# Patient Record
Sex: FEMALE | Race: WHITE | NOT HISPANIC OR LATINO | Employment: OTHER | URBAN - NONMETROPOLITAN AREA
[De-identification: names, ages, dates, MRNs, and addresses within clinical notes are randomized per-mention and may not be internally consistent; named-entity substitution may affect disease eponyms.]

---

## 2017-03-13 ENCOUNTER — AMBULATORY - GICH (OUTPATIENT)
Dept: PEDIATRICS | Facility: OTHER | Age: 82
End: 2017-03-13

## 2017-03-17 ENCOUNTER — OFFICE VISIT - GICH (OUTPATIENT)
Dept: PEDIATRICS | Facility: OTHER | Age: 82
End: 2017-03-17

## 2017-03-17 ENCOUNTER — AMBULATORY - GICH (OUTPATIENT)
Dept: RADIOLOGY | Facility: OTHER | Age: 82
End: 2017-03-17

## 2017-03-17 ENCOUNTER — HISTORY (OUTPATIENT)
Dept: PEDIATRICS | Facility: OTHER | Age: 82
End: 2017-03-17

## 2017-03-17 DIAGNOSIS — M99.83 OTHER BIOMECHANICAL LESIONS OF LUMBAR REGION (CODE): ICD-10-CM

## 2017-03-17 DIAGNOSIS — M12.88 OTHER SPECIFIC ARTHROPATHIES, NOT ELSEWHERE CLASSIFIED, OTHER SPECIFIED SITE: ICD-10-CM

## 2017-03-17 DIAGNOSIS — M05.79 RHEUMATOID ARTHRITIS OF MULTIPLE SITES WITHOUT ORGAN OR SYSTEM INVOLVEMENT WITH POSITIVE RHEUMATOID FACTOR (H): ICD-10-CM

## 2017-03-17 DIAGNOSIS — R05.9 COUGH: ICD-10-CM

## 2017-03-17 DIAGNOSIS — M48.061 SPINAL STENOSIS OF LUMBAR REGION: ICD-10-CM

## 2017-03-17 DIAGNOSIS — M53.9 DORSOPATHY: ICD-10-CM

## 2017-03-26 LAB
Lab: NEGATIVE

## 2017-03-29 ENCOUNTER — HOSPITAL ENCOUNTER (OUTPATIENT)
Dept: RADIOLOGY | Facility: OTHER | Age: 82
End: 2017-03-29
Attending: INTERNAL MEDICINE

## 2017-03-29 DIAGNOSIS — M12.88 OTHER SPECIFIC ARTHROPATHIES, NOT ELSEWHERE CLASSIFIED, OTHER SPECIFIED SITE: ICD-10-CM

## 2017-03-29 DIAGNOSIS — M99.83 OTHER BIOMECHANICAL LESIONS OF LUMBAR REGION (CODE): ICD-10-CM

## 2017-03-29 DIAGNOSIS — M53.9 DORSOPATHY: ICD-10-CM

## 2017-03-29 DIAGNOSIS — M05.79 RHEUMATOID ARTHRITIS OF MULTIPLE SITES WITHOUT ORGAN OR SYSTEM INVOLVEMENT WITH POSITIVE RHEUMATOID FACTOR (H): ICD-10-CM

## 2017-03-29 DIAGNOSIS — M48.061 SPINAL STENOSIS OF LUMBAR REGION: ICD-10-CM

## 2017-04-21 ENCOUNTER — AMBULATORY - GICH (OUTPATIENT)
Dept: PEDIATRICS | Facility: OTHER | Age: 82
End: 2017-04-21

## 2017-05-05 ENCOUNTER — COMMUNICATION - GICH (OUTPATIENT)
Dept: PEDIATRICS | Facility: OTHER | Age: 82
End: 2017-05-05

## 2017-05-05 DIAGNOSIS — R73.03 PREDIABETES: ICD-10-CM

## 2017-05-05 DIAGNOSIS — M06.9 RHEUMATOID ARTHRITIS (H): ICD-10-CM

## 2017-05-05 DIAGNOSIS — R73.01 IMPAIRED FASTING GLUCOSE: ICD-10-CM

## 2017-05-11 ENCOUNTER — HISTORY (OUTPATIENT)
Dept: PEDIATRICS | Facility: OTHER | Age: 82
End: 2017-05-11

## 2017-05-11 ENCOUNTER — AMBULATORY - GICH (OUTPATIENT)
Dept: LAB | Facility: OTHER | Age: 82
End: 2017-05-11

## 2017-05-11 ENCOUNTER — OFFICE VISIT - GICH (OUTPATIENT)
Dept: PEDIATRICS | Facility: OTHER | Age: 82
End: 2017-05-11

## 2017-05-11 DIAGNOSIS — M05.79 RHEUMATOID ARTHRITIS OF MULTIPLE SITES WITHOUT ORGAN OR SYSTEM INVOLVEMENT WITH POSITIVE RHEUMATOID FACTOR (H): ICD-10-CM

## 2017-05-11 DIAGNOSIS — M06.9 RHEUMATOID ARTHRITIS (H): ICD-10-CM

## 2017-05-11 DIAGNOSIS — M48.061 SPINAL STENOSIS OF LUMBAR REGION: ICD-10-CM

## 2017-05-11 DIAGNOSIS — M16.0 PRIMARY OSTEOARTHRITIS OF BOTH HIPS: ICD-10-CM

## 2017-05-11 DIAGNOSIS — E83.52 HYPERCALCEMIA: ICD-10-CM

## 2017-05-11 DIAGNOSIS — R73.01 IMPAIRED FASTING GLUCOSE: ICD-10-CM

## 2017-05-11 LAB
A/G RATIO - HISTORICAL: 1.8 (ref 1–2)
ABSOLUTE BASOPHILS - HISTORICAL: 0 THOU/CU MM
ABSOLUTE EOSINOPHILS - HISTORICAL: 0.1 THOU/CU MM
ABSOLUTE LYMPHOCYTES - HISTORICAL: 1.2 THOU/CU MM (ref 0.9–2.9)
ABSOLUTE MONOCYTES - HISTORICAL: 0.5 THOU/CU MM
ABSOLUTE NEUTROPHILS - HISTORICAL: 3.7 THOU/CU MM (ref 1.7–7)
ALBUMIN SERPL-MCNC: 4.9 G/DL (ref 3.5–5.7)
ALP SERPL-CCNC: 50 IU/L (ref 34–104)
ALT (SGPT) - HISTORICAL: 24 IU/L (ref 7–52)
ANION GAP - HISTORICAL: 11 (ref 5–18)
AST SERPL-CCNC: 21 IU/L (ref 13–39)
BASOPHILS # BLD AUTO: 0.6 %
BILIRUB SERPL-MCNC: 0.5 MG/DL (ref 0.3–1)
BUN SERPL-MCNC: 20 MG/DL (ref 7–25)
BUN/CREAT RATIO - HISTORICAL: 24
CALCIUM SERPL-MCNC: 11 MG/DL (ref 8.6–10.3)
CHLORIDE SERPLBLD-SCNC: 103 MMOL/L (ref 98–107)
CO2 SERPL-SCNC: 23 MMOL/L (ref 21–31)
CREAT SERPL-MCNC: 0.85 MG/DL (ref 0.7–1.3)
EOSINOPHIL NFR BLD AUTO: 1.9 %
ERYTHROCYTE [DISTWIDTH] IN BLOOD BY AUTOMATED COUNT: 14.3 % (ref 11.5–15.5)
ESTIMATED AVERAGE GLUCOSE: 103 MG/DL
GFR IF NOT AFRICAN AMERICAN - HISTORICAL: >60 ML/MIN/1.73M2
GLOBULIN - HISTORICAL: 2.8 G/DL (ref 2–3.7)
GLUCOSE SERPL-MCNC: 107 MG/DL (ref 70–105)
HCT VFR BLD AUTO: 38.3 % (ref 33–51)
HEMOGLOBIN A1C MONITORING (POCT) - HISTORICAL: 5.2 % (ref 4–6.2)
HEMOGLOBIN: 12.4 G/DL (ref 12–16)
LYMPHOCYTES NFR BLD AUTO: 22 % (ref 20–44)
MCH RBC QN AUTO: 31.9 PG (ref 26–34)
MCHC RBC AUTO-ENTMCNC: 32.3 G/DL (ref 32–36)
MCV RBC AUTO: 99 FL (ref 80–100)
MONOCYTES NFR BLD AUTO: 9.6 %
NEUTROPHILS NFR BLD AUTO: 66 % (ref 42–72)
PARATHYROID HORMONE INTACT: 81.3 PG/ML (ref 12–88)
PLATELET # BLD AUTO: 324 THOU/CU MM (ref 140–440)
PMV BLD: 5.7 FL (ref 6.5–11)
POTASSIUM SERPL-SCNC: 4 MMOL/L (ref 3.5–5.1)
PROT SERPL-MCNC: 7.7 G/DL (ref 6.4–8.9)
RED BLOOD COUNT - HISTORICAL: 3.88 MIL/CU MM (ref 4–5.2)
SODIUM SERPL-SCNC: 137 MMOL/L (ref 133–143)
VITAMIN D TOTAL - HISTORICAL: 36.4 NG/ML
WHITE BLOOD COUNT - HISTORICAL: 5.6 THOU/CU MM (ref 4.5–11)

## 2017-05-11 ASSESSMENT — ANXIETY QUESTIONNAIRES
2. NOT BEING ABLE TO STOP OR CONTROL WORRYING: NOT AT ALL
GAD7 TOTAL SCORE: 0
4. TROUBLE RELAXING: NOT AT ALL
7. FEELING AFRAID AS IF SOMETHING AWFUL MIGHT HAPPEN: NOT AT ALL
6. BECOMING EASILY ANNOYED OR IRRITABLE: NOT AT ALL
5. BEING SO RESTLESS THAT IT IS HARD TO SIT STILL: NOT AT ALL
3. WORRYING TOO MUCH ABOUT DIFFERENT THINGS: NOT AT ALL
1. FEELING NERVOUS, ANXIOUS, OR ON EDGE: NOT AT ALL

## 2017-05-16 LAB — PTH-RELATED PEPTIDE - HISTORICAL: <0.2 PMOL/L

## 2017-05-22 ENCOUNTER — HOSPITAL ENCOUNTER (OUTPATIENT)
Dept: RADIOLOGY | Facility: OTHER | Age: 82
End: 2017-05-22
Attending: INTERNAL MEDICINE

## 2017-05-22 DIAGNOSIS — M16.0 PRIMARY OSTEOARTHRITIS OF BOTH HIPS: ICD-10-CM

## 2017-06-07 ENCOUNTER — AMBULATORY - GICH (OUTPATIENT)
Dept: RADIOLOGY | Facility: OTHER | Age: 82
End: 2017-06-07

## 2017-06-07 DIAGNOSIS — M16.0 PRIMARY OSTEOARTHRITIS OF BOTH HIPS: ICD-10-CM

## 2017-06-27 ENCOUNTER — HOSPITAL ENCOUNTER (OUTPATIENT)
Dept: RADIOLOGY | Facility: OTHER | Age: 82
End: 2017-06-27
Attending: INTERNAL MEDICINE

## 2017-06-27 DIAGNOSIS — M16.0 PRIMARY OSTEOARTHRITIS OF BOTH HIPS: ICD-10-CM

## 2017-07-13 ENCOUNTER — AMBULATORY - GICH (OUTPATIENT)
Dept: SCHEDULING | Facility: OTHER | Age: 82
End: 2017-07-13

## 2017-07-17 ENCOUNTER — AMBULATORY - GICH (OUTPATIENT)
Dept: PEDIATRICS | Facility: OTHER | Age: 82
End: 2017-07-17

## 2017-08-08 ENCOUNTER — COMMUNICATION - GICH (OUTPATIENT)
Dept: PEDIATRICS | Facility: OTHER | Age: 82
End: 2017-08-08

## 2017-08-08 DIAGNOSIS — M06.9 RHEUMATOID ARTHRITIS (H): ICD-10-CM

## 2017-08-11 ENCOUNTER — HISTORY (OUTPATIENT)
Dept: PEDIATRICS | Facility: OTHER | Age: 82
End: 2017-08-11

## 2017-08-11 ENCOUNTER — OFFICE VISIT - GICH (OUTPATIENT)
Dept: PEDIATRICS | Facility: OTHER | Age: 82
End: 2017-08-11

## 2017-08-11 DIAGNOSIS — M05.79 RHEUMATOID ARTHRITIS OF MULTIPLE SITES WITHOUT ORGAN OR SYSTEM INVOLVEMENT WITH POSITIVE RHEUMATOID FACTOR (H): ICD-10-CM

## 2017-08-11 LAB
ABSOLUTE BASOPHILS - HISTORICAL: 0 THOU/CU MM
ABSOLUTE EOSINOPHILS - HISTORICAL: 0.2 THOU/CU MM
ABSOLUTE IMMATURE GRANULOCYTES(METAS,MYELOS,PROS) - HISTORICAL: 0 THOU/CU MM
ABSOLUTE LYMPHOCYTES - HISTORICAL: 1 THOU/CU MM (ref 0.9–2.9)
ABSOLUTE MONOCYTES - HISTORICAL: 0.4 THOU/CU MM
ABSOLUTE NEUTROPHILS - HISTORICAL: 4 THOU/CU MM (ref 1.7–7)
BASOPHILS # BLD AUTO: 0.4 %
C-REACTIVE PROTEIN - HISTORICAL: <1 MG/DL
EOSINOPHIL NFR BLD AUTO: 2.7 %
ERYTHROCYTE [DISTWIDTH] IN BLOOD BY AUTOMATED COUNT: 15.4 % (ref 11.5–15.5)
ERYTHROCYTE [SEDIMENTATION RATE] IN BLOOD: 16 MM/HR
HCT VFR BLD AUTO: 32.7 % (ref 33–51)
HEMOGLOBIN: 10.6 G/DL (ref 12–16)
IMMATURE GRANULOCYTES(METAS,MYELOS,PROS) - HISTORICAL: 0.5 %
LYMPHOCYTES NFR BLD AUTO: 17.7 % (ref 20–44)
MCH RBC QN AUTO: 29.5 PG (ref 26–34)
MCHC RBC AUTO-ENTMCNC: 32.4 G/DL (ref 32–36)
MCV RBC AUTO: 91 FL (ref 80–100)
MONOCYTES NFR BLD AUTO: 7.5 %
NEUTROPHILS NFR BLD AUTO: 71.2 % (ref 42–72)
PLATELET # BLD AUTO: 255 THOU/CU MM (ref 140–440)
PMV BLD: 9 FL (ref 6.5–11)
RED BLOOD COUNT - HISTORICAL: 3.59 MIL/CU MM (ref 4–5.2)
WHITE BLOOD COUNT - HISTORICAL: 5.6 THOU/CU MM (ref 4.5–11)

## 2017-08-11 ASSESSMENT — ANXIETY QUESTIONNAIRES
7. FEELING AFRAID AS IF SOMETHING AWFUL MIGHT HAPPEN: NOT AT ALL
5. BEING SO RESTLESS THAT IT IS HARD TO SIT STILL: NOT AT ALL
3. WORRYING TOO MUCH ABOUT DIFFERENT THINGS: NOT AT ALL
GAD7 TOTAL SCORE: 0
2. NOT BEING ABLE TO STOP OR CONTROL WORRYING: NOT AT ALL
6. BECOMING EASILY ANNOYED OR IRRITABLE: NOT AT ALL
1. FEELING NERVOUS, ANXIOUS, OR ON EDGE: NOT AT ALL
4. TROUBLE RELAXING: NOT AT ALL

## 2017-08-24 ENCOUNTER — OFFICE VISIT (OUTPATIENT)
Dept: AUDIOLOGY | Facility: OTHER | Age: 82
End: 2017-08-24
Attending: AUDIOLOGIST
Payer: MEDICARE

## 2017-08-24 DIAGNOSIS — H90.3 SENSORINEURAL HEARING LOSS, BILATERAL: Primary | ICD-10-CM

## 2017-08-24 PROCEDURE — 92556 SPEECH AUDIOMETRY COMPLETE: CPT | Performed by: AUDIOLOGIST

## 2017-08-24 PROCEDURE — 92552 PURE TONE AUDIOMETRY AIR: CPT | Performed by: AUDIOLOGIST

## 2017-08-24 NOTE — MR AVS SNAPSHOT
After Visit Summary   8/24/2017    Brenda Barker    MRN: [id number]           Patient Information     Date Of Birth          1935        Visit Information        Provider Department      8/24/2017 11:15 AM Fernanda Luna AuD [facility]        Today's Diagnoses     Sensorineural hearing loss, bilateral    -  1       Follow-ups after your visit        Your next 10 appointments already scheduled     Aug 24, 2017 11:15 AM CDT   (Arrive by 11:00 AM)   Hearing Evaluation with Cristela Erickson   [facility] [place] ([facility] [place] )    [address]  [place] [address]   [phone]           Please see your medical professional for ear cleaning prior to this appointment if you believe wax buildup may be an issue. All patients are required to have a physician's order stating the medical reason for the hearing test. Your doctor can send an electronic order, use their own form or we have provided a form (called Physician's Order for Audiology Services). It states that there is a medical reason for your exam. Without an order you may need to be rescheduled until the order can be obtained.              Who to contact     If you have questions or need follow up information about today's clinic visit or your schedule please contact [facility] directly at [phone].  Normal or non-critical lab and imaging results will be communicated to you by MyChart, letter or phone within 4 business days after the clinic has received the results. If you do not hear from us within 7 days, please contact the clinic through MyChart or phone. If you have a critical or abnormal lab result, we will notify you by phone as soon as possible.  Submit refill requests through [employer] or call your pharmacy and they will forward the refill request to us. Please allow 3 business days for your refill to be completed.          Additional Information About Your Visit         [employer] Information     [employer] gives you secure access to your electronic health record. If you see a primary care provider, you can also send messages to your care team and make appointments. If you have questions, please call your primary care clinic.  If you do not have a primary care provider, please call [phone] and they will assist you.        Care EveryWhere ID     This is your Care EveryWhere ID. This could be used by other organizations to access your [place] medical records  [id number]         Blood Pressure from Last 3 Encounters:   06/28/13 145/79   05/30/13 112/70   05/03/07 116/60    Weight from Last 3 Encounters:   05/30/13 181 lb (82.1 kg)   05/03/07 197 lb (89.4 kg)   04/16/07 197 lb (89.4 kg)              We Performed the Following     AUDIOGRAM/TYMPANOGRAM - INTERFACE        Primary Care Provider Office Phone # Fax #    Rich Keo Dukes [phone]       [facility] [address] [facility] [address]        Equal Access to Services     [facility]: Hadii aad ku hadasho Soomaali, waaxda luqadaha, qaybta kaalmada adeegyada, waxay paigein haymaddy garcia . So [facility] [phone].    ATENCIÓN: Si habla español, tiene a beltran disposición servicios gratuitos de asistencia lingüística. Llame al [phone].    We comply with applicable federal civil rights laws and [place] laws. We do not discriminate on the basis of race, color, national origin, age, disability sex, sexual orientation or gender identity.            Thank you!     Thank you for choosing [facility]  for your care. Our goal is always to provide you with excellent care. Hearing back from our patients is one way we can continue to improve our services. Please take a few minutes to complete the written survey that you may receive in the mail after your visit with us. Thank you!             Your Updated Medication List - Protect others around you: Learn how to safely  use, store and throw away your medicines at www.disposemymeds.org.          This list is accurate as of: 8/24/17 11:08 AM.  Always use your most recent med list.                   Brand Name Dispense Instructions for use Diagnosis    aspirin 81 MG tablet      1 TABLET DAILY        CALCIUM + D PO      1 TABLET DAILY        CENTRUM SILVER per tablet      1 TABLET DAILY        fish oil-omega-3 fatty acids 1000 MG capsule     OTC    2 CAPSULES DAILY  (? DOSE)    Other and unspecified hyperlipidemia       folic acid 1 MG tablet    FOLVITE     4 mg TABS DAILY    Rheumatoid arthritis(714.0), Obesity, unspecified, Other abnormal glucose       hydrochlorothiazide 12.5 MG capsule    MICROZIDE     Take 12.5 mg by mouth daily.        HYDROcodone-acetaminophen 5-500 MG per tablet    VICODIN     Take 1-2 tablets by mouth every 6 hours as needed.        LIQUID TEARS 1.4 % ophthalmic solution   Generic drug:  polyvinyl alcohol      1 drop as needed.        methotrexate 25 MG/ML injection      None Entered        nitroFURantoin macrocrystal 100 MG ED starter pack    MACRODANTIN     Take 1 capsule by mouth daily.        priLOSEC 20 MG CR capsule   Generic drug:  omeprazole     3 MONTHS    ONE DAILY  as needed    Rheumatoid arthritis(714.0), Obesity, unspecified, Other abnormal glucose       * TOPROL XL 50 MG 24 hr tablet   Generic drug:  metoprolol     30    ONE TABLET DAILY    Essential hypertension, benign       * metoprolol 50 MG 24 hr tablet    TOPROL-XL    30    1 TABLET DAILY    Essential hypertension, benign       ZOCOR 20 MG tablet   Generic drug:  simvastatin      Take  by mouth At Bedtime.        ZOVIRAX 400 MG tablet   Generic drug:  acyclovir      Take 400 mg by mouth daily.        * Notice:  This list has 2 medication(s) that are the same as other medications prescribed for you. Read the directions carefully, and ask your doctor or other care provider to review them with you.

## 2017-08-24 NOTE — PROGRESS NOTES
Audiology Evaluation Completed. Please refer SCANNED AUDIOGRAM and/or TYMPANOGRAM for BACKGROUND, RESULTS, RECOMMENDATIONS.         Fernanda MANN, [facility]  Audiologist #[id number]

## 2017-09-06 ENCOUNTER — COMMUNICATION - GICH (OUTPATIENT)
Dept: PEDIATRICS | Facility: OTHER | Age: 82
End: 2017-09-06

## 2017-09-06 DIAGNOSIS — K21.9 GASTRO-ESOPHAGEAL REFLUX DISEASE WITHOUT ESOPHAGITIS: ICD-10-CM

## 2017-09-10 ENCOUNTER — AMBULATORY - GICH (OUTPATIENT)
Dept: SCHEDULING | Facility: OTHER | Age: 82
End: 2017-09-10

## 2017-09-11 ENCOUNTER — AMBULATORY - GICH (OUTPATIENT)
Dept: PEDIATRICS | Facility: OTHER | Age: 82
End: 2017-09-11

## 2017-09-11 DIAGNOSIS — M54.9 DORSALGIA: ICD-10-CM

## 2017-09-11 DIAGNOSIS — R26.81 UNSTEADINESS ON FEET: ICD-10-CM

## 2017-10-05 ENCOUNTER — AMBULATORY - GICH (OUTPATIENT)
Dept: PEDIATRICS | Facility: OTHER | Age: 82
End: 2017-10-05

## 2017-10-18 ENCOUNTER — AMBULATORY - GICH (OUTPATIENT)
Dept: SCHEDULING | Facility: OTHER | Age: 82
End: 2017-10-18

## 2017-10-20 ENCOUNTER — OFFICE VISIT (OUTPATIENT)
Dept: FAMILY MEDICINE | Facility: CLINIC | Age: 82
End: 2017-10-20
Payer: COMMERCIAL

## 2017-10-20 VITALS
DIASTOLIC BLOOD PRESSURE: 60 MMHG | TEMPERATURE: 97.9 F | HEIGHT: 63 IN | OXYGEN SATURATION: 99 % | BODY MASS INDEX: 30.83 KG/M2 | SYSTOLIC BLOOD PRESSURE: 118 MMHG | HEART RATE: 78 BPM | WEIGHT: 174 LBS

## 2017-10-20 DIAGNOSIS — M05.79 RHEUMATOID ARTHRITIS INVOLVING MULTIPLE SITES WITH POSITIVE RHEUMATOID FACTOR (H): Primary | ICD-10-CM

## 2017-10-20 DIAGNOSIS — D33.3 BENIGN NEOPLASM OF CRANIAL NERVE (H): ICD-10-CM

## 2017-10-20 DIAGNOSIS — Z79.899 HIGH RISK MEDICATION USE: ICD-10-CM

## 2017-10-20 DIAGNOSIS — Z91.81 AT RISK FOR FALLING: ICD-10-CM

## 2017-10-20 DIAGNOSIS — E66.811 CLASS 1 OBESITY WITHOUT SERIOUS COMORBIDITY IN ADULT, UNSPECIFIED BMI, UNSPECIFIED OBESITY TYPE: ICD-10-CM

## 2017-10-20 PROCEDURE — 99202 OFFICE O/P NEW SF 15 MIN: CPT | Performed by: FAMILY MEDICINE

## 2017-10-20 RX ORDER — MELOXICAM 7.5 MG/1
7.5 TABLET ORAL
COMMUNITY
Start: 2016-12-15 | End: 2018-03-13

## 2017-10-20 RX ORDER — CHOLECALCIFEROL (VITAMIN D3) 25 MCG
2000 CAPSULE ORAL DAILY
COMMUNITY
End: 2022-05-24

## 2017-10-20 ASSESSMENT — PAIN SCALES - GENERAL: PAINLEVEL: SEVERE PAIN (6)

## 2017-10-20 NOTE — PROGRESS NOTES
SUBJECTIVE:   Brenda Barker is a 82 year old female who presents to clinic today for the following health issues:    New Patient/Transfer of Care - Transfer of Care from [facility] in [place]      Rheumatoid arthritis and history of left acoustic neuroma removed in 1998. Left total knee replacement in 2008. Moved down to [facility] [place]. Living in [facility] independent living. Medications, immunizations and labs all up to date. Appointment with new rheumatologist in 1 month. He will be doing labs.       Problem list and histories reviewed & adjusted, as indicated.  Additional history: as documented    Patient Active Problem List   Diagnosis     Benign neoplasm of cranial nerve (H)     Hemorrhoids     Aortic valve disorder     Obesity     Cervicalgia     Rheumatoid arthritis (H)     SNHL (sensorineural hearing loss)     Status post total left knee replacement     Past Surgical History:   Procedure Laterality Date     C DEXA, BONE DENSITY, AXIAL SKEL  7/03 7/03 L1-4 1.7, FemNkL-0.8,R-1.2, FArm Px 0.2,Dist-0.3     C REMOVAL OF OVARY(S)  1980 2nd ovary removed - endometriosis     C TOTAL ABDOM HYSTERECTOMY  1972    uterus, ovary removed - fibroid     HC COLONOSCOPY THRU STOMA W BIOPSY/CAUTERY TUMOR/POLYP/LESION  1993    adenomatous polyp     HC COLONOSCOPY THRU STOMA, DIAGNOSTIC  1998, 10/03    normal, '03 rec repeat in 5 yrs due to +hx polyp and + fam hx     HC FLEX SIGMOIDOSCOPY W/WO KARYNA SPEC BY BRUSH/WASH  12/2001     HC REMOVAL OF TONSILS,<11 Y/O  1942     SURGICAL HISTORY OF -   1989    basal cell Ca scalp excised     SURGICAL HISTORY OF -   1998    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       polyvinyl alcohol (LIQUID TEARS) 1.4 % ophthalmic solution 1 drop as needed.       hydrochlorothiazide (MICROZIDE) 12.5 MG capsule Take 12.5 mg by mouth daily.       simvastatin (ZOCOR) 20 MG tablet Take  by mouth At Bedtime.       METOPROLOL SUCCINATE# 50 MG OR TB24 1 TABLET DAILY 30 6     FOLIC ACID 1 MG OR TABS 4 mg TABS DAILY       PRILOSEC 20 MG OR CPDR ONE DAILY  as needed 3 MONTHS 1 YEAR     METHOTREXATE SODIUM 25 MG/ML IJ SOLN None Entered       CENTRUM SILVER OR TABS 1 TABLET DAILY       ASPIRIN 81 MG OR TABS 1 TABLET DAILY       TOPROL XL# 50 MG OR TB24 ONE TABLET DAILY 30 1 YEAR     FISH OIL 1000 MG OR CAPS 2 CAPSULES DAILY  (? DOSE) OTC --     acyclovir (ZOVIRAX) 400 MG tablet Take 400 mg by mouth daily.       Allergies   Allergen Reactions     Ciprofloxacin      nausea     Contrast Dye      Hives,anaphylaxix     Sulfasalazine      Other reaction(s): Rash     No lab results found.   BP Readings from Last 3 Encounters:   10/20/17 118/60   06/28/13 145/79   05/30/13 112/70    Wt Readings from Last 3 Encounters:   10/20/17 174 lb (78.9 kg)   05/30/13 181 lb (82.1 kg)   05/03/07 197 lb (89.4 kg)                  Labs reviewed in EPIC          Reviewed and updated as needed this visit by clinical staffTobacco  Allergies  Meds  Med Hx  Surg Hx  Fam Hx  Soc Hx      Reviewed and updated as needed this visit by Provider         ROS:  Constitutional, HEENT, cardiovascular, pulmonary, gi and gu systems are negative, except as otherwise noted.      OBJECTIVE:   /60 (BP Location: Right arm, Patient Position: Chair, Cuff Size: Adult Large)  Pulse 78  Temp 97.9  F (36.6  C)  "(Oral)  Ht 5' 2.58\" (1.59 m)  Wt 174 lb (78.9 kg)  SpO2 99%  Breastfeeding? No  BMI 31.24 kg/m2  Body mass index is 31.24 kg/(m^2).  GENERAL: elderly, alert, well nourished, well hydrated, no distress  HENT: ear canals- normal; TMs- normal; Nose- normal; Mouth- no ulcers, no lesions, missing dentition  NECK: no tenderness, no adenopathy, no asymmetry, no masses, no stiffness; thyroid- normal to palpation  RESP: lungs clear to auscultation - no rales, no rhonchi, no wheezes  CV: regular rates and rhythm, normal S1 S2, no S3 or S4 and no murmur, no click or rub, normal pulses  ABDOMEN: soft, no tenderness, no  hepatosplenomegaly, no masses, normal bowel sounds  MS: extremities- no gross deformities noted, no edema  SKIN: no suspicious lesions, no rashes, age related skin changes with seborrheic keratosis and no actinic keratosis.    NEURO: strength and tone- decreased, sensory exam- grossly normal, reflexes- symmetric  BACK: no CVA tenderness, no paralumbar tenderness  MENTAL STATUS EXAM:  Appearance/Behavior: no apparent distress, neatly groomed, dressed appropriately for weather, appears stated age and is frail-appearing  Speech: normal  Mood/Affect: normal affect  Insight: Good     Diagnostic Test Results:  none     ASSESSMENT/PLAN:         Tobacco Cessation:   reports that she has never smoked. She has never used smokeless tobacco.      BMI:   Estimated body mass index is 31.24 kg/(m^2) as calculated from the following:    Height as of this encounter: 5' 2.58\" (1.59 m).    Weight as of this encounter: 174 lb (78.9 kg).   Weight management plan: Discussed healthy diet and exercise guidelines and patient will follow up in 3 months in clinic to re-evaluate.            ICD-10-CM    1. Rheumatoid arthritis involving multiple sites with positive rheumatoid factor (H) M05.79 Stable on methotrexate and monitored by rheumatologist   2. At risk for falling Z91.81 Stable and uses walker with wheels   3. 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 Class 1 obesity without serious comorbidity in adult, unspecified BMI, unspecified obesity type E66.9 Weight loss counseling done        CONSULTATION/REFERRAL to rheumatology as scheduled.   FUTURE LABS:       - Schedule fasting labs in 3 months  FUTURE APPOINTMENTS:       - Follow-up visit in 3 months or sooner if any questions or concerns.   Work on weight loss  Regular exercise  See Patient Instructions    Liudmila Bowie MD  [facility]  \

## 2017-10-20 NOTE — MR AVS SNAPSHOT
After Visit Summary   10/20/2017    Brenda Barker    MRN: [id number]           Patient Information     Date Of Birth          1935        Visit Information        Provider Department      10/20/2017 1:20 PM Liudmila Bowie MD [facility]        Today's Diagnoses     Rheumatoid arthritis involving multiple sites with positive rheumatoid factor (H)    -  1    At risk for falling        Benign neoplasm of cranial nerve (H)          Care Instructions    How to contact your care team: [phone] Pharmacy [phone]   MD EFRAIN STEWART PA-C CHRIS JONES, PA-C NAM HO, MD JONATHAN BATES, MD ARVIN VOCAL, MD    Clinic hours M-Th 7am-7pm Fri 7am-5pm.   Urgent care M-F 11am-9pm  Sat/Sun 9am-5pm.   Pharmacy   Mon-Th:  8:00am-8pm   Fri:  8:00am-6:00pm  Sat/Sun  8:00am-5:00 pm               Follow-ups after your visit        Follow-up notes from your care team     Return in about 3 months (around 1/20/2018) for Physical Exam, Lab Work, medication follow up.      Who to contact     If you have questions or need follow up information about today's clinic visit or your schedule please contact [facility] directly at [phone].  Normal or non-critical lab and imaging results will be communicated to you by MyChart, letter or phone within 4 business days after the clinic has received the results. If you do not hear from us within 7 days, please contact the clinic through [employer] or phone. If you have a critical or abnormal lab result, we will notify you by phone as soon as possible.  Submit refill requests through [employer] or call your pharmacy and they will forward the refill request to us. Please allow 3 business days for your refill to be completed.          Additional Information About Your Visit        [employer] Information     [employer] gives you secure access to your electronic health record. If you see a primary care provider, you can also  "send messages to your care team and make appointments. If you have questions, please call your primary care clinic.  If you do not have a primary care provider, please call [phone] and they will assist you.        Care EveryWhere ID     This is your Care EveryWhere ID. This could be used by other organizations to access your [place] medical records  [id number]        Your Vitals Were     Pulse Temperature Height Pulse Oximetry Breastfeeding? BMI (Body Mass Index)    78 97.9  F (36.6  C) (Oral) 5' 2.58\" (1.59 m) 99% No 31.24 kg/m2       Blood Pressure from Last 3 Encounters:   10/20/17 118/60   06/28/13 145/79   05/30/13 112/70    Weight from Last 3 Encounters:   10/20/17 174 lb (78.9 kg)   05/30/13 181 lb (82.1 kg)   05/03/07 197 lb (89.4 kg)              Today, you had the following     No orders found for display       Primary Care Provider Office Phone # Fax #    Liudmila Sindy Bowie [phone]       [address]  [facility] [address]        Equal Access to Services     [facility] : Hadii aad ku hadasho Soomaali, waaxda luqadaha, qaybta kaalmada adeegyada, waxay lesia haymulugetan ramsey lee la'mulugetan ah. So [facility] [phone].    ATENCIÓN: Si habla español, tiene a beltran disposición servicios gratuitos de asistencia lingüística. [facility] [phone].    We comply with applicable federal civil rights laws and [place] laws. We do not discriminate on the basis of race, color, national origin, age, disability, sex, sexual orientation, or gender identity.            Thank you!     Thank you for choosing [facility]  for your care. Our goal is always to provide you with excellent care. Hearing back from our patients is one way we can continue to improve our services. Please take a few minutes to complete the written survey that you may receive in the mail after your visit with us. Thank you!             Your Updated Medication List - Protect others around you: Learn how to " safely use, store and throw away your medicines at www.disposemymeds.org.          This list is accurate as of: 10/20/17  2:12 PM.  Always use your most recent med list.                   Brand Name Dispense Instructions for use Diagnosis    aspirin 81 MG tablet      1 TABLET DAILY        CENTRUM SILVER per tablet      1 TABLET DAILY        fish oil-omega-3 fatty acids 1000 MG capsule     OTC    2 CAPSULES DAILY  (? DOSE)    Other and unspecified hyperlipidemia       folic acid 1 MG tablet    FOLVITE     4 mg TABS DAILY    Rheumatoid arthritis(714.0), Obesity, unspecified, Other abnormal glucose       hydrochlorothiazide 12.5 MG capsule    MICROZIDE     Take 12.5 mg by mouth daily.        LIQUID TEARS 1.4 % ophthalmic solution   Generic drug:  polyvinyl alcohol      1 drop as needed.        meloxicam 7.5 MG tablet    MOBIC     Take 7.5 mg by mouth        methotrexate 25 MG/ML injection      None Entered        nitroFURantoin macrocrystal 100 MG ED starter pack    MACRODANTIN     Take 1 capsule by mouth daily.        priLOSEC 20 MG CR capsule   Generic drug:  omeprazole     3 MONTHS    ONE DAILY  as needed    Rheumatoid arthritis(714.0), Obesity, unspecified, Other abnormal glucose       * TOPROL XL 50 MG 24 hr tablet   Generic drug:  metoprolol     30    ONE TABLET DAILY    Essential hypertension, benign       * metoprolol 50 MG 24 hr tablet    TOPROL-XL    30    1 TABLET DAILY    Essential hypertension, benign       Vitamin D-3 1000 UNITS Caps      Take by mouth daily        ZOCOR 20 MG tablet   Generic drug:  simvastatin      Take  by mouth At Bedtime.        ZOVIRAX 400 MG tablet   Generic drug:  acyclovir      Take 400 mg by mouth daily.        * Notice:  This list has 2 medication(s) that are the same as other medications prescribed for you. Read the directions carefully, and ask your doctor or other care provider to review them with you.

## 2017-10-20 NOTE — NURSING NOTE
"Chief Complaint   Patient presents with     Establish Care     Transfer from [facility] in [place]       Initial /60 (BP Location: Right arm, Patient Position: Chair, Cuff Size: Adult Large)  Pulse 78  Temp 97.9  F (36.6  C) (Oral)  Ht 5' 2.58\" (1.59 m)  Wt 174 lb (78.9 kg)  SpO2 99%  Breastfeeding? No  BMI 31.24 kg/m2 Estimated body mass index is 31.24 kg/(m^2) as calculated from the following:    Height as of this encounter: 5' 2.58\" (1.59 m).    Weight as of this encounter: 174 lb (78.9 kg).  Medication Reconciliation: complete     Maxwell Villareal CMA    "

## 2017-10-20 NOTE — PATIENT INSTRUCTIONS
How to contact your care team: [phone] Pharmacy [phone]   MD EFRAIN STEWART PA-C CHRIS JONES, PA-C NAM HO, MD JONATHAN BATES, MD ARVIN VOCAL, MD    Clinic hours M-Th 7am-7pm Fri 7am-5pm.   Urgent care M-F 11am-9pm  Sat/Sun 9am-5pm.   Pharmacy   Mon-Th:  8:00am-8pm   Fri:  8:00am-6:00pm  Sat/Sun  8:00am-5:00 pm

## 2017-10-23 ENCOUNTER — AMBULATORY - GICH (OUTPATIENT)
Dept: PEDIATRICS | Facility: OTHER | Age: 82
End: 2017-10-23

## 2017-10-23 DIAGNOSIS — Z87.440 PERSONAL HISTORY OF URINARY INFECTION: ICD-10-CM

## 2017-10-24 ENCOUNTER — OFFICE VISIT (OUTPATIENT)
Dept: FAMILY MEDICINE | Facility: CLINIC | Age: 82
End: 2017-10-24
Payer: COMMERCIAL

## 2017-10-24 VITALS
HEART RATE: 72 BPM | HEIGHT: 63 IN | WEIGHT: 173 LBS | BODY MASS INDEX: 30.65 KG/M2 | TEMPERATURE: 98.8 F | OXYGEN SATURATION: 95 % | SYSTOLIC BLOOD PRESSURE: 131 MMHG | DIASTOLIC BLOOD PRESSURE: 67 MMHG

## 2017-10-24 DIAGNOSIS — J06.9 URI WITH COUGH AND CONGESTION: Primary | ICD-10-CM

## 2017-10-24 PROCEDURE — 99213 OFFICE O/P EST LOW 20 MIN: CPT | Performed by: FAMILY MEDICINE

## 2017-10-24 RX ORDER — GUAIFENESIN 200 MG/1
400 TABLET ORAL EVERY 4 HOURS PRN
Qty: 60 TABLET | Refills: 1 | Status: SHIPPED | OUTPATIENT
Start: 2017-10-24 | End: 2017-12-19

## 2017-10-24 ASSESSMENT — PAIN SCALES - GENERAL: PAINLEVEL: NO PAIN (1)

## 2017-10-24 NOTE — NURSING NOTE
"Chief Complaint   Patient presents with     Sinus Problem       Initial /67 (BP Location: Right arm, Patient Position: Chair, Cuff Size: Adult Large)  Pulse 72  Temp 98.8  F (37.1  C) (Oral)  Ht 5' 2.58\" (1.59 m)  Wt 173 lb (78.5 kg)  SpO2 95%  Breastfeeding? No  BMI 31.06 kg/m2 Estimated body mass index is 31.06 kg/(m^2) as calculated from the following:    Height as of this encounter: 5' 2.58\" (1.59 m).    Weight as of this encounter: 173 lb (78.5 kg).  Medication Reconciliation: complete     Maxwell Villareal CMA    "

## 2017-10-24 NOTE — PROGRESS NOTES
SUBJECTIVE:   Brenda Barker is a 82 year old female who presents to clinic today for the following health issues:    Acute Illness   Acute illness concerns: Sinus infection  Onset: 10/22/17 Lucien night    Fever: no     Chills/Sweats: YES- shills    Headache (location?): no     Sinus Pressure:YES    Conjunctivitis:  no    Ear Pain: no    Rhinorrhea: YES- slight backdrainage    Congestion: no     Sore Throat: YES     Cough: YES-productive of yellow sputum    Wheeze: no     Decreased Appetite: YES    Nausea: no     Vomiting: no     Diarrhea:  no     Dysuria/Freq.: no     Fatigue/Achiness: YES    Sick/Strep Exposure: YES-  with a cough     Therapies Tried and outcome: Zyrtec daily without relief  Developed allergies in [place] this last year. Had to have steroid shots. Negative antibody for Coccidioidosis.             Problem list and histories reviewed & adjusted, as indicated.  Additional history: as documented    Patient Active Problem List   Diagnosis     Benign neoplasm of cranial nerve (H)     Hemorrhoids     Aortic valve disorder     Cervicalgia     Rheumatoid arthritis (H)     SNHL (sensorineural hearing loss)     Status post total left knee replacement     Obesity     High risk medication use     Past Surgical History:   Procedure Laterality Date     C DEXA, BONE DENSITY, AXIAL SKEL  7/03 7/03 L1-4 1.7, FemNkL-0.8,R-1.2, FArm Px 0.2,Dist-0.3     C REMOVAL OF OVARY(S)  1980 2nd ovary removed - endometriosis     C TOTAL ABDOM HYSTERECTOMY  1972    uterus, ovary removed - fibroid     HC COLONOSCOPY THRU STOMA W BIOPSY/CAUTERY TUMOR/POLYP/LESION  1993    adenomatous polyp     HC COLONOSCOPY THRU STOMA, DIAGNOSTIC  1998, 10/03    normal, '03 rec repeat in 5 yrs due to +hx polyp and + fam hx     HC FLEX SIGMOIDOSCOPY W/WO KARYNA SPEC BY BRUSH/WASH  12/2001     HC REMOVAL OF TONSILS,<11 Y/O  1942     SURGICAL HISTORY OF -   1989    basal cell Ca scalp excised     SURGICAL HISTORY OF -   1998    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       Social History   Substance Use Topics     Smoking status: Never Smoker     Smokeless tobacco: Never Used     Alcohol use Yes      Comment: occasionally     Family History   Problem Relation Age of Onset     GASTROINTESTINAL DISEASE Father      bleeding ulcer     CANCER Mother      thyroid cancer         Current Outpatient Prescriptions   Medication Sig Dispense Refill     guaiFENesin (ORGANIDIN) 200 MG TABS tablet Take 2 tablets (400 mg) by mouth every 4 hours as needed for cough 60 tablet 1     meloxicam (MOBIC) 7.5 MG tablet Take 7.5 mg by mouth       Cholecalciferol (VITAMIN D-3) 1000 UNITS CAPS Take by mouth daily       nitrofurantoin 100 MG Take 1 capsule by mouth daily.       polyvinyl alcohol (LIQUID TEARS) 1.4 % ophthalmic solution 1 drop as needed.       acyclovir (ZOVIRAX) 400 MG tablet Take 400 mg by mouth daily.       hydrochlorothiazide (MICROZIDE) 12.5 MG capsule Take 12.5 mg by mouth daily.       simvastatin (ZOCOR) 20 MG tablet Take  by mouth At Bedtime.       METOPROLOL SUCCINATE# 50 MG OR TB24 1 TABLET DAILY 30 6     FOLIC ACID 1 MG OR TABS 4 mg TABS DAILY       PRILOSEC 20 MG OR CPDR ONE DAILY  as needed 3 MONTHS 1 YEAR     METHOTREXATE SODIUM 25 MG/ML IJ SOLN None Entered       CENTRUM SILVER OR TABS 1 TABLET DAILY       ASPIRIN 81 MG OR TABS 1 TABLET DAILY       FISH OIL 1000 MG OR CAPS 2 CAPSULES DAILY  (? DOSE) OTC --     Allergies   Allergen Reactions     Ciprofloxacin      nausea     Contrast Dye      Hives,anaphylaxix     Sulfasalazine      Other reaction(s): Rash     No lab results found.   BP Readings from Last 3 Encounters:   10/24/17 131/67   10/20/17 118/60   06/28/13 145/79    Wt Readings from Last 3 Encounters:   10/24/17 173 lb (78.5 kg)   10/20/17 174 lb (78.9 kg)   05/30/13 181 lb (82.1 kg)                  Labs reviewed in EPIC          Reviewed and updated as needed this visit by clinical staffTobacco  Allergies  Meds      "  Reviewed and updated as needed this visit by Provider         ROS:  Constitutional, HEENT, cardiovascular, pulmonary, gi and gu systems are negative, except as otherwise noted.      OBJECTIVE:   /67 (BP Location: Right arm, Patient Position: Chair, Cuff Size: Adult Large)  Pulse 72  Temp 98.8  F (37.1  C) (Oral)  Ht 5' 2.58\" (1.59 m)  Wt 173 lb (78.5 kg)  SpO2 95%  Breastfeeding? No  BMI 31.06 kg/m2  Body mass index is 31.06 kg/(m^2).  GENERAL: acutely ill, alert, well nourished, well hydrated, no distress, with cough  HENT: ear canals- normal; TMs- normal; Nose- rhinorrhea ; Mouth- no ulcers, no lesions, throat is erythematous without exudate. Sinuses without tenderness to percussion.   NECK: no tenderness, negative anterior cervical adenopathy, no asymmetry, no masses, no stiffness; thyroid- normal to palpation  RESP: lungs clear to auscultation - no rales, no rhonchi, some expiratory wheezes  CV: regular rates and rhythm, normal S1 S2, no S3 or S4 and no murmur, no click or rub -  ABDOMEN: soft, no tenderness, no  hepatosplenomegaly, no masses, normal bowel sounds  MS: extremities- no gross deformities noted, no edema  SKIN: no suspicious lesions, no rashes  PSYCH: Alert and oriented times 3; affect- normal         ASSESSMENT/PLAN:         Tobacco Cessation:   reports that she has never smoked. She has never used smokeless tobacco.      BMI:   Estimated body mass index is 31.06 kg/(m^2) as calculated from the following:    Height as of this encounter: 5' 2.58\" (1.59 m).    Weight as of this encounter: 173 lb (78.5 kg).   Weight management plan: Discussed healthy diet and exercise guidelines and patient will follow up in 3 months in clinic to re-evaluate.            ICD-10-CM    1. URI with cough and congestion J06.9 guaiFENesin (ORGANIDIN) 200 MG TABS tablet every 4 hours as needed. Symptomatic treatment with rest, fluids, tylenol and OTC cold medications as needed. Call if symptoms worse or do not " improve for further evaluation and treatment. Discussed no antibiotics unless symptoms persistent or signs of bacterial infection.        FUTURE APPOINTMENTS:       - Follow-up for annual visit or as needed  See Patient Instructions    Liudmila Bowie MD  [facility]

## 2017-10-24 NOTE — MR AVS SNAPSHOT
After Visit Summary   10/24/2017    Brenda Barker    MRN: [id number]           Patient Information     Date Of Birth          1935        Visit Information        Provider Department      10/24/2017 11:20 AM Liudmila Bowie MD [facility]        Today's Diagnoses     URI with cough and congestion    -  1      Care Instructions    At [facility], we strive to deliver an exceptional experience to you, every time we see you.  If you receive a survey in the mail, please send us back your thoughts. We really do value your feedback.    Based on your medical history, these are the current health maintenance/preventive care services that you are due for (some may have been done at this visit.)  Health Maintenance Due   Topic Date Due     DEPRESSION ACTION PLAN Q1 YR  08/28/1955     PHQ-9 Q6 MONTHS  08/28/1953     ADVANCE DIRECTIVE PLANNING Q5 YRS  08/28/1992         Suggested websites for health information:  Www.Bikmo : Up to date and easily searchable information on multiple topics.  Www.medlineplus.gov : medication info, interactive tutorials, watch real surgeries online  Www.familydoctor.org : good info from the Academy of Family Physicians  Www.cdc.gov : public health info, travel advisories, epidemics (H1N1)  Www.aap.org : children's health info, normal development, vaccinations  Www.health[place] : [place] dept of health, public health issues in [place], N1N1    Your care team:                            Family Medicine Internal Medicine   MD Jose Alejandro Floyd MD Shantel Branch-Fleming, MD Katya Georgiev PA-C Nam Ho, MD Pediatrics   VINITA Gutiérrez, MD Kika Olivia CNP, MD Deborah Mielke, MD Kim Thein, APRHIREN CNP      Clinic hours: Monday - Thursday 7 am-7 pm; Fridays 7 am-5 pm.   Urgent care: Monday - Friday 11 am-9 pm; Saturday and Sunday 9 am-5 pm.  Pharmacy :  Monday -Thursday 8 am-8 pm; Friday 8 am-6 pm; Saturday and Sunday 9 am-5 pm.     Clinic: [phone]   Pharmacy: [phone]    Viral Upper Respiratory Illness (Adult)  You have a viral upper respiratory illness (URI), which is another term for the common cold. This illness is contagious during the first few days. It is spread through the air by coughing and sneezing. It may also be spread by direct contact (touching the sick person and then touching your own eyes, nose, or mouth). Frequent handwashing will decrease risk of spread. Most viral illnesses go away within 7 to 10 days with rest and simple home remedies. Sometimes the illness may last for several weeks. Antibiotics will not kill a virus, and they are generally not prescribed for this condition.    Home care    If symptoms are severe, rest at home for the first 2 to 3 days. When you resume activity, don't let yourself get too tired.    Avoid being exposed to cigarette smoke (yours or others ).    You may use acetaminophen or ibuprofen to control pain and fever, unless another medicine was prescribed. (Note: If you have chronic liver or kidney disease, have ever had a stomach ulcer or gastrointestinal bleeding, or are taking blood-thinning medicines, talk with your healthcare provider before using these medicines.) Aspirin should never be given to anyone under 18 years of age who is ill with a viral infection or fever. It may cause severe liver or brain damage.    Your appetite may be poor, so a light diet is fine. Avoid dehydration by drinking 6 to 8 glasses of fluids per day (water, soft drinks, juices, tea, or soup). Extra fluids will help loosen secretions in the nose and lungs.    Over-the-counter cold medicines will not shorten the length of time you re sick, but they may be helpful for the following symptoms: cough, sore throat, and nasal and sinus congestion. (Note: Do not use decongestants if you have high blood pressure.)  Follow-up  care  Follow up with your healthcare provider, or as advised.  When to seek medical advice  Call your healthcare provider right away if any of these occur:    Cough with lots of colored sputum (mucus)    Severe headache; face, neck, or ear pain    Difficulty swallowing due to throat pain    Fever of 100.4 F (38 C)  Call 911, or get immediate medical care  Call emergency services right away if any of these occur:    Chest pain, shortness of breath, wheezing, or difficulty breathing    Coughing up blood    Inability to swallow due to throat pain  Date Last Reviewed: 9/13/2015 2000-2017 [employer]. [address] [place] [address]. All rights reserved. This information is not intended as a substitute for professional medical care. Always follow your healthcare professional's instructions.                Follow-ups after your visit        Who to contact     If you have questions or need follow up information about today's clinic visit or your schedule please contact [facility] directly at [phone].  Normal or non-critical lab and imaging results will be communicated to you by [email or web address], letter or phone within 4 business days after the clinic has received the results. If you do not hear from us within 7 days, please contact the clinic through [email or web address] or phone. If you have a critical or abnormal lab result, we will notify you by phone as soon as possible.  Submit refill requests through [employer] or call your pharmacy and they will forward the refill request to us. Please allow 3 business days for your refill to be completed.          Additional Information About Your Visit        [employer] Information     [employer] gives you secure access to your electronic health record. If you see a primary care provider, you can also send messages to your care team and make appointments. If you have questions, please call your primary care clinic.  If you do not have a primary care provider,  "please call [phone] and they will assist you.        Care EveryWhere ID     This is your Care EveryWhere ID. This could be used by other organizations to access your [facility] medical records  [id number]        Your Vitals Were     Pulse Temperature Height Pulse Oximetry Breastfeeding? BMI (Body Mass Index)    72 98.8  F (37.1  C) (Oral) 5' 2.58\" (1.59 m) 95% No 31.06 kg/m2       Blood Pressure from Last 3 Encounters:   10/24/17 131/67   10/20/17 118/60   06/28/13 145/79    Weight from Last 3 Encounters:   10/24/17 173 lb (78.5 kg)   10/20/17 174 lb (78.9 kg)   05/30/13 181 lb (82.1 kg)              Today, you had the following     No orders found for display         Today's Medication Changes          These changes are accurate as of: 10/24/17 12:01 PM.  If you have any questions, ask your nurse or doctor.               Start taking these medicines.        Dose/Directions    guaiFENesin 200 MG Tabs tablet   Commonly known as:  ORGANIDIN   Used for:  URI with cough and congestion   Started by:  Liudmila Bowie MD        Dose:  400 mg   Take 2 tablets (400 mg) by mouth every 4 hours as needed for cough   Quantity:  60 tablet   Refills:  1         These medicines have changed or have updated prescriptions.        Dose/Directions    metoprolol 50 MG 24 hr tablet   Commonly known as:  TOPROL-XL   This may have changed:  Another medication with the same name was removed. Continue taking this medication, and follow the directions you see here.   Used for:  Essential hypertension, benign   Changed by:  Lori Shay MD        1 TABLET DAILY   Quantity:  30   Refills:  6            Where to get your medicines      These medications were [place] [facility] [place] - [address]  [address], [facility] [address]     Phone:  [phone]     guaiFENesin 200 MG Tabs tablet                Primary Care Provider Office Phone # Fax #    Liudmila Bowie [phone] " [phone]       [address]  [place] [facility] [address]        Equal Access to Services     MALATHI CANNON : Hadii aad ku hadjohnathono Soyvonali, waaxda luqadaha, qaybta kaalmada adepenelopeda, liliana paigein hayaaargelia quileslavern lee laRicardomaddy del valle. So [facility] [phone].    ATENCIÓN: Si habla español, tiene a beltran disposición servicios gratuitos de asistencia lingüística. Llame al [phone].    We comply with applicable federal civil rights laws and [place] laws. We do not discriminate on the basis of race, color, national origin, age, disability, sex, sexual orientation, or gender identity.            Thank you!     Thank you for choosing [facility]  for your care. Our goal is always to provide you with excellent care. Hearing back from our patients is one way we can continue to improve our services. Please take a few minutes to complete the written survey that you may receive in the mail after your visit with us. Thank you!             Your Updated Medication List - Protect others around you: Learn how to safely use, store and throw away your medicines at www.disposemymeds.org.          This list is accurate as of: 10/24/17 12:01 PM.  Always use your most recent med list.                   Brand Name Dispense Instructions for use Diagnosis    aspirin 81 MG tablet      1 TABLET DAILY        CENTRUM SILVER per tablet      1 TABLET DAILY        fish oil-omega-3 fatty acids 1000 MG capsule     OTC    2 CAPSULES DAILY  (? DOSE)    Other and unspecified hyperlipidemia       folic acid 1 MG tablet    FOLVITE     4 mg TABS DAILY    Rheumatoid arthritis(714.0), Obesity, unspecified, Other abnormal glucose       guaiFENesin 200 MG Tabs tablet    ORGANIDIN    60 tablet    Take 2 tablets (400 mg) by mouth every 4 hours as needed for cough    URI with cough and congestion       hydrochlorothiazide 12.5 MG capsule    MICROZIDE     Take 12.5 mg by mouth daily.        LIQUID TEARS 1.4 % ophthalmic solution   Generic drug:   polyvinyl alcohol      1 drop as needed.        meloxicam 7.5 MG tablet    MOBIC     Take 7.5 mg by mouth        methotrexate 25 MG/ML injection      None Entered        metoprolol 50 MG 24 hr tablet    TOPROL-XL    30    1 TABLET DAILY    Essential hypertension, benign       nitroFURantoin macrocrystal 100 MG ED starter pack    MACRODANTIN     Take 1 capsule by mouth daily.        priLOSEC 20 MG CR capsule   Generic drug:  omeprazole     3 MONTHS    ONE DAILY  as needed    Rheumatoid arthritis(714.0), Obesity, unspecified, Other abnormal glucose       Vitamin D-3 1000 UNITS Caps      Take by mouth daily        ZOCOR 20 MG tablet   Generic drug:  simvastatin      Take  by mouth At Bedtime.        ZOVIRAX 400 MG tablet   Generic drug:  acyclovir      Take 400 mg by mouth daily.

## 2017-10-24 NOTE — PATIENT INSTRUCTIONS
At [facility], we strive to deliver an exceptional experience to you, every time we see you.  If you receive a survey in the mail, please send us back your thoughts. We really do value your feedback.    Based on your medical history, these are the current health maintenance/preventive care services that you are due for (some may have been done at this visit.)  Health Maintenance Due   Topic Date Due     DEPRESSION ACTION PLAN Q1 YR  08/28/1953     PHQ-9 Q6 MONTHS  08[birth year]28/1953     ADVANCE DIRECTIVE PLANNING Q5 YRS  08/28/1992         Suggested websites for health information:  Www.[employer] : Up to date and easily searchable information on multiple topics.  Www.medlineplus.gov : medication info, interactive tutorials, watch real surgeries online  Www.familydoctor.org : good info from the Academy of Family Physicians  Www.cdc.gov : public health info, travel advisories, epidemics (H1N1)  Www.aap.org : children's health info, normal development, vaccinations  Www.health.[facility].[place].us : [place] dept of health, public health issues in [place], N1N1    Your care team:                            Family Medicine Internal Medicine   MD Jose Alejandro Floyd MD Shantel Branch-Fleming, MD Katya Georgiev PA-C Nam Ho, MD Pediatrics   VINITA Gutiérrez, MD Kika Olivia CNP, MD Deborah Mielke, MD Kim Thein, APRN [facility]      Clinic hours: Monday - Thursday 7 am-7 pm; Fridays 7 am-5 pm.   Urgent care: Monday - Friday 11 am-9 pm; Saturday and Sunday 9 am-5 pm.  Pharmacy : Monday -Thursday 8 am-8 pm; Friday 8 am-6 pm; Saturday and Sunday 9 am-5 pm.     Clinic: [phone]   Pharmacy: [phone]    Viral Upper Respiratory Illness (Adult)  You have a viral upper respiratory illness (URI), which is another term for the common cold. This illness is contagious during the first few days. It is spread through the air by coughing and sneezing.  It may also be spread by direct contact (touching the sick person and then touching your own eyes, nose, or mouth). Frequent handwashing will decrease risk of spread. Most viral illnesses go away within 7 to 10 days with rest and simple home remedies. Sometimes the illness may last for several weeks. Antibiotics will not kill a virus, and they are generally not prescribed for this condition.    Home care    If symptoms are severe, rest at home for the first 2 to 3 days. When you resume activity, don't let yourself get too tired.    Avoid being exposed to cigarette smoke (yours or others ).    You may use acetaminophen or ibuprofen to control pain and fever, unless another medicine was prescribed. (Note: If you have chronic liver or kidney disease, have ever had a stomach ulcer or gastrointestinal bleeding, or are taking blood-thinning medicines, talk with your healthcare provider before using these medicines.) Aspirin should never be given to anyone under 18 years of age who is ill with a viral infection or fever. It may cause severe liver or brain damage.    Your appetite may be poor, so a light diet is fine. Avoid dehydration by drinking 6 to 8 glasses of fluids per day (water, soft drinks, juices, tea, or soup). Extra fluids will help loosen secretions in the nose and lungs.    Over-the-counter cold medicines will not shorten the length of time you re sick, but they may be helpful for the following symptoms: cough, sore throat, and nasal and sinus congestion. (Note: Do not use decongestants if you have high blood pressure.)  Follow-up care  Follow up with your healthcare provider, or as advised.  When to seek medical advice  Call your healthcare provider right away if any of these occur:    Cough with lots of colored sputum (mucus)    Severe headache; face, neck, or ear pain    Difficulty swallowing due to throat pain    Fever of 100.4 F (38 C)  Call 911, or get immediate medical care  Call emergency services  right away if any of these occur:    Chest pain, shortness of breath, wheezing, or difficulty breathing    Coughing up blood    Inability to swallow due to throat pain  Date Last Reviewed: 9/13/2015 2000-2017 [employer]. [address] [place] [address], [place] [address]. All rights reserved. This information is not intended as a substitute for professional medical care. Always follow your healthcare professional's instructions.

## 2017-10-26 ENCOUNTER — TRANSFERRED RECORDS (OUTPATIENT)
Dept: HEALTH INFORMATION MANAGEMENT | Facility: CLINIC | Age: 82
End: 2017-10-26

## 2017-10-31 ENCOUNTER — OFFICE VISIT (OUTPATIENT)
Dept: FAMILY MEDICINE | Facility: CLINIC | Age: 82
End: 2017-10-31
Payer: COMMERCIAL

## 2017-10-31 VITALS
OXYGEN SATURATION: 99 % | TEMPERATURE: 97.1 F | HEART RATE: 70 BPM | HEIGHT: 63 IN | DIASTOLIC BLOOD PRESSURE: 69 MMHG | BODY MASS INDEX: 30.58 KG/M2 | WEIGHT: 172.6 LBS | SYSTOLIC BLOOD PRESSURE: 127 MMHG

## 2017-10-31 DIAGNOSIS — J20.9 ACUTE BRONCHITIS WITH SYMPTOMS > 10 DAYS: Primary | ICD-10-CM

## 2017-10-31 PROCEDURE — 99213 OFFICE O/P EST LOW 20 MIN: CPT | Performed by: FAMILY MEDICINE

## 2017-10-31 RX ORDER — AZITHROMYCIN 250 MG/1
TABLET, FILM COATED ORAL
Qty: 6 TABLET | Refills: 0 | Status: SHIPPED | OUTPATIENT
Start: 2017-10-31 | End: 2017-12-19

## 2017-10-31 ASSESSMENT — PAIN SCALES - GENERAL: PAINLEVEL: MODERATE PAIN (4)

## 2017-10-31 NOTE — NURSING NOTE
"Chief Complaint   Patient presents with     Cough       Initial /69 (BP Location: Left arm, Patient Position: Chair, Cuff Size: Adult Large)  Pulse 70  Temp 97.1  F (36.2  C) (Oral)  Ht 5' 2.58\" (1.59 m)  Wt 172 lb 9.6 oz (78.3 kg)  SpO2 99%  BMI 30.99 kg/m2 Estimated body mass index is 30.99 kg/(m^2) as calculated from the following:    Height as of this encounter: 5' 2.58\" (1.59 m).    Weight as of this encounter: 172 lb 9.6 oz (78.3 kg).  Medication Reconciliation: complete     Nayeli Sanchez MA      "

## 2017-10-31 NOTE — MR AVS SNAPSHOT
After Visit Summary   10/31/2017    Brenda Barker    MRN: [id number]           Patient Information     Date Of Birth          1935        Visit Information        Provider Department      10/31/2017 11:20 AM Liudmila Bowie MD [facility]        Today's Diagnoses     Acute bronchitis with symptoms > 10 days    -  1      Care Instructions    At [facility], we strive to deliver an exceptional experience to you, every time we see you.  If you receive a survey in the mail, please send us back your thoughts. We really do value your feedback.    Based on your medical history, these are the current health maintenance/preventive care services that you are due for (some may have been done at this visit.)  Health Maintenance Due   Topic Date Due     DEPRESSION ACTION PLAN Q1 YR  08/28/1955     PHQ-9 Q6 MONTHS  08/28/1955     ADVANCE DIRECTIVE PLANNING Q5 YRS  08/28/1992         Suggested websites for health information:  Www.[employer] : Up to date and easily searchable information on multiple topics.  Www.medlineplus.gov : medication info, interactive tutorials, watch real surgeries online  Www.familydoctor.org : good info from the Academy of Family Physicians  Www.cdc.gov : public health info, travel advisories, epidemics (H1N1)  Www.aap.org : children's health info, normal development, vaccinations  Www.health.[place] : [place] dept of health, public health issues in [place], N1N1    Your care team:                            Family Medicine Internal Medicine   MD Jose Alejandro Floyd MD Shantel Branch-Fleming, MD Katya Georgiev PA-C Nam Ho, MD Pediatrics   VINITA Gutiérrez, MD Kika Olivia CNP, MD Deborah Mielke, MD Kim Thein, ELIZABETH CNP      Clinic hours: Monday - Thursday 7 am-7 pm; Fridays 7 am-5 pm.   Urgent care: Monday - Friday 11 am-9 pm; Saturday and Sunday 9 am-5  "pm.  Pharmacy : Monday -Thursday 8 am-8 pm; Friday 8 am-6 pm; Saturday and Sunday 9 am-5 pm.     Clinic: [phone]   Pharmacy: [phone]            Follow-ups after your visit        Follow-up notes from your care team     Return if symptoms worsen or fail to improve.      Who to contact     If you have questions or need follow up information about today's clinic visit or your schedule please contact [facility] directly at [phone].  Normal or non-critical lab and imaging results will be communicated to you by [employer], letter or phone within 4 business days after the clinic has received the results. If you do not hear from us within 7 days, please contact the clinic through [employer] or phone. If you have a critical or abnormal lab result, we will notify you by phone as soon as possible.  Submit refill requests through [employer] or call your pharmacy and they will forward the refill request to us. Please allow 3 business days for your refill to be completed.          Additional Information About Your Visit        MyChart Information     [employer] gives you secure access to your electronic health record. If you see a primary care provider, you can also send messages to your care team and make appointments. If you have questions, please call your primary care clinic.  If you do not have a primary care provider, please call [phone] and they will assist you.        Care EveryWhere ID     This is your Care EveryWhere ID. This could be used by other organizations to access your [place] medical records  [id number]        Your Vitals Were     Pulse Temperature Height Pulse Oximetry BMI (Body Mass Index)       70 97.1  F (36.2  C) (Oral) 5' 2.58\" (1.59 m) 99% 30.99 kg/m2        Blood Pressure from Last 3 Encounters:   10/31/17 127/69   10/24/17 131/67   10/20/17 118/60    Weight from Last 3 Encounters:   10/31/17 172 lb 9.6 oz (78.3 kg)   10/24/17 173 lb (78.5 kg)   10/20/17 174 lb (78.9 " kg)              Today, you had the following     No orders found for display         Today's Medication Changes          These changes are accurate as of: 10/31/17 12:42 PM.  If you have any questions, ask your nurse or doctor.               Start taking these medicines.        Dose/Directions    azithromycin 250 MG tablet   Commonly known as:  ZITHROMAX   Used for:  Acute bronchitis with symptoms > 10 days   Started by:  Liudmila Bowie MD        Two tablets first day, then one tablet daily for four days.   Quantity:  6 tablet   Refills:  0            Where to get your medicines      These medications were sent to [facility] - [place], [place] - [address]  [address], [facility] [id number]     Phone:  [phone]     azithromycin 250 MG tablet                Primary Care Provider Office Phone # Fax #    Liudmila Bowie [phone]       [address]  [facility] [address]        Equal Access to Services     [facility]: Hadii aad ku hadasho Soomaali, waaxda luqadaha, qaybta kaalmada adeegyada, waxay idiin hayaan ramsey garcia . So [facility] [phone].    ATENCIÓN: Si habla español, tiene a beltran disposición servicios gratuitos de asistencia lingüística. Llame al [phone].    We comply with applicable federal civil rights laws and [place] laws. We do not discriminate on the basis of race, color, national origin, age, disability, sex, sexual orientation, or gender identity.            Thank you!     Thank you for choosing [facility]  for your care. Our goal is always to provide you with excellent care. Hearing back from our patients is one way we can continue to improve our services. Please take a few minutes to complete the written survey that you may receive in the mail after your visit with us. Thank you!             Your Updated Medication List - Protect others around you: Learn how to safely use, store and throw  away your medicines at www.disposemymeds.org.          This list is accurate as of: 10/31/17 12:42 PM.  Always use your most recent med list.                   Brand Name Dispense Instructions for use Diagnosis    aspirin 81 MG tablet      1 TABLET DAILY        azithromycin 250 MG tablet    ZITHROMAX    6 tablet    Two tablets first day, then one tablet daily for four days.    Acute bronchitis with symptoms > 10 days       CENTRUM SILVER per tablet      1 TABLET DAILY        fish oil-omega-3 fatty acids 1000 MG capsule     OTC    2 CAPSULES DAILY  (? DOSE)    Other and unspecified hyperlipidemia       folic acid 1 MG tablet    FOLVITE     4 mg TABS DAILY    Rheumatoid arthritis(714.0), Obesity, unspecified, Other abnormal glucose       guaiFENesin 200 MG Tabs tablet    ORGANIDIN    60 tablet    Take 2 tablets (400 mg) by mouth every 4 hours as needed for cough    URI with cough and congestion       hydrochlorothiazide 12.5 MG capsule    MICROZIDE     Take 12.5 mg by mouth daily.        LIQUID TEARS 1.4 % ophthalmic solution   Generic drug:  polyvinyl alcohol      1 drop as needed.        meloxicam 7.5 MG tablet    MOBIC     Take 7.5 mg by mouth        methotrexate 25 MG/ML injection      None Entered        metoprolol 50 MG 24 hr tablet    TOPROL-XL    30    1 TABLET DAILY    Essential hypertension, benign       nitroFURantoin macrocrystal 100 MG ED starter pack    MACRODANTIN     Take 1 capsule by mouth daily.        priLOSEC 20 MG CR capsule   Generic drug:  omeprazole     3 MONTHS    ONE DAILY  as needed    Rheumatoid arthritis(714.0), Obesity, unspecified, Other abnormal glucose       Vitamin D-3 1000 UNITS Caps      Take by mouth daily        ZOCOR 20 MG tablet   Generic drug:  simvastatin      Take  by mouth At Bedtime.        ZOVIRAX 400 MG tablet   Generic drug:  acyclovir      Take 400 mg by mouth daily.

## 2017-10-31 NOTE — PROGRESS NOTES
SUBJECTIVE:   Brenda Barker is a 82 year old female who presents to clinic today for the following health issues:      Acute Illness Follow up, worsen   Acute illness concerns: cough  Onset: 10/20    Fever: no    Chills/Sweats: yes, chills at night    Headache (location?): no    Sinus Pressure:no    Conjunctivitis:  no    Ear Pain: no    Rhinorrhea: no    Congestion: YES    Sore Throat: no     Cough: YES, difficulty coughing up phlegm, but when do so, color is yellow    Wheeze: YES- when laying down    Decreased Appetite: YES    Nausea: no    Vomiting: no    Diarrhea:  no    Dysuria/Freq.: no    Fatigue/Achiness: YES    Sick/Strep Exposure: YES     Therapies Tried and outcome: zyrtec, guaifenesin - no relief.    Getting worse and nights are worse. Coughing spasms        Problem list and histories reviewed & adjusted, as indicated.  Additional history: as documented    Patient Active Problem List   Diagnosis     Benign neoplasm of cranial nerve (H)     Hemorrhoids     Aortic valve disorder     Cervicalgia     Rheumatoid arthritis (H)     SNHL (sensorineural hearing loss)     Status post total left knee replacement     Obesity     High risk medication use     Past Surgical History:   Procedure Laterality Date     C DEXA, BONE DENSITY, AXIAL SKEL  7/03 7/03 L1-4 1.7, FemNkL-0.8,R-1.2, FArm Px 0.2,Dist-0.3     C REMOVAL OF OVARY(S)  1980 2nd ovary removed - endometriosis     C TOTAL ABDOM HYSTERECTOMY  1972    uterus, ovary removed - fibroid     HC COLONOSCOPY THRU STOMA W BIOPSY/CAUTERY TUMOR/POLYP/LESION  1993    adenomatous polyp     HC COLONOSCOPY THRU STOMA, DIAGNOSTIC  1998, 10/03    normal, '03 rec repeat in 5 yrs due to +hx polyp and + fam hx     HC FLEX SIGMOIDOSCOPY W/WO KARYNA SPEC BY BRUSH/WASH  12/2001     HC REMOVAL OF TONSILS,<11 Y/O  1942     SURGICAL HISTORY OF -   1989    basal cell Ca scalp excised     SURGICAL HISTORY OF -   1998    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 6 tablet 0     guaiFENesin (ORGANIDIN) 200 MG TABS tablet Take 2 tablets (400 mg) by mouth every 4 hours as needed for cough 60 tablet 1     meloxicam (MOBIC) 7.5 MG tablet Take 7.5 mg by mouth       Cholecalciferol (VITAMIN D-3) 1000 UNITS CAPS Take by mouth daily       nitrofurantoin 100 MG Take 1 capsule by mouth daily.       polyvinyl alcohol (LIQUID TEARS) 1.4 % ophthalmic solution 1 drop as needed.       acyclovir (ZOVIRAX) 400 MG tablet Take 400 mg by mouth daily.       hydrochlorothiazide (MICROZIDE) 12.5 MG capsule Take 12.5 mg by mouth daily.       simvastatin (ZOCOR) 20 MG tablet Take  by mouth At Bedtime.       METOPROLOL SUCCINATE# 50 MG OR TB24 1 TABLET DAILY 30 6     FOLIC ACID 1 MG OR TABS 4 mg TABS DAILY       PRILOSEC 20 MG OR CPDR ONE DAILY  as needed 3 MONTHS 1 YEAR     METHOTREXATE SODIUM 25 MG/ML IJ SOLN None Entered       CENTRUM SILVER OR TABS 1 TABLET DAILY       ASPIRIN 81 MG OR TABS 1 TABLET DAILY       FISH OIL 1000 MG OR CAPS 2 CAPSULES DAILY  (? DOSE) OTC --     Allergies   Allergen Reactions     Ciprofloxacin      nausea     Contrast Dye      Hives,anaphylaxix     Sulfasalazine      Other reaction(s): Rash     No lab results found.   BP Readings from Last 3 Encounters:   10/31/17 127/69   10/24/17 131/67   10/20/17 118/60    Wt Readings from Last 3 Encounters:   10/31/17 172 lb 9.6 oz (78.3 kg)   10/24/17 173 lb (78.5 kg)   10/20/17 174 lb (78.9 kg)                  Labs reviewed in EPIC          Reviewed and updated  "as needed this visit by clinical staffTobacco  Meds  Med Hx  Surg Hx  Fam Hx  Soc Hx      Reviewed and updated as needed this visit by Provider         ROS:  Constitutional, HEENT, cardiovascular, pulmonary, gi and gu systems are negative, except as otherwise noted.      OBJECTIVE:   /69 (BP Location: Left arm, Patient Position: Chair, Cuff Size: Adult Large)  Pulse 70  Temp 97.1  F (36.2  C) (Oral)  Ht 5' 2.58\" (1.59 m)  Wt 172 lb 9.6 oz (78.3 kg)  SpO2 99%  BMI 30.99 kg/m2  Body mass index is 30.99 kg/(m^2).  GENERAL: acutely ill, alert, well nourished, well hydrated, no distress, with cough  HENT: ear canals- normal; TMs- normal; Nose- rhinorrhea ; Mouth- no ulcers, no lesions, throat is erythematous without exudate. Sinuses without tenderness to percussion.   NECK: no tenderness, negative anterior cervical adenopathy, no asymmetry, no masses, no stiffness; thyroid- normal to palpation  RESP: lungs clear to auscultation - no rales, no rhonchi, some expiratory wheezes  CV: regular rates and rhythm, normal S1 S2, no S3 or S4 and no murmur, no click or rub -    MS: extremities- no gross deformities noted, no edema  SKIN: no suspicious lesions, no rashes  PSYCH: Alert and oriented times 3; affect- normal     Diagnostic Test Results:  none     ASSESSMENT/PLAN:             1. Acute bronchitis with symptoms > 10 days  Cough is getting worse with persistent symptoms at night especially. Will do an empiric course of antibiotics and follow up with chest xray if not improving. No signs or symptoms of pneumonia at this time.   - azithromycin (ZITHROMAX) 250 MG tablet; Two tablets first day, then one tablet daily for four days.  Dispense: 6 tablet; Refill: 0    Risks and benefits of medications discussed with patient. Patient instructed to call if symptoms become worse or do not improve. Call if side effects or allergic reactions. Return to clinic as instructed.     FUTURE APPOINTMENTS:       - as needed if " not improving and as previously recommended.   See Patient Instructions    Liudmila Bowie MD  [facility]

## 2017-10-31 NOTE — PATIENT INSTRUCTIONS
At [facility], we strive to deliver an exceptional experience to you, every time we see you.  If you receive a survey in the mail, please send us back your thoughts. We really do value your feedback.    Based on your medical history, these are the current health maintenance/preventive care services that you are due for (some may have been done at this visit.)  Health Maintenance Due   Topic Date Due     DEPRESSION ACTION PLAN Q1 YR  08/28/1953     PHQ-9 Q6 MONTHS  [birth year]/28/1953     ADVANCE DIRECTIVE PLANNING Q5 YRS  08/28/1992         Suggested websites for health information:  Www.[employer] : Up to date and easily searchable information on multiple topics.  Www.medlineplus.gov : medication info, interactive tutorials, watch real surgeries online  Www.familydoctor.org : good info from the Academy of Family Physicians  Www.cdc.gov : public health info, travel advisories, epidemics (H1N1)  Www.aap.org : children's health info, normal development, vaccinations  Www.health.[facility].[place].us : [place] dept of health, public health issues in [place], N1N1    Your care team:                            Family Medicine Internal Medicine   MD Jose Alejandro Floyd MD Shantel Branch-Fleming, MD Katya Georgiev PA-C Nam Ho, MD Pediatrics   VINITA Gutiérrez, MD Kika Olivia CNP, MD Deborah Mielke, MD Kim Thein, APRN [facility]      Clinic hours: Monday - Thursday 7 am-7 pm; Fridays 7 am-5 pm.   Urgent care: Monday - Friday 11 am-9 pm; Saturday and Sunday 9 am-5 pm.  Pharmacy : Monday -Thursday 8 am-8 pm; Friday 8 am-6 pm; Saturday and Sunday 9 am-5 pm.     Clinic: [phone]   Pharmacy: [phone]    Bronchitis, Antibiotic Treatment (Adult)    Bronchitis is an infection of the air passages (bronchial tubes) in your lungs. It often occurs when you have a cold. This illness is contagious during the first few days and is spread through the air  by coughing and sneezing, or by direct contact (touching the sick person and then touching your own eyes, nose, or mouth).  Symptoms of bronchitis include cough with mucus (phlegm) and low-grade fever. Bronchitis usually lasts 7 to 14 days. Mild cases can be treated with simple home remedies. More severe infection is treated with an antibiotic.  Home care  Follow these guidelines when caring for yourself at home:    If your symptoms are severe, rest at home for the first 2 to 3 days. When you go back to your usual activities, don't let yourself get too tired.    Do not smoke. Also avoid being exposed to secondhand smoke.    You may use over-the-counter medicines to control fever or pain, unless another medicine was prescribed. (Note: If you have chronic liver or kidney disease or have ever had a stomach ulcer or gastrointestinal bleeding, talk with your healthcare provider before using these medicines. Also talk to your provider if you are taking medicine to prevent blood clots.) Aspirin should never be given to anyone younger than 18 years of age who is ill with a viral infection or fever. It may cause severe liver or brain damage.    Your appetite may be poor, so a light diet is fine. Avoid dehydration by drinking 6 to 8 glasses of fluids per day (such as water, soft drinks, sports drinks, juices, tea, or soup). Extra fluids will help loosen secretions in the nose and lungs.    Over-the-counter cough, cold, and sore-throat medicines will not shorten the length of the illness, but they may be helpful to reduce symptoms. (Note: Do not use decongestants if you have high blood pressure.)    Finish all antibiotic medicine. Do this even if you are feeling better after only a few days.  Follow-up care  Follow up with your healthcare provider, or as advised. If you had an X-ray or ECG (electrocardiogram), a specialist will review it. You will be notified of any new findings that may affect your care.  Note: If you are age  65 or older, or if you have a chronic lung disease or condition that affects your immune system, or you smoke, talk to your healthcare provider about having pneumococcal vaccinations and a yearly influenza vaccination (flu shot).  When to seek medical advice  Call your healthcare provider right away if any of these occur:    Fever of 100.4 F (38 C) or higher    Coughing up increased amounts of colored sputum    Weakness, drowsiness, headache, facial pain, ear pain, or a stiff neck  Call 911, or get immediate medical care  Contact emergency services right away if any of these occur.    Coughing up blood    Worsening weakness, drowsiness, headache, or stiff neck    Trouble breathing, wheezing, or pain with breathing  Date Last Reviewed: 9/13/2015 2000-2017 [employer]. [address] [place] [address], [place] [address]. All rights reserved. This information is not intended as a substitute for professional medical care. Always follow your healthcare professional's instructions.

## 2017-12-09 ENCOUNTER — OFFICE VISIT (OUTPATIENT)
Dept: URGENT CARE | Facility: URGENT CARE | Age: 82
End: 2017-12-09
Payer: COMMERCIAL

## 2017-12-09 VITALS
SYSTOLIC BLOOD PRESSURE: 134 MMHG | BODY MASS INDEX: 30.52 KG/M2 | OXYGEN SATURATION: 98 % | DIASTOLIC BLOOD PRESSURE: 63 MMHG | TEMPERATURE: 98.1 F | WEIGHT: 170 LBS | HEART RATE: 88 BPM

## 2017-12-09 DIAGNOSIS — J20.9 ACUTE BRONCHITIS WITH COEXISTING CONDITION REQUIRING PROPHYLACTIC TREATMENT: Primary | ICD-10-CM

## 2017-12-09 PROCEDURE — 99214 OFFICE O/P EST MOD 30 MIN: CPT | Performed by: PHYSICIAN ASSISTANT

## 2017-12-09 RX ORDER — CEFUROXIME AXETIL 500 MG/1
500 TABLET ORAL 2 TIMES DAILY
Qty: 20 TABLET | Refills: 0 | Status: SHIPPED | OUTPATIENT
Start: 2017-12-09 | End: 2017-12-19

## 2017-12-09 NOTE — MR AVS SNAPSHOT
After Visit Summary   12/9/2017    Brenda Barker    MRN: [id number]           Patient Information     Date Of Birth          1935        Visit Information        Provider Department      12/9/2017 9:10 AM Lilo Henry PA-C [facility]        Today's Diagnoses     Acute bronchitis with coexisting condition requiring prophylactic treatment    -  1       Follow-ups after your visit        Who to contact     If you have questions or need follow up information about today's clinic visit or your schedule please contact [facility] directly at [phone].  Normal or non-critical lab and imaging results will be communicated to you by [employer], letter or phone within 4 business days after the clinic has received the results. If you do not hear from us within 7 days, please contact the clinic through [employer] or phone. If you have a critical or abnormal lab result, we will notify you by phone as soon as possible.  Submit refill requests through [facility] or call your pharmacy and they will forward the refill request to us. Please allow 3 business days for your refill to be completed.          Additional Information About Your Visit        MyChart Information     [facility] gives you secure access to your electronic health record. If you see a primary care provider, you can also send messages to your care team and make appointments. If you have questions, please call your primary care clinic.  If you do not have a primary care provider, please call [phone] and they will assist you.        Care EveryWhere ID     This is your Care EveryWhere ID. This could be used by other organizations to access your [place] medical records  [id number]        Your Vitals Were     Pulse Temperature Pulse Oximetry Breastfeeding? BMI (Body Mass Index)       88 98.1  F (36.7  C) (Oral) 98% No 30.52 kg/m2        Blood Pressure from Last 3 Encounters:   12/09/17 134/63   10/31/17  127/69   10/24/17 131/67    Weight from Last 3 Encounters:   12/09/17 170 lb (77.1 kg)   10/31/17 172 lb 9.6 oz (78.3 kg)   10/24/17 173 lb (78.5 kg)              Today, you had the following     No orders found for display         Today's Medication Changes          These changes are accurate as of: 12/9/17  9:55 AM.  If you have any questions, ask your nurse or doctor.               Start taking these medicines.        Dose/Directions    cefuroxime 500 MG tablet   Commonly known as:  CEFTIN   Used for:  Acute bronchitis with coexisting condition requiring prophylactic treatment   Started by:  Lilo Henry PA-C        Dose:  500 mg   Take 1 tablet (500 mg) by mouth 2 times daily   Quantity:  20 tablet   Refills:  0            Where to get your medicines      These medications were sent to [facility] [place] - [address]  [address], [facility] [address]     Phone:  [phone]     cefuroxime 500 MG tablet                Primary Care Provider Office Phone # Fax #    Liudmila Sindy Bowie [phone]       [address]  [facility] [address]        Equal Access to Services     MALATHI CANNON : Hadii simi ku hadasho Soomaali, waaxda luqadaha, qaybta kaalmada adeegyada, waxay idiin haymaddy del valle. So [facility] [phone].    ATENCIÓN: Si habla español, tiene a beltran disposición servicios gratuitos de asistencia lingüística. Llame al [phone].    We comply with applicable federal civil rights laws and [place] laws. We do not discriminate on the basis of race, color, national origin, age, disability, sex, sexual orientation, or gender identity.            Thank you!     Thank you for choosing [facility]  for your care. Our goal is always to provide you with excellent care. Hearing back from our patients is one way we can continue to improve our services. Please take a few minutes to complete the written survey that  you may receive in the mail after your visit with us. Thank you!             Your Updated Medication List - Protect others around you: Learn how to safely use, store and throw away your medicines at www.disposemymeds.org.          This list is accurate as of: 12/9/17  9:55 AM.  Always use your most recent med list.                   Brand Name Dispense Instructions for use Diagnosis    aspirin 81 MG tablet      1 TABLET DAILY        azithromycin 250 MG tablet    ZITHROMAX    6 tablet    Two tablets first day, then one tablet daily for four days.    Acute bronchitis with symptoms > 10 days       cefuroxime 500 MG tablet    CEFTIN    20 tablet    Take 1 tablet (500 mg) by mouth 2 times daily    Acute bronchitis with coexisting condition requiring prophylactic treatment       CENTRUM SILVER per tablet      1 TABLET DAILY        fish oil-omega-3 fatty acids 1000 MG capsule     OTC    2 CAPSULES DAILY  (? DOSE)    Other and unspecified hyperlipidemia       folic acid 1 MG tablet    FOLVITE     4 mg TABS DAILY    Rheumatoid arthritis(714.0), Obesity, unspecified, Other abnormal glucose       guaiFENesin 200 MG Tabs tablet    ORGANIDIN    60 tablet    Take 2 tablets (400 mg) by mouth every 4 hours as needed for cough    URI with cough and congestion       hydrochlorothiazide 12.5 MG capsule    MICROZIDE     Take 12.5 mg by mouth daily.        LIQUID TEARS 1.4 % ophthalmic solution   Generic drug:  polyvinyl alcohol      1 drop as needed.        meloxicam 7.5 MG tablet    MOBIC     Take 7.5 mg by mouth        methotrexate 25 MG/ML injection      None Entered        metoprolol 50 MG 24 hr tablet    TOPROL-XL    30    1 TABLET DAILY    Essential hypertension, benign       nitroFURantoin macrocrystal 100 MG ED starter pack    MACRODANTIN     Take 1 capsule by mouth daily.        priLOSEC 20 MG CR capsule   Generic drug:  omeprazole     3 MONTHS    ONE DAILY  as needed    Rheumatoid arthritis(714.0), Obesity, unspecified, Other  abnormal glucose       Vitamin D-3 1000 UNITS Caps      Take by mouth daily        ZOCOR 20 MG tablet   Generic drug:  simvastatin      Take  by mouth At Bedtime.        ZOVIRAX 400 MG tablet   Generic drug:  acyclovir      Take 400 mg by mouth daily.

## 2017-12-09 NOTE — NURSING NOTE
"Chief Complaint   Patient presents with     URI     cough 4x days       Initial /63 (BP Location: Left arm, Patient Position: Chair, Cuff Size: Adult Large)  Pulse 88  Temp 98.1  F (36.7  C) (Oral)  Wt 170 lb (77.1 kg)  SpO2 98%  Breastfeeding? No  BMI 30.52 kg/m2 Estimated body mass index is 30.52 kg/(m^2) as calculated from the following:    Height as of 10/31/17: 5' 2.58\" (1.59 m).    Weight as of this encounter: 170 lb (77.1 kg).  Medication Reconciliation: complete       Judi Kelsey MA      "

## 2017-12-09 NOTE — PROGRESS NOTES
SUBJECTIVE:   Brenda Barker is a 82 year old female who presents to clinic today for the following health issues:      RESPIRATORY SYMPTOMS      Duration: 4x days    Description  rhinorrhea, facial pain/pressure and cough    Severity: moderate    Accompanying signs and symptoms: coughing its really hurts, Diagnosed recently with Bronchitis.    History (predisposing factors):  none    Precipitating or alleviating factors: Coughing more will hurt the chest    Therapies tried and outcome:  rest and fluids Guaifenesin 200mg taken last night    Treated for bronchitis in October with z pack. Ill[place] and now coughing again. No CP/SOB. No leg swelling. No fever.      Allergies   Allergen Reactions     Ciprofloxacin      nausea     Contrast Dye      Hives,anaphylaxix     Sulfasalazine      Other reaction(s): Rash       Past Medical History:   Diagnosis Date     AORTIC VALVE SCLEROSIS 1/2002    ECHO AV sclerosis, mild TR, trace MR     ASYMPTOMATIC PVCS      Back pain      Basal cell carcinoma of scalp      BORDERLINE ELEVATED HBA1C- 6.1%  4/04 6/12/2005     CARPAL TUNNEL SYNDROME, R>L 8/2001     CERV. 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-6 BILAT UNCINATE SPURS 8/2001     DEPRESSION 2/13/2005     DJD (degenerative joint disease)      Endometriosis      Essential and other specified forms of tremor      HEMORRHOIDS      HERPES SIMPLEX  I AND II      HX ADENOMATOUS COLON POLYP 1993     HYPERLIPIDEMIA      HYPERTENSION 2/13/2005     L ACOUSTIC NEUROMA     post op 7 th nerve palsy and CSF leak     Neoplasm of uncertain behavior of skin      OBESITY -BMI 32      OSTEOPENIA 7/03    L1-4 1.7,FemNck L-0.8,R-1.2,F'arm dist-0.3     Pain in joint, pelvic region and thigh      Rheumatoid arthritis(714.0) 6/28/2004     Spinal stenosis, unspecified region other than cervical      Unspecified hearing loss      URIN TRACT INFECTION, RECURRENT          Current Outpatient Prescriptions on File Prior to Visit:  guaiFENesin  (ORGANIDIN) 200 MG TABS tablet Take 2 tablets (400 mg) by mouth every 4 hours as needed for cough   meloxicam (MOBIC) 7.5 MG tablet Take 7.5 mg by mouth   Cholecalciferol (VITAMIN D-3) 1000 UNITS CAPS Take by mouth daily   nitrofurantoin 100 MG Take 1 capsule by mouth daily.   polyvinyl alcohol (LIQUID TEARS) 1.4 % ophthalmic solution 1 drop as needed.   acyclovir (ZOVIRAX) 400 MG tablet Take 400 mg by mouth daily.   hydrochlorothiazide (MICROZIDE) 12.5 MG capsule Take 12.5 mg by mouth daily.   simvastatin (ZOCOR) 20 MG tablet Take  by mouth At Bedtime.   METOPROLOL SUCCINATE# 50 MG OR TB24 1 TABLET DAILY   FOLIC ACID 1 MG OR TABS 4 mg TABS DAILY   PRILOSEC 20 MG OR CPDR ONE DAILY  as needed   METHOTREXATE SODIUM 25 MG/ML IJ SOLN None Entered   CENTRUM SILVER OR TABS 1 TABLET DAILY   ASPIRIN 81 MG OR TABS 1 TABLET DAILY   FISH OIL 1000 MG OR CAPS 2 CAPSULES DAILY  (? DOSE)   azithromycin (ZITHROMAX) 250 MG tablet Two tablets first day, then one tablet daily for four days. (Patient not taking: Reported on 12/9/2017)     No current facility-administered medications on file prior to visit.     Social History   Substance Use Topics     Smoking status: Never Smoker     Smokeless tobacco: Never Used     Alcohol use Yes      Comment: occasionally       ROS:  CONSTITUTIONAL: Negative for fatigue or fever.  EYES: Negative for eye problems.  ENT: As above.  RESP: As above.  CV: Negative for chest pains.  GI: Negative for vomiting.  MUSCULOSKELETAL:  Negative for significant muscle or joint pains.  NEUROLOGIC: Negative for headaches.  SKIN: Negative for rash.    OBJECTIVE:  /63 (BP Location: Left arm, Patient Position: Chair, Cuff Size: Adult Large)  Pulse 88  Temp 98.1  F (36.7  C) (Oral)  Wt 170 lb (77.1 kg)  SpO2 98%  Breastfeeding? No  BMI 30.52 kg/m2  GENERAL APPEARANCE: Healthy, alert and no distress.  EYES:Conjunctiva/sclera clear.  EARS: No cerumen.   Ear canals w/o erythema.  TM's intact w/o erythema.     NOSE/MOUTH: Nose without ulcers, erythema or lesions.  SINUSES: No maxillary sinus tenderness.  THROAT: No erythema w/o tonsillar enlargement . No exudates.  NECK: Supple, nontender, no lymphadenopathy.  RESP: Lungs clear to auscultation - no rales, rhonchi or wheezes  CV: Regular rate and rhythm, normal S1 S2, no murmur noted.  NEURO: Awake, alert    SKIN: No rashes        ASSESSMENT:     ICD-10-CM    1. Acute bronchitis with coexisting condition requiring prophylactic treatment J20.9 cefuroxime (CEFTIN) 500 MG tablet           PLAN: Has Rheumatoid Arthritis so she is immunocompromised.  Lots of rest and fluids.  RTC if any worsening symptoms or if not improving.    Lilo Henry PA-C

## 2017-12-11 ENCOUNTER — COMMUNICATION - GICH (OUTPATIENT)
Dept: PEDIATRICS | Facility: OTHER | Age: 82
End: 2017-12-11

## 2017-12-11 ENCOUNTER — TELEPHONE (OUTPATIENT)
Dept: FAMILY MEDICINE | Facility: CLINIC | Age: 82
End: 2017-12-11

## 2017-12-11 DIAGNOSIS — K21.00 GASTROESOPHAGEAL REFLUX DISEASE WITH ESOPHAGITIS: Primary | ICD-10-CM

## 2017-12-11 DIAGNOSIS — K21.9 GASTRO-ESOPHAGEAL REFLUX DISEASE WITHOUT ESOPHAGITIS: ICD-10-CM

## 2017-12-11 NOTE — TELEPHONE ENCOUNTER
Patient asking for script for Omerpazole 20mg capsules.    Has now had filled here.  Patient asks it be filled by here PCP, Dr Bowie.

## 2017-12-12 NOTE — TELEPHONE ENCOUNTER
Patient contacted and informed. Requested physical appointment. Scheduled for next week.  Maxwell Villareal CMA

## 2017-12-19 ENCOUNTER — OFFICE VISIT (OUTPATIENT)
Dept: FAMILY MEDICINE | Facility: CLINIC | Age: 82
End: 2017-12-19
Payer: COMMERCIAL

## 2017-12-19 VITALS
WEIGHT: 167 LBS | HEIGHT: 62 IN | RESPIRATION RATE: 18 BRPM | HEART RATE: 80 BPM | BODY MASS INDEX: 30.73 KG/M2 | TEMPERATURE: 98.4 F | DIASTOLIC BLOOD PRESSURE: 60 MMHG | OXYGEN SATURATION: 98 % | SYSTOLIC BLOOD PRESSURE: 109 MMHG

## 2017-12-19 DIAGNOSIS — Z00.00 NORMAL PHYSICAL EXAM, ROUTINE: Primary | ICD-10-CM

## 2017-12-19 DIAGNOSIS — I35.9 AORTIC VALVE DISORDER: ICD-10-CM

## 2017-12-19 DIAGNOSIS — E83.52 HYPERCALCEMIA: ICD-10-CM

## 2017-12-19 DIAGNOSIS — I10 HYPERTENSION, GOAL BELOW 140/90: ICD-10-CM

## 2017-12-19 DIAGNOSIS — G25.2 RESTING TREMOR: ICD-10-CM

## 2017-12-19 DIAGNOSIS — E83.39 HYPOPHOSPHATEMIA: ICD-10-CM

## 2017-12-19 DIAGNOSIS — R00.0 RAPID HEART RATE: ICD-10-CM

## 2017-12-19 DIAGNOSIS — M05.79 RHEUMATOID ARTHRITIS INVOLVING MULTIPLE SITES WITH POSITIVE RHEUMATOID FACTOR (H): ICD-10-CM

## 2017-12-19 DIAGNOSIS — D33.3 BENIGN NEOPLASM OF CRANIAL NERVE (H): ICD-10-CM

## 2017-12-19 DIAGNOSIS — E78.5 HYPERLIPIDEMIA LDL GOAL <130: ICD-10-CM

## 2017-12-19 LAB
ALBUMIN SERPL-MCNC: 3.9 G/DL (ref 3.4–5)
ANION GAP SERPL CALCULATED.3IONS-SCNC: 12 MMOL/L (ref 3–14)
BUN SERPL-MCNC: 17 MG/DL (ref 7–30)
CALCIUM SERPL-MCNC: 10.6 MG/DL (ref 8.5–10.1)
CHLORIDE SERPL-SCNC: 106 MMOL/L (ref 94–109)
CHOLEST SERPL-MCNC: 167 MG/DL
CO2 SERPL-SCNC: 22 MMOL/L (ref 20–32)
CREAT SERPL-MCNC: 0.68 MG/DL (ref 0.52–1.04)
GFR SERPL CREATININE-BSD FRML MDRD: 83 ML/MIN/1.7M2
GLUCOSE SERPL-MCNC: 93 MG/DL (ref 70–99)
HDLC SERPL-MCNC: 64 MG/DL
LDLC SERPL CALC-MCNC: 77 MG/DL
NONHDLC SERPL-MCNC: 103 MG/DL
PHOSPHATE SERPL-MCNC: 2.3 MG/DL (ref 2.5–4.5)
POTASSIUM SERPL-SCNC: 4.4 MMOL/L (ref 3.4–5.3)
SODIUM SERPL-SCNC: 140 MMOL/L (ref 133–144)
TRIGL SERPL-MCNC: 131 MG/DL

## 2017-12-19 PROCEDURE — G0438 PPPS, INITIAL VISIT: HCPCS | Performed by: FAMILY MEDICINE

## 2017-12-19 PROCEDURE — 80069 RENAL FUNCTION PANEL: CPT | Performed by: FAMILY MEDICINE

## 2017-12-19 PROCEDURE — 36415 COLL VENOUS BLD VENIPUNCTURE: CPT | Performed by: FAMILY MEDICINE

## 2017-12-19 PROCEDURE — 80061 LIPID PANEL: CPT | Performed by: FAMILY MEDICINE

## 2017-12-19 RX ORDER — HYDROCHLOROTHIAZIDE 12.5 MG/1
12.5 CAPSULE ORAL DAILY
Qty: 90 CAPSULE | Refills: 3 | Status: ON HOLD | OUTPATIENT
Start: 2017-12-19 | End: 2018-07-01

## 2017-12-19 ASSESSMENT — PAIN SCALES - GENERAL: PAINLEVEL: NO PAIN (0)

## 2017-12-19 ASSESSMENT — ACTIVITIES OF DAILY LIVING (ADL): CURRENT_FUNCTION: NO ASSISTANCE NEEDED

## 2017-12-19 NOTE — PATIENT INSTRUCTIONS
At [facility], we strive to deliver an exceptional experience to you, every time we see you.  If you receive a survey in the mail, please send us back your thoughts. We really do value your feedback.    Based on your medical history, these are the current health maintenance/preventive care services that you are due for (some may have been done at this visit.)  Health Maintenance Due   Topic Date Due     DEPRESSION ACTION PLAN Q1 YR  08/28/1953     PHQ-9 Q6 MONTHS  08/28/1953     ADVANCE DIRECTIVE PLANNING Q5 YRS  08/28/1992         Suggested websites for health information:  Www.ShowKit.ShelfX : Up to date and easily searchable information on multiple topics.  Www.medlineplus.gov : medication info, interactive tutorials, watch real surgeries online  Www.familydoctor.org : good info from the Academy of Family Physicians  Www.cdc.gov : public health info, travel advisories, epidemics (H1N1)  Www.aap.org : children's health info, normal development, vaccinations  Www.health.[facility].[place].us : [place] dept of health, public health issues in [place], N1N1    Your care team:                            Family Medicine Internal Medicine   MD Jose Alejandro Floyd MD Shantel Branch-Fleming, MD Katya Georgiev PA-C Nam Ho, MD Pediatrics   VINITA Gutiérrez, MD Kika Olivia CNP, MD Deborah Mielke, MD Kim Thein, APRN CNP      Clinic hours: Monday - Thursday 7 am-7 pm; Fridays 7 am-5 pm.   Urgent care: Monday - Friday 11 am-9 pm; Saturday and Sunday 9 am-5 pm.  Pharmacy : Monday -Thursday 8 am-8 pm; Friday 8 am-6 pm; Saturday and Sunday 9 am-5 pm.     Clinic: [phone]   Pharmacy: [phone]

## 2017-12-19 NOTE — PROGRESS NOTES
SUBJECTIVE:   Brenda Barker is a 82 year old female who presents for Preventive Visit.  Are you in the first 12 months of your Medicare coverage?  No    Physical   Annual:     Getting at least 3 servings of Calcium per day::  NO (calcium high previous lab)    Bi-annual eye exam::  Yes    Dental care twice a year::  Yes    Diet::  Regular (no restrictions) (tries to eat healthy)    Frequency of exercise::  2-3 days/week (pool therapy twice per week)    Duration of exercise::  45-60 minutes    Taking medications regularly::  Yes    Medication side effects::  None    Additional concerns today::  No    Ability to successfully perform activities of daily living: no assistance needed  Home Safety:  No safety concerns identified  Hearing Impairment: no hearing concerns (with use of hearing aids)        Fall risk:  Fallen 2 or more times in the past year?: No  Any fall with injury in the past year?: No    COGNITIVE SCREEN  1) Repeat 3 items (Banana, Sunrise, Chair)    2) Clock draw: NORMAL  3) 3 item recall: Recalls 3 objects  Results: 3 items recalled: COGNITIVE IMPAIRMENT LESS LIKELY    Mini-CogTM Copyright S Carlos. Licensed by the author for use in [facility]; reprinted with permission (santosh@[facility]). All rights reserved.          Reviewed and updated as needed this visit by clinical staffTobacco  Allergies  Meds  Med Hx  Surg Hx  Fam Hx  Soc Hx        Reviewed and updated as needed this visit by Provider        Social History   Substance Use Topics     Smoking status: Never Smoker     Smokeless tobacco: Never Used     Alcohol use Yes      Comment: occasionally       No flowsheet data found.No flowsheet data found.            Hyperlipidemia Follow-Up      Rate your low fat/cholesterol diet?: good    Taking statin?  Yes, no muscle aches from statin    Other lipid medications/supplements?:  none    Hypertension Follow-up      Outpatient blood pressures are not being checked.    Low Salt Diet: no added  salt    Rheumatoid arthritis followed by rheumatologist. Stable.   Wrists were hurting and used her splints for a while and this is better now.  History of rapid heart rate controled with metoprolol.   History of back fusion with persistent hip and back pain.           Today's PHQ-2 Score: PHQ-2 ( 1999 Pfizer) 12/19/2017   Q1: Little interest or pleasure in doing things 0   Q2: Feeling down, depressed or hopeless 0   PHQ-2 Score 0       Do you feel safe in your environment - Yes    Do you have a Health Care Directive?: Yes: Patient states has Advance Directive and will bring in a copy to clinic.    Current providers sharing in care for this patient include:   Patient Care Team:  Liudmila Bowie MD as PCP - General (Family Practice)    The following health maintenance items are reviewed in Epic and correct as of today:  Health Maintenance   Topic Date Due     DEPRESSION ACTION PLAN Q1 YR  08/28/1953     PHQ-9 Q6 MONTHS  08/28/1953     ADVANCE DIRECTIVE PLANNING Q5 YRS  08/28/1992     TETANUS IMMUNIZATION (SYSTEM ASSIGNED)  03/13/2018     FALL RISK ASSESSMENT  10/20/2018     INFLUENZA VACCINE (SYSTEM ASSIGNED)  Completed     DEXA SCAN SCREENING (SYSTEM ASSIGNED)  Completed     PNEUMOCOCCAL  Completed     Labs reviewed in EPIC  BP Readings from Last 3 Encounters:   12/19/17 109/60   12/09/17 134/63   10/31/17 127/69    Wt Readings from Last 3 Encounters:   12/19/17 167 lb (75.8 kg)   12/09/17 170 lb (77.1 kg)   10/31/17 172 lb 9.6 oz (78.3 kg)                  Patient Active Problem List   Diagnosis     Benign neoplasm of cranial nerve (H)     Hemorrhoids     Aortic valve disorder     Cervicalgia     Rheumatoid arthritis (H)     SNHL (sensorineural hearing loss)     Status post total left knee replacement     Obesity     High risk medication use     Gastroesophageal reflux disease with esophagitis     Past Surgical History:   Procedure Laterality Date     C DEXA, BONE DENSITY, AXIAL SKEL  7/03 7/03 L1-4 1.7,  FemNkL-0.8,R-1.2, FArm Px 0.2,Dist-0.3     C REMOVAL OF OVARY(S)  1980    2nd ovary removed - endometriosis     C TOTAL ABDOM HYSTERECTOMY  1972    uterus, ovary removed - fibroid     HC COLONOSCOPY THRU STOMA W BIOPSY/CAUTERY TUMOR/POLYP/LESION  1993    adenomatous polyp     HC COLONOSCOPY THRU STOMA, DIAGNOSTIC  1998, 10/03    normal, '03 rec repeat in 5 yrs due to +hx polyp and + fam hx     HC FLEX SIGMOIDOSCOPY W/WO KARYNA SPEC BY BRUSH/WASH  12/2001     HC REMOVAL OF TONSILS,<13 Y/O  1942     SURGICAL HISTORY OF -   1989    basal cell Ca scalp excised     SURGICAL HISTORY OF -   1998    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       Social History   Substance Use Topics     Smoking status: Never Smoker     Smokeless tobacco: Never Used     Alcohol use Yes      Comment: occasionally     Family History   Problem Relation Age of Onset     GASTROINTESTINAL DISEASE Father      bleeding ulcer     CANCER Mother      thyroid cancer         Current Outpatient Prescriptions   Medication Sig Dispense Refill     omeprazole (PRILOSEC) 20 MG CR capsule Take 1 capsule (20 mg) by mouth daily 90 capsule 1     meloxicam (MOBIC) 7.5 MG tablet Take 7.5 mg by mouth       Cholecalciferol (VITAMIN D-3) 1000 UNITS CAPS Take by mouth daily       nitrofurantoin 100 MG Take 1 capsule by mouth daily.       polyvinyl alcohol (LIQUID TEARS) 1.4 % ophthalmic solution 1 drop as needed.       acyclovir (ZOVIRAX) 400 MG tablet Take 400 mg by mouth daily.       hydrochlorothiazide (MICROZIDE) 12.5 MG capsule Take 12.5 mg by mouth daily.       simvastatin (ZOCOR) 20 MG tablet Take  by mouth At Bedtime.       METOPROLOL SUCCINATE# 50 MG OR TB24 1 TABLET DAILY 30 6     FOLIC ACID 1 MG OR TABS 4 mg TABS DAILY       METHOTREXATE SODIUM 25 MG/ML IJ SOLN None Entered       CENTRUM SILVER OR TABS 1 TABLET DAILY       ASPIRIN 81 MG OR TABS 1 TABLET DAILY       FISH OIL 1000 MG OR CAPS 2 CAPSULES DAILY  (? DOSE) OTC --     Allergies  "  Allergen Reactions     Ciprofloxacin      nausea     Contrast Dye      Hives,anaphylaxix     Sulfasalazine      Other reaction(s): Rash     No lab results found.           Pneumonia Vaccine:Adults age 65+ who received Pneumovax (PPSV23) at 65 years or older: Should be given PCV13 > 1 year after their most recent PPSV23  Mammogram Screening: Patient over age 75, has elected to stop mammography screening.Review of Systems  Constitutional, HEENT, cardiovascular, pulmonary, gi and gu systems are negative, except as otherwise noted.      OBJECTIVE:   /60 (BP Location: Right arm, Patient Position: Chair, Cuff Size: Adult Large)  Pulse 80  Temp 98.4  F (36.9  C) (Oral)  Resp 18  Ht 5' 2.25\" (1.581 m)  Wt 167 lb (75.8 kg)  SpO2 98%  Breastfeeding? No  BMI 30.3 kg/m2 Estimated body mass index is 30.3 kg/(m^2) as calculated from the following:    Height as of this encounter: 5' 2.25\" (1.581 m).    Weight as of this encounter: 167 lb (75.8 kg).  Physical Exam  GENERAL: elderly, alert, well nourished, well hydrated, no distress  HENT: ear canals- normal; TMs- normal; Nose- normal; Mouth- no ulcers, no lesions, missing dentition  NECK: no tenderness, no adenopathy, no asymmetry, no masses, no stiffness; thyroid- normal to palpation  RESP: lungs clear to auscultation - no rales, no rhonchi, no wheezes  CV: regular rates and rhythm, normal S1 S2, no S3 or S4 and no murmur, no click or rub, normal pulses  ABDOMEN: soft, no tenderness, no  hepatosplenomegaly, no masses, normal bowel sounds  MS: extremities- no gross deformities noted, no edema  SKIN: no suspicious lesions, no rashes, age related skin changes with seborrheic keratosis and no actinic keratosis.    NEURO: strength and tone- normal, sensory exam- grossly normal, reflexes- symmetric, head tremor at rest  BACK: no CVA tenderness, no paralumbar tenderness  MENTAL STATUS EXAM:  Appearance/Behavior: no apparent distress, neatly groomed, dressed appropriately " "for weather, appears stated age and is not frail-appearing  Speech: normal  Mood/Affect: normal affect  Insight: Excellent     ASSESSMENT / PLAN:       ICD-10-CM    1. Normal physical exam, routine Z00.00 Chronic stable problems   2. Rheumatoid arthritis involving multiple sites with positive rheumatoid factor (H) M05.79 Established with new rheumatologist and is tapering a bit on her methotrexate.    3. 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 Aortic valve disorder I35.9 stable   5. Hypertension, goal below 140/90 I10 hydrochlorothiazide (MICROZIDE) 12.5 MG capsule     Renal panel   6. Hyperlipidemia LDL goal <130 E78.5 Lipid panel reflex to direct LDL Fasting   7. Rapid heart rate R00.0 Treated and stable for years on metoprolol. Refills as needed.    8. Resting tremor R25.9 Chronic head neck tremor. May benefit from botox injections.        End of Life Planning:  Patient currently has an advanced directive: Yes.  Practitioner is supportive of decision.    COUNSELING:  Reviewed preventive health counseling, as reflected in patient instructions       Regular exercise       Healthy diet/nutrition       Aspirin Prophylaxsis       Osteoporosis Prevention/Bone Health          Estimated body mass index is 30.3 kg/(m^2) as calculated from the following:    Height as of this encounter: 5' 2.25\" (1.581 m).    Weight as of this encounter: 167 lb (75.8 kg).  Weight management plan: Discussed healthy diet and exercise guidelines and patient will follow up in 6 months in clinic to re-evaluate.   reports that she has never smoked. She has never used smokeless tobacco.        Appropriate preventive services were discussed with this patient, including applicable screening as appropriate for cardiovascular disease, diabetes, osteopenia/osteoporosis, and glaucoma.  As appropriate for age/gender, discussed screening for colorectal cancer, prostate cancer, breast cancer, and " cervical cancer. Checklist reviewing preventive services available has been given to the patient.    Reviewed patients plan of care and provided an AVS. The Basic Care Plan (routine screening as documented in Health Maintenance) for Brenda meets the Care Plan requirement. This Care Plan has been established and reviewed with the Patient.    Counseling Resources:  ATP IV Guidelines  Pooled Cohorts Equation Calculator  Breast Cancer Risk Calculator  FRAX Risk Assessment  ICSI Preventive Guidelines  Dietary Guidelines for Americans, 2010  USDA's MyPlate  ASA Prophylaxis  Lung CA Screening    Liudmila Bowie MD  [facility]

## 2017-12-19 NOTE — LETTER
[address] [address]  [phone]        December 21, 2017    Regarding:  Brenda Barker  [address] [facility]    [facility] [address]              To Whom It May Concern;    Brenda Barker will need the following lab orders for follow up of   hypercalcemia E 83.52 and  hypophosphatemia E 83.39.    Calcium  Phosphorous  Parathyroid hormone  Vitamin D      Sincerely,        Liudmila Bowie MD

## 2017-12-19 NOTE — MR AVS SNAPSHOT
After Visit Summary   12/19/2017    Brenda Barker    MRN: [id number]           Patient Information     Date Of Birth          1935        Visit Information        Provider Department      12/19/2017 11:40 AM Liudmila Bowie MD [facility]        Today's Diagnoses     Normal physical exam, routine    -  1    Rheumatoid arthritis involving multiple sites with positive rheumatoid factor (H)        Benign neoplasm of cranial nerve (H)        Aortic valve disorder        Hypertension, goal below 140/90        Hyperlipidemia LDL goal <130        Rapid heart rate        Resting tremor          Care Instructions    At [facility], we strive to deliver an exceptional experience to you, every time we see you.  If you receive a survey in the mail, please send us back your thoughts. We really do value your feedback.    Based on your medical history, these are the current health maintenance/preventive care services that you are due for (some may have been done at this visit.)  Health Maintenance Due   Topic Date Due     DEPRESSION ACTION PLAN Q1 YR  08/28/1953     PHQ-9 Q6 MONTHS  [birth year]28/1953     ADVANCE DIRECTIVE PLANNING Q5 YRS  08/28/1992         Suggested websites for health information:  Www.Real [employer] Direct.artaculous : Up to date and easily searchable information on multiple topics.  Www.medlineplus.gov : medication info, interactive tutorials, watch real surgeries online  Www.familydoctor.org : good info from the Academy of Family Physicians  Www.cdc.gov : public health info, travel advisories, epidemics (H1N1)  Www.aap.org : children's health info, normal development, vaccinations  Www.health.[facility].[place].us : [place] dept of health, public health issues in [place], N1N1    Your care team:                            Family Medicine Internal Medicine   MD Jose Alejandro Floyd MD Shantel Branch-Fleming, MD Katya Georgiev PA-C Nam Ho, MD Pediatrics   Kel Pena,  ANNA Trent CNP, MD Bethany Templen, MD Deborah Mielke, MD Kim Thein, APRN CNP      Clinic hours: Monday - Thursday 7 am-7 pm; Fridays 7 am-5 pm.   Urgent care: Monday - Friday 11 am-9 pm; Saturday and Sunday 9 am-5 pm.  Pharmacy : Monday -Thursday 8 am-8 pm; Friday 8 am-6 pm; Saturday and Sunday 9 am-5 pm.     Clinic: [phone]   Pharmacy: [phone]            Follow-ups after your visit        Follow-up notes from your care team     Return in about 6 months (around 6/19/2018) for Lab Work, medication follow up, Weight check.      Who to contact     If you have questions or need follow up information about today's clinic visit or your schedule please contact [facility] directly at [phone].  Normal or non-critical lab and imaging results will be communicated to you by MyChart, letter or phone within 4 business days after the clinic has received the results. If you do not hear from us within 7 days, please contact the clinic through Emergent Onehart or phone. If you have a critical or abnormal lab result, we will notify you by phone as soon as possible.  Submit refill requests through Pre[employer]se or call your pharmacy and they will forward the refill request to us. Please allow 3 business days for your refill to be completed.          Additional Information About Your Visit        Emergent Onehart Information     Precyse gives you secure access to your electronic health record. If you see a primary care provider, you can also send messages to your care team and make appointments. If you have questions, please call your primary care clinic.  If you do not have a primary care provider, please call [phone] and they will assist you.        Care EveryWhere ID     This is your Care EveryWhere ID. This could be used by other organizations to access your [facility] medical records  [id number]        Your Vitals Were     Pulse Temperature Respirations  "Height Pulse Oximetry Breastfeeding?    80 98.4  F (36.9  C) (Oral) 18 5' 2.25\" (1.581 m) 98% No    BMI (Body Mass Index)                   30.3 kg/m2            Blood Pressure from Last 3 Encounters:   12/19/17 109/60   12/09/17 134/63   10/31/17 127/69    Weight from Last 3 Encounters:   12/19/17 167 lb (75.8 kg)   12/09/17 170 lb (77.1 kg)   10/31/17 172 lb 9.6 oz (78.3 kg)              We Performed the Following     Lipid panel reflex to direct LDL Fasting     Renal panel          Where to get your medicines      These medications were sent to [id number] [place] - [address] [place] AT [facility] [address]  [address] [place] [address]     Phone:  [phone]     hydrochlorothiazide 12.5 MG capsule          Primary Care Provider Office Phone # Fax #    Liudmila Sindy Bowie [phone]       [address]  [facility] [address]        Equal Access to Services     [facility] AH: Hadii aad ku hadasho Soomaali, waaxda luqadaha, qaybta kaalmada adeegyada, waxay paigein haymulugetan ramsey garcia . So [facility] [phone].    ATENCIÓN: Si habla español, tiene a beltran disposición servicios gratuitos de asistencia lingüística. Llame al [phone].    We comply with applicable federal civil rights laws and [place] laws. We do not discriminate on the basis of race, color, national origin, age, disability, sex, sexual orientation, or gender identity.            Thank you!     Thank you for choosing [facility]  for your care. Our goal is always to provide you with excellent care. Hearing back from our patients is one way we can continue to improve our services. Please take a few minutes to complete the written survey that you may receive in the mail after your visit with us. Thank you!             Your Updated Medication List - Protect others around you: Learn how to safely use, store and throw away your medicines at www.disposemymeds.org.    "       This list is accurate as of: 12/19/17 12:39 PM.  Always use your most recent med list.                   Brand Name Dispense Instructions for use Diagnosis    aspirin 81 MG tablet      1 TABLET DAILY        CENTRUM SILVER per tablet      1 TABLET DAILY        fish oil-omega-3 fatty acids 1000 MG capsule     OTC    2 CAPSULES DAILY  (? DOSE)    Other and unspecified hyperlipidemia       folic acid 1 MG tablet    FOLVITE     4 mg TABS DAILY    Rheumatoid arthritis(714.0), Obesity, unspecified, Other abnormal glucose       hydrochlorothiazide 12.5 MG capsule    MICROZIDE    90 capsule    Take 1 capsule (12.5 mg) by mouth daily    Hypertension, goal below 140/90       LIQUID TEARS 1.4 % ophthalmic solution   Generic drug:  polyvinyl alcohol      1 drop as needed.        meloxicam 7.5 MG tablet    MOBIC     Take 7.5 mg by mouth        methotrexate 25 MG/ML injection      None Entered        metoprolol 50 MG 24 hr tablet    TOPROL-XL    30    1 TABLET DAILY    Essential hypertension, benign       nitroFURantoin macrocrystal 100 MG ED starter pack    MACRODANTIN     Take 1 capsule by mouth daily.        omeprazole 20 MG CR capsule    priLOSEC    90 capsule    Take 1 capsule (20 mg) by mouth daily    Gastroesophageal reflux disease with esophagitis       Vitamin D-3 1000 UNITS Caps      Take by mouth daily        ZOCOR 20 MG tablet   Generic drug:  simvastatin      Take  by mouth At Bedtime.        ZOVIRAX 400 MG tablet   Generic drug:  acyclovir      Take 400 mg by mouth daily.

## 2017-12-20 ENCOUNTER — MYC MEDICAL ADVICE (OUTPATIENT)
Dept: FAMILY MEDICINE | Facility: CLINIC | Age: 82
End: 2017-12-20

## 2017-12-20 NOTE — PROGRESS NOTES
Dear Brenda    Your test results are attached.     Your calcium is still high but is better than last time when it was up to 11.0 in May. Your phosphorus is a little low. We sometimes see this when your parathyroid hormone is too high. I would like to recheck your calcium and phosphorous and check the parathyroid hormone level at the same time. Your previous doctor was recommending this also. I will put in a future lab order. Please schedule a lab appointment in the next 2-3 weeks at your convenience. You do not need to be fasting.    The blood sugar is normal and you do not have diabetes. The kidneys are healthy. The cholesterol looks great!    Please contact me by DrivenBIhart if you have any questions about your labs or management.    Liudmila Bowie MD

## 2017-12-27 NOTE — PROGRESS NOTES
Patient Information     Patient Name MRN Sex Brenda Mckinnon [id number] Female 1935      Progress Notes by Ayah Bess R.T. (ARRT) at 2017 12:06 PM     Author:  Ay[facility].T. [facility] Service:  (none) Author Type:  [facility] - Registered Radiologic Technologist     Filed:  2017 12:06 PM Date of Service:  2017 12:06 PM Status:  Signed     :  Ayah Bess R.T. ([facility]) ([facility] - Registered Radiologic Technologist)            [place] Protocol    A. Pre-procedure verification complete yes  1-relevant information / documentation available, reviewed and properly matched to the patient; 2-consent accurate and complete, 3-equipment and supplies available    B. Site marking complete Yes  Site marked if not in continuous attendance with patient    C. TIME OUT completed yes  Time Out was conducted just prior to starting procedure to verify the eight required elements: 1-patient identity, 2-consent accurate and complete, 3-position, 4-correct side/site marked (if applicable), 5-procedure, 6-relevant images / results properly labeled and displayed (if applicable), 7-antibiotics / irrigation fluids (if applicable), 8-safety precautions.

## 2017-12-27 NOTE — PROGRESS NOTES
Patient Information     Patient Name MRN Brenda Garcia [id number] Female 1935      Progress Notes by Rich Dukes MD at 2017  2:00 PM     Author:  Rich Dukes MD Service:  (none) Author Type:  Physician     Filed:  2017  2:28 PM Encounter Date:  2017 Status:  Signed     :  Rich Dukes MD (Physician)            Subjective  Brenda Barker is a 81 y.o. female who presents for follow-up visit from Dr. Bauer. She has a history of rheumatoid arthritis and continues on methotrexate and meloxicam. She was feeling no better or no worse at her last visit with Dr. Bauer before he left to move to the [facility]. She had blood work that revealed elevated CRP and sedimentation rate. She was prescribed a course of prednisone for 20 days due to the lab elevations. Her joints did feel better. She finished about a week ago. She starting to get a little more stiffness in her hands and hip area. Overall she feels unchanged. There is been no chest pains, cough or dyspnea. No diarrhea, abdominal pain. No vomiting. No sinus pressure or headache. No rash. No dysuria.    Problem List/PMH: reviewed in EMR, and made relevant updates today.  Medications: reviewed in EMR, and made relevant updates today.  Allergies: reviewed in EMR, and made relevant updates today.    Social Hx:  Social History       Substance Use Topics         Smoking status:   Never Smoker     Smokeless tobacco:   Never Used     Alcohol use   0.0 oz/week      0 Standard drinks or equivalent per week         Comment: 1 drink 3-4 times weekly      Social History Narrative    Nonsmoker. Alcohol-moderate.     . Retired. Three sons.      Her 3rd son committed suicide.         , will be going to [facility] to [facility] this winter.  Originally from [place] [facility].     Planning on moving back to the [place]      I reviewed social history and made relevant updates today.    Family Hx:   Family History      "  Problem   Relation Age of Onset     Cancer  Mother      Thyroid and larynx       Other  Father      bleeding ulcer       Diabetes  Maternal Grandmother      Diabetes mellitus.       Cancer-colon  Maternal Uncle      Colon CA , dx age 50       Psychiatric illness  Son      suicide       Cancer-breast  No Family History        Objective  Vitals: reviewed in EMR.  /60  Pulse 68  Ht 1.615 m (5' 3.6\")  Wt 80.7 kg (178 lb)  BMI 30.94 kg/m2    Gen: Pleasant female, NAD.  HEENT: MMM  Neck: Supple  Pulm: Breathing easily  Neuro: Grossly intact  Skin: No concerning lesions.  Psychiatric: Normal affect and insight. Does not appear anxious or depressed.    Laboratory data reviewed via care everywhere including ESR 53, CRP 8.7.      Assessment    ICD-10-CM    1. Rheumatoid arthritis involving multiple sites with positive rheumatoid factor (HC) M05.79 CBC WITH DIFFERENTIAL      SEDIMENTATION RATE      CRP, inflammatory      methotrexate (RHEUMATREX) 2.5 mg tablet      CBC WITH DIFFERENTIAL      SEDIMENTATION RATE      CRP, inflammatory      CBC WITH AUTO DIFFERENTIAL       It sounds as though her course of prednisone was for the elevated inflammatory markers without much of an underlying new symptom. If her inflammatory markers have normalized I'd say this is in response to the course of prednisone and likely the inflammation was secondary to her rheumatoid arthritis. However if inflammatory markers have remained elevated there is the possibility of an occult malignancy. In that circumstance close follow-up/establish care with new rheumatologist would be recommended first.    Plan   -- Expected clinical course discussed   -- Medications and their side effects discussed   -- Labs today   -- Follow-up in 3 months    Signed, Rich Dukes MD  Internal Medicine & Pediatrics            "

## 2017-12-28 NOTE — TELEPHONE ENCOUNTER
Patient Information     Patient Name MRN Brenda Garcia [id number] Female 1935      Telephone Encounter by Alie Lindsey RN at 2017 11:22 AM     Author:  Alie Lindsey RN Service:  (none) Author Type:  NURS- Registered Nurse     Filed:  2017 11:25 AM Encounter Date:  2017 Status:  Signed     :  Alie Lindsey RN (NURS- Registered Nurse)            Proton Pump Inhibitors    Office visit in the past 12 months or per provider note.    Last visit with GERARD ORANTES was on: 2017 in [facility]  Next visit with GERARD ORANTES is on: No future appointment listed with this provider  Next visit with Internal Med Pediatrics is on: No future appointment listed in this department    Max refill for 12 months from last office visit or per provider note.    Prescription refilled per RN Medication Refill Policy.................... ALIE LINDSEY RN ....................  2017   11:25 AM

## 2017-12-28 NOTE — TELEPHONE ENCOUNTER
Patient Information     Patient Name MRN Brenda Garcia [id number] Female 1935      Telephone Encounter by Sonia Rush RN at 2017  1:51 PM     Author:  Sonia Rush RN Service:  (none) Author Type:  NURS- Registered Nurse     Filed:  2017  1:54 PM Encounter Date:  2017 Status:  Signed     :  Sonia Rush RN (NURS- Registered Nurse)            Medication is not on refill protocol. Unable to complete prescription refill per RN Medication Refill Policy.................... SONIA RUSH RN ....................  2017   1:52 PM        This is a Refill request from: [facility]  Name of Medication: methotrexate 2.5mg  Quantity requested: 28  Last fill date: 17  Last visit with RICH DUKES was on: 2017 in [facility]  PCP:  Rich Dukes MD  Controlled Substance Agreement:  na   Diagnosis r/t this medication request: RA

## 2017-12-28 NOTE — PROGRESS NOTES
Patient Information     Patient Name MRN Brenda Garcia [id number] Female 1935      Progress Notes by Ayah Bess R.T. ([facility]) at 2017 12:05 PM     Author:  [facility]T. [facility] Service:  (none) Author Type:  [employer] - Registered Radiologic Technologist     Filed:  2017 12:06 PM Date of Service:  2017 12:05 PM Status:  Signed     :  Ayah Bess R.T. (ARRT) ([facility] - Registered Radiologic Technologist)            Falls Risk Criteria:    Age 65 and older or under age 4        Sensory deficits    Poor vision    Use of ambulatory aides    Impaired judgment    Unable to walk independently    Meets High Risk criteria for falls:  Yes             1.  Do you have dizziness or vertigo?    no                    2.  Do you need help standing or walking?   no                 3.  Have you fallen within the last 6 months?    no           4.  Has the patient been fasting?      no       If any risks are marked Yes, the following interventions are utilized:    Do not leave patient unattended     Assist patient in the dressing room and bathroom    Have ambulatory aides available throughout procedure    Involve patient s family if available

## 2017-12-28 NOTE — TELEPHONE ENCOUNTER
Patient Information     Patient Name MRN Brenda Garcia [id number] Female 1935      Telephone Encounter by Uri Hsu at 2017  2:54 PM     Author:  Uri Hsu Service:  (none) Author Type:  (none)     Filed:  2017  2:55 PM Encounter Date:  2017 Status:  Signed     :  Uri Hsu            After birth date was verified, spoke with Brenda and let her know that a written prescription for a walker with wheels will be available at the [address] check in window to . No further questions or concerns.    Uri Hsu ....................  2017   2:55 PM

## 2017-12-28 NOTE — TELEPHONE ENCOUNTER
Patient Information     Patient Name MRN Brenda Garcia [id number] Female 1935      Telephone Encounter by Rich Dukes MD at 2017  2:58 PM     Author:  Rich Dukes MD Service:  (none) Author Type:  Physician     Filed:  2017  2:59 PM Encounter Date:  2017 Status:  Signed     :  Rich Dukes MD (Physician)            Taking a course of prednisone with a taper for elevated ESR would be reasonable, particularly if there was an increase in arthritis pain. I agree with having a follow-up after the prescription has completed. Follow-up sooner if symptoms have worsened.    Signed, Rich Dukes MD  Internal Medicine & Pediatrics

## 2017-12-28 NOTE — PROGRESS NOTES
Patient Information     Patient Name MRN Brenda Garcia [id number] Female 1935      Progress Notes by Ayah Bess R.T. (ARRT) at 2017 12:05 PM     Author:  Ayah Bess R.T. (ARRT) Service:  (none) Author Type:  [employer] - Registered Radiologic Technologist     Filed:  2017 12:05 PM Date of Service:  2017 12:05 PM Status:  Signed     :  Ayah Bess R.T. (ARRT) ([facility] - Registered Radiologic Technologist)            RECOVERY TIME  You may experience numbness and/or relief of your pain for up to 4-6 hours after the injection.  Your usual symptoms may return the night of the procedure and may possible be more severe than usual a day or two following.  Please keep track of your pain over the next several days and report how long the relief lasts to the doctor who referred you for this procedure.    The beneficial effects of the steroids usually require 2 to 3 days to take effect, buy may take as long as 5 to 7 days.  If there is no change in the pain, then investigation can be focused on other possible sources of your pain.  In either case, the information is useful to the doctor who referred you for this procedure.    POSSIBLE SIDE EFFECTS  Facial flushing (redness), occasional low grade fevers of 99.5F or less, hiccups, insomnia, headaches, increased heart rate, abdominal cramping, and/or a bloating feeling are side effects of the steroid medications and will go away 3 to 4 days after the injection.    Diabetic Patients  The steroids you have received may significantly increase your blood sugar levels.  Monitor your blood sugar level closely (4-6 times per day) for a period of 4 days or until your blood sugar level normalizes.  If your blood sugar level elevates significantly or you experience confusion, dizziness, sweating, please notify our primary physician and make him/her aware that you have received steroids.

## 2017-12-30 NOTE — NURSING NOTE
Patient Information     Patient Name MRBrenda Arenas [id number] Female 1935      Nursing Note by Damari Talley at 2017  2:00 PM     Author:  Damari Talley Service:  (none) Author Type:  (none)     Filed:  2017  2:00 PM Encounter Date:  2017 Status:  Signed     :  Damari Talley            Patient presents to clinic for F/U on visit and lab work with Dr. Bauer.  Damari Talley LPN ....................  2017   1:56 PM

## 2018-01-03 NOTE — NURSING NOTE
Patient Information     Patient Name Brenda Headley [id number] Female 1935      Nursing Note by Damari Talley at 3/17/2017  2:00 PM     Author:  Damari Talley Service:  (none) Author Type:  (none)     Filed:  3/17/2017  2:14 PM Encounter Date:  3/17/2017 Status:  Signed     :  Damari Talley            Patient presents to clinic for cough since Feb that is getting better now that she is back from [place], allergies, and to discuss pain injection for back.  Damari Talley LPN ....................  3/17/2017   2:08 PM

## 2018-01-03 NOTE — PROGRESS NOTES
Patient Information     Patient Name MRN Brenda Garcia [id number] Female 1935      Progress Notes by Rich Dukes MD at 3/17/2017  2:00 PM     Author:  Rich Dukes MD Service:  (none) Author Type:  Physician     Filed:  3/17/2017  4:50 PM Encounter Date:  3/17/2017 Status:  Signed     :  Rich Dukes MD (Physician)            Subjective  Brenda Barker is a 81 y.o. female who presents for follow-up allergies. She never had allergies before. She went to [place] for the winter and thinks she was exposed to when the air and pollens. She felt like she had a marshmallow on the back of her throat with symptoms that moved to the left ear. She had a lot of coughing as well. She was seen and started on Nasacort and Zyrtec, as well as a course of prednisone. Her symptoms resolved with the exception of the cough which has persisted. She's had no fever. No wheezing. No heartburn. She's worried it may be valley fever. She has ongoing back pain which is chronic and unchanged in nature. She's tried multiple methodologies for her pain including physical therapy, water-based physical therapy, interventional radiology guided injections, physical medicine and rehabilitation consultation none of which have yielded any improvement in her back pain. She has a history of rheumatoid arthritis and continues on meloxicam and methotrexate under the guidance of Dr. Bauer in [place]. She recently saw him and had a good report.    Problem List/PMH: reviewed in EMR, and made relevant updates today.  Medications: reviewed in EMR, and made relevant updates today.  Allergies: reviewed in EMR, and made relevant updates today.    Social Hx:  Social History       Substance Use Topics         Smoking status:   Never Smoker     Smokeless tobacco:   Never Used     Alcohol use   0.0 oz/week      0 Standard drinks or equivalent per week         Comment: 1 drink 3-4 times weekly      Social History Narrative    Nonsmoker.  Alcohol-moderate.     . Retired. Three sons.      Her 3rd son committed suicide.         , will be going to [facility] to [facility] this winter.  Originally from [place].       I reviewed social history and made relevant updates today.    Family Hx:   Family History       Problem   Relation Age of Onset     Cancer  Mother      Thyroid and larynx       Other  Father      bleeding ulcer       Diabetes  Maternal Grandmother      Diabetes mellitus.       Cancer-colon  Maternal Uncle      Colon CA , dx age 50       Psychiatric illness  Son      suicide       Cancer-breast  No Family History        Objective  Vitals: reviewed in EMR.    Visit Vitals       /70     Pulse 64     Wt 81 kg (178 lb 9.6 oz)     Breastfeeding No     BMI 31.04 kg/m2       Gen: Pleasant female, NAD.  HEENT: MMM  Neck: Supple  Pulm: Breathing easily  Neuro: Grossly intact  Skin: No concerning lesions.  Psychiatric: Normal affect and insight. Does not appear anxious or depressed.    Recent laboratory data from care everywhere in [place] was reviewed, normal.      Assessment    ICD-10-CM    1. Cough R05 COCCIDIOIDES ANTIBODY      COCCIDIOIDES ANTIBODY   2. Rheumatoid arthritis involving multiple sites with positive rheumatoid factor (HC) M05.79 AMB CONSULT TO INTERVENTIONAL RADIOLOGY   3. Lumbar foraminal stenosis M99.83 AMB CONSULT TO INTERVENTIONAL RADIOLOGY   4. Lumbar facet arthropathy (HC) M12.88 AMB CONSULT TO INTERVENTIONAL RADIOLOGY       It is possible that her cough could be due to coccidiomycosis or valley fever so we will check the antibody today which I would expect to be positive at this point since her symptoms have been ongoing for 6 weeks. Most likely would be allergic rhinosinusitis with postnasal drainage. Differential diagnosis also includes gastroesophageal reflux disease, others. It's difficult for me to determine exactly what the next best methodologies for her back will be's given she's  tried some many options which have been ineffective. We discussed options and decided on an interventional radiology consult with Dr. Waters to see if he has any injections that he believes may be helpful that have not been performed yet.    Plan   -- Expected clinical course discussed   -- Medications and their side effects discussed  Patient Instructions    -- Continue Nasacort and Zyrtec   -- Consider PFTs if cough persists   -- Consult with Dr. Waters for possible injection    -- Consider return to PT after injection      Signed, Rich Dukes MD  Internal Medicine & Pediatrics

## 2018-01-03 NOTE — PATIENT INSTRUCTIONS
Patient Information     Patient Name MRN Sex Brenda Mckinnon [id number] Female 1935      Patient Instructions by Rich Dukes MD at 3/17/2017  2:00 PM     Author:  iRch Dukes MD Service:  (none) Author Type:  Physician     Filed:  3/17/2017  2:33 PM Encounter Date:  3/17/2017 Status:  Signed     :  Rich Dukes MD (Physician)             -- Continue Nasacort and Zyrtec   -- Consider PFTs if cough persists   -- Consult with Dr. Waters for possible injection    -- Consider return to PT after injection

## 2018-01-03 NOTE — TELEPHONE ENCOUNTER
Patient Information     Patient Name MRN Brenda Garcia [id number] Female 1935      Telephone Encounter by Rich Dukes MD at 3/13/2017  4:02 PM     Author:  Rich Dukes MD Service:  (none) Author Type:  Physician     Filed:  3/13/2017  4:02 PM Encounter Date:  3/13/2017 Status:  Signed     :  Rich Dukes MD (Physician)             -- Continue Zyrtec OTC   -- Schedule OV to discuss    SignedRich MD  Internal Medicine & Pediatrics

## 2018-01-04 NOTE — TELEPHONE ENCOUNTER
Patient Information     Patient Name MRN Brenda Garcia [id number] Female 1935      Telephone Encounter by Rich Dukes MD at 2017 11:42 AM     Author:  Rich Dukes MD Service:  (none) Author Type:  Physician     Filed:  2017 11:43 AM Encounter Date:  2017 Status:  Signed     :  Rich Dukes MD (Physician)            She just had an injection 3/29/17.  Recommend OV to discuss.    Signed, Rich Dukes MD  Internal Medicine & Pediatrics

## 2018-01-04 NOTE — PROGRESS NOTES
Patient Information     Patient Name MRN Sex Brenda Mckinnon [id number] Female 1935      Progress Notes by Ay[facility] Bess [facility]T. [facility]) at 3/29/2017  2:50 PM     Author:  Ayah Bess R[facility]T. [facility] Service:  (none) Author Type:  [facility] - Registered Radiologic Technologist     Filed:  3/29/2017  2:50 PM Date of Service:  3/29/2017  2:50 PM Status:  Signed     :  Ayah Bess R[facility]T. [facility]) ([facility] - Registered Radiologic Technologist)            [place] Protocol    A. Pre-procedure verification complete yes  1-relevant information / documentation available, reviewed and properly matched to the patient; 2-consent accurate and complete, 3-equipment and supplies available    B. Site marking complete Yes  Site marked if not in continuous attendance with patient    C. TIME OUT completed yes  Time Out was conducted just prior to starting procedure to verify the eight required elements: 1-patient identity, 2-consent accurate and complete, 3-position, 4-correct side/site marked (if applicable), 5-procedure, 6-relevant images / results properly labeled and displayed (if applicable), 7-antibiotics / irrigation fluids (if applicable), 8-safety precautions.

## 2018-01-04 NOTE — PROGRESS NOTES
Patient Information     Patient Name MRN Brenda Garcia [id number] Female 1935      Progress Notes by Ayah Bess R.T. ([facility]) at 3/29/2017  2:50 PM     Author:  Ayah Bess R.T. ([facility]) Service:  (none) Author Type:  [facility] - Registered Radiologic Technologist     Filed:  3/29/2017  2:50 PM Date of Service:  3/29/2017  2:50 PM Status:  Signed     :  Ayah Bess R.T. ([facility]) ([facility] - Registered Radiologic Technologist)            RECOVERY TIME  You may experience numbness and/or relief of your pain for up to 4-6 hours after the injection.  Your usual symptoms may return the night of the procedure and may possible be more severe than usual a day or two following.  Please keep track of your pain over the next several days and report how long the relief lasts to the doctor who referred you for this procedure.    The beneficial effects of the steroids usually require 2 to 3 days to take effect, buy may take as long as 5 to 7 days.  If there is no change in the pain, then investigation can be focused on other possible sources of your pain.  In either case, the information is useful to the doctor who referred you for this procedure.    POSSIBLE SIDE EFFECTS  Facial flushing (redness), occasional low grade fevers of 99.5F or less, hiccups, insomnia, headaches, increased heart rate, abdominal cramping, and/or a bloating feeling are side effects of the steroid medications and will go away 3 to 4 days after the injection.    Diabetic Patients  The steroids you have received may significantly increase your blood sugar levels.  Monitor your blood sugar level closely (4-6 times per day) for a period of 4 days or until your blood sugar level normalizes.  If your blood sugar level elevates significantly or you experience confusion, dizziness, sweating, please notify our primary physician and make him/her aware that you have received steroids.

## 2018-01-04 NOTE — PROGRESS NOTES
Patient Information     Patient Name MRN Brenda Garcia [id number] Female 1935      Progress Notes by Ayah Bess R.T. ([facility]) at 3/29/2017  2:50 PM     Author:  [facility] Service:  (none) Author Type:  [facility] - Registered Radiologic Technologist     Filed:  3/29/2017  2:50 PM Date of Service:  3/29/2017  2:50 PM Status:  Signed     :  Ayah Bess R.T. ([facility]) ([facility] - Registered Radiologic Technologist)            Falls Risk Criteria:    Age 65 and older or under age 4        Sensory deficits    Poor vision    Use of ambulatory aides    Impaired judgment    Unable to walk independently    Meets High Risk criteria for falls:  Yes             1.  Do you have dizziness or vertigo?    no                    2.  Do you need help standing or walking?   no                 3.  Have you fallen within the last 6 months?    no           4.  Has the patient been fasting?      no       If any risks are marked Yes, the following interventions are utilized:    Do not leave patient unattended     Assist patient in the dressing room and bathroom    Have ambulatory aides available throughout procedure    Involve patient s family if available

## 2018-01-04 NOTE — TELEPHONE ENCOUNTER
Patient Information     Patient Name MRBrenda Arenas [id number] Female 1935      Telephone Encounter by Lilia Mike at 2017 10:54 AM     Author:  Lilia Mike Service:  (none) Author Type:  (none)     Filed:  2017 10:59 AM Encounter Date:  2017 Status:  Signed     :  Lilia Mike            Patient notified  Lilia Mike LPN........................2017  10:59 AM

## 2018-01-04 NOTE — TELEPHONE ENCOUNTER
Patient Information     Patient Name MRN Brenda Garcia [id number] Female 1935      Telephone Encounter by Rich Dukes MD at 2017  7:56 AM     Author:  Rich Dukes MD Service:  (none) Author Type:  Physician     Filed:  2017  7:56 AM Encounter Date:  2017 Status:  Signed     :  Rich Dukes MD (Physician)             -- Non-fasting lab-only   -- MD visit for med management    Signed, Rich Dukes MD  Internal Medicine & Pediatrics

## 2018-01-04 NOTE — TELEPHONE ENCOUNTER
Patient Information     Patient Name MRN Brenda Garcia [id number] Female 1935      Telephone Encounter by Alie Florian RN at 2017  2:32 PM     Author:  Alie Florian RN Service:  (none) Author Type:  [facility]- Registered Nurse     Filed:  2017  2:33 PM Encounter Date:  2017 Status:  Signed     :  Alie Florian RN (NURS- Registered Nurse)            methotrexate (RHEUMATREX) 2.5 mg tablet  TAKE 17.5 MG(7 TABLETS) BY MOUTH ONCE WEEKLY       Disp: 28 tablet Refills: 0    Class: eRx Start: 2017    For: Rheumatoid arthritis, involving unspecified site, unspecified rheumatoid factor presence (HC)  Originally ordered: 1 year ago by Rich Dukes MD  Last refill:3/19/2017  [facility] [address] [place] [address]   [address] - [phone]: [phone]  Last visit with RICH DUKES was on: 2017 in [facility]  PCP:  Rich Dukes MD    Unable to complete prescription refill per RN Medication Refill Policy.................... ALIE FLORIAN RN ....................  2017   2:32 PM

## 2018-01-05 NOTE — PROGRESS NOTES
Patient Information     Patient Name MRBrenda Arenas [id number] Female 1935      Progress Notes by Gretchen Langford at 2017  1:17 PM     Author:  Gretchen Langford Service:  (none) Author Type:  (none)     Filed:  2017  1:17 PM Date of Service:  2017  1:17 PM Status:  Signed     :  Gretchen Langford            RECOVERY TIME  You may experience numbness and/or relief of your pain for up to 4-6 hours after the injection.  Your usual symptoms may return the night of the procedure and may possible be more severe than usual a day or two following.  Please keep track of your pain over the next several days and report how long the relief lasts to the doctor who referred you for this procedure.    The beneficial effects of the steroids usually require 2 to 3 days to take effect, buy may take as long as 5 to 7 days.  If there is no change in the pain, then investigation can be focused on other possible sources of your pain.  In either case, the information is useful to the doctor who referred you for this procedure.    POSSIBLE SIDE EFFECTS  Facial flushing (redness), occasional low grade fevers of 99.5F or less, hiccups, insomnia, headaches, increased heart rate, abdominal cramping, and/or a bloating feeling are side effects of the steroid medications and will go away 3 to 4 days after the injection.    Diabetic Patients  The steroids you have received may significantly increase your blood sugar levels.  Monitor your blood sugar level closely (4-6 times per day) for a period of 4 days or until your blood sugar level normalizes.  If your blood sugar level elevates significantly or you experience confusion, dizziness, sweating, please notify our primary physician and make him/her aware that you have received steroids.

## 2018-01-05 NOTE — NURSING NOTE
Patient Information     Patient Name MRN Brenda Garcia [id number] Female 1935      Nursing Note by Damari Talley at 2017  2:00 PM     Author:  Damari Talley Service:  (none) Author Type:  (none)     Filed:  2017  2:12 PM Encounter Date:  2017 Status:  Signed     :  Damari Talley            Patient presents to clinic for bilateral hip pain.  Would like a referral for hip injections.  Damari Talley LPN ....................  2017   2:02 PM

## 2018-01-05 NOTE — PROGRESS NOTES
Patient Information     Patient Name MRN Brenda Garcia [id number] Female 1935      Progress Notes by Rich Dukes MD at 2017  2:00 PM     Author:  Rich Dukes MD Service:  (none) Author Type:  Physician     Filed:  2017  2:53 PM Encounter Date:  2017 Status:  Signed     :  Rich Dukes MD (Physician)            Subjective  Brenda Barker is a 81 y.o. female who presents for evaluation of hip pain. She continues to navarrete pain in the buttocks and hip area as well as the low back. She had some recent low back injections which didn't help for very long. In the past Dr. Hsu gave her hip injections which were quite helpful. She describes the hip socket pain as related to the upper outer buttock radiating into the right leg. It's worse with walking or standing with cooking and better with keep moving. She has a history of rheumatoid arthritis which has been stable with methotrexate. She seen several specialists including Dr. Bauer of rheumatology and Dr. Kasper of physical medicine and rehabilitation. She's undergone extensive physical therapy which has had limited improvement. She is working on losing weight.    Problem List/PMH: reviewed in EMR, and made relevant updates today.  Medications: reviewed in EMR, and made relevant updates today.  Allergies: reviewed in EMR, and made relevant updates today.    Social Hx:  Social History       Substance Use Topics         Smoking status:   Never Smoker     Smokeless tobacco:   Never Used     Alcohol use   0.0 oz/week      0 Standard drinks or equivalent per week         Comment: 1 drink 3-4 times weekly      Social History Narrative    Nonsmoker. Alcohol-moderate.     . Retired. Three sons.      Her 3rd son committed suicide.         , will be going to [facility] to [facility] this winter.  Originally from [place].       I reviewed social history and made relevant updates today.    Family Hx:  "  Family History       Problem   Relation Age of Onset     Cancer  Mother      Thyroid and larynx       Other  Father      bleeding ulcer       Diabetes  Maternal Grandmother      Diabetes mellitus.       Cancer-colon  Maternal Uncle      Colon CA , dx age 50       Psychiatric illness  Son      suicide       Cancer-breast  No Family History        Objective  Vitals: reviewed in EMR.  /70  Pulse 62  Ht 1.615 m (5' 3.6\")  Wt 79.7 kg (175 lb 9.6 oz)  BMI 30.52 kg/m2    Gen: Pleasant female, NAD.  HEENT: MMM  Neck: Supple  Pulm: Breathing easily  Neuro: Grossly intact  Skin: No concerning lesions.  Psychiatric: Normal affect and insight. Does not appear anxious or depressed.  MSK: Negative log roll bilaterally. Mild pain is elicited with resisted internal and external rotation of the hips bilaterally. Equivocal straight leg raise bilaterally.    Results         RIGHT HIP, 2 VIEWS - 8/12/2013      HISTORY:  Lumbago.      FINDINGS:  Patient has moderate osteoarthritis of the hip with narrowing of the joint.  Some sclerosis and marginal spurring.  No fractures seen.      IMPRESSION:  Moderate osteoarthritis.          Donavan Hart M.D.  [facility].  [facility]  D:8/12/2013 / T:8/12/2013          Results         LEFT HIP 2 VIEWS, 8/12/2013     CLINICAL HISTORY:  Lumbago.     FINDINGS:  The patient has moderate to advanced osteoarthritis of the left hip with only a very small residual joint space superiorly narrowing diffusely. Sclerosis and marginal spurring identified.     IMPRESSION:  Moderate to advanced osteoarthritis of the left hip.     Donavan Hrat M.D.  [facility].  [facility]  D:8/12/2013/T:8/12/2013     Results for orders placed or performed in visit on 05/11/17      COMPLETE METABOLIC PANEL      Result  Value Ref Range    SODIUM 137 133 - 143 mmol/L    POTASSIUM 4.0 3.5 - 5.1 mmol/L    CHLORIDE 103 98 - 107 mmol/L    CO2,TOTAL 23 21 - 31 mmol/L "    ANION GAP 11 5 - 18                    GLUCOSE 107 (H) 70 - 105 mg/dL    CALCIUM 11.0 (H) 8.6 - 10.3 mg/dL    BUN 20 7 - 25 mg/dL    CREATININE 0.85 0.70 - 1.30 mg/dL    BUN/CREAT RATIO           24                    GFR if African American >60 >60 ml/min/1.73m2    GFR if not African American >60 >60 ml/min/1.73m2    ALBUMIN 4.9 3.5 - 5.7 g/dL    PROTEIN,TOTAL 7.7 6.4 - 8.9 g/dL    GLOBULIN                  2.8 2.0 - 3.7 g/dL    A/G RATIO 1.8 1.0 - 2.0                    BILIRUBIN,TOTAL 0.5 0.3 - 1.0 mg/dL    ALK PHOSPHATASE 50 34 - 104 IU/L    ALT (SGPT) 24 7 - 52 IU/L    AST (SGOT) 21 13 - 39 IU/L   Hgb A1c      Result  Value Ref Range    HEMOGLOBIN A1C MONITORING (POCT) 5.2 4.0 - 6.2 %    ESTIMATED AVERAGE GLUCOSE  103 mg/dL   CBC WITH AUTO DIFFERENTIAL      Result  Value Ref Range    WHITE BLOOD COUNT         5.6 4.5 - 11.0 thou/cu mm    RED BLOOD COUNT           3.88 (L) 4.00 - 5.20 mil/cu mm    HEMOGLOBIN                12.4 12.0 - 16.0 g/dL    HEMATOCRIT                38.3 33.0 - 51.0 %    MCV                       99 80 - 100 fL    MCH                       31.9 26.0 - 34.0 pg    MCHC                      32.3 32.0 - 36.0 g/dL    RDW                       14.3 11.5 - 15.5 %    PLATELET COUNT            324 140 - 440 thou/cu mm    MPV                       5.7 (L) 6.5 - 11.0 fL    NEUTROPHILS               66.0 42.0 - 72.0 %    LYMPHOCYTES               22.0 20.0 - 44.0 %    MONOCYTES                 9.6 <12.0 %    EOSINOPHILS               1.9 <8.0 %    BASOPHILS                 0.6 <3.0 %    ABSOLUTE NEUTROPHILS      3.7 1.7 - 7.0 thou/cu mm    ABSOLUTE LYMPHOCYTES      1.2 0.9 - 2.9 thou/cu mm    ABSOLUTE MONOCYTES        0.5 <0.9 thou/cu mm    ABSOLUTE EOSINOPHILS      0.1 <0.5 thou/cu mm    ABSOLUTE BASOPHILS        0.0 <0.3 thou/cu mm         Assessment    ICD-10-CM    1. Bilateral primary osteoarthritis of hip M16.0 XR INJ HIP JOINT LEFT      XR INJ HIP JOINT RIGHT   2. Spinal stenosis of lumbar  region M48.06    3. Rheumatoid arthritis involving multiple sites with positive rheumatoid factor (HC) M05.79    4. Hypercalcemia E83.52 PTH      VITAMIN D 25 (DEFICIENCY)      PTH RELATED PEPTIDE      PTH      VITAMIN D 25 (DEFICIENCY)      PTH RELATED PEPTIDE       Her clinical history sounds more consistent with a low back source with radiculopathy however differential diagnosis also includes osteoarthritis of the hip. Previous x-rays show moderate to severe osteoarthritis in the hips. Surveillance lab work was performed today for methotrexate therapy and overall looks reassuring including normal CBC, kidney function and liver function. However calcium has increased despite her reduced dose of oral calcium supplementation. Differential diagnosis includes hyperparathyroidism, parathyroid hormone related peptide production, vitamin D deficiency, others.    Plan   -- Expected clinical course discussed   -- Medications and their side effects discussed   -- another trial of corticosteroid hip injections   -- if ineffective consider return to physical therapy versus expert consultation: Physical medicine and rehabilitation versus orthopedic surgery   -- obtain additional labs looking at hypercalcemia as outlined above   -- additional testing may be required, based on these results   -- follow-up based on results.    Signed, Rich Dukes MD  Internal Medicine & Pediatrics

## 2018-01-05 NOTE — PROGRESS NOTES
Patient Information     Patient Name MRN Sex Brenda Mckinnon [id number] Female 1935      Progress Notes by Gretchen Langford at 2017  1:17 PM     Author:  Gretchen Langford Service:  (none) Author Type:  (none)     Filed:  2017  1:18 PM Date of Service:  2017  1:17 PM Status:  Signed     :  Gretchen Langford            [place] Protocol    A. Pre-procedure verification complete yes  1-relevant information / documentation available, reviewed and properly matched to the patient; 2-consent accurate and complete, 3-equipment and supplies available    B. Site marking complete Yes  Site marked if not in continuous attendance with patient    C. TIME OUT completed yes  Time Out was conducted just prior to starting procedure to verify the eight required elements: 1-patient identity, 2-consent accurate and complete, 3-position, 4-correct side/site marked (if applicable), 5-procedure, 6-relevant images / results properly labeled and displayed (if applicable), 7-antibiotics / irrigation fluids (if applicable), 8-safety precautions.

## 2018-01-05 NOTE — PROGRESS NOTES
Patient Information     Patient Name MRN Brenda Garcia [id number] Female 1935      Progress Notes by Gretchen Langford at 2017  1:18 PM     Author:  Gretchen Langford Service:  (none) Author Type:  (none)     Filed:  2017  1:18 PM Date of Service:  2017  1:18 PM Status:  Signed     :  Gretchen Langford            Falls Risk Criteria:    Age 65 and older or under age 4        Sensory deficits    Poor vision    Use of ambulatory aides    Impaired judgment    Unable to walk independently    Meets High Risk criteria for falls:  Yes             1.  Do you have dizziness or vertigo?    no                    2.  Do you need help standing or walking?   no                 3.  Have you fallen within the last 6 months?    no           4.  Has the patient been fasting?      no       If any risks are marked Yes, the following interventions are utilized:    Do not leave patient unattended     Assist patient in the dressing room and bathroom    Have ambulatory aides available throughout procedure    Involve patient s family if available

## 2018-01-12 ENCOUNTER — TELEPHONE (OUTPATIENT)
Dept: FAMILY MEDICINE | Facility: CLINIC | Age: 83
End: 2018-01-12

## 2018-01-12 NOTE — TELEPHONE ENCOUNTER
Reason for Call:  Other prescription    Detailed comments: Need clarification if it is vitamin D hydro or non-hydro    Phone Number Patient can be reached at: Other phone number:    [employer] [phone]         Best Time: any    Can we leave a detailed message on this number? YES    Call taken on 1/12/2018 at 1:33 PM by Dolores Louis

## 2018-01-12 NOTE — TELEPHONE ENCOUNTER
Commercial assay should measure total 25(OH)D. Which is the hydroxylated form.  Liudmila Bowie MD

## 2018-01-16 ENCOUNTER — TRANSFERRED RECORDS (OUTPATIENT)
Dept: HEALTH INFORMATION MANAGEMENT | Facility: CLINIC | Age: 83
End: 2018-01-16

## 2018-01-25 VITALS
DIASTOLIC BLOOD PRESSURE: 70 MMHG | HEIGHT: 64 IN | WEIGHT: 175.6 LBS | HEART RATE: 62 BPM | BODY MASS INDEX: 29.98 KG/M2 | SYSTOLIC BLOOD PRESSURE: 126 MMHG

## 2018-01-25 VITALS
SYSTOLIC BLOOD PRESSURE: 128 MMHG | DIASTOLIC BLOOD PRESSURE: 70 MMHG | WEIGHT: 178.6 LBS | BODY MASS INDEX: 31.14 KG/M2 | HEART RATE: 64 BPM

## 2018-01-25 VITALS
SYSTOLIC BLOOD PRESSURE: 134 MMHG | HEART RATE: 68 BPM | DIASTOLIC BLOOD PRESSURE: 60 MMHG | HEIGHT: 64 IN | WEIGHT: 178 LBS | BODY MASS INDEX: 30.39 KG/M2

## 2018-01-27 ASSESSMENT — ANXIETY QUESTIONNAIRES
GAD7 TOTAL SCORE: 0
GAD7 TOTAL SCORE: 0

## 2018-02-12 NOTE — TELEPHONE ENCOUNTER
Patient Information     Patient Name MRN Sex     Brenda Barker [id number] Female 1935      Telephone Encounter by Aditi Tilley RN at 2017 11:18 AM     Author:  Aditi Tilley RN Service:  (none) Author Type:  NURS- Registered Nurse     Filed:  2017 11:25 AM Encounter Date:  2017 Status:  Signed     :  Aditi Tilley RN (NURS- Registered Nurse)            Refill Request refused: Not appropriate.    Contacted Patient and verified last name and . Per medical message from Patient on 10/5/17, Patient had moved and was looking for a new provider. Patient states today that this medication was refilled by her new doctor and she thought April had cancelled the request. We can disregard this request at this time.    Unable to complete prescription refill per RN Medication Refill Policy.................... Aditi Tilley RN ....................  2017   11:25 AM

## 2018-03-07 DIAGNOSIS — I10 ESSENTIAL HYPERTENSION, BENIGN: ICD-10-CM

## 2018-03-08 RX ORDER — MELOXICAM 7.5 MG/1
TABLET ORAL
Qty: 90 TABLET | Refills: 0 | OUTPATIENT
Start: 2018-03-08

## 2018-03-08 RX ORDER — METOPROLOL SUCCINATE 50 MG/1
TABLET, EXTENDED RELEASE ORAL
Qty: 90 TABLET | Refills: 0 | OUTPATIENT
Start: 2018-03-08

## 2018-03-08 NOTE — TELEPHONE ENCOUNTER
This is a Refill request from: [facility]  Name of Medication and Dose:  Meloxicam 7.5 mg, Metoprolol 50 mg  Quantity requested:  90 days  Last fill date: N/A  Last Office Visit: 9/6/17  PCP:  Unclear on who PCP is.   Controlled Substance Agreement: N/A  Associated Diagnosis: HTN    SURAJ Knutson ....................  3/8/2018   11:36 AM

## 2018-03-13 DIAGNOSIS — B00.9 HERPES SIMPLEX VIRUS INFECTION: ICD-10-CM

## 2018-03-13 DIAGNOSIS — M05.79 RHEUMATOID ARTHRITIS INVOLVING MULTIPLE SITES WITH POSITIVE RHEUMATOID FACTOR (H): Primary | ICD-10-CM

## 2018-03-13 DIAGNOSIS — I10 HYPERTENSION, GOAL BELOW 140/90: ICD-10-CM

## 2018-03-13 DIAGNOSIS — R00.0 RAPID HEART RATE: ICD-10-CM

## 2018-03-13 NOTE — TELEPHONE ENCOUNTER
"Requested Prescriptions   Pending Prescriptions Disp Refills     meloxicam (MOBIC) 7.5 MG tablet [Pharmacy Med Name: MELOXICAM 7.5MG TABLETS]    Last Written Prescription Date:  12/15/16  Last Fill Quantity: 0,  # refills: 0   Last Office Visit with [facility] or  Health prescribing provider:  12/19/17   Future Office Visit:      90 tablet 0     Sig: TAKE 1 TABLET BY MOUTH ONCE DAILY    NSAID Medications Failed    3/13/2018  1:34 PM       Failed - Patient is age 6-64 years       Failed - Normal CBC on file in past 12 months    Recent Labs   Lab Test  08/11/17   1436   HGB  10.6*   HCT  32.7*   PLT  255            Passed - Blood pressure under 140/90 in past 12 months    BP Readings from Last 3 Encounters:   12/19/17 109/60   12/09/17 134/63   10/31/17 127/69                Passed - Normal ALT on file in past 12 months    No lab results found.         Passed - Normal AST on file in past 12 months    No lab results found.         Passed - Recent (12 mo) or future (30 days) visit within the authorizing provider's specialty    Patient had office visit in the last 12 months or has a visit in the next 30 days with authorizing provider or within the authorizing provider's specialty.  See \"Patient Info\" tab in inbasket, or \"Choose Columns\" in Meds & Orders section of the refill encounter.           Passed - No active pregnancy on record       Passed - Normal serum creatinine on file in past 12 months    Recent Labs   Lab Test  12/19/17   1223   CR  0.68            Passed - No positive pregnancy test in past 12 months        metoprolol succinate (TOPROL-XL) 50 MG 24 hr tablet [Pharmacy Med Name: METOPROLOL ER SUCCINATE 50MG TABS]    Last Written Prescription Date:  10/8/2007  Last Fill Quantity: 30,  # refills: 6   Last Office Visit with [facility] or [facility] prescribing provider:  12/19/17   Future Office Visit:      90 tablet 0     Sig: TAKE 1 TABLET BY MOUTH ONCE DAILY    Beta-Blockers Protocol Passed    3/13/2018  1:34 PM " "      Passed - Blood pressure under 140/90 in past 12 months    BP Readings from Last 3 Encounters:   12/19/17 109/60   12/09/17 134/63   10/31/17 127/69                Passed - Patient is age 6 or older       Passed - Recent (12 mo) or future (30 days) visit within the authorizing provider's specialty    Patient had office visit in the last 12 months or has a visit in the next 30 days with authorizing provider or within the authorizing provider's specialty.  See \"Patient Info\" tab in inbasket, or \"Choose Columns\" in Meds & Orders section of the refill encounter.            acyclovir (ZOVIRAX) 400 MG tablet [Pharmacy Med Name: ACYCLOVIR 400MG TABLETS]    Last Written Prescription Date:  n/a  Last Fill Quantity: 0,  # refills: 0   Last Office Visit with [facility], [facility] or [facility] prescribing provider:  12/19/17   Future Office Visit:      90 tablet 0     Sig: TAKE 1 TABLET BY MOUTH DAILY    Antivirals for Herpes Protocol Passed    3/13/2018  1:34 PM       Passed - Patient is age 12 or older       Passed - Recent (12 mo) or future (30 days) visit within the authorizing provider's specialty    Patient had office visit in the last 12 months or has a visit in the next 30 days with authorizing provider or within the authorizing provider's specialty.  See \"Patient Info\" tab in inbasket, or \"Choose Columns\" in Meds & Orders section of the refill encounter.           Passed - Normal serum creatinine on file in past 12 months    Recent Labs   Lab Test  12/19/17   1223   CR  0.68                   Moreno Faarax  [facility]  "

## 2018-03-14 ENCOUNTER — MYC MEDICAL ADVICE (OUTPATIENT)
Dept: FAMILY MEDICINE | Facility: CLINIC | Age: 83
End: 2018-03-14

## 2018-03-14 RX ORDER — MELOXICAM 7.5 MG/1
TABLET ORAL
Qty: 90 TABLET | Refills: 0 | Status: SHIPPED | OUTPATIENT
Start: 2018-03-14 | End: 2018-06-12

## 2018-03-14 RX ORDER — ACYCLOVIR 400 MG/1
TABLET ORAL
Qty: 90 TABLET | Refills: 0 | Status: SHIPPED | OUTPATIENT
Start: 2018-03-14 | End: 2018-06-04

## 2018-03-14 RX ORDER — METOPROLOL SUCCINATE 50 MG/1
TABLET, EXTENDED RELEASE ORAL
Qty: 90 TABLET | Refills: 0 | Status: SHIPPED | OUTPATIENT
Start: 2018-03-14 | End: 2018-06-12

## 2018-03-20 ENCOUNTER — OFFICE VISIT (OUTPATIENT)
Dept: FAMILY MEDICINE | Facility: CLINIC | Age: 83
End: 2018-03-20
Payer: COMMERCIAL

## 2018-03-20 VITALS
SYSTOLIC BLOOD PRESSURE: 118 MMHG | DIASTOLIC BLOOD PRESSURE: 59 MMHG | OXYGEN SATURATION: 98 % | BODY MASS INDEX: 30.73 KG/M2 | HEART RATE: 73 BPM | WEIGHT: 167 LBS | TEMPERATURE: 97.5 F | HEIGHT: 62 IN

## 2018-03-20 DIAGNOSIS — E78.5 HYPERLIPIDEMIA LDL GOAL <130: ICD-10-CM

## 2018-03-20 DIAGNOSIS — G89.29 CHRONIC PAIN OF LEFT KNEE: ICD-10-CM

## 2018-03-20 DIAGNOSIS — I10 HYPERTENSION, GOAL BELOW 140/90: ICD-10-CM

## 2018-03-20 DIAGNOSIS — M25.562 CHRONIC PAIN OF LEFT KNEE: ICD-10-CM

## 2018-03-20 DIAGNOSIS — Z96.652 STATUS POST TOTAL LEFT KNEE REPLACEMENT: ICD-10-CM

## 2018-03-20 DIAGNOSIS — J20.9 ACUTE BRONCHITIS WITH COEXISTING CONDITION REQUIRING PROPHYLACTIC TREATMENT: ICD-10-CM

## 2018-03-20 DIAGNOSIS — J06.9 VIRAL URI WITH COUGH: Primary | ICD-10-CM

## 2018-03-20 PROCEDURE — 99214 OFFICE O/P EST MOD 30 MIN: CPT | Performed by: FAMILY MEDICINE

## 2018-03-20 RX ORDER — CEFUROXIME AXETIL 500 MG/1
500 TABLET ORAL 2 TIMES DAILY
Qty: 20 TABLET | Refills: 0 | Status: SHIPPED | OUTPATIENT
Start: 2018-03-20 | End: 2018-05-17

## 2018-03-20 ASSESSMENT — PAIN SCALES - GENERAL: PAINLEVEL: MODERATE PAIN (5)

## 2018-03-20 NOTE — PATIENT INSTRUCTIONS
At [facility], we strive to deliver an exceptional experience to you, every time we see you.  If you receive a survey in the mail, please send us back your thoughts. We really do value your feedback.    Based on your medical history, these are the current health maintenance/preventive care services that you are due for (some may have been done at this visit.)  Health Maintenance Due   Topic Date Due     DEPRESSION ACTION PLAN Q1 YR  08/28/1953     ADVANCE DIRECTIVE PLANNING Q5 YRS  08/28/1992     PHQ-9 Q6 MONTHS  03/09/2017     TETANUS IMMUNIZATION (SYSTEM ASSIGNED)  03/13/2018         Suggested websites for health information:  Www.[facility].org : Up to date and easily searchable information on multiple topics.  Www.medlineplus.gov : medication info, interactive tutorials, watch real surgeries online  Www.familydoctor.org : good info from the Academy of Family Physicians  Www.cdc.gov : public health info, travel advisories, epidemics (H1N1)  Www.aap.org : children's health info, normal development, vaccinations  Www.health.[facility].[place].us : [place] dept of health, public health issues in [place], N1N1    Your care team:                            Family Medicine Internal Medicine   MD Jose Alejandro Floyd MD Shantel Branch-Fleming, MD Katya Georgiev PA-C Nam Ho, MD Pediatrics   VINITA Gutiérrez, MD Kika Olivia CNP, MD Deborah Mielke, MD Kim Thein, APRN [facility]      Clinic hours: Monday - Thursday 7 am-7 pm; Fridays 7 am-5 pm.   Urgent care: Monday - Friday 11 am-9 pm; Saturday and Sunday 9 am-5 pm.  Pharmacy : Monday -Thursday 8 am-8 pm; Friday 8 am-6 pm; Saturday and Sunday 9 am-5 pm.     Clinic: [phone]   Pharmacy: [phone]    Arthralgia    Arthralgia is the term for pain in or around the joint. It is a symptom, not a disease. This pain may involve one or more joints. In some cases, the pain moves from joint to  joint.  There are many causes for joint pain. These include:    Injury    Osteoarthritis (wearing out of the joint surface)    Gout (inflammation of the joint due to crystals in the joint fluid)    Infection inside the joint      Bursitis (inflammation of the fluid-filled sacs around the joint)    Autoimmune disorders such as rheumatoid arthritis or lupus    Tendonitis (inflammation of chords that attach muscle to bone)  Home care    Rest the involved joint(s) until your symptoms improve.     You may be prescribed pain medicine. If none is prescribed, you may use acetaminophen or ibuprofen to control pain and inflammation.  Follow-up care  Follow up with your healthcare provider or as advised.  When to seek medical advice  Contact your healthcare provider right away if any of the following occurs:    Pain, swelling, or redness of joint increases    Pain worsens or recurs after a period of improvement    Pain moves to other joints    You cannot bear weight on the affected joint     You cannot move the affected joint    Joint appears deformed    New rash appears    Fever of 100.4 F (38 C) or higher, or as directed by your healthcare provider  Date Last Reviewed: 3/1/2017    6382-2982 [employer]. [address] [place] [address]. All rights reserved. This information is not intended as a substitute for professional medical care. Always follow your healthcare professional's instructions.        Bronchitis, Antibiotic Treatment (Adult)    Bronchitis is an infection of the air passages (bronchial tubes) in your lungs. It often occurs when you have a cold. This illness is contagious during the first few days and is spread through the air by coughing and sneezing, or by direct contact (touching the sick person and then touching your own eyes, nose, or mouth).  Symptoms of bronchitis include cough with mucus (phlegm) and low-grade fever. Bronchitis usually lasts 7 to 14 days. Mild cases can be treated  with simple home remedies. More severe infection is treated with an antibiotic.  Home care  Follow these guidelines when caring for yourself at home:    If your symptoms are severe, rest at home for the first 2 to 3 days. When you go back to your usual activities, don't let yourself get too tired.    Do not smoke. Also avoid being exposed to secondhand smoke.    You may use over-the-counter medicines to control fever or pain, unless another medicine was prescribed. (Note: If you have chronic liver or kidney disease or have ever had a stomach ulcer or gastrointestinal bleeding, talk with your healthcare provider before using these medicines. Also talk to your provider if you are taking medicine to prevent blood clots.) Aspirin should never be given to anyone younger than 18 years of age who is ill with a viral infection or fever. It may cause severe liver or brain damage.    Your appetite may be poor, so a light diet is fine. Avoid dehydration by drinking 6 to 8 glasses of fluids per day (such as water, soft drinks, sports drinks, juices, tea, or soup). Extra fluids will help loosen secretions in the nose and lungs.    Over-the-counter cough, cold, and sore-throat medicines will not shorten the length of the illness, but they may be helpful to reduce symptoms. (Note: Do not use decongestants if you have high blood pressure.)    Finish all antibiotic medicine. Do this even if you are feeling better after only a few days.  Follow-up care  Follow up with your healthcare provider, or as advised. If you had an X-ray or ECG (electrocardiogram), a specialist will review it. You will be notified of any new findings that may affect your care.  Note: If you are age 65 or older, or if you have a chronic lung disease or condition that affects your immune system, or you smoke, talk to your healthcare provider about having pneumococcal vaccinations and a yearly influenza vaccination (flu shot).  When to seek medical advice  Call  your healthcare provider right away if any of these occur:    Fever of 100.4 F (38 C) or higher    Coughing up increased amounts of colored sputum    Weakness, drowsiness, headache, facial pain, ear pain, or a stiff neck  Call 911, or get immediate medical care  Contact emergency services right away if any of these occur.    Coughing up blood    Worsening weakness, drowsiness, headache, or stiff neck    Trouble breathing, wheezing, or pain with breathing  Date Last Reviewed: 9/13/2015 2000-2017 [employer]. [address] [place] [id number]. All rights reserved. This information is not intended as a substitute for professional medical care. Always follow your healthcare professional's instructions.

## 2018-03-20 NOTE — PROGRESS NOTES
SUBJECTIVE:   Brenda Barker is a 82 year old female who presents to clinic today for the following health issues:    Acute Illness   Acute illness concerns: Cough, sore throat. Recently came back from [place]. Unsure if related to allergies or other  Onset: 3/15/18, came back on 3-     Fever: no     Chills/Sweats: YES- chills    Headache (location?): no     Sinus Pressure:no    Conjunctivitis:  no    Ear Pain: no    Rhinorrhea: YES    Congestion: YES- into chest    Sore Throat: YES     Cough: YES    Wheeze: no     Decreased Appetite: YES    Nausea: no     Vomiting: no     Diarrhea:  no     Dysuria/Freq.: no     Fatigue/Achiness: YES    Sick/Strep Exposure: no      Therapies Tried and outcome: Zyrtec, Nasospray      Hyperlipidemia Follow-Up      Rate your low fat/cholesterol diet?: good    Taking statin?  Yes, no muscle aches from statin    Other lipid medications/supplements?:  none    Hypertension Follow-up      Outpatient blood pressures are not being checked.    Low Salt Diet: no added salt    Chronic Pain Follow-Up       Type / Location of Pain: left knee 10 years after knee replacement surgery  Analgesia/pain control:       Recent changes:  worse      Overall control: Tolerable with discomfort  Activity level/function:      Daily activities:  Able to do light housework, cooking    Work:  not applicable  Adverse effects:  No  Adherance    Taking medication as directed?  Yes    Participating in other treatments: yes  Risk Factors:    Sleep:  Fair    Mood/anxiety:  controlled    Recent family or social stressors:  none noted    Other aggravating factors: none  PHQ-9 SCORE 9/9/2016   Total Score 0     LORNE-7 SCORE 6/19/2015 5/11/2017 8/11/2017   Total Score 0 0 0     Encounter-Level CSA:     There are no encounter-level csa.          Problem list and histories reviewed & adjusted, as indicated.  Additional history: as documented    Patient Active Problem List   Diagnosis     Benign neoplasm of cranial nerve  (H)     Hemorrhoids     Aortic valve disorder     Cervicalgia     Rheumatoid arthritis (H)     SNHL (sensorineural hearing loss)     Status post total left knee replacement     Obesity     High risk medication use     Gastroesophageal reflux disease with esophagitis     Hypertension, goal below 140/90     Hyperlipidemia LDL goal <130     Rapid heart rate     Resting tremor     Past Surgical History:   Procedure Laterality Date     C DEXA, BONE DENSITY, AXIAL SKEL  7/03 7/03 L1-4 1.7, FemNkL-0.8,R-1.2, FArm Px 0.2,Dist-0.3     C REMOVAL OF OVARY(S)  1980 2nd ovary removed - endometriosis     C TOTAL ABDOM HYSTERECTOMY  1972    uterus, ovary removed - fibroid     HC COLONOSCOPY THRU STOMA W BIOPSY/CAUTERY TUMOR/POLYP/LESION  1993    adenomatous polyp     HC COLONOSCOPY THRU STOMA, DIAGNOSTIC  1998, 10/03    normal, '03 rec repeat in 5 yrs due to +hx polyp and + fam hx     HC FLEX SIGMOIDOSCOPY W/WO KARYNA SPEC BY BRUSH/WASH  12/2001     HC REMOVAL OF TONSILS,<11 Y/O  1942     SURGICAL HISTORY OF -   1989    basal cell Ca scalp excised     SURGICAL HISTORY OF -   1998    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       Social History   Substance Use Topics     Smoking status: Never Smoker     Smokeless tobacco: Never Used     Alcohol use Yes      Comment: occasionally     Family History   Problem Relation Age of Onset     GASTROINTESTINAL DISEASE Father      bleeding ulcer     CANCER Mother      thyroid cancer         Current Outpatient Prescriptions   Medication Sig Dispense Refill     cefuroxime (CEFTIN) 500 MG tablet Take 1 tablet (500 mg) by mouth 2 times daily 20 tablet 0     meloxicam (MOBIC) 7.5 MG tablet TAKE 1 TABLET BY MOUTH ONCE DAILY 90 tablet 0     metoprolol succinate (TOPROL-XL) 50 MG 24 hr tablet TAKE 1 TABLET BY MOUTH ONCE DAILY 90 tablet 0     acyclovir (ZOVIRAX) 400 MG tablet TAKE 1 TABLET BY MOUTH DAILY 90 tablet 0     simvastatin (ZOCOR) 20 MG tablet Take 1 tablet (20 mg) by  mouth At Bedtime 90 tablet 3     hydrochlorothiazide (MICROZIDE) 12.5 MG capsule Take 1 capsule (12.5 mg) by mouth daily 90 capsule 3     omeprazole (PRILOSEC) 20 MG CR capsule Take 1 capsule (20 mg) by mouth daily 90 capsule 1     Cholecalciferol (VITAMIN D-3) 1000 UNITS CAPS Take by mouth daily       nitrofurantoin 100 MG Take 1 capsule by mouth daily.       polyvinyl alcohol (LIQUID TEARS) 1.4 % ophthalmic solution 1 drop as needed.       simvastatin (ZOCOR) 20 MG tablet Take  by mouth At Bedtime.       FOLIC ACID 1 MG OR TABS 4 mg TABS DAILY       METHOTREXATE SODIUM 25 MG/ML IJ SOLN None Entered       CENTRUM SILVER OR TABS 1 TABLET DAILY       ASPIRIN 81 MG OR TABS 1 TABLET DAILY       FISH OIL 1000 MG OR CAPS 2 CAPSULES DAILY  (? DOSE) OTC --     Allergies   Allergen Reactions     Diatrizoate Hives and Shortness Of Breath     Ciprofloxacin      nausea     Contrast Dye      Hives,anaphylaxix     Sulfasalazine      Other reaction(s): Rash     Recent Labs   Lab Test  12/19/17   1223  05/11/17   1416  12/15/16   1129   12/07/15   1035   LDL  77   --   115*   --   100*   HDL  64   --   70   --   59   TRIG  131   --   119   --   150*   CR  0.68  0.85   --    < >  0.73   GFRESTIMATED  83   --    --    --    --    GFRESTBLACK  >90  >60   --    < >  >60   POTASSIUM  4.4  4.0   --    --   3.5    < > = values in this interval not displayed.      BP Readings from Last 3 Encounters:   03/20/18 118/59   12/19/17 109/60   12/09/17 134/63    Wt Readings from Last 3 Encounters:   03/20/18 167 lb (75.8 kg)   12/19/17 167 lb (75.8 kg)   12/09/17 170 lb (77.1 kg)                  Labs reviewed in EPIC    Reviewed and updated as needed this visit by clinical staff       Reviewed and updated as needed this visit by Provider         ROS:  Constitutional, HEENT, cardiovascular, pulmonary, gi and gu systems are negative, except as otherwise noted.    OBJECTIVE:     /59 (BP Location: Right arm, Patient Position: Chair, Cuff  "Size: Adult Large)  Pulse 73  Temp 97.5  F (36.4  C) (Oral)  Ht 5' 2.25\" (1.581 m)  Wt 167 lb (75.8 kg)  SpO2 98%  Breastfeeding? No  BMI 30.3 kg/m2  Body mass index is 30.3 kg/(m^2).  GENERAL: healthy, alert, well nourished, well hydrated, no distress, obese  HENT: ear canals- normal; TMs- normal; Nose- normal; Mouth- no ulcers, no lesions, throat is clear with no erythema or exudate.   NECK: no tenderness, no adenopathy, no asymmetry, no masses, no stiffness; thyroid- normal to palpation  RESP: lungs clear to auscultation - no rales, no rhonchi, no wheezes  CV: regular rates and rhythm, normal S1 S2, no S3 or S4 and no murmur, no click or rub -  ABDOMEN: soft, no tenderness, no  hepatosplenomegaly, no masses, normal bowel sounds  MS: extremities- no gross deformities noted, no edema, decreased ROM left knee with knee replacement.   SKIN: no suspicious lesions, no rashes  NEURO: strength and tone- normal, sensory exam- grossly normal, mentation- intact, speech- normal, reflexes- symmetric  BACK: no CVA tenderness, no paralumbar tenderness  PSYCH: Alert and oriented times 3; speech- coherent , normal rate and volume; able to articulate logical thoughts, able to abstract reason, no tangential thoughts, no hallucinations or delusions, affect- normal     Diagnostic Test Results:  Results for orders placed or performed in visit on 12/19/17   Renal panel   Result Value Ref Range    Sodium 140 133 - 144 mmol/L    Potassium 4.4 3.4 - 5.3 mmol/L    Chloride 106 94 - 109 mmol/L    Carbon Dioxide 22 20 - 32 mmol/L    Anion Gap 12 3 - 14 mmol/L    Glucose 93 70 - 99 mg/dL    Urea Nitrogen 17 7 - 30 mg/dL    Creatinine 0.68 0.52 - 1.04 mg/dL    GFR Estimate 83 >60 mL/min/1.7m2    GFR Estimate If Black >90 >60 mL/min/1.7m2    Calcium 10.6 (H) 8.5 - 10.1 mg/dL    Phosphorus 2.3 (L) 2.5 - 4.5 mg/dL    Albumin 3.9 3.4 - 5.0 g/dL   Lipid panel reflex to direct LDL Fasting   Result Value Ref Range    Cholesterol 167 <200 mg/dL " "   Triglycerides 131 <150 mg/dL    HDL Cholesterol 64 >49 mg/dL    LDL Cholesterol Calculated 77 <100 mg/dL    Non HDL Cholesterol 103 <130 mg/dL       ASSESSMENT/PLAN:         Tobacco Cessation:   reports that she has never smoked. She has never used smokeless tobacco.      BMI:   Estimated body mass index is 30.3 kg/(m^2) as calculated from the following:    Height as of this encounter: 5' 2.25\" (1.581 m).    Weight as of this encounter: 167 lb (75.8 kg).   Weight management plan: Discussed healthy diet and exercise guidelines and patient will follow up in 3 months in clinic to re-evaluate.        ICD-10-CM    1. Viral URI with cough J06.9 Had similar infection last year after coming back from [place]    B97.89    2. Acute bronchitis with coexisting condition requiring prophylactic treatment J20.9 cefuroxime (CEFTIN) 500 MG tablet twice a day for 10 days. Start if symptoms not improving with symptomatic treatment in 3-4 days.    3. Hypertension, goal below 140/90 I10 Well controlled on medications    4. Hyperlipidemia LDL goal <130 E78.5 stable   5. Status post total left knee replacement Z96.652 ORTHOPEDICS ADULT REFERRAL   6. Chronic pain of left knee M25.562 ORTHOPEDICS ADULT REFERRAL    G89.29        CONSULTATION/REFERRAL to orthopedics for follow up left knee replacement  FUTURE LABS:       - Schedule fasting labs in 3 months  FUTURE APPOINTMENTS:       - Follow-up visit in 3 months or sooner if any questions or concerns.   Work on weight loss  Regular exercise  See Patient Instructions    Liudmila Bowie MD  [facility]    "

## 2018-03-20 NOTE — MR AVS SNAPSHOT
After Visit Summary   3/20/2018    Brenda Barker    MRN: [id number]           Patient Information     Date Of Birth          1935        Visit Information        Provider Department      3/20/2018 6:20 PM Liudmila Bowie MD [facility]        Today's Diagnoses     Viral URI with cough    -  1    Acute bronchitis with coexisting condition requiring prophylactic treatment        Hypertension, goal below 140/90        Hyperlipidemia LDL goal <130        Status post total left knee replacement        Chronic pain of left knee          Care Instructions    At [facility], we strive to deliver an exceptional experience to you, every time we see you.  If you receive a survey in the mail, please send us back your thoughts. We really do value your feedback.    Based on your medical history, these are the current health maintenance/preventive care services that you are due for (some may have been done at this visit.)  Health Maintenance Due   Topic Date Due     DEPRESSION ACTION PLAN Q1 YR  08/28/1953     ADVANCE DIRECTIVE PLANNING Q5 YRS  08/28/1992     PHQ-9 Q6 MONTHS  03/09/2017     TETANUS IMMUNIZATION (SYSTEM ASSIGNED)  03/13/2018         Suggested websites for health information:  Www.Nativoo.Evocha : Up to date and easily searchable information on multiple topics.  Www.medlineplus.gov : medication info, interactive tutorials, watch real surgeries online  Www.familydoctor.org : good info from the Academy of Family Physicians  Www.cdc.gov : public health info, travel advisories, epidemics (H1N1)  Www.aap.org : children's health info, normal development, vaccinations  Www.health.[facility].[place].us : [place] dept of health, public health issues in [place], N1N1    Your care team:                            Family Medicine Internal Medicine   MD Jose Alejandro Floyd MD Shantel Branch-Fleming, MD Katya Georgiev PA-C Nam Ho, MD Pediatrics   Kel Pena,  VINITA Cook, MD Kika Olivia CNP, MD Deborah Mielke, MD Kim Thein, APRN CNP      Clinic hours: Monday - Thursday 7 am-7 pm; Fridays 7 am-5 pm.   Urgent care: Monday - Friday 11 am-9 pm; Saturday and Sunday 9 am-5 pm.  Pharmacy : Monday -Thursday 8 am-8 pm; Friday 8 am-6 pm; Saturday and Sunday 9 am-5 pm.     Clinic: [phone]   Pharmacy: [phone]    Arthralgia    Arthralgia is the term for pain in or around the joint. It is a symptom, not a disease. This pain may involve one or more joints. In some cases, the pain moves from joint to joint.  There are many causes for joint pain. These include:    Injury    Osteoarthritis (wearing out of the joint surface)    Gout (inflammation of the joint due to crystals in the joint fluid)    Infection inside the joint      Bursitis (inflammation of the fluid-filled sacs around the joint)    Autoimmune disorders such as rheumatoid arthritis or lupus    Tendonitis (inflammation of chords that attach muscle to bone)  Home care    Rest the involved joint(s) until your symptoms improve.     You may be prescribed pain medicine. If none is prescribed, you may use acetaminophen or ibuprofen to control pain and inflammation.  Follow-up care  Follow up with your healthcare provider or as advised.  When to seek medical advice  Contact your healthcare provider right away if any of the following occurs:    Pain, swelling, or redness of joint increases    Pain worsens or recurs after a period of improvement    Pain moves to other joints    You cannot bear weight on the affected joint     You cannot move the affected joint    Joint appears deformed    New rash appears    Fever of 100.4 F (38 C) or higher, or as directed by your healthcare provider  Date Last Reviewed: 3/1/2017    1453-4207 [employer]. [address] [place] [address], [place] [address]. All rights reserved. This information is not intended as a  substitute for professional medical care. Always follow your healthcare professional's instructions.        Bronchitis, Antibiotic Treatment (Adult)    Bronchitis is an infection of the air passages (bronchial tubes) in your lungs. It often occurs when you have a cold. This illness is contagious during the first few days and is spread through the air by coughing and sneezing, or by direct contact (touching the sick person and then touching your own eyes, nose, or mouth).  Symptoms of bronchitis include cough with mucus (phlegm) and low-grade fever. Bronchitis usually lasts 7 to 14 days. Mild cases can be treated with simple home remedies. More severe infection is treated with an antibiotic.  Home care  Follow these guidelines when caring for yourself at home:    If your symptoms are severe, rest at home for the first 2 to 3 days. When you go back to your usual activities, don't let yourself get too tired.    Do not smoke. Also avoid being exposed to secondhand smoke.    You may use over-the-counter medicines to control fever or pain, unless another medicine was prescribed. (Note: If you have chronic liver or kidney disease or have ever had a stomach ulcer or gastrointestinal bleeding, talk with your healthcare provider before using these medicines. Also talk to your provider if you are taking medicine to prevent blood clots.) Aspirin should never be given to anyone younger than 18 years of age who is ill with a viral infection or fever. It may cause severe liver or brain damage.    Your appetite may be poor, so a light diet is fine. Avoid dehydration by drinking 6 to 8 glasses of fluids per day (such as water, soft drinks, sports drinks, juices, tea, or soup). Extra fluids will help loosen secretions in the nose and lungs.    Over-the-counter cough, cold, and sore-throat medicines will not shorten the length of the illness, but they may be helpful to reduce symptoms. (Note: Do not use decongestants if you have high  blood pressure.)    Finish all antibiotic medicine. Do this even if you are feeling better after only a few days.  Follow-up care  Follow up with your healthcare provider, or as advised. If you had an X-ray or ECG (electrocardiogram), a specialist will review it. You will be notified of any new findings that may affect your care.  Note: If you are age 65 or older, or if you have a chronic lung disease or condition that affects your immune system, or you smoke, talk to your healthcare provider about having pneumococcal vaccinations and a yearly influenza vaccination (flu shot).  When to seek medical advice  Call your healthcare provider right away if any of these occur:    Fever of 100.4 F (38 C) or higher    Coughing up increased amounts of colored sputum    Weakness, drowsiness, headache, facial pain, ear pain, or a stiff neck  Call 911, or get immediate medical care  Contact emergency services right away if any of these occur.    Coughing up blood    Worsening weakness, drowsiness, headache, or stiff neck    Trouble breathing, wheezing, or pain with breathing  Date Last Reviewed: 9/13/2015 2000-2017 [employer]. [address] [place] [address]. All rights reserved. This information is not intended as a substitute for professional medical care. Always follow your healthcare professional's instructions.                Follow-ups after your visit        Additional Services     ORTHOPEDICS ADULT REFERRAL       Your provider has referred you to: [facility]: [facility] [phone]    [email or web address]    Please be aware that coverage of these services is subject to the terms and limitations of your health insurance plan.  Call member services at your health plan with any benefit or coverage questions.      Please bring the following to your appointment:    >>   Any x-rays, CTs or MRIs which have been performed.  Contact the facility  "where they were done to arrange for  prior to your scheduled appointment.    >>   List of current medications   >>   This referral request   >>   Any documents/labs given to you for this referral                  Follow-up notes from your care team     Return in about 6 months (around 9/20/2018) for Lab Work, Physical Exam, medication follow up.      Who to contact     If you have questions or need follow up information about today's clinic visit or your schedule please contact [facility] directly at [phone].  Normal or non-critical lab and imaging results will be communicated to you by Vovic[employer]art, letter or phone within 4 business days after the clinic has received the results. If you do not hear from us within 7 days, please contact the clinic through [employer] or phone. If you have a critical or abnormal lab result, we will notify you by phone as soon as possible.  Submit refill requests through [employer] or call your pharmacy and they will forward the refill request to us. Please allow 3 business days for your refill to be completed.          Additional Information About Your Visit        Vovicihart Information     [employer] gives you secure access to your electronic health record. If you see a primary care provider, you can also send messages to your care team and make appointments. If you have questions, please call your primary care clinic.  If you do not have a primary care provider, please call [phone] and they will assist you.        Care EveryWhere ID     This is your Care EveryWhere ID. This could be used by other organizations to access your [place] medical records  [id number]        Your Vitals Were     Pulse Temperature Height Pulse Oximetry Breastfeeding? BMI (Body Mass Index)    73 97.5  F (36.4  C) (Oral) 1.581 m (5' 2.25\") 98% No 30.3 kg/m2       Blood Pressure from Last 3 Encounters:   03/20/18 118/59   12/19/17 109/60   12/09/17 134/63    Weight from Last 3 " Encounters:   03/20/18 75.8 kg (167 lb)   12/19/17 75.8 kg (167 lb)   12/09/17 77.1 kg (170 lb)              We Performed the Following     ORTHOPEDICS ADULT REFERRAL          Today's Medication Changes          These changes are accurate as of 3/20/18  6:48 PM.  If you have any questions, ask your nurse or doctor.               Start taking these medicines.        Dose/Directions    cefuroxime 500 MG tablet   Commonly known as:  CEFTIN   Used for:  Acute bronchitis with coexisting condition requiring prophylactic treatment   Started by:  Liudmila Bowie MD        Dose:  500 mg   Take 1 tablet (500 mg) by mouth 2 times daily   Quantity:  20 tablet   Refills:  0            Where to get your medicines      [place] [facility] [place] [address]  [address], [facility] [id number]     Phone:  [phone]     cefuroxime 500 MG tablet                Primary Care Provider Office Phone # Fax #    Liudmila Bowie [phone]       [address]  [facility] [address]        Equal Access to Services     [facility]: Hadii aad ku hadasho Soomaali, waaxda luqadaha, qaybta kaalmada adeegyada, liliana graf haymaddy garcia . So [facility] [phone].    ATENCIÓN: Si habla español, tiene a beltran disposición servicios gratuitos de asistencia lingüística. Llame al [phone].    We comply with applicable federal civil rights laws and [place] laws. We do not discriminate on the basis of race, color, national origin, age, disability, sex, sexual orientation, or gender identity.            Thank you!     Thank you for choosing [facility]  for your care. Our goal is always to provide you with excellent care. Hearing back from our patients is one way we can continue to improve our services. Please take a few minutes to complete the written survey that you may receive in the mail after your visit with us.  Thank you!             Your Updated Medication List - Protect others around you: Learn how to safely use, store and throw away your medicines at www.disposemymeds.org.          This list is accurate as of 3/20/18  6:48 PM.  Always use your most recent med list.                   Brand Name Dispense Instructions for use Diagnosis    acyclovir 400 MG tablet    ZOVIRAX    90 tablet    TAKE 1 TABLET BY MOUTH DAILY    Herpes simplex virus infection       aspirin 81 MG tablet      1 TABLET DAILY        cefuroxime 500 MG tablet    CEFTIN    20 tablet    Take 1 tablet (500 mg) by mouth 2 times daily    Acute bronchitis with coexisting condition requiring prophylactic treatment       CENTRUM SILVER per tablet      1 TABLET DAILY        fish oil-omega-3 fatty acids 1000 MG capsule     OTC    2 CAPSULES DAILY  (? DOSE)    Other and unspecified hyperlipidemia       folic acid 1 MG tablet    FOLVITE     4 mg TABS DAILY    Rheumatoid arthritis(714.0), Obesity, unspecified, Other abnormal glucose       hydrochlorothiazide 12.5 MG capsule    MICROZIDE    90 capsule    Take 1 capsule (12.5 mg) by mouth daily    Hypertension, goal below 140/90       LIQUID TEARS 1.4 % ophthalmic solution   Generic drug:  polyvinyl alcohol      1 drop as needed.        meloxicam 7.5 MG tablet    MOBIC    90 tablet    TAKE 1 TABLET BY MOUTH ONCE DAILY    Rheumatoid arthritis involving multiple sites with positive rheumatoid factor (H)       methotrexate 25 MG/ML injection      None Entered        metoprolol succinate 50 MG 24 hr tablet    TOPROL-XL    90 tablet    TAKE 1 TABLET BY MOUTH ONCE DAILY    Rapid heart rate       nitroFURantoin macrocrystal 100 MG ED starter pack    MACRODANTIN     Take 1 capsule by mouth daily.        omeprazole 20 MG CR capsule    priLOSEC    90 capsule    Take 1 capsule (20 mg) by mouth daily    Gastroesophageal reflux disease with esophagitis       Vitamin D-3 1000 UNITS Caps      Take by mouth daily        * ZOCOR 20 MG  tablet   Generic drug:  simvastatin      Take  by mouth At Bedtime.        * simvastatin 20 MG tablet    ZOCOR    90 tablet    Take 1 tablet (20 mg) by mouth At Bedtime    Hyperlipidemia LDL goal <130       * Notice:  This list has 2 medication(s) that are the same as other medications prescribed for you. Read the directions carefully, and ask your doctor or other care provider to review them with you.

## 2018-03-23 ENCOUNTER — DOCUMENTATION ONLY (OUTPATIENT)
Dept: OTHER | Facility: CLINIC | Age: 83
End: 2018-03-23

## 2018-03-23 PROBLEM — Z71.89 ACP (ADVANCE CARE PLANNING): Chronic | Status: ACTIVE | Noted: 2018-03-23

## 2018-04-04 ENCOUNTER — RADIANT APPOINTMENT (OUTPATIENT)
Dept: GENERAL RADIOLOGY | Facility: CLINIC | Age: 83
End: 2018-04-04
Attending: ORTHOPAEDIC SURGERY
Payer: COMMERCIAL

## 2018-04-04 ENCOUNTER — OFFICE VISIT (OUTPATIENT)
Dept: ORTHOPEDICS | Facility: CLINIC | Age: 83
End: 2018-04-04
Payer: COMMERCIAL

## 2018-04-04 VITALS
DIASTOLIC BLOOD PRESSURE: 68 MMHG | WEIGHT: 169 LBS | BODY MASS INDEX: 31.1 KG/M2 | SYSTOLIC BLOOD PRESSURE: 146 MMHG | HEART RATE: 67 BPM | HEIGHT: 62 IN

## 2018-04-04 DIAGNOSIS — G89.29 CHRONIC PAIN OF LEFT KNEE: ICD-10-CM

## 2018-04-04 DIAGNOSIS — M25.562 CHRONIC PAIN OF LEFT KNEE: ICD-10-CM

## 2018-04-04 DIAGNOSIS — M25.562 CHRONIC PAIN OF LEFT KNEE: Primary | ICD-10-CM

## 2018-04-04 DIAGNOSIS — Z96.652 HX OF TOTAL KNEE REPLACEMENT, LEFT: ICD-10-CM

## 2018-04-04 DIAGNOSIS — G89.29 CHRONIC PAIN OF LEFT KNEE: Primary | ICD-10-CM

## 2018-04-04 PROCEDURE — 99203 OFFICE O/P NEW LOW 30 MIN: CPT | Performed by: ORTHOPAEDIC SURGERY

## 2018-04-04 PROCEDURE — 73562 X-RAY EXAM OF KNEE 3: CPT | Mod: LT

## 2018-04-04 ASSESSMENT — PAIN SCALES - GENERAL: PAINLEVEL: NO PAIN (1)

## 2018-04-04 NOTE — MR AVS SNAPSHOT
After Visit Summary   4/4/2018    Brenda Barker    MRN: [id number]           Patient Information     Date Of Birth          1935        Visit Information        Provider Department      4/4/2018 2:15 PM Davis Mena MD [facility]        Today's Diagnoses     Chronic pain of left knee    -  1    Hx of total knee replacement, left           Follow-ups after your visit        Additional Services     PHYSICAL THERAPY REFERRAL       *This order will print in the [facility] Central Scheduling Office*     Ana Lilia at [facility] provides Physical Therapy evaluation and treatment and many specialty services across the [facility] system.  If requesting a specialty program, please choose from the list below.    Call one number to schedule at any [facility] location   [phone].    Treatment: Evaluation & Treatment  Special Instructions/Modalities: S/P left total knee arthroplasty   Special Programs: None    Please be aware that coverage of these services is subject to the terms and limitations of your health insurance plan.  Call member services at your health plan with any benefit or coverage questions.      **Note to Provider** To refer patients to therapy outside of the location list, change the order class to External Referral in the order composer.                  Follow-up notes from your care team     Return if symptoms worsen or fail to improve.      Who to contact     If you have questions or need follow up information about today's clinic visit or your schedule please contact [facility] directly at [phone].  Normal or non-critical lab and imaging results will be communicated to you by MyChart, letter or phone within 4 business days after the clinic has received the results. If you do not hear from us within 7 days, please contact the clinic through [email or web address] or  "phone. If you have a critical or abnormal lab result, we will notify you by phone as soon as possible.  Submit refill requests through [employer] or call your pharmacy and they will forward the refill request to us. Please allow 3 business days for your refill to be completed.          Additional Information About Your Visit        MyChart Information     [employer] gives you secure access to your electronic health record. If you see a primary care provider, you can also send messages to your care team and make appointments. If you have questions, please call your primary care clinic.  If you do not have a primary care provider, please call [phone] and they will assist you.        Care EveryWhere ID     This is your Care EveryWhere ID. This could be used by other organizations to access your [place] medical records  [id number]        Your Vitals Were     Pulse Height BMI (Body Mass Index)             67 5' 2.25\" (1.581 m) 30.66 kg/m2          Blood Pressure from Last 3 Encounters:   04/04/18 146/68   03/20/18 118/59   12/19/17 109/60    Weight from Last 3 Encounters:   04/04/18 169 lb (76.7 kg)   03/20/18 167 lb (75.8 kg)   12/19/17 167 lb (75.8 kg)              We Performed the Following     PHYSICAL THERAPY REFERRAL        Primary Care Provider Office Phone # Fax #    Liudmila Sindy Bowie [phone]       [address]  [facility] [address]        Equal Access to Services     [facility]: Hadii aad ku hadasho Soyvonali, waaxda luqadaha, qaybta kaalmada adeegyada, liliana garcia . So [facility] [phone].    ATENCIÓN: Si habla español, tiene a beltran disposición servicios gratuitos de asistencia lingüística. Matt al [phone].    We comply with applicable federal civil rights laws and [place] laws. We do not discriminate on the basis of race, color, national origin, age, disability, sex, sexual orientation, or gender identity.            Thank you!     Thank you for " choosing [facility]  for your care. Our goal is always to provide you with excellent care. Hearing back from our patients is one way we can continue to improve our services. Please take a few minutes to complete the written survey that you may receive in the mail after your visit with us. Thank you!             Your Updated Medication List - Protect others around you: Learn how to safely use, store and throw away your medicines at www.disposemymeds.org.          This list is accurate as of 4/4/18  3:56 PM.  Always use your most recent med list.                   Brand Name Dispense Instructions for use Diagnosis    acyclovir 400 MG tablet    ZOVIRAX    90 tablet    TAKE 1 TABLET BY MOUTH DAILY    Herpes simplex virus infection       aspirin 81 MG tablet      1 TABLET DAILY        cefuroxime 500 MG tablet    CEFTIN    20 tablet    Take 1 tablet (500 mg) by mouth 2 times daily    Acute bronchitis with coexisting condition requiring prophylactic treatment       CENTRUM SILVER per tablet      1 TABLET DAILY        fish oil-omega-3 fatty acids 1000 MG capsule     OTC    2 CAPSULES DAILY  (? DOSE)    Other and unspecified hyperlipidemia       folic acid 1 MG tablet    FOLVITE     4 mg TABS DAILY    Rheumatoid arthritis(714.0), Obesity, unspecified, Other abnormal glucose       hydrochlorothiazide 12.5 MG capsule    MICROZIDE    90 capsule    Take 1 capsule (12.5 mg) by mouth daily    Hypertension, goal below 140/90       LIQUID TEARS 1.4 % ophthalmic solution   Generic drug:  polyvinyl alcohol      1 drop as needed.        meloxicam 7.5 MG tablet    MOBIC    90 tablet    TAKE 1 TABLET BY MOUTH ONCE DAILY    Rheumatoid arthritis involving multiple sites with positive rheumatoid factor (H)       methotrexate 25 MG/ML injection      None Entered        metoprolol succinate 50 MG 24 hr tablet    TOPROL-XL    90 tablet    TAKE 1 TABLET BY MOUTH ONCE DAILY    Rapid heart rate       nitroFURantoin  macrocrystal 100 MG ED starter pack    MACRODANTIN     Take 1 capsule by mouth daily.        omeprazole 20 MG CR capsule    priLOSEC    90 capsule    Take 1 capsule (20 mg) by mouth daily    Gastroesophageal reflux disease with esophagitis       Vitamin D-3 1000 UNITS Caps      Take by mouth daily        * ZOCOR 20 MG tablet   Generic drug:  simvastatin      Take  by mouth At Bedtime.        * simvastatin 20 MG tablet    ZOCOR    90 tablet    Take 1 tablet (20 mg) by mouth At Bedtime    Hyperlipidemia LDL goal <130       * Notice:  This list has 2 medication(s) that are the same as other medications prescribed for you. Read the directions carefully, and ask your doctor or other care provider to review them with you.

## 2018-04-04 NOTE — PROGRESS NOTES
CHIEF COMPLAINT:   Chief Complaint   Patient presents with     Knee Pain     Left knee pain s/p left TKA on 6/24/08 with Dr. Doroteo Cooper in [place]. Onset: 12/2017 - started making noise and began getting achy. NKI. The noises and achiness just came on expecially when bending over or picking up something. She has general achiness over the anterior knee. No treatments.      Brneda Barker is seen today in the [facility] for evaluation of left knee pain at the request of Dr. Liudmila Bowie.     HISTORY OF PRESENT ILLNESS    Brenda Barker is a 82 year old female seen for evaluation of ongoing left knee pain with no known injury. She is status post left total knee arthroplasty (cruciate retaining) on 6/24/2008 by Dr. Doroteo Cooper in [place] / [facility]. Pain has been present for about 4 months, since December 2017. She started to notice some clicking in the knee with some soreness. Today her pain is minimal, rated a 1/10. She has an occasional ache in the knee. pain is worsened with bending over to pick something. Denies any sharp pain. For treatment, she has not done much. She has tried going to use the warm water pool at [facility].      Of note: She has a walker for her hips.       Present symptoms: minimal pain.    Pain severity: 1/10  Frequency of symptoms: frequently  Exacerbating Factors: with bending the knees  Relieving Factors: rest  Night Pain: No  Pain while at rest: No   Numbness or tingling: Not in knee   Patient has tried:     NSAIDS: No     Physical Therapy: No      Activity modification: Yes      Bracing: No      Injections: No     Ice: No      Assistive device:  Yes, walker for her hips     Other: warm water pool    Orthopaedic PMH: history of left total knee arthroplasty in 2008    Other PMH:  has a past medical history of AORTIC VALVE SCLEROSIS (1/2002); ASYMPTOMATIC PVCS; Back pain; Basal cell carcinoma of scalp; BORDERLINE ELEVATED HBA1C- 6.1%  4/04  (6/12/2005); CARPAL TUNNEL SYNDROME, R>L (8/2001); CERV. 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-6 BILAT UNCINATE SPURS (8/2001); DEPRESSION (2/13/2005); DJD (degenerative joint disease); Endometriosis; Essential and other specified forms of tremor; HEMORRHOIDS; HERPES SIMPLEX  I AND II; HX ADENOMATOUS COLON POLYP (1993); HYPERLIPIDEMIA; HYPERTENSION (2/13/2005); L ACOUSTIC NEUROMA; Neoplasm of uncertain behavior of skin; OBESITY -BMI 32; OSTEOPENIA (7/03); Pain in joint, pelvic region and thigh; Rheumatoid arthritis(714.0) (6/28/2004); Spinal stenosis, unspecified region other than cervical; Unspecified hearing loss; and URIN TRACT INFECTION, RECURRENT.  Patient Active Problem List    Diagnosis Date Noted     ACP (advance care planning) 03/23/2018     Priority: Medium     Hypertension, goal below 140/90 12/19/2017     Priority: Medium     Hyperlipidemia LDL goal <130 12/19/2017     Priority: Medium     Rapid heart rate 12/19/2017     Priority: Medium     Resting tremor 12/19/2017     Priority: Medium     Gastroesophageal reflux disease with esophagitis 12/11/2017     Priority: Medium     Obesity 10/20/2017     Priority: Medium     High risk medication use 10/20/2017     Priority: Medium     SNHL (sensorineural hearing loss) 05/30/2013     Priority: Medium     Status post total left knee replacement 10/20/2008     Priority: Medium     Rheumatoid arthritis (H) 06/28/2004     Priority: Medium     Problem list name updated by automated process. 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 Provider to review       Aortic valve disorder 06/24/2003     Priority: Medium     Problem list name updated by automated process. Provider to review         Surgical Hx:  has a past surgical history that includes REMOVAL OF  TONSILS,<13 Y/O (1942); TOTAL ABDOM HYSTERECTOMY (1972); REMOVAL OF OVARY(S) (1980); surgical history of -  (1989); surgical history of -  (1998); COLONOSCOPY THRU STOMA W BIOPSY/CAUTERY TUMOR/POLYP/LESION (1993); COLONOSCOPY THRU STOMA, DIAGNOSTIC (1998, 10/03); FLEX SIGMOIDOSCOPY W/WO KARYNA SPEC BY BRUSH/WASH (12/2001); and DEXA, BONE DENSITY, AXIAL SKEL (7/03).    Medications:   Current Outpatient Prescriptions:      meloxicam (MOBIC) 7.5 MG tablet, TAKE 1 TABLET BY MOUTH ONCE DAILY, Disp: 90 tablet, Rfl: 0     metoprolol succinate (TOPROL-XL) 50 MG 24 hr tablet, TAKE 1 TABLET BY MOUTH ONCE DAILY, Disp: 90 tablet, Rfl: 0     acyclovir (ZOVIRAX) 400 MG tablet, TAKE 1 TABLET BY MOUTH DAILY, Disp: 90 tablet, Rfl: 0     simvastatin (ZOCOR) 20 MG tablet, Take 1 tablet (20 mg) by mouth At Bedtime, Disp: 90 tablet, Rfl: 3     hydrochlorothiazide (MICROZIDE) 12.5 MG capsule, Take 1 capsule (12.5 mg) by mouth daily, Disp: 90 capsule, Rfl: 3     omeprazole (PRILOSEC) 20 MG CR capsule, Take 1 capsule (20 mg) by mouth daily, Disp: 90 capsule, Rfl: 1     Cholecalciferol (VITAMIN D-3) 1000 UNITS CAPS, Take by mouth daily, Disp: , Rfl:      nitrofurantoin 100 MG, Take 1 capsule by mouth daily., Disp: , Rfl:      polyvinyl alcohol (LIQUID TEARS) 1.4 % ophthalmic solution, 1 drop as needed., Disp: , Rfl:      simvastatin (ZOCOR) 20 MG tablet, Take  by mouth At Bedtime., Disp: , Rfl:      FOLIC ACID 1 MG OR TABS, 4 mg TABS DAILY, Disp: , Rfl:      METHOTREXATE SODIUM 25 MG/ML IJ SOLN, None Entered, Disp: , Rfl:      CENTRUM SILVER OR TABS, 1 TABLET DAILY, Disp: , Rfl:      ASPIRIN 81 MG OR TABS, 1 TABLET DAILY, Disp: , Rfl:      FISH OIL 1000 MG OR CAPS, 2 CAPSULES DAILY  (? DOSE), Disp: OTC, Rfl: --     cefuroxime (CEFTIN) 500 MG tablet, Take 1 tablet (500 mg) by mouth 2 times daily (Patient not taking: Reported on 4/4/2018), Disp: 20 tablet, Rfl: 0    Allergies:   Allergies   Allergen Reactions     Diatrizoate Hives and Shortness  "Of Breath     Ciprofloxacin      nausea     Contrast Dye      Hives,anaphylaxix     Sulfasalazine      Other reaction(s): Rash       Social Hx: retired   reports that she has never smoked. She has never used smokeless tobacco. She reports that she drinks alcohol. She reports that she does not use illicit drugs.    Family Hx: family history includes CANCER in her mother; GASTROINTESTINAL DISEASE in her father.    REVIEW OF SYSTEMS: 10 point ROS neg other than the symptoms noted above in the HPI and PMH. Notables include  CONSTITUTIONAL:NEGATIVE for fever, chills, change in weight  INTEGUMENTARY/SKIN: NEGATIVE for worrisome rashes, moles or lesions  MUSCULOSKELETAL:See HPI above  NEURO: NEGATIVE for weakness, dizziness or paresthesias    This document serves as a record of the services and decisions personally performed and made by Davis Mena MD. It was created on his behalf by Rosaline Coburn, a trained medical scribe. The creation of this document is based the provider's statements to the medical scribe.    Scribe Rosaline Coburn 2:37 PM 4/4/2018    PHYSICAL EXAM:  /68  Pulse 67  Ht 1.581 m (5' 2.25\")  Wt 76.7 kg (169 lb)  BMI 30.66 kg/m2   GENERAL APPEARANCE: healthy, alert, no distress  SKIN: no suspicious lesions or rashes  NEURO: Normal strength and tone, mentation intact and speech normal, obvious hand and head tremors  PSYCH:  mentation appears normal and affect normal, not anxious  RESPIRATORY: No increased work of breathing.  HANDS: no clubbing, nail pitting. Positive tremor.    BILATERAL LOWER EXTREMITIES:  Gait: hunched, using wheeled walker (she states for her hips and low back)  No gross deformities or masses.  No Quad atrophy, strength weak bilateral.  Intact sensation deep peroneal nerve, superficial peroneal nerve, med/lat tibial nerve, sural nerve, saphenous nerve  Intact EHL, EDL, TA, FHL, GS, quadriceps hamstrings and hip flexors  Toes warm and well perfused, brisk capillary refill. Palpable 2+ " dp pulses.  Bilateral calf soft and nttp or squeeze.  Edema: 1+ pitting bilateral.    LEFT KNEE EXAM:    Skin: intact, no ecchymosis or erythema, longitudinal midline scar over anterior knee   ROM: 2 extension to 120 flexion  Tight hamstrings on straight leg raise.  Effusion: none  Tender: quadriceps tendon, anterior knee.  NTTP med/lat joint line, posterior knee    Some anterior/posterior laxity  Some varus/valgus laxity with endpoint within normal limits.  Patellofemoral joint:                Apprehension: negative              Crepitations: mild   Grind: positive      RIGHT KNEE EXAM:    Skin: intact, no ecchymosis or erythema, arthroscopy portal scars  ROM: 3 extension to 120 flexion  Tight hamstrings on straight leg raise.  Effusion: none  Tender: lateral joint line  NTTP med joint line, anterior or posterior knee  McMurrays: negative    MCL: stable, and non-painful at both 0 and 30 degrees knee flexion  Varus stress: stable, and non-painful at both 0 and 30 degrees knee flexion  Positive anterior/posterior laxity  Lachmans: neg, firm endpoint  Posterior Drawer stable  Patellofemoral joint:                Apprehension: negative              Crepitations: mild to moderate   Grind: mild positive.      X-RAY:  3 views left knee from 4/4/2018 were reviewed in clinic today. On my review, no obvious fractures or dislocations. Cemented total knee arthroplasty in place without evidence of failure or loosening. No effusion.      Impression:   82 year old female with left knee pain, 10 years status post left total knee arthroplasty, anterior knee pain.    Plan:   * reviewed imaging studies with patient  * possible ligament stretching / tearing, in particular posterior cruciate ligament in setting of history of rheumatoid arthritis.  * treatment is non-operative.  * referral placed to [facility].   * If patient would like a workup of carpal tunnel syndrome, schedule a new appointment.    * rest, ice,  elevate, nsaids, activity modification    * return to clinic as needed.          The information in this document, created by a scribe for me, accurately reflects the services I personally performed and the decisions made by me. I have reviewed and approved this document for accuracy.     Davis Mena M.D., M.S.  Dept. of Orthopaedic Surgery  [facility]

## 2018-04-04 NOTE — LETTER
4/4/2018         RE: Brenda Barker  [address]  [address]  [place] [address]        Dear Colleague,    Thank you for referring your patient, Brenda Barker, to the [facility]. Please see a copy of my visit note below.    CHIEF COMPLAINT:   Chief Complaint   Patient presents with     Knee Pain     Left knee pain s/p left TKA on 6/24/08 with Dr. Doroteo Cooper in [place]. Onset: 12/2017 - started making noise and began getting achy. NKI. The noises and achiness just came on expecially when bending over or picking up something. She has general achiness over the anterior knee. No treatments.      Brenda Barker is seen today in the [facility] for evaluation of left knee pain at the request of Dr. Liudmila Bowie.     HISTORY OF PRESENT ILLNESS    Brenda Barker is a 82 year old female seen for evaluation of ongoing left knee pain with no known injury. She is status post left total knee arthroplasty (cruciate retaining) on 6/24/2008 by Dr. Doroteo Cooper in [place] / [facility]. Pain has been present for about 4 months, since December 2017. She started to notice some clicking in the knee with some soreness. Today her pain is minimal, rated a 1/10. She has an occasional ache in the knee. pain is worsened with bending over to pick something. Denies any sharp pain. For treatment, she has not done much. She has tried going to use the warm water pool at [facility].      Of note: She has a walker for her hips.       Present symptoms: minimal pain.    Pain severity: 1/10  Frequency of symptoms: frequently  Exacerbating Factors: with bending the knees  Relieving Factors: rest  Night Pain: No  Pain while at rest: No   Numbness or tingling: Not in knee   Patient has tried:     NSAIDS: No     Physical Therapy: No      Activity modification: Yes      Bracing: No      Injections: No     Ice: No      Assistive device:  Yes, walker for her hips     Other: warm  water pool    Orthopaedic PMH: history of left total knee arthroplasty in 2008    Other PMH:  has a past medical history of AORTIC VALVE SCLEROSIS (1/2002); ASYMPTOMATIC PVCS; Back pain; Basal cell carcinoma of scalp; BORDERLINE ELEVATED HBA1C- 6.1%  4/04 (6/12/2005); CARPAL TUNNEL SYNDROME, R>L (8/2001); CERV. 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-6 BILAT UNCINATE SPURS (8/2001); DEPRESSION (2/13/2005); DJD (degenerative joint disease); Endometriosis; Essential and other specified forms of tremor; HEMORRHOIDS; HERPES SIMPLEX  I AND II; HX ADENOMATOUS COLON POLYP (1993); HYPERLIPIDEMIA; HYPERTENSION (2/13/2005); L ACOUSTIC NEUROMA; Neoplasm of uncertain behavior of skin; OBESITY -BMI 32; OSTEOPENIA (7/03); Pain in joint, pelvic region and thigh; Rheumatoid arthritis(714.0) (6/28/2004); Spinal stenosis, unspecified region other than cervical; Unspecified hearing loss; and URIN TRACT INFECTION, RECURRENT.  Patient Active Problem List    Diagnosis Date Noted     ACP (advance care planning) 03/23/2018     Priority: Medium     Hypertension, goal below 140/90 12/19/2017     Priority: Medium     Hyperlipidemia LDL goal <130 12/19/2017     Priority: Medium     Rapid heart rate 12/19/2017     Priority: Medium     Resting tremor 12/19/2017     Priority: Medium     Gastroesophageal reflux disease with esophagitis 12/11/2017     Priority: Medium     Obesity 10/20/2017     Priority: Medium     High risk medication use 10/20/2017     Priority: Medium     SNHL (sensorineural hearing loss) 05/30/2013     Priority: Medium     Status post total left knee replacement 10/20/2008     Priority: Medium     Rheumatoid arthritis (H) 06/28/2004     Priority: Medium     Problem list name updated by automated process. 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 Provider to review       Aortic valve disorder 06/24/2003     Priority: Medium     Problem list name updated by automated process. Provider to review         Surgical Hx:  has a past surgical history that includes REMOVAL OF TONSILS,<15 Y/O (1942); TOTAL ABDOM HYSTERECTOMY (1972); REMOVAL OF OVARY(S) (1980); surgical history of -  (1989); surgical history of -  (1998); COLONOSCOPY THRU STOMA W BIOPSY/CAUTERY TUMOR/POLYP/LESION (1993); COLONOSCOPY THRU STOMA, DIAGNOSTIC (1998, 10/03); FLEX SIGMOIDOSCOPY W/WO KARYNA SPEC BY BRUSH/WASH (12/2001); and DEXA, BONE DENSITY, AXIAL SKEL (7/03).    Medications:   Current Outpatient Prescriptions:      meloxicam (MOBIC) 7.5 MG tablet, TAKE 1 TABLET BY MOUTH ONCE DAILY, Disp: 90 tablet, Rfl: 0     metoprolol succinate (TOPROL-XL) 50 MG 24 hr tablet, TAKE 1 TABLET BY MOUTH ONCE DAILY, Disp: 90 tablet, Rfl: 0     acyclovir (ZOVIRAX) 400 MG tablet, TAKE 1 TABLET BY MOUTH DAILY, Disp: 90 tablet, Rfl: 0     simvastatin (ZOCOR) 20 MG tablet, Take 1 tablet (20 mg) by mouth At Bedtime, Disp: 90 tablet, Rfl: 3     hydrochlorothiazide (MICROZIDE) 12.5 MG capsule, Take 1 capsule (12.5 mg) by mouth daily, Disp: 90 capsule, Rfl: 3     omeprazole (PRILOSEC) 20 MG CR capsule, Take 1 capsule (20 mg) by mouth daily, Disp: 90 capsule, Rfl: 1     Cholecalciferol (VITAMIN D-3) 1000 UNITS CAPS, Take by mouth daily, Disp: , Rfl:      nitrofurantoin 100 MG, Take 1 capsule by mouth daily., Disp: , Rfl:      polyvinyl alcohol (LIQUID TEARS) 1.4 % ophthalmic solution, 1 drop as needed., Disp: , Rfl:      simvastatin (ZOCOR) 20 MG tablet, Take  by mouth At Bedtime., Disp: , Rfl:      FOLIC ACID 1 MG OR TABS, 4 mg TABS DAILY, Disp: , Rfl:      METHOTREXATE SODIUM 25 MG/ML IJ SOLN, None Entered, Disp: , Rfl:      CENTRUM SILVER OR TABS, 1 TABLET DAILY, Disp: , Rfl:      ASPIRIN 81 MG OR TABS, 1 TABLET DAILY, Disp: , Rfl:      FISH OIL 1000 MG OR CAPS, 2 CAPSULES DAILY  (? DOSE), Disp:  "OTC, Rfl: --     cefuroxime (CEFTIN) 500 MG tablet, Take 1 tablet (500 mg) by mouth 2 times daily (Patient not taking: Reported on 4/4/2018), Disp: 20 tablet, Rfl: 0    Allergies:   Allergies   Allergen Reactions     Diatrizoate Hives and Shortness Of Breath     Ciprofloxacin      nausea     Contrast Dye      Hives,anaphylaxix     Sulfasalazine      Other reaction(s): Rash       Social Hx: retired   reports that she has never smoked. She has never used smokeless tobacco. She reports that she drinks alcohol. She reports that she does not use illicit drugs.    Family Hx: family history includes CANCER in her mother; GASTROINTESTINAL DISEASE in her father.    REVIEW OF SYSTEMS: 10 point ROS neg other than the symptoms noted above in the HPI and PMH. Notables include  CONSTITUTIONAL:NEGATIVE for fever, chills, change in weight  INTEGUMENTARY/SKIN: NEGATIVE for worrisome rashes, moles or lesions  MUSCULOSKELETAL:See HPI above  NEURO: NEGATIVE for weakness, dizziness or paresthesias    This document serves as a record of the services and decisions personally performed and made by Davis Mena MD. It was created on his behalf by Rosaline Coburn, a trained medical scribe. The creation of this document is based the provider's statements to the medical scribe.    Scribe Rosaline Coburn 2:37 PM 4/4/2018    PHYSICAL EXAM:  /68  Pulse 67  Ht 1.581 m (5' 2.25\")  Wt 76.7 kg (169 lb)  BMI 30.66 kg/m2   GENERAL APPEARANCE: healthy, alert, no distress  SKIN: no suspicious lesions or rashes  NEURO: Normal strength and tone, mentation intact and speech normal, obvious hand and head tremors  PSYCH:  mentation appears normal and affect normal, not anxious  RESPIRATORY: No increased work of breathing.  HANDS: no clubbing, nail pitting. Positive tremor.    BILATERAL LOWER EXTREMITIES:  Gait: hunched, using wheeled walker (she states for her hips and low back)  No gross deformities or masses.  No Quad atrophy, strength weak " bilateral.  Intact sensation deep peroneal nerve, superficial peroneal nerve, med/lat tibial nerve, sural nerve, saphenous nerve  Intact EHL, EDL, TA, FHL, GS, quadriceps hamstrings and hip flexors  Toes warm and well perfused, brisk capillary refill. Palpable 2+ dp pulses.  Bilateral calf soft and nttp or squeeze.  Edema: 1+ pitting bilateral.    LEFT KNEE EXAM:    Skin: intact, no ecchymosis or erythema, longitudinal midline scar over anterior knee   ROM: 2 extension to 120 flexion  Tight hamstrings on straight leg raise.  Effusion: none  Tender: quadriceps tendon, anterior knee.  NTTP med/lat joint line, posterior knee    Some anterior/posterior laxity  Some varus/valgus laxity with endpoint within normal limits.  Patellofemoral joint:                Apprehension: negative              Crepitations: mild   Grind: positive      RIGHT KNEE EXAM:    Skin: intact, no ecchymosis or erythema, arthroscopy portal scars  ROM: 3 extension to 120 flexion  Tight hamstrings on straight leg raise.  Effusion: none  Tender: lateral joint line  NTTP med joint line, anterior or posterior knee  McMurrays: negative    MCL: stable, and non-painful at both 0 and 30 degrees knee flexion  Varus stress: stable, and non-painful at both 0 and 30 degrees knee flexion  Positive anterior/posterior laxity  Lachmans: neg, firm endpoint  Posterior Drawer stable  Patellofemoral joint:                Apprehension: negative              Crepitations: mild to moderate   Grind: mild positive.      X-RAY:  3 views left knee from 4/4/2018 were reviewed in clinic today. On my review, no obvious fractures or dislocations. Cemented total knee arthroplasty in place without evidence of failure or loosening. No effusion.      Impression:   82 year old female with left knee pain, 10 years status post left total knee arthroplasty, anterior knee pain.    Plan:   * reviewed imaging studies with patient  * possible ligament stretching / tearing, in particular  posterior cruciate ligament in setting of history of rheumatoid arthritis.  * treatment is non-operative.  * referral placed to physical therapy at [facility].   * If patient would like a workup of carpal tunnel syndrome, schedule a new appointment.    * rest, ice, elevate, nsaids, activity modification    * return to clinic as needed.          The information in this document, created by a scribe for me, accurately reflects the services I personally performed and the decisions made by me. I have reviewed and approved this document for accuracy.     Davis Mena M.D., M.S.  Dept. of Orthopaedic Surgery  [facility]        Again, thank you for allowing me to participate in the care of your patient.        Sincerely,        Davis Mena MD

## 2018-04-06 ENCOUNTER — MYC MEDICAL ADVICE (OUTPATIENT)
Dept: ORTHOPEDICS | Facility: CLINIC | Age: 83
End: 2018-04-06

## 2018-04-06 NOTE — TELEPHONE ENCOUNTER
Faxed physical therapy order to [facility] as requested to [phone]. Fax confirmed.  Miguelina Parrish Certified Medical Assistant

## 2018-04-25 ENCOUNTER — TELEPHONE (OUTPATIENT)
Dept: ORTHOPEDICS | Facility: CLINIC | Age: 83
End: 2018-04-25

## 2018-04-25 NOTE — TELEPHONE ENCOUNTER
Faxed back a signed plan/updated plan of progress to [facility] at [phone]. Fax confirmed and sent to abstracting.  Miguelina Parrish Clinical Medical Assistant

## 2018-05-07 ENCOUNTER — MYC MEDICAL ADVICE (OUTPATIENT)
Dept: FAMILY MEDICINE | Facility: CLINIC | Age: 83
End: 2018-05-07

## 2018-05-17 ENCOUNTER — OFFICE VISIT (OUTPATIENT)
Dept: FAMILY MEDICINE | Facility: CLINIC | Age: 83
End: 2018-05-17
Payer: COMMERCIAL

## 2018-05-17 ENCOUNTER — RADIANT APPOINTMENT (OUTPATIENT)
Dept: GENERAL RADIOLOGY | Facility: CLINIC | Age: 83
End: 2018-05-17
Attending: FAMILY MEDICINE
Payer: COMMERCIAL

## 2018-05-17 VITALS
RESPIRATION RATE: 18 BRPM | HEART RATE: 70 BPM | SYSTOLIC BLOOD PRESSURE: 142 MMHG | WEIGHT: 166 LBS | BODY MASS INDEX: 30.55 KG/M2 | TEMPERATURE: 97.5 F | HEIGHT: 62 IN | OXYGEN SATURATION: 99 % | DIASTOLIC BLOOD PRESSURE: 62 MMHG

## 2018-05-17 DIAGNOSIS — Z96.652 STATUS POST TOTAL LEFT KNEE REPLACEMENT: ICD-10-CM

## 2018-05-17 DIAGNOSIS — M25.552 HIP PAIN, LEFT: ICD-10-CM

## 2018-05-17 DIAGNOSIS — M16.0 PRIMARY OSTEOARTHRITIS OF BOTH HIPS: Primary | ICD-10-CM

## 2018-05-17 PROCEDURE — 73502 X-RAY EXAM HIP UNI 2-3 VIEWS: CPT | Mod: FY

## 2018-05-17 PROCEDURE — 99213 OFFICE O/P EST LOW 20 MIN: CPT | Performed by: FAMILY MEDICINE

## 2018-05-17 ASSESSMENT — PAIN SCALES - GENERAL: PAINLEVEL: MODERATE PAIN (5)

## 2018-05-17 NOTE — PATIENT INSTRUCTIONS
At [facility], we strive to deliver an exceptional experience to you, every time we see you.  If you receive a survey in the mail, please send us back your thoughts. We really do value your feedback.    Based on your medical history, these are the current health maintenance/preventive care services that you are due for (some may have been done at this visit.)  Health Maintenance Due   Topic Date Due     DEPRESSION ACTION PLAN Q1 YR  08/28/1953     PHQ-9 Q6 MONTHS  03/09/2017     TETANUS IMMUNIZATION (SYSTEM ASSIGNED)  03/13/2018         Suggested websites for health information:  Www.[employer] : Up to date and easily searchable information on multiple topics.  Www.medlineplus.gov : medication info, interactive tutorials, watch real surgeries online  Www.familydoctor.org : good info from the Academy of Family Physicians  Www.cdc.gov : public health info, travel advisories, epidemics (H1N1)  Www.aap.org : children's health info, normal development, vaccinations  Www.health.[facility].[place].us : [place] dept of health, public health issues in [place], N1N1    Your care team:                            Family Medicine Internal Medicine   MD Jose Alejandro Floyd MD Shantel Branch-Fleming, MD Katya Georgiev PA-C Nam Ho, MD Pediatrics   VINITA Gutiérrez, MD Kika Olivia CNP, MD Deborah Mielke, MD Kim Thein, APRN CNP      Clinic hours: Monday - Thursday 7 am-7 pm; Fridays 7 am-5 pm.   Urgent care: Monday - Friday 11 am-9 pm; Saturday and Sunday 9 am-5 pm.  Pharmacy : Monday -Thursday 8 am-8 pm; Friday 8 am-6 pm; Saturday and Sunday 9 am-5 pm.     Clinic: [phone]   Pharmacy: [phone]

## 2018-05-17 NOTE — PROGRESS NOTES
SUBJECTIVE:   Brenda Barker is a 82 year old female who presents to clinic today for the following health issues:    Chronic Pain Follow-Up       Type / Location of Pain: Left knee, radiation left hip Hx left total knee replacement 2008 and lower back pain worst left than right. Evaluated by orthopedics for his knee and now his hip is bothering.   Analgesia/pain control:       Recent changes:  Same. Left hip pain constant achy pain intensity varies, left knee pain intermittent with popping sounds with bending, Pain back pain constant ache      Overall control: Tolerable with discomfort  Activity level/function:      Daily activities:  Able to do light housework, cooking    Work:  not applicable  Adverse effects:  No  Adherance    Taking medication as directed?  Yes Meloxicam at bedtime and Tylenol 6 tablets during the day  Participating in other treatments: yes physical therapy on land did not work so switched to pool therapy x 1week and is better  Risk Factors:    Sleep:  Good    Mood/anxiety:  controlled    Recent family or social stressors:  none noted    Other aggravating factors: prolong walking  PHQ-9 SCORE 9/9/2016   Total Score 0     LORNE-7 SCORE 6/19/2015 5/11/2017 8/11/2017   Total Score 0 0 0     Encounter-Level CSA:     There are no encounter-level csa.                Problem list and histories reviewed & adjusted, as indicated.  Additional history: as documented    Patient Active Problem List   Diagnosis     Benign neoplasm of cranial nerve (H)     Hemorrhoids     Aortic valve disorder     Cervicalgia     Rheumatoid arthritis (H)     SNHL (sensorineural hearing loss)     Status post total left knee replacement     Obesity     High risk medication use     Gastroesophageal reflux disease with esophagitis     Hypertension, goal below 140/90     Hyperlipidemia LDL goal <130     Rapid heart rate     Resting tremor     ACP (advance care planning)     Past Surgical History:   Procedure Laterality Date      BACK SURGERY  2011    Fusion L3-4 & decompression; [facility]     BIOPSY  2013    Thyroid - normal result     C DEXA, BONE DENSITY, AXIAL SKEL  7/03 7/03 L1-4 1.7, FemNkL-0.8,R-1.2, FArm Px 0.2,Dist-0.3     C REMOVAL OF OVARY(S)  1980 2nd ovary removed - endometriosis     C TOTAL ABDOM HYSTERECTOMY  1972    uterus, ovary removed - fibroid     EYE SURGERY  2010 & 11    Cataract surgery     HC COLONOSCOPY THRU STOMA W BIOPSY/CAUTERY TUMOR/POLYP/LESION  1993    adenomatous polyp     HC COLONOSCOPY THRU STOMA, DIAGNOSTIC  1998, 10/03    normal, '03 rec repeat in 5 yrs due to +hx polyp and + fam hx     HC FLEX SIGMOIDOSCOPY W/WO KARYNA SPEC BY BRUSH/WASH  12/2001     HC REMOVAL OF TONSILS,<11 Y/O  1942     ORTHOPEDIC SURGERY  2008    Left knee replacement     SURGICAL HISTORY OF -   1989    basal cell Ca scalp excised     SURGICAL HISTORY OF -   1998    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       Social History   Substance Use Topics     Smoking status: Never Smoker     Smokeless tobacco: Never Used     Alcohol use Yes      Comment: occasionally     Family History   Problem Relation Age of Onset     GASTROINTESTINAL DISEASE Father      bleeding ulcer     CANCER Mother      thyroid cancer     Other Cancer Mother      thyroid & throat     OSTEOPOROSIS Mother      Thyroid Disease Mother      DIABETES Paternal Grandmother      CEREBROVASCULAR DISEASE Paternal Grandmother      Colon Cancer Maternal Half-Brother      OSTEOPOROSIS Maternal Grandmother          Current Outpatient Prescriptions   Medication Sig Dispense Refill     acyclovir (ZOVIRAX) 400 MG tablet TAKE 1 TABLET BY MOUTH DAILY 90 tablet 0     ASPIRIN 81 MG OR TABS 1 TABLET DAILY       CENTRUM SILVER OR TABS 1 TABLET DAILY       Cholecalciferol (VITAMIN D-3) 1000 UNITS CAPS Take by mouth daily       FISH OIL 1000 MG OR CAPS 2 CAPSULES DAILY  (? DOSE) OTC --     FOLIC ACID 1 MG OR TABS 4 mg TABS DAILY       hydrochlorothiazide (MICROZIDE)  12.5 MG capsule Take 1 capsule (12.5 mg) by mouth daily 90 capsule 3     meloxicam (MOBIC) 7.5 MG tablet TAKE 1 TABLET BY MOUTH ONCE DAILY 90 tablet 0     METHOTREXATE SODIUM 25 MG/ML IJ SOLN None Entered       metoprolol succinate (TOPROL-XL) 50 MG 24 hr tablet TAKE 1 TABLET BY MOUTH ONCE DAILY 90 tablet 0     nitrofurantoin 100 MG Take 1 capsule by mouth daily.       omeprazole (PRILOSEC) 20 MG CR capsule Take 1 capsule (20 mg) by mouth daily 90 capsule 1     polyvinyl alcohol (LIQUID TEARS) 1.4 % ophthalmic solution 1 drop as needed.       simvastatin (ZOCOR) 20 MG tablet Take 1 tablet (20 mg) by mouth At Bedtime 90 tablet 3     [DISCONTINUED] simvastatin (ZOCOR) 20 MG tablet Take  by mouth At Bedtime.       Allergies   Allergen Reactions     Diatrizoate Hives and Shortness Of Breath     Ciprofloxacin      nausea     Contrast Dye      Hives,anaphylaxix     Sulfasalazine      Other reaction(s): Rash     Recent Labs   Lab Test  12/19/17   1223  05/11/17   1416  12/15/16   1129   12/07/15   1035   LDL  77   --   115*   --   100*   HDL  64   --   70   --   59   TRIG  131   --   119   --   150*   CR  0.68  0.85   --    < >  0.73   GFRESTIMATED  83   --    --    --    --    GFRESTBLACK  >90  >60   --    < >  >60   POTASSIUM  4.4  4.0   --    --   3.5    < > = values in this interval not displayed.      BP Readings from Last 3 Encounters:   05/17/18 142/62   04/04/18 146/68   03/20/18 118/59    Wt Readings from Last 3 Encounters:   05/17/18 166 lb (75.3 kg)   04/04/18 169 lb (76.7 kg)   03/20/18 167 lb (75.8 kg)                  Labs reviewed in EPIC    Reviewed and updated as needed this visit by clinical staff  Tobacco  Allergies  Meds       Reviewed and updated as needed this visit by Provider         ROS:  Constitutional, HEENT, cardiovascular, pulmonary, gi and gu systems are negative, except as otherwise noted.    OBJECTIVE:     /62 (BP Location: Right arm, Patient Position: Chair, Cuff Size: Adult  "Regular)  Pulse 70  Temp 97.5  F (36.4  C) (Oral)  Resp 18  Ht 5' 2.25\" (1.581 m)  Wt 166 lb (75.3 kg)  LMP 05/17/1972  SpO2 99%  BMI 30.12 kg/m2  Body mass index is 30.12 kg/(m^2).  GENERAL: healthy, alert, well nourished, well hydrated, no distress, obese  HENT: ear canals- normal; TMs- normal; Nose- normal; Mouth- no ulcers, no lesions, throat is clear with no erythema or exudate.   NECK: no tenderness, no adenopathy, no asymmetry, no masses, no stiffness; thyroid- normal to palpation  RESP: lungs clear to auscultation - no rales, no rhonchi, no wheezes  CV: regular rates and rhythm, normal S1 S2, no S3 or S4 and no murmur, no click or rub -  ABDOMEN: soft, no tenderness, no  hepatosplenomegaly, no masses, normal bowel sounds  MS: extremities- no gross deformities noted, no edema, decreased ROM left hip with tenderness in groin area to palpation.   SKIN: no suspicious lesions, no rashes  NEURO: strength and tone- normal, sensory exam- grossly normal, mentation- intact, speech- normal, reflexes- symmetric  BACK: no CVA tenderness, no paralumbar tenderness  PSYCH: Alert and oriented times 3; speech- coherent , normal rate and volume; able to articulate logical thoughts, able to abstract reason, no tangential thoughts, no hallucinations or delusions, affect- normal     Diagnostic Test Results:  No results found for this or any previous visit (from the past 24 hour(s)).    ASSESSMENT/PLAN:         Tobacco Cessation:   reports that she has never smoked. She has never used smokeless tobacco.      BMI:   Estimated body mass index is 30.12 kg/(m^2) as calculated from the following:    Height as of this encounter: 5' 2.25\" (1.581 m).    Weight as of this encounter: 166 lb (75.3 kg).   Weight management plan: Discussed healthy diet and exercise guidelines and patient will follow up in 3 months in clinic to re-evaluate.        ICD-10-CM    1. Primary osteoarthritis of both hips M16.0 ORTHO  REFERRAL mostly " left hip increased pain and decreased ROM      PHYSICAL THERAPY REFERRAL   2. Status post total left knee replacement Z96.652 PHYSICAL THERAPY REFERRAL- doing better with pool therapy and needs referral renewal.    3. Hip pain, left M25.552 ORTHO  REFERRAL     XR Hip Left 2-3 Views- moderate to severe signs of osteoarthritis 5 years ago on x ray      PHYSICAL THERAPY REFERRAL       CONSULTATION/REFERRAL to orthopedics for evaluation of left hip for osteoarthritis.   FUTURE LABS:       - Schedule fasting labs in 6 months  FUTURE APPOINTMENTS:       - Follow-up visit in 6 months or sooner if any questions or concerns.   Work on weight loss  Regular exercise  See Patient Instructions    Liudmila Bowie MD  [facility]  [phone]

## 2018-05-17 NOTE — MR AVS SNAPSHOT
After Visit Summary   5/17/2018    Brenda Barker    MRN: [id number]           Patient Information     Date Of Birth          1935        Visit Information        Provider Department      5/17/2018 1:00 PM Liudmila Bowie MD [facility]        Today's Diagnoses     Primary osteoarthritis of both hips    -  1    Status post total left knee replacement        Hip pain, left          Care Instructions    At [facility], we strive to deliver an exceptional experience to you, every time we see you.  If you receive a survey in the mail, please send us back your thoughts. We really do value your feedback.    Based on your medical history, these are the current health maintenance/preventive care services that you are due for (some may have been done at this visit.)  Health Maintenance Due   Topic Date Due     DEPRESSION ACTION PLAN Q1 YR  08/28/1953     PHQ-9 Q6 MONTHS  03/09/2017     TETANUS IMMUNIZATION (SYSTEM ASSIGNED)  03/13/2018         Suggested websites for health information:  Www.[employer]limmer : Up to date and easily searchable information on multiple topics.  Www.medlineplus.gov : medication info, interactive tutorials, watch real surgeries online  Www.familydoctor.org : good info from the Academy of Family Physicians  Www.cdc.gov : public health info, travel advisories, epidemics (H1N1)  Www.aap.org : children's health info, normal development, vaccinations  Www.health.state.[place].us : [place] dept of health, public health issues in [place], N1N1    Your care team:                            Family Medicine Internal Medicine   MD Jose Alejandro Floyd MD Shantel Branch-Fleming, MD Katya Georgiev PA-C Nam Ho, MD Pediatrics   VINITA Gutiérrez, MD Kika Olivia CNP, MD Deborah Mielke, MD Kim Thein, APRN CNP      Clinic hours: Monday - Thursday 7 am-7 pm; Fridays 7 am-5 pm.   Urgent  "care: Monday - Friday 11 am-9 pm; Saturday and Sunday 9 am-5 pm.  Pharmacy : Monday -Thursday 8 am-8 pm; Friday 8 am-6 pm; Saturday and Sunday 9 am-5 pm.     Clinic: [phone]   Pharmacy: [phone]            Follow-ups after your visit        Additional Services     ORTHO  REFERRAL       [facility] is referring you to the Orthopedic  Services at [facility].       The  Representative will assist you in the coordination of your Orthopedic and Musculoskeletal Care as prescribed by your physician.    The  Representative will call you within 1 business day to help schedule your appointment, or you may contact the  Representative at:    All areas ~ [phone]     Type of Referral : Surgical / Specialist       Timeframe requested: Routine    Coverage of these services is subject to the terms and limitations of your health insurance plan.  Please call member services at your health plan with any benefit or coverage questions.      If X-rays, CT or MRI's have been performed, please contact the facility where they were done to arrange for , prior to your scheduled appointment.  Please bring this referral request to your appointment and present it to your specialist.            PHYSICAL THERAPY REFERRAL       Treatment: Evaluation & Treatment  Special Instructions/Modalities: Pool therapy and ultrasound for left hip and knee therapy    [facility] physical therapy [phone]    Please be aware that coverage of these services is subject to the terms and limitations of your health insurance plan.  Call member services at your health plan with any benefit or coverage questions.      **Note to Provider:  If you are referring outside of [facility] for the therapy appointment, please list the name of the location in the \"special instructions\" above, print the referral and give to the patient to schedule the appointment.         " "         Follow-up notes from your care team     Return in about 6 months (around 11/17/2018) for Physical Exam, Lab Work, medication follow up.      Who to contact     If you have questions or need follow up information about today's clinic visit or your schedule please contact [facility] directly at [phone].  Normal or non-critical lab and imaging results will be communicated to you by MyChart, letter or phone within 4 business days after the clinic has received the results. If you do not hear from us within 7 days, please contact the clinic through [employer] or phone. If you have a critical or abnormal lab result, we will notify you by phone as soon as possible.  Submit refill requests through [employer] or call your pharmacy and they will forward the refill request to us. Please allow 3 business days for your refill to be completed.          Additional Information About Your Visit        [employer] Information     [employer] gives you secure access to your electronic health record. If you see a primary care provider, you can also send messages to your care team and make appointments. If you have questions, please call your primary care clinic.  If you do not have a primary care provider, please call [phone] and they will assist you.        Care EveryWhere ID     This is your Care EveryWhere ID. This could be used by other organizations to access your [facility] medical records  [id number]        Your Vitals Were     Pulse Temperature Respirations Height Last Period Pulse Oximetry    70 97.5  F (36.4  C) (Oral) 18 5' 2.25\" (1.581 m) 05/17/1972 99%    BMI (Body Mass Index)                   30.12 kg/m2            Blood Pressure from Last 3 Encounters:   05/17/18 142/62   04/04/18 146/68   03/20/18 118/59    Weight from Last 3 Encounters:   05/17/18 166 lb (75.3 kg)   04/04/18 169 lb (76.7 kg)   03/20/18 167 lb (75.8 kg)              We Performed the Following     ORTHO  REFERRAL     " PHYSICAL THERAPY REFERRAL        Primary Care Provider Office Phone # Fax #    Liudmila Sindy Bowie [phone]       [address]  [facility] [address]        Equal Access to Services     MALATHI CANNON : Angel simi ash pageo Soyvonali, waaxda luqadaha, qaybta kaalmada adeegyada, waxariane idiin hayaan ramsey lee lacecile del valle. So [facility] [phone].    ATENCIÓN: Si habla español, tiene a beltran disposición servicios gratuitos de asistencia lingüística. Llame al [phone].    We comply with applicable federal civil rights laws and [place] laws. We do not discriminate on the basis of race, color, national origin, age, disability, sex, sexual orientation, or gender identity.            Thank you!     Thank you for choosing [facility]  for your care. Our goal is always to provide you with excellent care. Hearing back from our patients is one way we can continue to improve our services. Please take a few minutes to complete the written survey that you may receive in the mail after your visit with us. Thank you!             Your Updated Medication List - Protect others around you: Learn how to safely use, store and throw away your medicines at www.disposemymeds.org.          This list is accurate as of 5/17/18  4:39 PM.  Always use your most recent med list.                   Brand Name Dispense Instructions for use Diagnosis    acyclovir 400 MG tablet    ZOVIRAX    90 tablet    TAKE 1 TABLET BY MOUTH DAILY    Herpes simplex virus infection       aspirin 81 MG tablet      1 TABLET DAILY        CENTRUM SILVER per tablet      1 TABLET DAILY        fish oil-omega-3 fatty acids 1000 MG capsule     OTC    2 CAPSULES DAILY  (? DOSE)    Other and unspecified hyperlipidemia       folic acid 1 MG tablet    FOLVITE     4 mg TABS DAILY    Rheumatoid arthritis(714.0), Obesity, unspecified, Other abnormal glucose       hydrochlorothiazide 12.5 MG capsule    MICROZIDE    90 capsule    Take 1 capsule  (12.5 mg) by mouth daily    Hypertension, goal below 140/90       LIQUID TEARS 1.4 % ophthalmic solution   Generic drug:  polyvinyl alcohol      1 drop as needed.        meloxicam 7.5 MG tablet    MOBIC    90 tablet    TAKE 1 TABLET BY MOUTH ONCE DAILY    Rheumatoid arthritis involving multiple sites with positive rheumatoid factor (H)       methotrexate 25 MG/ML injection      None Entered        metoprolol succinate 50 MG 24 hr tablet    TOPROL-XL    90 tablet    TAKE 1 TABLET BY MOUTH ONCE DAILY    Rapid heart rate       nitroFURantoin macrocrystal 100 MG ED starter pack    MACRODANTIN     Take 1 capsule by mouth daily.        omeprazole 20 MG CR capsule    priLOSEC    90 capsule    Take 1 capsule (20 mg) by mouth daily    Gastroesophageal reflux disease with esophagitis       simvastatin 20 MG tablet    ZOCOR    90 tablet    Take 1 tablet (20 mg) by mouth At Bedtime    Hyperlipidemia LDL goal <130       Vitamin D-3 1000 units Caps      Take by mouth daily

## 2018-05-18 ENCOUNTER — MYC MEDICAL ADVICE (OUTPATIENT)
Dept: FAMILY MEDICINE | Facility: CLINIC | Age: 83
End: 2018-05-18

## 2018-05-20 ENCOUNTER — MYC MEDICAL ADVICE (OUTPATIENT)
Dept: FAMILY MEDICINE | Facility: CLINIC | Age: 83
End: 2018-05-20

## 2018-05-21 NOTE — PROGRESS NOTES
Dear Brenda    Your test results are attached.     The left hip has severe arthritis and the hip bone is deteriorating. You should see the orthopedic surgeon as soon as possible and do not continue to have physical therapy until you are evaluated and treated by the surgeon this week. I will forward these results to Dr Mena for your appointment on Wednesday     Please contact me by Verdezynet if you have any questions about your labs or management.    Liudmila Bowie MD

## 2018-05-24 ENCOUNTER — TELEPHONE (OUTPATIENT)
Dept: ORTHOPEDICS | Facility: CLINIC | Age: 83
End: 2018-05-24

## 2018-05-24 DIAGNOSIS — M25.552 HIP PAIN, LEFT: Primary | ICD-10-CM

## 2018-05-24 NOTE — TELEPHONE ENCOUNTER
Discussed with patient reason for visit Left hip pain  Patient prepared for appointment through the followin. Have you had any recent xrays in the last 6 months? Yes - Xray are in EPIC.    2. Have you had an MRI? No.     3. Have you had any surgery in past related to complaint?  No.  If yes, Patient advised that implant stickers are needed for any previous total joint surgery as well for appointment.     4. Are you being referred by another provider? Yes: Jamir  If yes-Records in Epic.    5. Have you received the intake form in mail?.No.   Pt reminded if intake has not been received before appointment to arrive an additional 20 minutes early to appointment.  Will mail    6. Is this work comp or MVA related? No.

## 2018-05-29 ENCOUNTER — RADIANT APPOINTMENT (OUTPATIENT)
Dept: GENERAL RADIOLOGY | Facility: CLINIC | Age: 83
End: 2018-05-29
Attending: ORTHOPAEDIC SURGERY
Payer: COMMERCIAL

## 2018-05-29 ENCOUNTER — OFFICE VISIT (OUTPATIENT)
Dept: ORTHOPEDICS | Facility: CLINIC | Age: 83
End: 2018-05-29
Payer: COMMERCIAL

## 2018-05-29 ENCOUNTER — TELEPHONE (OUTPATIENT)
Dept: ORTHOPEDICS | Facility: CLINIC | Age: 83
End: 2018-05-29

## 2018-05-29 VITALS
BODY MASS INDEX: 30.55 KG/M2 | HEART RATE: 71 BPM | SYSTOLIC BLOOD PRESSURE: 167 MMHG | OXYGEN SATURATION: 99 % | WEIGHT: 166.01 LBS | HEIGHT: 62 IN | DIASTOLIC BLOOD PRESSURE: 71 MMHG

## 2018-05-29 DIAGNOSIS — M25.552 HIP PAIN, LEFT: Primary | ICD-10-CM

## 2018-05-29 DIAGNOSIS — M25.552 HIP PAIN, LEFT: ICD-10-CM

## 2018-05-29 PROCEDURE — 73502 X-RAY EXAM HIP UNI 2-3 VIEWS: CPT | Performed by: RADIOLOGY

## 2018-05-29 PROCEDURE — 99204 OFFICE O/P NEW MOD 45 MIN: CPT | Performed by: ORTHOPAEDIC SURGERY

## 2018-05-29 NOTE — LETTER
5/29/2018         RE: Brenda Barker  [address]  [address]  [place] [address]        Dear Colleague,    Thank you for referring your patient, Brenda Barker, to the [facility]. Please see a copy of my visit note below.    Chief Complaint: Consult (Hip pain, left/primary osteoarthritis of both hips. Dr. Bowie)      Physician:  Liudmila Bowie    HPI: Brenda Barker is a 82 year old female who presents today for evaluation of    Symptom Profile  Location of symptoms:  Groin   Onset: insidoius  Duration of symptoms: multiple years   Quality of symptoms: aching and sharp/stabbing  Severity: severe  Alleviate:activity modification  Exacerbating: activities   Previous Treatments: Previous treatments include activity modification, oral pain medication,   MEDICAL HISTORY:   Past Medical History:   Diagnosis Date     AORTIC VALVE SCLEROSIS 1/2002    ECHO AV sclerosis, mild TR, trace MR     ASYMPTOMATIC PVCS      Back pain      Basal cell carcinoma of scalp      BORDERLINE ELEVATED HBA1C- 6.1%  4/04 6/12/2005     CARPAL TUNNEL SYNDROME, R>L 8/2001     CERV. 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 Discussed  risk factors.    Medications:     Current Outpatient Prescriptions:      acyclovir (ZOVIRAX) 400 MG tablet, TAKE 1 TABLET BY MOUTH DAILY, Disp: 90 tablet, Rfl: 0     ASPIRIN 81 MG OR TABS, 1 TABLET DAILY, Disp: , Rfl:      CENTRUM SILVER OR TABS, 1 TABLET DAILY, Disp: , Rfl:      Cholecalciferol (VITAMIN D-3) 1000 UNITS CAPS, Take by mouth daily, Disp: , Rfl:      FISH OIL 1000 MG OR CAPS, 2 CAPSULES DAILY  (? DOSE), Disp: OTC, Rfl: --     FOLIC ACID 1 MG OR TABS, 4 mg TABS DAILY, Disp: , Rfl:      hydrochlorothiazide (MICROZIDE) 12.5 MG capsule, Take 1 capsule (12.5 mg) by mouth daily, Disp: 90 capsule, Rfl: 3     meloxicam (MOBIC) 7.5 MG tablet, TAKE 1 TABLET BY MOUTH ONCE DAILY, Disp: 90 tablet, Rfl: 0     methotrexate 2.5 MG tablet CHEMO, TK  6 TS  PO  PER WEEK, Disp: , Rfl: 2     METHOTREXATE SODIUM 25 MG/ML IJ SOLN, None Entered, Disp: , Rfl:      metoprolol succinate (TOPROL-XL) 50 MG 24 hr tablet, TAKE 1 TABLET BY MOUTH ONCE DAILY, Disp: 90 tablet, Rfl: 0     nitrofurantoin 100 MG, Take 1 capsule by mouth daily., Disp: , Rfl:      omeprazole (PRILOSEC) 20 MG CR capsule, Take 1 capsule (20 mg) by mouth daily, Disp: 90 capsule, Rfl: 1     polyvinyl alcohol (LIQUID TEARS) 1.4 % ophthalmic solution, 1 drop as needed., Disp: , Rfl:      simvastatin (ZOCOR) 20 MG tablet, Take 1 tablet (20 mg) by mouth At Bedtime, Disp: 90 tablet, Rfl: 3    Allergies: Diatrizoate; Ciprofloxacin; Contrast dye; and Sulfasalazine    SURGICAL HISTORY:   Past Surgical History:   Procedure Laterality Date     BACK SURGERY  2011    Fusion L3-4 & decompression; [facility]     BIOPSY  2013    Thyroid - normal result     C DEXA, BONE DENSITY, AXIAL SKEL  7/03 7/03 L1-4 1.7, FemNkL-0.8,R-1.2, FArm Px 0.2,Dist-0.3     C REMOVAL OF OVARY(S)  1980    2nd ovary removed - endometriosis     C TOTAL ABDOM HYSTERECTOMY  1972    uterus, ovary removed - fibroid     EYE SURGERY  2010 & 11    Cataract surgery     HC COLONOSCOPY THRU STOMA W  "BIOPSY/CAUTERY TUMOR/POLYP/LESION  1993    adenomatous polyp     HC COLONOSCOPY THRU STOMA, DIAGNOSTIC  1998, 10/03    normal, '03 rec repeat in 5 yrs due to +hx polyp and + fam hx     HC FLEX SIGMOIDOSCOPY W/WO KARYNA SPEC BY BRUSH/WASH  12/2001     HC REMOVAL OF TONSILS,<13 Y/O  1942     ORTHOPEDIC SURGERY  2008    Left knee replacement     SURGICAL HISTORY OF -   1989    basal cell Ca scalp excised     SURGICAL HISTORY OF -   1998    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       FAMILY HISTORY:   Family History   Problem Relation Age of Onset     GASTROINTESTINAL DISEASE Father      bleeding ulcer     CANCER Mother      thyroid cancer     Other Cancer Mother      thyroid & throat     OSTEOPOROSIS Mother      Thyroid Disease Mother      DIABETES Paternal Grandmother      CEREBROVASCULAR DISEASE Paternal Grandmother      Colon Cancer Maternal Half-Brother      OSTEOPOROSIS Maternal Grandmother        SOCIAL HISTORY:   Social History   Substance Use Topics     Smoking status: Never Smoker     Smokeless tobacco: Never Used     Alcohol use Yes      Comment: occasionally       REVIEW OF SYSTEMS:  The comprehensive review of systems from the intake form was reviewed with the patient.  No fever, weight change or fatigue. No dry eyes. No oral ulcers, sore throat or voice change. No palpitations, syncope, angina or edema.  No chest pain, excessive sleepiness, shortness of breath or hemoptysis.   No abdominal pain, nausea, vomiting, diarrhea or heartburn.  No skin rash. No focal weakness or numbness. No bleeding or lymphadenopathy. No rhinitis or hives.     Exam:  On physical examination the patient appears the stated age, is in no acute distress, affectThe is appropriate, and breathing is non-labored.  Vitals are documented in the EMR and have been reviewed:    /71 (BP Location: Left arm)  Pulse 71  Ht 1.581 m (5' 2.24\")  Wt 75.3 kg (166 lb 0.1 oz)  LMP 05/17/1972  SpO2 99%  BMI 30.13 " "kg/m2  5' 2.244\"  Body mass index is 30.13 kg/(m^2).    Rises from chair: with effort   Gait: antalgic favoring the left   Gains the exam table: with effort     RIGHT hip subjective: a little irritated   Abd:  Add:  PFC:  Flexion: 09  IRF:0  ERF:25  Impingement test: + groin     LEFT hip subjective: painful ROM  Abd:  Add:  PFC:  Flexion: 80 with guarding   IRF: -10  ERF:25  Impingement test: ++ groin and this reproduces her symptoms    Distally, the circulatory, motor, and sensation exam is intact with 5/5 EHL, gastroc-soleus, and tibialis anterior.  Sensation to light touch is intact.  Dorsalis pedis and posterior tibialis pulses are palpable.  There are no sores on the feet, no bruising, and no lymphedema.    X-rays:   Final Report   Exam: Single frontal view of each hip and a lateral view of the left  hip dated 5/29/2018.    COMPARISON: 5/17/2018.    CLINICAL HISTORY: Left hip pain.    FINDINGS: Single frontal view of both hips and a lateral view of the  left hip was obtained. Osteoarthrosis in the bilateral hip joints,  severe in the left hip joint with complete joint space narrowing. Mild  collapse with subchondral lucency.    IMPRESSION: Osteoarthrosis in the bilateral hips, severe in the left  hip.    MEREDITH LOPEZ MD  Principal   Name: MEREDITH LOPEZ      Assessment: This is a 82 year old with advanced hip osteoarthritis. We discussed the treatment options [id number] living with it and total hip. We spent twenty minutes discussing total hip arthroplasty.  We discussed the implants, the procedure, the risks and benefits, and the post-operative course.  We discussed blood clots, blood clots to the lungs, injury to blood vessels and nerves, dislocation, infection, and leg length difference.  All the patients questions were answered to the best of my ability.    Plan:  Left total hip      Chief Complaint: Consult (Hip pain, left/primary osteoarthritis of both hips. Dr. Bowie)      Physician:  " Liudmila Bowie    HPI: Brenda Barker is a 82 year old female who presents today for evaluation of    Symptom Profile  Location of symptoms:    Onset:  Duration of symptoms:  Quality of symptoms:  Severity:  Alleviate:  Exacerbating:   Previous Treatments: Previous treatments include activity modification, oral pain medication,     Current Status:  Results of the patient s Hip Disability and Osteoarthritis Outcome Score (HOOS)  are as follows (0-100 scales with 100 being the theoretical best):  Pain:  Symptoms:  ADLs:  Sports/Recreation:  Quality of Life:  (http://koos.nu/)  [employer] Activity Score:    MEDICAL HISTORY:   Past Medical History:   Diagnosis Date     AORTIC VALVE SCLEROSIS 1/2002    ECHO AV sclerosis, mild TR, trace MR     ASYMPTOMATIC PVCS      Back pain      Basal cell carcinoma of scalp      BORDERLINE ELEVATED HBA1C- 6.1%  4/04 6/12/2005     CARPAL TUNNEL SYNDROME, R>L 8/2001     CERV. 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 Discussed risk factors.    Medications:     Current Outpatient Prescriptions:      acyclovir (ZOVIRAX) 400 MG tablet, TAKE 1 TABLET BY MOUTH DAILY, Disp: 90 tablet, Rfl: 0     ASPIRIN 81 MG OR TABS,  1 TABLET DAILY, Disp: , Rfl:      CENTRUM SILVER OR TABS, 1 TABLET DAILY, Disp: , Rfl:      Cholecalciferol (VITAMIN D-3) 1000 UNITS CAPS, Take by mouth daily, Disp: , Rfl:      FISH OIL 1000 MG OR CAPS, 2 CAPSULES DAILY  (? DOSE), Disp: OTC, Rfl: --     FOLIC ACID 1 MG OR TABS, 4 mg TABS DAILY, Disp: , Rfl:      hydrochlorothiazide (MICROZIDE) 12.5 MG capsule, Take 1 capsule (12.5 mg) by mouth daily, Disp: 90 capsule, Rfl: 3     meloxicam (MOBIC) 7.5 MG tablet, TAKE 1 TABLET BY MOUTH ONCE DAILY, Disp: 90 tablet, Rfl: 0     methotrexate 2.5 MG tablet CHEMO, TK  6 TS  PO  PER WEEK, Disp: , Rfl: 2     METHOTREXATE SODIUM 25 MG/ML IJ SOLN, None Entered, Disp: , Rfl:      metoprolol succinate (TOPROL-XL) 50 MG 24 hr tablet, TAKE 1 TABLET BY MOUTH ONCE DAILY, Disp: 90 tablet, Rfl: 0     nitrofurantoin 100 MG, Take 1 capsule by mouth daily., Disp: , Rfl:      omeprazole (PRILOSEC) 20 MG CR capsule, Take 1 capsule (20 mg) by mouth daily, Disp: 90 capsule, Rfl: 1     polyvinyl alcohol (LIQUID TEARS) 1.4 % ophthalmic solution, 1 drop as needed., Disp: , Rfl:      simvastatin (ZOCOR) 20 MG tablet, Take 1 tablet (20 mg) by mouth At Bedtime, Disp: 90 tablet, Rfl: 3    Allergies: Diatrizoate; Ciprofloxacin; Contrast dye; and Sulfasalazine    SURGICAL HISTORY:   Past Surgical History:   Procedure Laterality Date     BACK SURGERY  2011    Fusion L3-4 & decompression; [facility]     BIOPSY  2013    Thyroid - normal result     C DEXA, BONE DENSITY, AXIAL SKEL  7/03 7/03 L1-4 1.7, FemNkL-0.8,R-1.2, FArm Px 0.2,Dist-0.3     C REMOVAL OF OVARY(S)  1980    2nd ovary removed - endometriosis     C TOTAL ABDOM HYSTERECTOMY  1972    uterus, ovary removed - fibroid     EYE SURGERY  2010 & 11    Cataract surgery     HC COLONOSCOPY THRU STOMA W BIOPSY/CAUTERY TUMOR/POLYP/LESION  1993    adenomatous polyp     HC COLONOSCOPY THRU STOMA, DIAGNOSTIC  1998, 10/03    normal, '03 rec repeat in 5 yrs due to +hx polyp and + fam hx     HC FLEX  "SIGMOIDOSCOPY W/WO KARYNA SPEC BY BRUSH/WASH  12/2001     HC REMOVAL OF TONSILS,<11 Y/O  1942     ORTHOPEDIC SURGERY  2008    Left knee replacement     SURGICAL HISTORY OF -   1989    basal cell Ca scalp excised     SURGICAL HISTORY OF -   1998    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       FAMILY HISTORY:   Family History   Problem Relation Age of Onset     GASTROINTESTINAL DISEASE Father      bleeding ulcer     CANCER Mother      thyroid cancer     Other Cancer Mother      thyroid & throat     OSTEOPOROSIS Mother      Thyroid Disease Mother      DIABETES Paternal Grandmother      CEREBROVASCULAR DISEASE Paternal Grandmother      Colon Cancer Maternal Half-Brother      OSTEOPOROSIS Maternal Grandmother        SOCIAL HISTORY:   Social History   Substance Use Topics     Smoking status: Never Smoker     Smokeless tobacco: Never Used     Alcohol use Yes      Comment: occasionally       REVIEW OF SYSTEMS:  The comprehensive review of systems from the intake form was reviewed with the patient.  No fever, weight change or fatigue. No dry eyes. No oral ulcers, sore throat or voice change. No palpitations, syncope, angina or edema.  No chest pain, excessive sleepiness, shortness of breath or hemoptysis.   No abdominal pain, nausea, vomiting, diarrhea or heartburn.  No skin rash. No focal weakness or numbness. No bleeding or lymphadenopathy. No rhinitis or hives.     Exam:  On physical examination the patient appears the stated age, is in no acute distress, affectThe is appropriate, and breathing is non-labored.  Vitals are documented in the EMR and have been reviewed:    /71 (BP Location: Left arm)  Pulse 71  Ht 1.581 m (5' 2.24\")  Wt 75.3 kg (166 lb 0.1 oz)  LMP 05/17/1972  SpO2 99%  BMI 30.13 kg/m2  5' 2.244\"  Body mass index is 30.13 kg/(m^2).    Rises from chair:  Gait:  Trendelenburg test:  Gains the exam table:    RIGHT hip " subjective:  Abd:  Add:  PFC:  Flexion:  IRF:  ERF:  Impingement test:  Resisted straight leg raise:  DANIEL:    LEFT hip subjective:  Abd:  Add:  PFC:  Flexion:  IRF:  ERF:  Impingement test:  Resisted straight leg raise:  DANIEL:    Distally, the circulatory, motor, and sensation exam is intact with 5/5 EHL, gastroc-soleus, and tibialis anterior.  Sensation to light touch is intact.  Dorsalis pedis and posterior tibialis pulses are palpable.  There are no sores on the feet, no bruising, and no lymphedema.    X-rays:       Assessment: This is a 82 year old with     Plan:        Again, thank you for allowing me to participate in the care of your patient.        Sincerely,        Keo Priest MD

## 2018-05-29 NOTE — PATIENT INSTRUCTIONS
[facility]  [address] [place] [id number]  Phone [phone]  Fax [phone]    SURGICAL INFORMATION & INSTRUCTIONS  Dr. Keo Priest  Name of Surgery: Left total hip arthroplasty    Date of Surgery:     If you need to reschedule/schedule your surgery, please contact Brittany, our surgery scheduler at [place], at [phone].    Arrival Time:     Time of Surgery:      Please arrive early so that we can prepare you for surgery. If you arrive later than your scheduled arrival time, your surgery may be cancelled.  Please note that scheduled times may change, but you will always be notified if there is a change.     Location of Surgery:     ? [facility], [facility]  [address], 3rd floor for check-in  Phone [phone]  Fax [phone]  Bring parking ticket with you for validation and reduced parking rate  [email or web address]    Prior to surgery    ? Call your insurance company  Ask if you need pre-approval for your surgery.  If pre-approval is needed, please call our surgery scheduler for assistance with the pre-approval process.   If you do not have insurance, please let us know.     ? [place] [facility] Surgery (if on [facility])  10 days before surgery, call [phone] to register with the surgery center. Have your insurance card ready.     Total Joint Replacement:  - Joint Replacement Class:  If you are scheduled to have a joint replacement, you (and any family/friends that will be helping to care for you) are expected to attend an educational class at least two weeks prior to your surgery.  To register for the class please call the Patient Learning Center at [phone] or register online at [email or web address]. Classes are held at multiple locations as well as online.  You must know the date of your surgery before you can register for a class  (approximate date OK). If registering online, please select hip or knee as surgery type as shoulder has not been made an option at this time.     o This is also a good opportunity to think about where you would like to have physical therapy after your surgery. You may also be able to set up appointments in advance so that everything is ready to go after surgery.    - Antibiotics Prophylaxis:  Certain procedures such as dental cleaning/work, colonoscopies and other surgeries can cause bacteria to enter the bloodstream.  These bacteria can attach to joint replacement devices.  To reduce this risk, you will need to take antibiotics before you have invasive procedures or dental work.  This is known as antibiotic prophylaxis.    - Dental Visit:  You may want to schedule an appointment with your dentist for a cleaning or needed work before your surgery.  We advise no dental work or cleaning until 6 months after your surgery with implants to hip(s), knee(s), or shoulder(s).  Once you are 6 months past your surgery, you will need to take prophylactic (preventative) antibiotics for the rest of your life before having any dental cleaning or work.  If dental work is needed before surgery, make sure everything is completely healed prior to surgery.  It may cause delays or cancellation of surgery if not healed completely. This is also for any other invasive procedures you may have such as a colonoscopy.  Please notify the clinic if you have any questions.    ? Schedule an appointment with a Primary Care Provider for a Pre-Op Physical.  This should be done within 30 days of surgery  If you do not have a primary care provider, you may call [facility] at [phone], for an appointment.  Please have your office note and any labs or tests faxed to the facility where you are having surgery. Please be sure to request a copy of your pre-op physical and bring it with you on the day of surgery.      Tell your provider if you  have any of the following:   - IF you have a pacemaker or an ICD (implanted cardiac defibrillator). If you do, please bring the ID card with you on the day of surgery  - IF you're a smoker. People who smoke have a higher risk of infection after surgery. Ask your provider how you can quit smoking.  - If you have diabetes, work with your provider to control your blood sugar. If its not well-controlled, we may need to delay surgery (or you may have problems healing afterward).  - If your surgeon asks you to see your dentist: You'll need to complete any dental work before surgery. Your dentist must send a letter to your surgery center saying it's ok to do the surgery.    ? Blood Bank Pre-Admission order (total joint replacement only):    You will need to have a Blood Type and Screen drawn at a [facility] location before your surgery.  Please check your form included in your packet to determine if it needs to be done 1-30 days before surgery or 1-3 days before surgery.    ? Pre-Op Phone Call  You will receive a pre-op phone call 1-3 days before surgery to review your eating and drinking restrictions, review medications, and confirm surgery times.      ? 7-10 days BEFORE surgery  ? Stop taking aspirin, Plavix or aspirin products 10 days before surgery or as directed by your doctor.  ? Stop taking non-steroidal anti-inflammatory medications (naproxen/Aleve, ibuprofen/Advil/Motrin, celecoxib/Celebrex, meloxicam/Mobic) 1 week before surgery or as directed by your surgeon.  This will reduce the risk of bleeding during surgery.  ? Stop taking fish oil (Omega-3-fatty acid) 1 week before surgery.  ? It is OK to take acetaminophen (Tylenol) up until 2 hours prior to surgery.  ? Take prescription medications as directed by your doctor.  Discuss which medications to take or hold prior to your surgery, with your primary care doctor.   ? If you have diabetes, ask your primary care doctor or endocrinologist how you should  take your medication(s).    ? 24 hours BEFORE surgery  Stop drinking alcohol (beer, wine, liquor) at least 24-hours before and after your surgery.     ? Evening BEFORE surgery  - You may eat a normal meal the night before surgery, but eat nothing after midnight.     - Take a shower - to help wash away bacteria (germs) from your skin.  It s normal to have bacteria on your skin and skin protects us from these germs.  When you have surgery, we cut the skin.  Sometimes germs get into the cuts and cause infection (illness caused by germs).  By following the showering instructions and using the special soap, you will lower the number of germs on your skin.  This decreases your chance of an infection.    - Buy or get 8 ounces of antiseptic surgical soap called 4% CHG.  Common brands of this soap are Hibiclens and Exidine.    - You can find it this soap at your local pharmacy, clinic or retail store.  If you have trouble finding it, ask your pharmacist to help you find the right substitute.    - Do not shave within 12 inches of your incision (surgical cut) area for at least 3 days before surgery.  Shaving can make small cuts in the skin. This puts you at a higher risk of infection.    Items you will need for each shower:   - Newly washed towel  - 4 ounces of one of the recommended soaps    Follow these instructions, the evening before surgery  - Wash your hair and body with your regular shampoo and soap. Make sure you rinse the shampoo and soap from your hair and body.  - Using clean hands, apply about 2 ounces of soap gently on your skin from the neck to your toes. Use on your groin area last. Do not use this soap on your face or head. If you get any soap in your eyes, ears or mouth, rinse right away.  - Once the soap has been on your skin for at least 1 minute, rinse off completely and repeat washing with the surgical soap one more time.  - Rinse well and dry off using a clean towel.  If you feel any tingling, itching or  other irritation, rinse right away. It is normal to feel some coolness on the skin after using the antiseptic soap. Your skin may feel a bit dry after the shower, but do not use any lotions, creams or moisturizers. Do not use hair spray or other products in your hair.  - Dress in freshly washed clothes or pajamas. Use fresh pillowcases and sheets on your bed.    ? Day of Surgery  - You may drink clear liquids up to 2 hours before surgery or as directed by your surgeon.  Clear liquids include: Water, Pedialyte, Gatorade, apple juice and liquids you can read through. Please avoid liquids that are red or orange in color.   - Do NOT drink: milk, coffee, liquids that have pulp, orange juice, and lemonade or tomato juice.   - Do NOT chew gum, chew tobacco or suck on hard candy.    - Take another shower          Follow these instructions:      - Wash your hair and body with your regular shampoo and soap. Make sure you rinse the shampoo and soap from your hair and body.  - Using clean hands, apply about 2 ounces of soap gently on your skin from the neck to your toes. Use on your groin area last. Do not use this soap on your face or head. If you get any soap in your eyes, ears or mouth, rinse right away.  - Once the soap has been on your skin for at least 1 minute, rinse off completely and repeat washing with the surgical soap one more time.  - Rinse well and dry off using a clean towel.  If you feel any tingling, itching or other irritation, rinse right away. It is normal to feel some coolness on the skin after using the antiseptic soap. Your skin may feel a bit dry after the shower, but do not use any lotions, creams or moisturizers. Do not use hair spray or other products in your hair.  - Dress in freshly washed clothes.  - Do not wear deodorant, cologne, lotion, makeup, nail polish or jewelry to surgery.    ? If there is any change in your health PRIOR to your surgery, please contact your surgeon's office.  Such as a  fever, body aches, fatigue, any flu-like symptoms, rash, or any new injury to related body part.    ? Arrange for someone to drive you home after discharge.    will need to be a responsible adult (18 years or older) that will provide transportation to and from surgery and stay in the waiting room during your surgery. You may not drive yourself or take public transportation to and from surgery.    ? Arrange for someone to stay with you for 24 hours after you go home.   This person must be a responsible adult (18 years or older).    ? Bring these items to the hospital/surgery center:   ? Insurance card(s)  ? Money for parking and co-pays, if needed  ? A list of all the medications you take, including vitamins, minerals, herbs and over the counter medications.    ? A copy of your Advance Health Care Directive, if you have one.  This tells us what treatment(s) you would or would not want, and who would make health care decisions, if you could no longer speak for yourself.    ? A case for glasses, contact lenses, hearing aids or dentures.   ? Your inhaler or CPAP machine, if you use these at home    ? Leave extra cash, jewelry and other valuables at home.       ? Other information:   Sleep Apnea: Let your nurse know if you have a history of sleep apnea, only if you are having surgery at the [facility].    When you arrive for surgery  When you get to the surgery center/hospital, you will:  - Check in. If you are under age 18, you must be with a parent or legal guardian.  - Sign consent forms, if you haven t already. These forms state that you know the risks and benefits of surgery. When you sign the forms, you give us permission to do the surgery. Do not sign them unless you understand what will happen during and after your surgery. If you have any questions about your surgery, ask to speak with your doctor before you sign the forms. If you don t understand the answers, ask again.  - Receive a copy  of the Patient s Bill of Rights. If you do not receive a copy, please ask for one.  - Change into hospital clothes. Your belongings will be placed in a bag. We will return them to you after surgery.  - Meet with the anesthesia provider. He or she will tell you what kind of anesthesia (medicine) will be used to keep you comfortable during surgery.  - Remember: it s okay to remind doctors and nurses to wash their hands before touching you.  - In most cases, your surgeon will use a marker to write his or her initials on the surgery site. This ensures that the exact site is operated on.  - For safety reasons, we will ask you the same questions many times. For example, we may ask your name and birth date over and over again.  - Friends and family can stay with you until it s time for surgery. While you re in surgery, they will be in the waiting area. Please note that cell phones are not allowed in some patient care areas.  - If you have questions about what will happen in the operating room, talk to your care team.  - You will meet with an anesthesiologist, before your surgery.  He or she may reference types of anesthesia commonly used for surgeries:   o General:  This involves the use of an IV for injection of medication and anesthesia. You are put into a sleep and have a breathing tube to assist you with breathing.  o Sedation:  You are asleep, but not so deply that you need a breathing tube.   o Local or Regional: a nerve is injected to numb the surgical area.  o Spinal: you are numbed from the waist down with medicine injected into your back.  o Femoral Nerve Block:  Anesthesia injected into the groin of leg which you are having surgery on.      After surgery  We will move you to a recovery room, where we will watch you closely. If you have any pain or discomfort, tell your nurse. He or she will try to make you comfortable.    - We will move you to your hospital room after you are awake and stable. If you having any  pain or discomfort, please let your nursing staff know.  - Please wash your hands every time you touch the wound or change bandages or dressings.  - Do not submerge the wound in water.  You may not use a bathtub, hot tub, pool, or lake until 3 months after surgery. Any open area can be a source of incoming bacteria, which can lead to infection. Keep all dressings clean and dry.   - Elevate your leg(s) as much as possible to help reduce swelling. You will also receive compression stockings for the surgical leg. Please wear these during the day and fully remove them at night. Continue to wear these for approximately 4 weeks after surgery or until your swelling has resolved.  - If you had a total knee replacement, you may be discharged with a Continuous Passive Motion (CPM) machine. You will use this for 2-6 weeks at home.   - You may put ice on the surgical area for comfort, keeping ice on area for up to 20 minutes then off for 40 minutes.  You may do this the first few days after surgery to help reduce pain and swelling.    - Drink at least 8-10 glasses of liquid each day to help your body heal.  - Keep your lungs clear by coughing and taking deep breaths every couple hours.  This is especially important the first 48 hours after surgery.  - Typically, you will be able to start driving once you are fully off narcotics and able to do a the quick stop test (slamming on the brake) with your right leg without experiencing much pain.  - You will start physical therapy while in the hospital. Once you leave the hospital, you will need to continue going to physical therapy to maintain and improve your range of motion and strength. Please call the physical therapy clinic of your choice to set up an appointment within 1 week of being discharged from the hospital.    - Notify your doctor if you have any of the following:   o Fever of 101 F or higher  o Numbness and/or tingling  o Increased pain, redness or swelling  o Drainage  from wound  o Prolonged or uncontrolled bleeding  o Difficulty with movement    Range of Motion Restrictions  These are strict for 3 months post surgery, but should be followed for life. As your muscles gain strength, you will notice that you will have more range of motion, but your risk for dislocating remains. See patient education packet for details  - NO bending past 90 degrees at the waist (operative side)   - NO crossing your operative leg over or under your non-operative leg  - NO turning your operative leg inward     Follow-up Appointments, in Clinic  If you don't already have an appointment scheduled, please call to set up an appointment at [phone].      ? Nurse Only Appointment (2 weeks post op)  ? Post-Op appointments with provider (6 weeks post op)    Dealing with pain  A nurse will check your comfort level often during your stay. He or she will work with you to manage your pain.  It s expected that you will have pain after surgery.  Our goal is to reduce or minimize pain by:   - Remember pain is real. There are many ways to control pain. We can help you decide what works best for you.  - Ask for pain medicine when you need it.  Don t try to  tough it out --this can make you feel worse. Always take your medicine as ordered.  - Medicine doesn t work the same for everyone. If your medicine isn t working, tell your nurse. There may be other medicines or treatments we can try.  - Medication Refills.  If you need refills on your pain medication, please call the clinic as soon as possible.  It may take 72-business hours to obtain a refill.  Refills must be picked up at check-in 2, [facility] or mailed to a pharmacy of your choice.    - It is expected that you will wean off the pain medications in a timely manner.   - Constipation is a common side effect of pain medication, decreased activity and anesthesia from surgery.  Take a stool softener as prescribed by your doctor at the time  of discharge.  You may also use over the counter medications as needed.  Be sure to increase your fiber (fruits and vegetables) and your water intake.      Please call the clinic at [phone], if you experience any problems or have questions.  If you are having an emergency, always call 350 or seek immediate evaluation at the Emergency Room.    Thank you for selecting the [facility] for your care!  ---------------------------------------------              Thanks for coming today.  [facility]  [address] [place] [address]    To schedule future appointments in Ortho Clinic, you may call [phone].    To schedule ordered imaging by your provider:   Call Central Imaging [phone]    To schedule an injection ordered by your provider:  Call Central Imaging Injection scheduling line: [phone]  [employer] available online at:  [email or web address]    Please call if any further questions or concerns ([phone]).  Clinic hours 8 am to 5 pm.    Return to clinic (call) if symptoms worsen or fail to improve.

## 2018-05-29 NOTE — PROGRESS NOTES
Chief Complaint: Consult (Hip pain, left/primary osteoarthritis of both hips. Dr. Bowie)      Physician:  Liudmila Bowie    HPI: Brenda Barker is a 82 year old female who presents today for evaluation of    Symptom Profile  Location of symptoms:  Groin   Onset: insidoius  Duration of symptoms: multiple years   Quality of symptoms: aching and sharp/stabbing  Severity: severe  Alleviate:activity modification  Exacerbating: activities   Previous Treatments: Previous treatments include activity modification, oral pain medication,   MEDICAL HISTORY:   Past Medical History:   Diagnosis Date     AORTIC VALVE SCLEROSIS 1/2002    ECHO AV sclerosis, mild TR, trace MR     ASYMPTOMATIC PVCS      Back pain      Basal cell carcinoma of scalp      BORDERLINE ELEVATED HBA1C- 6.1%  4/04 6/12/2005     CARPAL TUNNEL SYNDROME, R>L 8/2001     CERV. 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 Discussed risk factors.    Medications:     Current Outpatient Prescriptions:      acyclovir (ZOVIRAX) 400 MG tablet, TAKE 1 TABLET BY MOUTH DAILY, Disp: 90 tablet, Rfl: 0     ASPIRIN 81 MG OR TABS, 1 TABLET DAILY, Disp: , Rfl:      CENTRUM SILVER OR TABS, 1 TABLET  DAILY, Disp: , Rfl:      Cholecalciferol (VITAMIN D-3) 1000 UNITS CAPS, Take by mouth daily, Disp: , Rfl:      FISH OIL 1000 MG OR CAPS, 2 CAPSULES DAILY  (? DOSE), Disp: OTC, Rfl: --     FOLIC ACID 1 MG OR TABS, 4 mg TABS DAILY, Disp: , Rfl:      hydrochlorothiazide (MICROZIDE) 12.5 MG capsule, Take 1 capsule (12.5 mg) by mouth daily, Disp: 90 capsule, Rfl: 3     meloxicam (MOBIC) 7.5 MG tablet, TAKE 1 TABLET BY MOUTH ONCE DAILY, Disp: 90 tablet, Rfl: 0     methotrexate 2.5 MG tablet CHEMO, TK  6 TS  PO  PER WEEK, Disp: , Rfl: 2     METHOTREXATE SODIUM 25 MG/ML IJ SOLN, None Entered, Disp: , Rfl:      metoprolol succinate (TOPROL-XL) 50 MG 24 hr tablet, TAKE 1 TABLET BY MOUTH ONCE DAILY, Disp: 90 tablet, Rfl: 0     nitrofurantoin 100 MG, Take 1 capsule by mouth daily., Disp: , Rfl:      omeprazole (PRILOSEC) 20 MG CR capsule, Take 1 capsule (20 mg) by mouth daily, Disp: 90 capsule, Rfl: 1     polyvinyl alcohol (LIQUID TEARS) 1.4 % ophthalmic solution, 1 drop as needed., Disp: , Rfl:      simvastatin (ZOCOR) 20 MG tablet, Take 1 tablet (20 mg) by mouth At Bedtime, Disp: 90 tablet, Rfl: 3    Allergies: Diatrizoate; Ciprofloxacin; Contrast dye; and Sulfasalazine    SURGICAL HISTORY:   Past Surgical History:   Procedure Laterality Date     BACK SURGERY  2011    Fusion L3-4 & decompression; [facility]     BIOPSY  2013    Thyroid - normal result     C DEXA, BONE DENSITY, AXIAL SKEL  7/03 7/03 L1-4 1.7, FemNkL-0.8,R-1.2, FArm Px 0.2,Dist-0.3     C REMOVAL OF OVARY(S)  1980 2nd ovary removed - endometriosis     C TOTAL ABDOM HYSTERECTOMY  1972    uterus, ovary removed - fibroid     EYE SURGERY  2010 & 11    Cataract surgery     HC COLONOSCOPY THRU STOMA W BIOPSY/CAUTERY TUMOR/POLYP/LESION  1993    adenomatous polyp     HC COLONOSCOPY THRU STOMA, DIAGNOSTIC  1998, 10/03    normal, '03 rec repeat in 5 yrs due to +hx polyp and + fam hx     HC FLEX SIGMOIDOSCOPY W/WO KARYNA SPEC BY BRUSH/WASH  12/2001     HC REMOVAL OF  "TONSILS,<13 Y/O  1942     ORTHOPEDIC SURGERY  2008    Left knee replacement     SURGICAL HISTORY OF -   1989    basal cell Ca scalp excised     SURGICAL HISTORY OF -   1998    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       FAMILY HISTORY:   Family History   Problem Relation Age of Onset     GASTROINTESTINAL DISEASE Father      bleeding ulcer     CANCER Mother      thyroid cancer     Other Cancer Mother      thyroid & throat     OSTEOPOROSIS Mother      Thyroid Disease Mother      DIABETES Paternal Grandmother      CEREBROVASCULAR DISEASE Paternal Grandmother      Colon Cancer Maternal Half-Brother      OSTEOPOROSIS Maternal Grandmother        SOCIAL HISTORY:   Social History   Substance Use Topics     Smoking status: Never Smoker     Smokeless tobacco: Never Used     Alcohol use Yes      Comment: occasionally       REVIEW OF SYSTEMS:  The comprehensive review of systems from the intake form was reviewed with the patient.  No fever, weight change or fatigue. No dry eyes. No oral ulcers, sore throat or voice change. No palpitations, syncope, angina or edema.  No chest pain, excessive sleepiness, shortness of breath or hemoptysis.   No abdominal pain, nausea, vomiting, diarrhea or heartburn.  No skin rash. No focal weakness or numbness. No bleeding or lymphadenopathy. No rhinitis or hives.     Exam:  On physical examination the patient appears the stated age, is in no acute distress, affectThe is appropriate, and breathing is non-labored.  Vitals are documented in the EMR and have been reviewed:    /71 (BP Location: Left arm)  Pulse 71  Ht 1.581 m (5' 2.24\")  Wt 75.3 kg (166 lb 0.1 oz)  LMP 05/17/1972  SpO2 99%  BMI 30.13 kg/m2  5' 2.244\"  Body mass index is 30.13 kg/(m^2).    Rises from chair: with effort   Gait: antalgic favoring the left   Gains the exam table: with effort     RIGHT hip subjective: a little irritated   Abd:  Add:  PFC:  Flexion: 09  IRF:0  ERF:25  Impingement test: + " groin     LEFT hip subjective: painful ROM  Abd:  Add:  PFC:  Flexion: 80 with guarding   IRF: -10  ERF:25  Impingement test: ++ groin and this reproduces her symptoms    Distally, the circulatory, motor, and sensation exam is intact with 5/5 EHL, gastroc-soleus, and tibialis anterior.  Sensation to light touch is intact.  Dorsalis pedis and posterior tibialis pulses are palpable.  There are no sores on the feet, no bruising, and no lymphedema.    X-rays:   Final Report   Exam: Single frontal view of each hip and a lateral view of the left  hip dated 5/29/2018.    COMPARISON: 5/17/2018.    CLINICAL HISTORY: Left hip pain.    FINDINGS: Single frontal view of both hips and a lateral view of the  left hip was obtained. Osteoarthrosis in the bilateral hip joints,  severe in the left hip joint with complete joint space narrowing. Mild  collapse with subchondral lucency.    IMPRESSION: Osteoarthrosis in the bilateral hips, severe in the left  hip.    MEREDITH LOPEZ MD  Principal   Name: MEREDITH LOPEZ      Assessment: This is a 82 year old with advanced hip osteoarthritis. We discussed the treatment options [id number] living with it and total hip. We spent twenty minutes discussing total hip arthroplasty.  We discussed the implants, the procedure, the risks and benefits, and the post-operative course.  We discussed blood clots, blood clots to the lungs, injury to blood vessels and nerves, dislocation, infection, and leg length difference.  All the patients questions were answered to the best of my ability.    Plan:  Left total hip

## 2018-05-29 NOTE — TELEPHONE ENCOUNTER
Procedure: Left total hip arthroplasty  Facility: [facility]  Length: 2 hour(s)  Surgeon: Cem ALCARAZ  Anesthesia: Choice  BMI: There is no height or weight on file to calculate BMI. (If over 43 for general or 45 for MAC cannot be [place] at [facility]   Pre-op Appointments needed: H&P within 30 days of surgery.  Post-op appointments needed: 2 weeks nurse only, 6 weeks provider only   needed:  No  Surgery packet/instructions given to patient?  Yes     Mehnaz Denton RN

## 2018-05-29 NOTE — TELEPHONE ENCOUNTER
Date Scheduled: 6-27-18  Facility: [facility]  Surgeon: Dr. Priest   Post-op appointment scheduled:   Next 5 appointments (look out 90 days)     Jul 10, 2018 11:00 AM CDT   Nurse Only with MG NURSE ONLY ORTHO   [facility]    [id number] [place] [address] [id number] [phone]            Aug 07, 2018 11:30 AM CDT   (Arrive by 11:00 AM)   Return Visit with Keo Priest MD   [facility]    [address] [place] [address] [phone] [phone]                   scheduled?: No  Surgery packet/instructions confirmed received?  Yes  Special Considerations:   Brittany Way, Surgery Scheduling Coordinator

## 2018-05-29 NOTE — PROGRESS NOTES
Chief Complaint: Consult (Hip pain, left/primary osteoarthritis of both hips. Dr. Bowie)      Physician:  Liudmila Bowie    HPI: Brenda Barker is a 82 year old female who presents today for evaluation of    Symptom Profile  Location of symptoms:    Onset:  Duration of symptoms:  Quality of symptoms:  Severity:  Alleviate:  Exacerbating:   Previous Treatments: Previous treatments include activity modification, oral pain medication,     Current Status:  Results of the patient s Hip Disability and Osteoarthritis Outcome Score (HOOS)  are as follows (0-100 scales with 100 being the theoretical best):  Pain:  Symptoms:  ADLs:  Sports/Recreation:  Quality of Life:  (http://koos.nu/)  [facility] Activity Score:    MEDICAL HISTORY:   Past Medical History:   Diagnosis Date     AORTIC VALVE SCLEROSIS 1/2002    ECHO AV sclerosis, mild TR, trace MR     ASYMPTOMATIC PVCS      Back pain      Basal cell carcinoma of scalp      BORDERLINE ELEVATED HBA1C- 6.1%  4/04 6/12/2005     CARPAL TUNNEL SYNDROME, R>L 8/2001     CERV. 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 Discussed risk factors.    Medications:     Current Outpatient Prescriptions:      acyclovir  (ZOVIRAX) 400 MG tablet, TAKE 1 TABLET BY MOUTH DAILY, Disp: 90 tablet, Rfl: 0     ASPIRIN 81 MG OR TABS, 1 TABLET DAILY, Disp: , Rfl:      CENTRUM SILVER OR TABS, 1 TABLET DAILY, Disp: , Rfl:      Cholecalciferol (VITAMIN D-3) 1000 UNITS CAPS, Take by mouth daily, Disp: , Rfl:      FISH OIL 1000 MG OR CAPS, 2 CAPSULES DAILY  (? DOSE), Disp: OTC, Rfl: --     FOLIC ACID 1 MG OR TABS, 4 mg TABS DAILY, Disp: , Rfl:      hydrochlorothiazide (MICROZIDE) 12.5 MG capsule, Take 1 capsule (12.5 mg) by mouth daily, Disp: 90 capsule, Rfl: 3     meloxicam (MOBIC) 7.5 MG tablet, TAKE 1 TABLET BY MOUTH ONCE DAILY, Disp: 90 tablet, Rfl: 0     methotrexate 2.5 MG tablet CHEMO, TK  6 TS  PO  PER WEEK, Disp: , Rfl: 2     METHOTREXATE SODIUM 25 MG/ML IJ SOLN, None Entered, Disp: , Rfl:      metoprolol succinate (TOPROL-XL) 50 MG 24 hr tablet, TAKE 1 TABLET BY MOUTH ONCE DAILY, Disp: 90 tablet, Rfl: 0     nitrofurantoin 100 MG, Take 1 capsule by mouth daily., Disp: , Rfl:      omeprazole (PRILOSEC) 20 MG CR capsule, Take 1 capsule (20 mg) by mouth daily, Disp: 90 capsule, Rfl: 1     polyvinyl alcohol (LIQUID TEARS) 1.4 % ophthalmic solution, 1 drop as needed., Disp: , Rfl:      simvastatin (ZOCOR) 20 MG tablet, Take 1 tablet (20 mg) by mouth At Bedtime, Disp: 90 tablet, Rfl: 3    Allergies: Diatrizoate; Ciprofloxacin; Contrast dye; and Sulfasalazine    SURGICAL HISTORY:   Past Surgical History:   Procedure Laterality Date     BACK SURGERY  2011    Fusion L3-4 & decompression; [facility]     BIOPSY  2013    Thyroid - normal result     C DEXA, BONE DENSITY, AXIAL SKEL  7/03 7/03 L1-4 1.7, FemNkL-0.8,R-1.2, FArm Px 0.2,Dist-0.3     C REMOVAL OF OVARY(S)  1980    2nd ovary removed - endometriosis     C TOTAL ABDOM HYSTERECTOMY  1972    uterus, ovary removed - fibroid     EYE SURGERY  2010 & 11    Cataract surgery     HC COLONOSCOPY THRU STOMA W BIOPSY/CAUTERY TUMOR/POLYP/LESION  1993    adenomatous polyp     HC COLONOSCOPY THRU STOMA,  "DIAGNOSTIC  1998, 10/03    normal, '03 rec repeat in 5 yrs due to +hx polyp and + fam hx     HC FLEX SIGMOIDOSCOPY W/WO KARYNA SPEC BY BRUSH/WASH  12/2001     HC REMOVAL OF TONSILS,<13 Y/O  1942     ORTHOPEDIC SURGERY  2008    Left knee replacement     SURGICAL HISTORY OF -   1989    basal cell Ca scalp excised     SURGICAL HISTORY OF -   1998    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       FAMILY HISTORY:   Family History   Problem Relation Age of Onset     GASTROINTESTINAL DISEASE Father      bleeding ulcer     CANCER Mother      thyroid cancer     Other Cancer Mother      thyroid & throat     OSTEOPOROSIS Mother      Thyroid Disease Mother      DIABETES Paternal Grandmother      CEREBROVASCULAR DISEASE Paternal Grandmother      Colon Cancer Maternal Half-Brother      OSTEOPOROSIS Maternal Grandmother        SOCIAL HISTORY:   Social History   Substance Use Topics     Smoking status: Never Smoker     Smokeless tobacco: Never Used     Alcohol use Yes      Comment: occasionally       REVIEW OF SYSTEMS:  The comprehensive review of systems from the intake form was reviewed with the patient.  No fever, weight change or fatigue. No dry eyes. No oral ulcers, sore throat or voice change. No palpitations, syncope, angina or edema.  No chest pain, excessive sleepiness, shortness of breath or hemoptysis.   No abdominal pain, nausea, vomiting, diarrhea or heartburn.  No skin rash. No focal weakness or numbness. No bleeding or lymphadenopathy. No rhinitis or hives.     Exam:  On physical examination the patient appears the stated age, is in no acute distress, affectThe is appropriate, and breathing is non-labored.  Vitals are documented in the EMR and have been reviewed:    /71 (BP Location: Left arm)  Pulse 71  Ht 1.581 m (5' 2.24\")  Wt 75.3 kg (166 lb 0.1 oz)  LMP 05/17/1972  SpO2 99%  BMI 30.13 kg/m2  5' 2.244\"  Body mass index is 30.13 kg/(m^2).    Rises from chair:  Gait:  Trendelenburg " test:  Gains the exam table:    RIGHT hip subjective:  Abd:  Add:  PFC:  Flexion:  IRF:  ERF:  Impingement test:  Resisted straight leg raise:  DANIEL:    LEFT hip subjective:  Abd:  Add:  PFC:  Flexion:  IRF:  ERF:  Impingement test:  Resisted straight leg raise:  DANIEL:    Distally, the circulatory, motor, and sensation exam is intact with 5/5 EHL, gastroc-soleus, and tibialis anterior.  Sensation to light touch is intact.  Dorsalis pedis and posterior tibialis pulses are palpable.  There are no sores on the feet, no bruising, and no lymphedema.    X-rays:       Assessment: This is a 82 year old with     Plan:

## 2018-05-29 NOTE — NURSING NOTE
"Brenda Barker's chief complaint for this visit includes:  Chief Complaint   Patient presents with     Consult     Hip pain, left/primary osteoarthritis of both hips. Dr. Bowie     PCP: Liudmila Bowie    Referring Provider:  Liudmila Bowie MD  [id number] [place] [address]    /71 (BP Location: Left arm)  Pulse 71  Ht 1.581 m (5' 2.24\")  Wt 75.3 kg (166 lb 0.1 oz)  LMP 05/17/1972  SpO2 99%  BMI 30.13 kg/m2  Data Unavailable     Do you need any medication refills at today's visit? No  Teagan Alva CMA        "

## 2018-05-29 NOTE — MR AVS SNAPSHOT
After Visit Summary   5/29/2018    Brenda Barker    MRN: [id number]           Patient Information     Date Of Birth          1935        Visit Information        Provider Department      5/29/2018 1:00 PM Keo Priest MD [facility]        Care Instructions      [facility]  [address] [place] [address]  Phone [phone]  Fax [phone]    SURGICAL INFORMATION & INSTRUCTIONS  Dr. Keo Priest  Name of Surgery: Left total hip arthroplasty    Date of Surgery:     If you need to reschedule/schedule your surgery, please contact Brittany, our surgery scheduler at [place], at [phone].    Arrival Time:     Time of Surgery:      Please arrive early so that we can prepare you for surgery. If you arrive later than your scheduled arrival time, your surgery may be cancelled.  Please note that scheduled times may change, but you will always be notified if there is a change.     Location of Surgery:     ? [facility], [address]. [address], 3rd floor for check-in  Phone [phone]  Fax [phone]  Bring parking ticket with you for validation and reduced parking rate  [email or web address]    Prior to surgery    ? Call your insurance company  Ask if you need pre-approval for your surgery.  If pre-approval is needed, please call our surgery scheduler for assistance with the pre-approval process.   If you do not have insurance, please let us know.     ? Ward for Surgery (if on [facility])  10 days before surgery, call [phone] to register with the surgery center. Have your insurance card ready.     Total Joint Replacement:  - Joint Replacement Class:  If you are scheduled to have a joint replacement, you (and any family/friends that will be helping to care for you) are expected to attend an educational class at least two  weeks prior to your surgery.  To register for the class please call the Patient Learning Center at [phone] or register online at [email or web address]. Classes are held at multiple locations as well as online.  You must know the date of your surgery before you can register for a class (approximate date OK). If registering online, please select hip or knee as surgery type as shoulder has not been made an option at this time.     o This is also a good opportunity to think about where you would like to have physical therapy after your surgery. You may also be able to set up appointments in advance so that everything is ready to go after surgery.    - Antibiotics Prophylaxis:  Certain procedures such as dental cleaning/work, colonoscopies and other surgeries can cause bacteria to enter the bloodstream.  These bacteria can attach to joint replacement devices.  To reduce this risk, you will need to take antibiotics before you have invasive procedures or dental work.  This is known as antibiotic prophylaxis.    - Dental Visit:  You may want to schedule an appointment with your dentist for a cleaning or needed work before your surgery.  We advise no dental work or cleaning until 6 months after your surgery with implants to hip(s), knee(s), or shoulder(s).  Once you are 6 months past your surgery, you will need to take prophylactic (preventative) antibiotics for the rest of your life before having any dental cleaning or work.  If dental work is needed before surgery, make sure everything is completely healed prior to surgery.  It may cause delays or cancellation of surgery if not healed completely. This is also for any other invasive procedures you may have such as a colonoscopy.  Please notify the clinic if you have any questions.    ? Schedule an appointment with a Primary Care Provider for a Pre-Op Physical.  This should be done within 30 days of surgery  If you do not have a primary care provider, you may  call [facility] at [phone], for an appointment.  Please have your office note and any labs or tests faxed to the facility where you are having surgery. Please be sure to request a copy of your pre-op physical and bring it with you on the day of surgery.      Tell your provider if you have any of the following:   - IF you have a pacemaker or an ICD (implanted cardiac defibrillator). If you do, please bring the ID card with you on the day of surgery  - IF you're a smoker. People who smoke have a higher risk of infection after surgery. Ask your provider how you can quit smoking.  - If you have diabetes, work with your provider to control your blood sugar. If its not well-controlled, we may need to delay surgery (or you may have problems healing afterward).  - If your surgeon asks you to see your dentist: You'll need to complete any dental work before surgery. Your dentist must send a letter to your surgery center saying it's ok to do the surgery.    ? Blood Bank Pre-Admission order (total joint replacement only):    You will need to have a Blood Type and Screen drawn at a [facility] location before your surgery.  Please check your form included in your packet to determine if it needs to be done 1-30 days before surgery or 1-3 days before surgery.    ? Pre-Op Phone Call  You will receive a pre-op phone call 1-3 days before surgery to review your eating and drinking restrictions, review medications, and confirm surgery times.      ? 7-10 days BEFORE surgery  ? Stop taking aspirin, Plavix or aspirin products 10 days before surgery or as directed by your doctor.  ? Stop taking non-steroidal anti-inflammatory medications (naproxen/Aleve, ibuprofen/Advil/Motrin, celecoxib/Celebrex, meloxicam/Mobic) 1 week before surgery or as directed by your surgeon.  This will reduce the risk of bleeding during surgery.  ? Stop taking fish oil (Omega-3-fatty acid) 1 week before surgery.  ? It is OK to take  acetaminophen (Tylenol) up until 2 hours prior to surgery.  ? Take prescription medications as directed by your doctor.  Discuss which medications to take or hold prior to your surgery, with your primary care doctor.   ? If you have diabetes, ask your primary care doctor or endocrinologist how you should take your medication(s).    ? 24 hours BEFORE surgery  Stop drinking alcohol (beer, wine, liquor) at least 24-hours before and after your surgery.     ? Evening BEFORE surgery  - You may eat a normal meal the night before surgery, but eat nothing after midnight.     - Take a shower - to help wash away bacteria (germs) from your skin.  It s normal to have bacteria on your skin and skin protects us from these germs.  When you have surgery, we cut the skin.  Sometimes germs get into the cuts and cause infection (illness caused by germs).  By following the showering instructions and using the special soap, you will lower the number of germs on your skin.  This decreases your chance of an infection.    - Buy or get 8 ounces of antiseptic surgical soap called 4% CHG.  Common brands of this soap are Hibiclens and Exidine.    - You can find it this soap at your local pharmacy, clinic or retail store.  If you have trouble finding it, ask your pharmacist to help you find the right substitute.    - Do not shave within 12 inches of your incision (surgical cut) area for at least 3 days before surgery.  Shaving can make small cuts in the skin. This puts you at a higher risk of infection.    Items you will need for each shower:   - Newly washed towel  - 4 ounces of one of the recommended soaps    Follow these instructions, the evening before surgery  - Wash your hair and body with your regular shampoo and soap. Make sure you rinse the shampoo and soap from your hair and body.  - Using clean hands, apply about 2 ounces of soap gently on your skin from the neck to your toes. Use on your groin area last. Do not use this soap on your  face or head. If you get any soap in your eyes, ears or mouth, rinse right away.  - Once the soap has been on your skin for at least 1 minute, rinse off completely and repeat washing with the surgical soap one more time.  - Rinse well and dry off using a clean towel.  If you feel any tingling, itching or other irritation, rinse right away. It is normal to feel some coolness on the skin after using the antiseptic soap. Your skin may feel a bit dry after the shower, but do not use any lotions, creams or moisturizers. Do not use hair spray or other products in your hair.  - Dress in freshly washed clothes or pajamas. Use fresh pillowcases and sheets on your bed.    ? Day of Surgery  - You may drink clear liquids up to 2 hours before surgery or as directed by your surgeon.  Clear liquids include: Water, Pedialyte, Gatorade, apple juice and liquids you can read through. Please avoid liquids that are red or orange in color.   - Do NOT drink: milk, coffee, liquids that have pulp, orange juice, and lemonade or tomato juice.   - Do NOT chew gum, chew tobacco or suck on hard candy.    - Take another shower          Follow these instructions:      - Wash your hair and body with your regular shampoo and soap. Make sure you rinse the shampoo and soap from your hair and body.  - Using clean hands, apply about 2 ounces of soap gently on your skin from the neck to your toes. Use on your groin area last. Do not use this soap on your face or head. If you get any soap in your eyes, ears or mouth, rinse right away.  - Once the soap has been on your skin for at least 1 minute, rinse off completely and repeat washing with the surgical soap one more time.  - Rinse well and dry off using a clean towel.  If you feel any tingling, itching or other irritation, rinse right away. It is normal to feel some coolness on the skin after using the antiseptic soap. Your skin may feel a bit dry after the shower, but do not use any lotions, creams or  moisturizers. Do not use hair spray or other products in your hair.  - Dress in freshly washed clothes.  - Do not wear deodorant, cologne, lotion, makeup, nail polish or jewelry to surgery.    ? If there is any change in your health PRIOR to your surgery, please contact your surgeon's office.  Such as a fever, body aches, fatigue, any flu-like symptoms, rash, or any new injury to related body part.    ? Arrange for someone to drive you home after discharge.    will need to be a responsible adult (18 years or older) that will provide transportation to and from surgery and stay in the waiting room during your surgery. You may not drive yourself or take public transportation to and from surgery.    ? Arrange for someone to stay with you for 24 hours after you go home.   This person must be a responsible adult (18 years or older).    ? Bring these items to the hospital/surgery center:   ? Insurance card(s)  ? Money for parking and co-pays, if needed  ? A list of all the medications you take, including vitamins, minerals, herbs and over the counter medications.    ? A copy of your Advance Health Care Directive, if you have one.  This tells us what treatment(s) you would or would not want, and who would make health care decisions, if you could no longer speak for yourself.    ? A case for glasses, contact lenses, hearing aids or dentures.   ? Your inhaler or CPAP machine, if you use these at home    ? Leave extra cash, jewelry and other valuables at home.       ? Other information:   Sleep Apnea: Let your nurse know if you have a history of sleep apnea, only if you are having surgery at the [facility].    When you arrive for surgery  When you get to the surgery center/hospital, you will:  - Check in. If you are under age 18, you must be with a parent or legal guardian.  - Sign consent forms, if you haven t already. These forms state that you know the risks and benefits of surgery. When you sign the  forms, you give us permission to do the surgery. Do not sign them unless you understand what will happen during and after your surgery. If you have any questions about your surgery, ask to speak with your doctor before you sign the forms. If you don t understand the answers, ask again.  - Receive a copy of the Patient s Bill of Rights. If you do not receive a copy, please ask for one.  - Change into hospital clothes. Your belongings will be placed in a bag. We will return them to you after surgery.  - Meet with the anesthesia provider. He or she will tell you what kind of anesthesia (medicine) will be used to keep you comfortable during surgery.  - Remember: it s okay to remind doctors and nurses to wash their hands before touching you.  - In most cases, your surgeon will use a marker to write his or her initials on the surgery site. This ensures that the exact site is operated on.  - For safety reasons, we will ask you the same questions many times. For example, we may ask your name and birth date over and over again.  - Friends and family can stay with you until it s time for surgery. While you re in surgery, they will be in the waiting area. Please note that cell phones are not allowed in some patient care areas.  - If you have questions about what will happen in the operating room, talk to your care team.  - You will meet with an anesthesiologist, before your surgery.  He or she may reference types of anesthesia commonly used for surgeries:   o General:  This involves the use of an IV for injection of medication and anesthesia. You are put into a sleep and have a breathing tube to assist you with breathing.  o Sedation:  You are asleep, but not so deply that you need a breathing tube.   o Local or Regional: a nerve is injected to numb the surgical area.  o Spinal: you are numbed from the waist down with medicine injected into your back.  o Femoral Nerve Block:  Anesthesia injected into the groin of leg which you  are having surgery on.      After surgery  We will move you to a recovery room, where we will watch you closely. If you have any pain or discomfort, tell your nurse. He or she will try to make you comfortable.    - We will move you to your hospital room after you are awake and stable. If you having any pain or discomfort, please let your nursing staff know.  - Please wash your hands every time you touch the wound or change bandages or dressings.  - Do not submerge the wound in water.  You may not use a bathtub, hot tub, pool, or lake until 3 months after surgery. Any open area can be a source of incoming bacteria, which can lead to infection. Keep all dressings clean and dry.   - Elevate your leg(s) as much as possible to help reduce swelling. You will also receive compression stockings for the surgical leg. Please wear these during the day and fully remove them at night. Continue to wear these for approximately 4 weeks after surgery or until your swelling has resolved.  - If you had a total knee replacement, you may be discharged with a Continuous Passive Motion (CPM) machine. You will use this for 2-6 weeks at home.   - You may put ice on the surgical area for comfort, keeping ice on area for up to 20 minutes then off for 40 minutes.  You may do this the first few days after surgery to help reduce pain and swelling.    - Drink at least 8-10 glasses of liquid each day to help your body heal.  - Keep your lungs clear by coughing and taking deep breaths every couple hours.  This is especially important the first 48 hours after surgery.  - Typically, you will be able to start driving once you are fully off narcotics and able to do a the quick stop test (slamming on the brake) with your right leg without experiencing much pain.  - You will start physical therapy while in the hospital. Once you leave the hospital, you will need to continue going to physical therapy to maintain and improve your range of motion and  strength. Please call the physical therapy clinic of your choice to set up an appointment within 1 week of being discharged from the hospital.    - Notify your doctor if you have any of the following:   o Fever of 101 F or higher  o Numbness and/or tingling  o Increased pain, redness or swelling  o Drainage from wound  o Prolonged or uncontrolled bleeding  o Difficulty with movement    Range of Motion Restrictions  These are strict for 3 months post surgery, but should be followed for life. As your muscles gain strength, you will notice that you will have more range of motion, but your risk for dislocating remains. See patient education packet for details  - NO bending past 90 degrees at the waist (operative side)   - NO crossing your operative leg over or under your non-operative leg  - NO turning your operative leg inward     Follow-up Appointments, in Clinic  If you don't already have an appointment scheduled, please call to set up an appointment at [phone].      ? Nurse Only Appointment (2 weeks post op)  ? Post-Op appointments with provider (6 weeks post op)    Dealing with pain  A nurse will check your comfort level often during your stay. He or she will work with you to manage your pain.  It s expected that you will have pain after surgery.  Our goal is to reduce or minimize pain by:   - Remember pain is real. There are many ways to control pain. We can help you decide what works best for you.  - Ask for pain medicine when you need it.  Don t try to  tough it out --this can make you feel worse. Always take your medicine as ordered.  - Medicine doesn t work the same for everyone. If your medicine isn t working, tell your nurse. There may be other medicines or treatments we can try.  - Medication Refills.  If you need refills on your pain medication, please call the clinic as soon as possible.  It may take 72-business hours to obtain a refill.  Refills must be picked up at check-in , [facility] or mailed to a pharmacy of your choice.    - It is expected that you will wean off the pain medications in a timely manner.   - Constipation is a common side effect of pain medication, decreased activity and anesthesia from surgery.  Take a stool softener as prescribed by your doctor at the time of discharge.  You may also use over the counter medications as needed.  Be sure to increase your fiber (fruits and vegetables) and your water intake.      Please call the clinic at [phone], if you experience any problems or have questions.  If you are having an emergency, always call [phone] or seek immediate evaluation at the Emergency Room.    Thank you for selecting the [facility] for your care!  ---------------------------------------------              Thanks for coming today.  Ortho/Sports Medicine Clinic  [address] [place] [address]    To schedule future appointments in [facility], you may call [phone].    To schedule ordered imaging by your provider:   Call Central Imaging [phone]    To schedule an injection ordered by your provider:  Call Central Imaging Injection scheduling line: [phone]  [employer] available online at:  [email or web address]    Please call if any further questions or concerns ([phone]).  Clinic hours 8 am to 5 pm.    Return to clinic (call) if symptoms worsen or fail to improve.            Follow-ups after your visit        Who to contact     If you have questions or need follow up information about today's clinic visit or your schedule please contact [facility] directly at [phone].  Normal or non-critical lab and imaging results will be communicated to you by MyChart, letter or phone within 4 business days after the clinic has received the results. If you do not hear from us within 7 days, please contact the clinic through [employer] or phone. If you have a critical or abnormal lab result,  "we will notify you by phone as soon as possible.  Submit refill requests through [employer] or call your pharmacy and they will forward the refill request to us. Please allow 3 business days for your refill to be completed.          Additional Information About Your Visit        [employer] Information     [employer] gives you secure access to your electronic health record. If you see a primary care provider, you can also send messages to your care team and make appointments. If you have questions, please call your primary care clinic.  If you do not have a primary care provider, please call [phone] and they will assist you.      [employer] is an electronic gateway that provides easy, online access to your medical records. With [employer], you can request a clinic appointment, read your test results, renew a prescription or communicate with your care team.     To access your existing account, please contact your [facility] or call [phone] for assistance.        Care EveryWhere ID     This is your Care EveryWhere ID. This could be used by other organizations to access your [facility] medical records  [id number]        Your Vitals Were     Pulse Height Last Period Pulse Oximetry BMI (Body Mass Index)       71 1.581 m (5' 2.24\") 05/17/1972 99% 30.13 kg/m2        Blood Pressure from Last 3 Encounters:   05/29/18 167/71   05/17/18 142/62   04/04/18 146/68    Weight from Last 3 Encounters:   05/29/18 75.3 kg (166 lb 0.1 oz)   05/17/18 75.3 kg (166 lb)   04/04/18 76.7 kg (169 lb)              Today, you had the following     No orders found for display       Primary Care Provider Office Phone # Fax #    Liudmila Bowie [phone]       [address]  [facility] [address]        Equal Access to Services     MALATHI CANNON : Hadii simi abelo Joel, waaxda luqadaha, qaybta kaalmada rebecca, liliana del valle. So [facility] [phone].    ATENCIÓN: " Si habla sammy, tiene a beltran disposición servicios gratuitos de asistencia lingüística. Matt segal [phone].    We comply with applicable federal civil rights laws and [place] laws. We do not discriminate on the basis of race, color, national origin, age, disability, sex, sexual orientation, or gender identity.            Thank you!     Thank you for choosing [facility]  for your care. Our goal is always to provide you with excellent care. Hearing back from our patients is one way we can continue to improve our services. Please take a few minutes to complete the written survey that you may receive in the mail after your visit with us. Thank you!             Your Updated Medication List - Protect others around you: Learn how to safely use, store and throw away your medicines at www.disposemymeds.org.          This list is accurate as of 5/29/18  1:54 PM.  Always use your most recent med list.                   Brand Name Dispense Instructions for use Diagnosis    acyclovir 400 MG tablet    ZOVIRAX    90 tablet    TAKE 1 TABLET BY MOUTH DAILY    Herpes simplex virus infection       aspirin 81 MG tablet      1 TABLET DAILY        CENTRUM SILVER per tablet      1 TABLET DAILY        fish oil-omega-3 fatty acids 1000 MG capsule     OTC    2 CAPSULES DAILY  (? DOSE)    Other and unspecified hyperlipidemia       folic acid 1 MG tablet    FOLVITE     4 mg TABS DAILY    Rheumatoid arthritis(714.0), Obesity, unspecified, Other abnormal glucose       hydrochlorothiazide 12.5 MG capsule    MICROZIDE    90 capsule    Take 1 capsule (12.5 mg) by mouth daily    Hypertension, goal below 140/90       LIQUID TEARS 1.4 % ophthalmic solution   Generic drug:  polyvinyl alcohol      1 drop as needed.        meloxicam 7.5 MG tablet    MOBIC    90 tablet    TAKE 1 TABLET BY MOUTH ONCE DAILY    Rheumatoid arthritis involving multiple sites with positive rheumatoid factor (H)       * methotrexate 25 MG/ML injection       None Entered        * methotrexate 2.5 MG tablet CHEMO      TK  6 TS  PO  PER WEEK        metoprolol succinate 50 MG 24 hr tablet    TOPROL-XL    90 tablet    TAKE 1 TABLET BY MOUTH ONCE DAILY    Rapid heart rate       nitroFURantoin macrocrystal 100 MG ED starter pack    MACRODANTIN     Take 1 capsule by mouth daily.        omeprazole 20 MG CR capsule    priLOSEC    90 capsule    Take 1 capsule (20 mg) by mouth daily    Gastroesophageal reflux disease with esophagitis       simvastatin 20 MG tablet    ZOCOR    90 tablet    Take 1 tablet (20 mg) by mouth At Bedtime    Hyperlipidemia LDL goal <130       Vitamin D-3 1000 units Caps      Take by mouth daily        * Notice:  This list has 2 medication(s) that are the same as other medications prescribed for you. Read the directions carefully, and ask your doctor or other care provider to review them with you.

## 2018-05-31 DIAGNOSIS — Z96.642 STATUS POST TOTAL REPLACEMENT OF LEFT HIP: Primary | ICD-10-CM

## 2018-06-01 ENCOUNTER — TELEPHONE (OUTPATIENT)
Dept: ORTHOPEDICS | Facility: CLINIC | Age: 83
End: 2018-06-01

## 2018-06-01 NOTE — TELEPHONE ENCOUNTER
[facility] Call Center    Phone Message    May a detailed message be left on voicemail: yes    Reason for Call: Other: patient is requesting to speak with a nurse regarding post op care- please advise    Action Taken: Message routed to:  Adult Clinics: Orthopedics p [phone]

## 2018-06-01 NOTE — TELEPHONE ENCOUNTER
Scheduled Left Total Hip Arthroplasty on 6/27/18.    Had questions about TCU after surgery and PT options. Pt lives at [facility] and they have TCU there along with PT/OT. If needed, she would like to stay there. I advised to discuss this when she is admitted after surgery and also to speak with [facility] staff to see if they can reserve a bed or if she can receive all of the cares where she is currently at. All questions answered at this time.    Krishna Gregorio RN

## 2018-06-04 ENCOUNTER — MYC MEDICAL ADVICE (OUTPATIENT)
Dept: ORTHOPEDICS | Facility: CLINIC | Age: 83
End: 2018-06-04

## 2018-06-04 DIAGNOSIS — B00.9 HERPES SIMPLEX VIRUS INFECTION: ICD-10-CM

## 2018-06-04 NOTE — TELEPHONE ENCOUNTER
"Requested Prescriptions   Pending Prescriptions Disp Refills     acyclovir (ZOVIRAX) 400 MG tablet [Pharmacy Med Name: ACYCLOVIR 400MG TABLETS]  Last Written Prescription Date:  03/14/18  Last Fill Quantity: 90,  # refills: 0   Last Office Visit with FMNAYANA, DARNELL or [facility] prescribing provider:  05/17/18   Future Office Visit:    Next 5 appointments (look out 90 days)     Jul 10, 2018 11:00 AM CDT   Nurse Only with MG NURSE ONLY ORTHO   [facility])    [address] [place] [id number] [phone]            Aug 07, 2018 11:30 AM CDT   (Arrive by 11:00 AM)   Return Visit with Keo Priest MD   [facility])    [address] [place] [address] [phone]                90 tablet 0     Sig: TAKE 1 TABLET BY MOUTH DAILY    Antivirals for Herpes Protocol Passed    6/4/2018 11:36 AM       Passed - Patient is age 12 or older       Passed - Recent (12 mo) or future (30 days) visit within the authorizing provider's specialty    Patient had office visit in the last 12 months or has a visit in the next 30 days with authorizing provider or within the authorizing provider's specialty.  See \"Patient Info\" tab in inbasket, or \"Choose Columns\" in Meds & Orders section of the refill encounter.           Passed - Normal serum creatinine on file in past 12 months    Recent Labs   Lab Test  12/19/17   1223   CR  0.68               "

## 2018-06-05 DIAGNOSIS — K21.00 GASTROESOPHAGEAL REFLUX DISEASE WITH ESOPHAGITIS: ICD-10-CM

## 2018-06-05 RX ORDER — ACYCLOVIR 400 MG/1
TABLET ORAL
Qty: 90 TABLET | Refills: 0 | Status: SHIPPED | OUTPATIENT
Start: 2018-06-05 | End: 2018-09-21

## 2018-06-05 NOTE — TELEPHONE ENCOUNTER
"Requested Prescriptions   Pending Prescriptions Disp Refills     omeprazole (PRILOSEC) 20 MG CR capsule [Pharmacy Med Name: OMEPRAZOLE 20MG CAPSULES]  Last Written Prescription Date:  12/11/17  Last Fill Quantity: 90,  # refills: 1   Last Office Visit with FMG, DARNELL or [facility] prescribing provider:  05/17/18   Future Office Visit:    Next 5 appointments (look out 90 days)     Jun 12, 2018  2:20 PM CDT   Urmila Hernandez with Liudmila Bowie MD   [facility]    [address] [facility] [address]   [phone]            Jul 10, 2018 11:00 AM CDT   Nurse Only with MG NURSE ONLY ORTHO   [facility])    [address] [place] [phone] [phone]            Aug 07, 2018 11:30 AM CDT   (Arrive by 11:00 AM)   Return Visit with Keo Priest MD   [facility])    [address] [place] [id number] [phone]                90 capsule 0     Sig: TAKE 1 CAPSULE(20 MG) BY MOUTH DAILY    PPI Protocol Passed    6/5/2018  9:46 AM       Passed - Not on Clopidogrel (unless Pantoprazole ordered)       Passed - No diagnosis of osteoporosis on record       Passed - Recent (12 mo) or future (30 days) visit within the authorizing provider's specialty    Patient had office visit in the last 12 months or has a visit in the next 30 days with authorizing provider or within the authorizing provider's specialty.  See \"Patient Info\" tab in inbasket, or \"Choose Columns\" in Meds & Orders section of the refill encounter.           Passed - Patient is age 18 or older       Passed - No active pregnacy on record       Passed - No positive pregnancy test in past 12 months          "

## 2018-06-05 NOTE — TELEPHONE ENCOUNTER
Prescription approved per [facility] Refill Protocol.    Checo Navarrete RN, BSN

## 2018-06-11 ENCOUNTER — TRANSFERRED RECORDS (OUTPATIENT)
Dept: HEALTH INFORMATION MANAGEMENT | Facility: CLINIC | Age: 83
End: 2018-06-11

## 2018-06-11 LAB — TSH SERPL-ACNC: 1.05 UIU/ML (ref 0.3–4.5)

## 2018-06-12 ENCOUNTER — OFFICE VISIT (OUTPATIENT)
Dept: FAMILY MEDICINE | Facility: CLINIC | Age: 83
End: 2018-06-12
Payer: COMMERCIAL

## 2018-06-12 VITALS
WEIGHT: 166 LBS | DIASTOLIC BLOOD PRESSURE: 66 MMHG | TEMPERATURE: 98.6 F | BODY MASS INDEX: 30.55 KG/M2 | OXYGEN SATURATION: 97 % | SYSTOLIC BLOOD PRESSURE: 142 MMHG | RESPIRATION RATE: 16 BRPM | HEART RATE: 69 BPM | HEIGHT: 62 IN

## 2018-06-12 DIAGNOSIS — M16.32 OSTEOARTHRITIS RESULTING FROM LEFT HIP DYSPLASIA: ICD-10-CM

## 2018-06-12 DIAGNOSIS — R00.0 RAPID HEART RATE: ICD-10-CM

## 2018-06-12 DIAGNOSIS — M05.79 RHEUMATOID ARTHRITIS INVOLVING MULTIPLE SITES WITH POSITIVE RHEUMATOID FACTOR (H): ICD-10-CM

## 2018-06-12 DIAGNOSIS — E78.5 HYPERLIPIDEMIA LDL GOAL <130: ICD-10-CM

## 2018-06-12 DIAGNOSIS — Z96.652 STATUS POST TOTAL LEFT KNEE REPLACEMENT: ICD-10-CM

## 2018-06-12 DIAGNOSIS — I10 HYPERTENSION, GOAL BELOW 140/90: ICD-10-CM

## 2018-06-12 DIAGNOSIS — Z01.818 PREOP GENERAL PHYSICAL EXAM: Primary | ICD-10-CM

## 2018-06-12 LAB
MRSA DNA SPEC QL NAA+PROBE: NEGATIVE
SPECIMEN SOURCE: NORMAL

## 2018-06-12 PROCEDURE — 87640 STAPH A DNA AMP PROBE: CPT | Mod: 59 | Performed by: FAMILY MEDICINE

## 2018-06-12 PROCEDURE — 99215 OFFICE O/P EST HI 40 MIN: CPT | Performed by: FAMILY MEDICINE

## 2018-06-12 PROCEDURE — 93000 ELECTROCARDIOGRAM COMPLETE: CPT | Performed by: FAMILY MEDICINE

## 2018-06-12 PROCEDURE — 87641 MR-STAPH DNA AMP PROBE: CPT | Performed by: FAMILY MEDICINE

## 2018-06-12 RX ORDER — METOPROLOL SUCCINATE 50 MG/1
TABLET, EXTENDED RELEASE ORAL
Qty: 90 TABLET | Refills: 0 | Status: SHIPPED | OUTPATIENT
Start: 2018-06-12 | End: 2018-09-11

## 2018-06-12 RX ORDER — MELOXICAM 7.5 MG/1
TABLET ORAL
Qty: 90 TABLET | Refills: 0 | Status: ON HOLD | OUTPATIENT
Start: 2018-06-12 | End: 2018-06-29

## 2018-06-12 ASSESSMENT — PAIN SCALES - GENERAL: PAINLEVEL: MODERATE PAIN (4)

## 2018-06-12 NOTE — TELEPHONE ENCOUNTER
Routing refill request to provider for review/approval because:  For meloxicam:  Labs out of range:  CBC  Labs not current:  ALT, AST  Patient is over 64 years old so protocol failed.    For Metoprolol:  Routing refill request to provider for review/approval because:  BP is out of range.      Checo Navarrete RN, BSN

## 2018-06-12 NOTE — LETTER
My Depression Action Plan  Name: Brenda Barker   Date of Birth 1935  Date: 6/12/2018    My doctor: Liudmila Bowie   My clinic: [address] [id number] [phone]          GREEN    ZONE   Good Control    What it looks like:     Things are going generally well. You have normal up s and down s. You may even feel depressed from time to time, but bad moods usually last less than a day.   What you need to do:  1. Continue to care for yourself (see self care plan)  2. Check your depression survival kit and update it as needed  3. Follow your physician s recommendations including any medication.  4. Do not stop taking medication unless you consult with your physician first.           YELLOW         ZONE Getting Worse    What it looks like:     Depression is starting to interfere with your life.     It may be hard to get out of bed; you may be starting to isolate yourself from others.    Symptoms of depression are starting to last most all day and this has happened for several days.     You may have suicidal thoughts but they are not constant.   What you need to do:     1. Call your care team, your response to treatment will improve if you keep your care team informed of your progress. Yellow periods are signs an adjustment may need to be made.     2. Continue your self-care, even if you have to fake it!    3. Talk to someone in your support network    4. Open up your depression survival kit           RED    ZONE Medical Alert - Get Help    What it looks like:     Depression is seriously interfering with your life.     You may experience these or other symptoms: You can t get out of bed most days, can t work or engage in other necessary activities, you have trouble taking care of basic hygiene, or basic responsibilities, thoughts of suicide or death that will not go away, self-injurious behavior.     What you need to do:  1. Call your care  team and request a same-day appointment. If they are not available (weekends or after hours) call your local crisis line, emergency room or 911.            Depression Self Care Plan / Survival Kit    Self-Care for Depression  Here s the deal. Your body and mind are really not as separate as most people think.  What you do and think affects how you feel and how you feel influences what you do and think. This means if you do things that people who feel good do, it will help you feel better.  Sometimes this is all it takes.  There is also a place for medication and therapy depending on how severe your depression is, so be sure to consult with your medical provider and/ or Behavioral Health Consultant if your symptoms are worsening or not improving.     In order to better manage my stress, I will:    Exercise  Get some form of exercise, every day. This will help reduce pain and release endorphins, the  feel good  chemicals in your brain. This is almost as good as taking antidepressants!  This is not the same as joining a gym and then never going! (they count on that by the way ) It can be as simple as just going for a walk or doing some gardening, anything that will get you moving.      Hygiene   Maintain good hygiene (Get out of bed in the morning, Make your bed, Brush your teeth, Take a shower, and Get dressed like you were going to work, even if you are unemployed).  If your clothes don't fit try to get ones that do.    Diet  I will strive to eat foods that are good for me, drink plenty of water, and avoid excessive sugar, caffeine, alcohol, and other mood-altering substances.  Some foods that are helpful in depression are: complex carbohydrates, B vitamins, flaxseed, fish or fish oil, fresh fruits and vegetables.    Psychotherapy  I agree to participate in Individual Therapy (if recommended).    Medication  If prescribed medications, I agree to take them.  Missing doses can result in serious side effects.  I  understand that drinking alcohol, or other illicit drug use, may cause potential side effects.  I will not stop my medication abruptly without first discussing it with my provider.    Staying Connected With Others  I will stay in touch with my friends, family members, and my primary care provider/team.    Use your imagination  Be creative.  We all have a creative side; it doesn t matter if it s oil painting, sand castles, or mud pies! This will also kick up the endorphins.    Witness Beauty  (AKA stop and smell the roses) Take a look outside, even in mid-winter. Notice colors, textures. Watch the squirrels and birds.     Service to others  Be of service to others.  There is always someone else in need.  By helping others we can  get out of ourselves  and remember the really important things.  This also provides opportunities for practicing all the other parts of the program.    Humor  Laugh and be silly!  Adjust your TV habits for less news and crime-drama and more comedy.    Control your stress  Try breathing deep, massage therapy, biofeedback, and meditation. Find time to relax each day.     My support system    Clinic Contact:  Phone number:    Contact 1:  Phone number:    Contact 2:  Phone number:    Yarsani/:  Phone number:    Therapist:  Phone number:    Local crisis center:    Phone number:    Other community support:  Phone number:

## 2018-06-12 NOTE — TELEPHONE ENCOUNTER
Requested Prescriptions   Pending Prescriptions Disp Refills     meloxicam (MOBIC) 7.5 MG tablet [Pharmacy Med Name: MELOXICAM 7.5MG TABLETS]  Last Written Prescription Date:  03/14/18  Last Fill Quantity: 90,  # refills: 0   Last Office Visit with FMNAYANA, DARNELL or [facility] prescribing provider:  05/17/18   Future Office Visit:    Next 5 appointments (look out 90 days)     Jun 12, 2018  2:20 PM CDT   Urmila Hernandez with Liudmila Bowie MD   [facility])    [address] [facility] [address]   [phone]            Jul 10, 2018 11:00 AM CDT   Nurse Only with MG NURSE ONLY ORTHO   [facility])    [address] [place] [id number] [phone]            Aug 07, 2018 11:30 AM CDT   (Arrive by 11:00 AM)   Return Visit with Keo Priest MD   [facility])    [address] [place] [address] [phone]                90 tablet 0     Sig: TAKE 1 TABLET BY MOUTH ONCE DAILY    NSAID Medications Failed    6/12/2018 11:24 AM       Failed - Blood pressure under 140/90 in past 12 months    BP Readings from Last 3 Encounters:   05/29/18 167/71   05/17/18 142/62   04/04/18 146/68                Failed - Normal ALT on file in past 12 months    No lab results found.         Failed - Normal AST on file in past 12 months    No lab results found.         Failed - Patient is age 6-64 years       Failed - Normal CBC on file in past 12 months    Recent Labs   Lab Test  08/11/17   1436   DTPE904  5.6   [id number]  3.59*   HGB  10.6*   HCT  32.7*   PLT  255       For GICH ONLY: [id number] = WBC, DYWE644 = RBC         Passed - Recent (12 mo) or future (30 days) visit within the authorizing provider's specialty    Patient had office visit in the last 12 months or has a visit in the next 30 days with authorizing provider or within the authorizing  "provider's specialty.  See \"Patient Info\" tab in inbasket, or \"Choose Columns\" in Meds & Orders section of the refill encounter.           Passed - No active pregnancy on record       Passed - Normal serum creatinine on file in past 12 months    Recent Labs   Lab Test  12/19/17   1223   CR  0.68            Passed - No positive pregnancy test in past 12 months        metoprolol succinate (TOPROL-XL) 50 MG 24 hr tablet [Pharmacy Med Name: METOPROLOL ER SUCCINATE 50MG TABS]  Last Written Prescription Date:  03/14/18  Last Fill Quantity: 90,  # refills: 0   Last Office Visit with G, P or [facility] prescribing provider:  05/17/18   Future Office Visit:    Next 5 appointments (look out 90 days)     Jun 12, 2018  2:20 PM CDT   Stephaniat David with Liudmila Bowie MD   [facility]    [address] [place] [address]   [phone]            Jul 10, 2018 11:00 AM CDT   Nurse Only with MG NURSE ONLY ORTHO   [facility] ([facility])    [address] [place] [phone]            Aug 07, 2018 11:30 AM CDT   (Arrive by 11:00 AM)   Return Visit with Keo Priest MD   [facility] ([facility])    [address] [place] [phone] [phone]                90 tablet 0     Sig: TAKE 1 TABLET BY MOUTH ONCE DAILY    Beta-Blockers Protocol Failed    6/12/2018 11:24 AM       Failed - Blood pressure under 140/90 in past 12 months    BP Readings from Last 3 Encounters:   05/29/18 167/71   05/17/18 142/62   04/04/18 146/68                Passed - Patient is age 6 or older       Passed - Recent (12 mo) or future (30 days) visit within the authorizing provider's specialty    Patient had office visit in the last 12 months or has a visit in the next 30 days with authorizing provider or within the authorizing provider's specialty.  See \"Patient Info\" " "tab in inbasket, or \"Choose Columns\" in Meds & Orders section of the refill encounter.              "

## 2018-06-12 NOTE — MR AVS SNAPSHOT
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  If you receive a survey in the mail, please send us back your thoughts. We really do value your feedback.    Based on your medical history, these are the current health maintenance/preventive care services that you are due for (some may have been done at this visit.)  Health Maintenance Due   Topic Date Due     DEPRESSION ACTION PLAN Q1 YR  08/28/1953     PHQ-9 Q6 MONTHS  03/09/2017     TETANUS IMMUNIZATION (SYSTEM ASSIGNED)  03/13/2018         Suggested websites for health information:  Www.Alltuition.org : Up to date and easily searchable information on multiple topics.  Www.medlineplus.gov : medication info, interactive tutorials, watch real surgeries online  Www.familydoctor.org : good info from the Academy of Family Physicians  Www.cdc.gov : public health info, travel advisories, epidemics (H1N1)  Www.aap.org : children's health info, normal development, vaccinations  Www.health.state.[place].us : [place] dept of health, public health issues in [place], N1N1    Your care team:                            Family Medicine Internal Medicine   MD Jose Alejandro Floyd MD Shantel Branch-Fleming, MD Katya Georgiev PA-C Nam Ho, MD Pediatrics   VINITA Gutiérrez, CNP  MD Kika Feliciano CNP, MD Deborah Mielke, MD Kim Thein, APRN CNP      Clinic hours: Monday - Thursday 7 am-7 pm; Fridays 7 am-5 pm.   Urgent care: Monday - Friday 11 am-9 pm; Saturday and Sunday 9 am-5 pm.  Pharmacy : Monday -Thursday 8 am-8 pm; Friday 8 am-6 pm; Saturday and Sunday 9 am-5 pm.     Clinic: [phone]   Pharmacy: [phone]    Before Your Surgery      Call your surgeon if there is any change in your health. This includes signs of a cold or flu (such as a sore throat, runny nose, cough, rash or fever).    Do not smoke, drink alcohol or take over the counter medicine (unless your surgeon or primary care doctor tells you to) for the 24 hours before and after surgery.    If you take prescribed drugs: Follow your doctor s orders about which medicines to take and which to stop until after surgery.    Eating and drinking prior to surgery: follow the instructions from your surgeon    Take a shower or bath the night before surgery. Use the soap your surgeon gave you to gently clean your skin. If you do not have soap from your surgeon, use your regular soap. Do not shave or scrub the surgery site.  Wear clean pajamas and have clean sheets on your bed.     --Methotrexate: Stop for 2 weeks before and 2 weeks after the procedure recommended.           Follow-ups after your visit        Follow-up notes from your care team     Return in about 3 days (around 6/15/2018) for Lab Work.      Your next 10 appointments already scheduled     Morgan 15, 2018  2:00 PM CDT   LAB with BK LAB   [facility]    [address] [place] [phone]           Please do not eat 10-12 hours before your appointment if you are coming in fasting for labs on lipids, cholesterol, or glucose (sugar). This does not apply to pregnant women. Water, hot tea and black coffee (with nothing added) are okay. Do not  drink other fluids, diet soda or chew gum.            Jun 27, 2018   Procedure with Keo Priest MD   [facility] [place] (--)    [address]  [place] [address] [phone]            Jul 10, 2018 11:00 AM CDT   Nurse Only with MG NURSE ONLY ORTHO   [facility]    [address] [facility] [phone] [phone]            Aug 07, 2018 11:30 AM CDT   (Arrive by 11:00 AM)   Return Visit with Keo Priest MD   [facility] ([facility])    [address] [place] [address] [phone]              Future tests that were ordered for you today     Open Future Orders        Priority Expected Expires Ordered    UA reflex to Microscopic and Culture Routine 6/15/2018 6/12/2019 6/12/2018    CBC with platelets Routine 6/15/2018 6/12/2019 6/12/2018    Renal panel Routine 6/15/2018 6/12/2019 6/12/2018            Who to contact     If you have questions or need follow up information about today's clinic visit or your schedule please contact [facility] directly at [phone].  Normal or non-critical lab and imaging results will be communicated to you by Audiolifehart, letter or phone within 4 business days after the clinic has received the results. If you do not hear from us within 7 days, please contact the clinic through MyChart or phone. If you have a critical or abnormal lab result, we will notify you by phone as soon as possible.  Submit refill requests through Giveo or call your pharmacy and they will forward the refill request to us. Please allow 3 business days for your refill to be completed.          Additional Information About Your Visit        [employer] Information     Giveo gives you secure access to your electronic health record. If you see a primary care provider, you can also send messages to your care team and make appointments. If you have questions,  "please call your primary care clinic.  If you do not have a primary care provider, please call [phone] and they will assist you.        Care EveryWhere ID     This is your Care EveryWhere ID. This could be used by other organizations to access your [facility] medical records  [id number]        Your Vitals Were     Pulse Temperature Respirations Height Last Period Pulse Oximetry    69 98.6  F (37  C) (Oral) 16 5' 2\" (1.575 m) 05/17/1972 97%    BMI (Body Mass Index)                   30.36 kg/m2            Blood Pressure from Last 3 Encounters:   06/12/18 142/66   05/29/18 167/71   05/17/18 142/62    Weight from Last 3 Encounters:   06/12/18 166 lb (75.3 kg)   05/29/18 166 lb 0.1 oz (75.3 kg)   05/17/18 166 lb (75.3 kg)              We Performed the Following     EKG 12-lead complete w/read - Clinics     MRSA MSSA Presurgical Screen        Primary Care Provider Office Phone # Fax #    Liudmila Sindy Bowie [phone]       [address]  [facility] [address]        Equal Access to Services     EUGENIO CANNON AH: Hadii aad ku hadasho Soomaali, waaxda luqadaha, qaybta kaalmada adeegyada, liliana graf haymulugetan ramsey del valle. So [facility] [phone].    ATENCIÓN: Si habla español, tiene a beltran disposición servicios gratuitos de asistencia lingüística. [facility] [phone].    We comply with applicable federal civil rights laws and [place] laws. We do not discriminate on the basis of race, color, national origin, age, disability, sex, sexual orientation, or gender identity.            Thank you!     Thank you for choosing [facility]  for your care. Our goal is always to provide you with excellent care. Hearing back from our patients is one way we can continue to improve our services. Please take a few minutes to complete the written survey that you may receive in the mail after your visit with us. Thank you!             Your Updated Medication List - Protect others around you: " Learn how to safely use, store and throw away your medicines at www.disposemymeds.org.          This list is accurate as of 6/12/18  3:18 PM.  Always use your most recent med list.                   Brand Name Dispense Instructions for use Diagnosis    acyclovir 400 MG tablet    ZOVIRAX    90 tablet    TAKE 1 TABLET BY MOUTH DAILY    Herpes simplex virus infection       aspirin 81 MG tablet      1 TABLET DAILY        CENTRUM SILVER per tablet      1 TABLET DAILY        fish oil-omega-3 fatty acids 1000 MG capsule     OTC    2 CAPSULES DAILY  (? DOSE)    Other and unspecified hyperlipidemia       folic acid 1 MG tablet    FOLVITE     4 mg TABS DAILY    Rheumatoid arthritis(714.0), Obesity, unspecified, Other abnormal glucose       hydrochlorothiazide 12.5 MG capsule    MICROZIDE    90 capsule    Take 1 capsule (12.5 mg) by mouth daily    Hypertension, goal below 140/90       LIQUID TEARS 1.4 % ophthalmic solution   Generic drug:  polyvinyl alcohol      1 drop as needed.        meloxicam 7.5 MG tablet    MOBIC    90 tablet    TAKE 1 TABLET BY MOUTH ONCE DAILY    Rheumatoid arthritis involving multiple sites with positive rheumatoid factor (H)       methotrexate 2.5 MG tablet CHEMO      TK  6 TS  PO  PER WEEK        metoprolol succinate 50 MG 24 hr tablet    TOPROL-XL    90 tablet    TAKE 1 TABLET BY MOUTH ONCE DAILY    Rapid heart rate       nitroFURantoin macrocrystal 100 MG ED starter pack    MACRODANTIN     Take 1 capsule by mouth daily.        omeprazole 20 MG CR capsule    priLOSEC    90 capsule    TAKE 1 CAPSULE(20 MG) BY MOUTH DAILY    Gastroesophageal reflux disease with esophagitis       simvastatin 20 MG tablet    ZOCOR    90 tablet    Take 1 tablet (20 mg) by mouth At Bedtime    Hyperlipidemia LDL goal <130       Vitamin D-3 1000 units Caps      Take by mouth daily        ZYRTEC ALLERGY PO      Take 1 tablet by mouth daily

## 2018-06-12 NOTE — PATIENT INSTRUCTIONS
At [facility], we strive to deliver an exceptional experience to you, every time we see you.  If you receive a survey in the mail, please send us back your thoughts. We really do value your feedback.    Based on your medical history, these are the current health maintenance/preventive care services that you are due for (some may have been done at this visit.)  Health Maintenance Due   Topic Date Due     DEPRESSION ACTION PLAN Q1 YR  08/28/1953     PHQ-9 Q6 MONTHS  03/09/2017     TETANUS IMMUNIZATION (SYSTEM ASSIGNED)  03/13/2018         Suggested websites for health information:  Www.vBrand.[employer].com : Up to date and easily searchable information on multiple topics.  Www.medlineplus.gov : medication info, interactive tutorials, watch real surgeries online  Www.familydoctor.org : good info from the Academy of Family Physicians  Www.cdc.gov : public health info, travel advisories, epidemics (H1N1)  Www.aap.org : children's health info, normal development, vaccinations  Www.health.[facility].[place].us : [place] dept of health, public health issues in [place], N1N1    Your care team:                            Family Medicine Internal Medicine   MD Jose Alejandro Floyd MD Shantel Branch-Fleming, MD Katya Georgiev PA-C Nam Ho, MD Pediatrics   VINITA Gutiérrez, MD Kika Olivia CNP, MD Deborah Mielke, MD Kim Thein, APRN [facility]      Clinic hours: Monday - Thursday 7 am-7 pm; Fridays 7 am-5 pm.   Urgent care: Monday - Friday 11 am-9 pm; Saturday and Sunday 9 am-5 pm.  Pharmacy : Monday -Thursday 8 am-8 pm; Friday 8 am-6 pm; Saturday and Sunday 9 am-5 pm.     Clinic: [phone]   Pharmacy: [phone]    Before Your Surgery      Call your surgeon if there is any change in your health. This includes signs of a cold or flu (such as a sore throat, runny nose, cough, rash or fever).    Do not smoke, drink alcohol or take over the counter  medicine (unless your surgeon or primary care doctor tells you to) for the 24 hours before and after surgery.    If you take prescribed drugs: Follow your doctor s orders about which medicines to take and which to stop until after surgery.    Eating and drinking prior to surgery: follow the instructions from your surgeon    Take a shower or bath the night before surgery. Use the soap your surgeon gave you to gently clean your skin. If you do not have soap from your surgeon, use your regular soap. Do not shave or scrub the surgery site.  Wear clean pajamas and have clean sheets on your bed.     --Methotrexate: Stop for 2 weeks before and 2 weeks after the procedure recommended.

## 2018-06-12 NOTE — PROGRESS NOTES
[address] [address]  [phone]  Dept: [phone]    PRE-OP EVALUATION:  Today's date: 2018    Brenda Barker (: 1935) presents for pre-operative evaluation assessment as requested by Dr. Keo Priest.  She requires evaluation and anesthesia risk assessment prior to undergoing surgery/procedure for treatment of Left hip pain osteoarthritis.    Proposed Surgery/ Procedure: Left total hipArthroplasty  Date of Surgery/ Procedure: 18  Time of Surgery/ Procedure: 10:20am  Hospital/Surgical Facility: [facility]  Fax number for surgical facility: NA  Primary Physician: Liudmila Bowie  Type of Anesthesia Anticipated: General    Patient has a Health Care Directive or Living Will:  YES on file    1. NO - Do you have a history of heart attack, stroke, stent, bypass or surgery on an artery in the head, neck, heart or legs?  2. NO - Do you ever have any pain or discomfort in your chest?  3. NO - Do you have a history of  Heart Failure?  4. NO - Are you troubled by shortness of breath when: walking on the level, up a slight hill or at night?  5. NO - Do you currently have a cold, bronchitis or other respiratory infection?  6. NO - Do you have a cough, shortness of breath or wheezing?  7. NO - Do you sometimes get pains in the calves of your legs when you walk?  8. NO - Do you or anyone in your family have previous history of blood clots?  9. NO - Do you or does anyone in your family have a serious bleeding problem such as prolonged bleeding following surgeries or cuts?  10. NO - Have you ever had problems with anemia or been told to take iron pills?  11. NO - Have you had any abnormal blood loss such as black, tarry or bloody stools, or abnormal vaginal bleeding?  12. YES - HAVE YOU EVER HAD A BLOOD TRANSFUSION? Years ago  13. NO - Have you or any of your relatives ever had problems with anesthesia?  14. NO - Do you have sleep  apnea, excessive snoring or daytime drowsiness?  15. NO - Do you have any prosthetic heart valves?  16. YES - DO YOU HAVE PROSTHETIC JOINTS? Knee replacement  17. NO - Is there any chance that you may be pregnant?      HPI:     HPI related to upcoming procedure: severe left hip pain that is progressive with severe osteoarthritis and osteonecrosis of hip unresponsive to medical treatment.      HYPERLIPIDEMIA - Patient has a long history of significant Hyperlipidemia requiring medication for treatment with recent good control. Patient reports no problems or side effects with the medication.                                                                                                                                                       .  HYPERTENSION - Patient has longstanding history of HTN , currently denies any symptoms referable to elevated blood pressure. Specifically denies chest pain, palpitations, dyspnea, orthopnea, PND or peripheral edema. Blood pressure readings have been in normal range. Current medication regimen is as listed below. Patient denies any side effects of medication.                                                                                                                                                                                          .  Rheumatoid arthritis managed by rheumatologist on methotrexate.      MEDICAL HISTORY:     Patient Active Problem List    Diagnosis Date Noted     Hypertension, goal below 140/90 12/19/2017     Priority: High     Hyperlipidemia LDL goal <130 12/19/2017     Priority: High     Rheumatoid arthritis (H) 06/28/2004     Priority: High     Problem list name updated by automated process. 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     Priority: High     Surgery 1998 left side       ACP (advance care planning) 03/23/2018     Priority: Medium     Rapid heart rate 12/19/2017     Priority: Medium     Resting tremor 12/19/2017      Priority: Medium     Gastroesophageal reflux disease with esophagitis 12/11/2017     Priority: Medium     Obesity 10/20/2017     Priority: Medium     High risk medication use 10/20/2017     Priority: Medium     SNHL (sensorineural hearing loss) 05/30/2013     Priority: Medium     Status post total left knee replacement 10/20/2008     Priority: Medium     Cervicalgia 06/02/2004     Priority: Medium     Hemorrhoids 06/24/2003     Priority: Medium     Problem list name updated by automated process. Provider to review       Aortic valve disorder 06/24/2003     Priority: Medium     Problem list name updated by automated process. Provider to review        Past Medical History:   Diagnosis Date     AORTIC VALVE SCLEROSIS 1/2002    ECHO AV sclerosis, mild TR, trace MR     ASYMPTOMATIC PVCS      Back pain      Basal cell carcinoma of scalp      BORDERLINE ELEVATED HBA1C- 6.1%  4/04 6/12/2005     CARPAL TUNNEL SYNDROME, R>L 8/2001     CERV. 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 CSF leak     Neoplasm of uncertain behavior of skin      OBESITY -BMI 32      OSTEOPENIA 7/03    L1-4 1.7,FemNck L-0.8,R-1.2,F'arm dist-0.3     Pain in joint, pelvic region and thigh      Rheumatoid arthritis(714.0) 6/28/2004     Spinal stenosis, unspecified region other than cervical      Unspecified hearing loss      URIN TRACT INFECTION, RECURRENT      Past Surgical History:   Procedure Laterality Date     BACK SURGERY  2011    Fusion L3-4 & decompression; [facility]     BIOPSY  2013    Thyroid - normal result     C DEXA, BONE DENSITY, AXIAL SKEL  7/03 7/03 L1-4 1.7,  FemNkL-0.8,R-1.2, FArm Px 0.2,Dist-0.3     C REMOVAL OF OVARY(S)  1980    2nd ovary removed - endometriosis     C TOTAL ABDOM HYSTERECTOMY  1972    uterus, ovary removed - fibroid     EYE SURGERY  2010 & 11    Cataract surgery     HC COLONOSCOPY THRU STOMA W BIOPSY/CAUTERY TUMOR/POLYP/LESION  1993    adenomatous polyp     HC COLONOSCOPY THRU STOMA, DIAGNOSTIC  1998, 10/03    normal, '03 rec repeat in 5 yrs due to +hx polyp and + fam hx     HC FLEX SIGMOIDOSCOPY W/WO KARYNA SPEC BY BRUSH/WASH  12/2001     HC REMOVAL OF TONSILS,<11 Y/O  1942     ORTHOPEDIC SURGERY  2008    Left knee replacement     SURGICAL HISTORY OF -   1989    basal cell Ca scalp excised     SURGICAL HISTORY OF -   1998    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     Current Outpatient Prescriptions   Medication Sig Dispense Refill     acyclovir (ZOVIRAX) 400 MG tablet TAKE 1 TABLET BY MOUTH DAILY 90 tablet 0     ASPIRIN 81 MG OR TABS 1 TABLET DAILY       CENTRUM SILVER OR TABS 1 TABLET DAILY       Cetirizine HCl (ZYRTEC ALLERGY PO) Take 1 tablet by mouth daily       Cholecalciferol (VITAMIN D-3) 1000 UNITS CAPS Take by mouth daily       FISH OIL 1000 MG OR CAPS 2 CAPSULES DAILY  (? DOSE) OTC --     FOLIC ACID 1 MG OR TABS 4 mg TABS DAILY       hydrochlorothiazide (MICROZIDE) 12.5 MG capsule Take 1 capsule (12.5 mg) by mouth daily 90 capsule 3     meloxicam (MOBIC) 7.5 MG tablet TAKE 1 TABLET BY MOUTH ONCE DAILY 90 tablet 0     methotrexate 2.5 MG tablet CHEMO TK  6 TS  PO  PER WEEK  2     metoprolol succinate (TOPROL-XL) 50 MG 24 hr tablet TAKE 1 TABLET BY MOUTH ONCE DAILY 90 tablet 0     nitrofurantoin 100 MG Take 1 capsule by mouth daily.       omeprazole (PRILOSEC) 20 MG CR capsule TAKE 1 CAPSULE(20 MG) BY MOUTH DAILY 90 capsule 2     polyvinyl alcohol (LIQUID TEARS) 1.4 % ophthalmic solution 1 drop as needed.       simvastatin (ZOCOR) 20 MG tablet Take 1 tablet (20 mg) by mouth At Bedtime 90 tablet 3     OTC products: None,  "except as noted above    Allergies   Allergen Reactions     Diatrizoate Hives and Shortness Of Breath     Ciprofloxacin      nausea     Contrast Dye      Hives,anaphylaxix     Sulfasalazine      Other reaction(s): Rash      Latex Allergy: NO    Social History   Substance Use Topics     Smoking status: Never Smoker     Smokeless tobacco: Never Used     Alcohol use Yes      Comment: occasionally     History   Drug Use No       REVIEW OF SYSTEMS:   Constitutional, neuro, ENT, endocrine, pulmonary, cardiac, gastrointestinal, genitourinary, musculoskeletal, integument and psychiatric systems are negative, except as otherwise noted.    EXAM:   /66 (BP Location: Right arm, Patient Position: Chair, Cuff Size: Adult Regular)  Pulse 69  Temp 98.6  F (37  C) (Oral)  Resp 16  Ht 5' 2\" (1.575 m)  Wt 166 lb (75.3 kg)  LMP 05/17/1972  SpO2 97%  BMI 30.36 kg/m2    GENERAL APPEARANCE: healthy, alert and no distress     EYES: EOMI, PERRL     HENT: ear canals and TM's normal and nose and mouth without ulcers or lesions     NECK: no adenopathy, no asymmetry, masses, or scars and thyroid normal to palpation     RESP: lungs clear to auscultation - no rales, rhonchi or wheezes     CV: regular rates and rhythm, normal S1 S2, no S3 or S4 and no murmur, click or rub     ABDOMEN:  soft, nontender, no HSM or masses and bowel sounds normal     MS: extremities normal- no gross deformities noted, no evidence of inflammation in joints, FROM in all extremities.     SKIN: no suspicious lesions or rashes     NEURO: Normal strength and tone, sensory exam grossly normal, mentation intact and speech normal     PSYCH: mentation appears normal. and affect normal/bright     LYMPHATICS: No cervical adenopathy    DIAGNOSTICS:   EKG: appears normal, NSR, normal axis, normal intervals, no acute ST/T changes c/w ischemia, no LVH by voltage criteria, unchanged from previous tracings    Recent Labs   Lab Test  12/19/17   1223  08/11/17   1436  " 05/11/17   1416  05/11/17   1356   HGB   --   10.6*   --   12.4   PLT   --   255   --   324   NA  140   --   137   --    POTASSIUM  4.4   --   4.0   --    CR  0.68   --   0.85   --         IMPRESSION:   Reason for surgery/procedure: Left hip pain osteoarthritis.    Proposed Surgery/ Procedure: Left total hipArthroplasty  Diagnosis/reason for consult: cardiac and anesthesia risk assessment    The proposed surgical procedure is considered INTERMEDIATE risk.    REVISED CARDIAC RISK INDEX  The patient has the following serious cardiovascular risks for perioperative complications such as (MI, PE, VFib and 3  AV Block):  No serious cardiac risks  INTERPRETATION: 0 risks: Class I (very low risk - 0.4% complication rate)    The patient has the following additional risks for perioperative complications:  No identified additional risks      ICD-10-CM    1. Preop general physical exam Z01.818 EKG 12-lead complete w/read - Clinics- approved for surgery and anesthesia without further work up. MRSA culture   2. 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 M16.32 Total left hip replacement under anesthesia   3. Status post total left knee replacement Z96.652 Doing well    4. Rheumatoid arthritis involving multiple sites with positive rheumatoid factor (H) M05.79 Will stop methotrexate at this time. Last dose June 9th and next dose scheduled June 16, which is too close to surgery date. Patient will call her rheumatologist to confirm.    5. Hypertension, goal below 140/90- well controlled on medications  I10 UA reflex to Microscopic and Culture     CBC with platelets     Renal panel   6. Hyperlipidemia LDL goal <130 E78.5 Stable        RECOMMENDATIONS:     --Consult hospital rounder / IM to assist post-op medical management    --Patient is to take all scheduled medications on the day of surgery EXCEPT for modifications listed below.    Preoperative Joint replacement and Rheumatologic DMARD Use  The pending procedure is an  intervertebral disc arthroplasty or a total joint arthroplasty at the hip (RENETTA), knee (TKA), ankle shoulder, wrist or elbow.    --Methotrexate: Stop for 2 weeks before and 2 weeks after the procedure recommended.       APPROVAL GIVEN to proceed with proposed procedure, without further diagnostic evaluation       Signed Electronically by: Liudmila Bowie MD    Copy of this evaluation report is provided to requesting physician.    [facility] Preop Guidelines    Revised Cardiac Risk Index

## 2018-06-13 NOTE — PROGRESS NOTES
Dear Brenda    Your test results are attached.     The nasal culture is negative and you do not have an infection for MRSA.     Please contact me by [email or web address] if you have any questions about your labs or management.    Liudmila Bowie MD

## 2018-06-14 ENCOUNTER — MYC MEDICAL ADVICE (OUTPATIENT)
Dept: ORTHOPEDICS | Facility: CLINIC | Age: 83
End: 2018-06-14

## 2018-06-15 DIAGNOSIS — E83.52 HYPERCALCEMIA: Primary | ICD-10-CM

## 2018-06-15 DIAGNOSIS — M25.552 HIP PAIN, LEFT: ICD-10-CM

## 2018-06-15 DIAGNOSIS — I10 HYPERTENSION, GOAL BELOW 140/90: ICD-10-CM

## 2018-06-15 LAB
ALBUMIN SERPL-MCNC: 4.2 G/DL (ref 3.4–5)
ALBUMIN UR-MCNC: NEGATIVE MG/DL
ANION GAP SERPL CALCULATED.3IONS-SCNC: 11 MMOL/L (ref 3–14)
APPEARANCE UR: CLEAR
BILIRUB UR QL STRIP: NEGATIVE
BUN SERPL-MCNC: 22 MG/DL (ref 7–30)
CALCIUM SERPL-MCNC: 10.7 MG/DL (ref 8.5–10.1)
CHLORIDE SERPL-SCNC: 105 MMOL/L (ref 94–109)
CO2 SERPL-SCNC: 23 MMOL/L (ref 20–32)
COLOR UR AUTO: YELLOW
CREAT SERPL-MCNC: 0.78 MG/DL (ref 0.52–1.04)
ERYTHROCYTE [DISTWIDTH] IN BLOOD BY AUTOMATED COUNT: 17 % (ref 10–15)
GFR SERPL CREATININE-BSD FRML MDRD: 71 ML/MIN/1.7M2
GLUCOSE SERPL-MCNC: 98 MG/DL (ref 70–99)
GLUCOSE UR STRIP-MCNC: NEGATIVE MG/DL
HCT VFR BLD AUTO: 33.1 % (ref 35–47)
HGB BLD-MCNC: 10.4 G/DL (ref 11.7–15.7)
HGB UR QL STRIP: NEGATIVE
KETONES UR STRIP-MCNC: NEGATIVE MG/DL
LEUKOCYTE ESTERASE UR QL STRIP: NEGATIVE
MCH RBC QN AUTO: 28.5 PG (ref 26.5–33)
MCHC RBC AUTO-ENTMCNC: 31.4 G/DL (ref 31.5–36.5)
MCV RBC AUTO: 91 FL (ref 78–100)
NITRATE UR QL: NEGATIVE
PH UR STRIP: 6 PH (ref 5–7)
PHOSPHATE SERPL-MCNC: 3.2 MG/DL (ref 2.5–4.5)
PLATELET # BLD AUTO: 294 10E9/L (ref 150–450)
POTASSIUM SERPL-SCNC: 4.1 MMOL/L (ref 3.4–5.3)
RBC # BLD AUTO: 3.65 10E12/L (ref 3.8–5.2)
SODIUM SERPL-SCNC: 139 MMOL/L (ref 133–144)
SOURCE: NORMAL
SP GR UR STRIP: <=1.005 (ref 1–1.03)
UROBILINOGEN UR STRIP-ACNC: 0.2 EU/DL (ref 0.2–1)
WBC # BLD AUTO: 5.3 10E9/L (ref 4–11)

## 2018-06-15 PROCEDURE — 86850 RBC ANTIBODY SCREEN: CPT | Performed by: ORTHOPAEDIC SURGERY

## 2018-06-15 PROCEDURE — 86901 BLOOD TYPING SEROLOGIC RH(D): CPT | Performed by: ORTHOPAEDIC SURGERY

## 2018-06-15 PROCEDURE — 85027 COMPLETE CBC AUTOMATED: CPT | Performed by: FAMILY MEDICINE

## 2018-06-15 PROCEDURE — 80069 RENAL FUNCTION PANEL: CPT | Performed by: FAMILY MEDICINE

## 2018-06-15 PROCEDURE — 86900 BLOOD TYPING SEROLOGIC ABO: CPT | Performed by: ORTHOPAEDIC SURGERY

## 2018-06-15 PROCEDURE — 36415 COLL VENOUS BLD VENIPUNCTURE: CPT | Performed by: FAMILY MEDICINE

## 2018-06-15 PROCEDURE — 81003 URINALYSIS AUTO W/O SCOPE: CPT | Performed by: FAMILY MEDICINE

## 2018-06-16 ENCOUNTER — OFFICE VISIT (OUTPATIENT)
Dept: URGENT CARE | Facility: URGENT CARE | Age: 83
End: 2018-06-16
Payer: COMMERCIAL

## 2018-06-16 VITALS
DIASTOLIC BLOOD PRESSURE: 71 MMHG | HEART RATE: 65 BPM | BODY MASS INDEX: 30.36 KG/M2 | RESPIRATION RATE: 18 BRPM | OXYGEN SATURATION: 98 % | SYSTOLIC BLOOD PRESSURE: 163 MMHG | TEMPERATURE: 97.6 F | WEIGHT: 166 LBS

## 2018-06-16 DIAGNOSIS — T22.212A PARTIAL THICKNESS BURN OF LEFT FOREARM, INITIAL ENCOUNTER: Primary | ICD-10-CM

## 2018-06-16 PROCEDURE — 99213 OFFICE O/P EST LOW 20 MIN: CPT | Performed by: PHYSICIAN ASSISTANT

## 2018-06-16 RX ORDER — MUPIROCIN 20 MG/G
OINTMENT TOPICAL 3 TIMES DAILY
Qty: 15 G | Refills: 0 | Status: SHIPPED | OUTPATIENT
Start: 2018-06-16 | End: 2018-06-23

## 2018-06-16 RX ORDER — CEPHALEXIN 500 MG/1
500 CAPSULE ORAL 3 TIMES DAILY
Qty: 21 CAPSULE | Refills: 0 | Status: SHIPPED | OUTPATIENT
Start: 2018-06-16 | End: 2018-06-23
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
## 2018-06-16 NOTE — MR AVS SNAPSHOT
After Visit Summary   6/16/2018    Brenda Barker    MRN: [id number]           Patient Information     Date Of Birth          1935        Visit Information        Provider Department      6/16/2018 12:35 PM Lilo Henry PA-C [facility]        Today's Diagnoses     Partial thickness burn of left forearm, initial encounter    -  1       Follow-ups after your visit        Your next 10 appointments already scheduled     Jun 27, 2018   Procedure with Keo Priest MD   [facility] [place], Same Day Surgery (--)    [address]  [place] [address]   [phone]            Jul 10, 2018 11:00 AM CDT   Nurse Only with MG NURSE ONLY ORTHO   [facility])    [address] [place] [id number] [phone]            Aug 07, 2018 11:30 AM CDT   (Arrive by 11:00 AM)   Return Visit with Keo Priest MD   [facility])    [address] [place] [id number] [phone]              Who to contact     If you have questions or need follow up information about today's clinic visit or your schedule please contact [facility] directly at [phone].  Normal or non-critical lab and imaging results will be communicated to you by MyChart, letter or phone within 4 business days after the clinic has received the results. If you do not hear from us within 7 days, please contact the clinic through MyChart or phone. If you have a critical or abnormal lab result, we will notify you by phone as soon as possible.  Submit refill requests through [employer] or call your pharmacy and they will forward the refill request to us. Please allow 3 business days for your refill to be completed.          Additional Information About Your Visit        MyChart Information     [employer] gives you secure access to your electronic health record. If you see a primary care  provider, you can also send messages to your care team and make appointments. If you have questions, please call your primary care clinic.  If you do not have a primary care provider, please call [phone] and they will assist you.        Care EveryWhere ID     This is your Care EveryWhere ID. This could be used by other organizations to access your [facility] medical records  [id number]        Your Vitals Were     Pulse Temperature Respirations Last Period Pulse Oximetry BMI (Body Mass Index)    65 97.6  F (36.4  C) (Oral) 18 05/17/1972 98% 30.36 kg/m2       Blood Pressure from Last 3 Encounters:   06/16/18 163/71   06/12/18 142/66   05/29/18 167/71    Weight from Last 3 Encounters:   06/16/18 166 lb (75.3 kg)   06/12/18 166 lb (75.3 kg)   05/29/18 166 lb 0.1 oz (75.3 kg)              Today, you had the following     No orders found for display         Today's Medication Changes          These changes are accurate as of 6/16/18 12:55 PM.  If you have any questions, ask your nurse or doctor.               Start taking these medicines.        Dose/Directions    cephALEXin 500 MG capsule   Commonly known as:  KEFLEX   Used for:  Partial thickness burn of left forearm, initial encounter   Started by:  Lilo Henry PA-C        Dose:  500 mg   Take 1 capsule (500 mg) by mouth 3 times daily for 7 days   Quantity:  21 capsule   Refills:  0       mupirocin 2 % ointment   Commonly known as:  BACTROBAN   Used for:  Partial thickness burn of left forearm, initial encounter   Started by:  Lilo Henry PA-C        Apply topically 3 times daily for 7 days   Quantity:  15 g   Refills:  0            Where to get your medicines      These medications were sent to [id number]  [place] - [address] [place] AT [facility] [address] [place] [address]     Phone:  [phone]     cephALEXin 500 MG capsule    mupirocin 2 % ointment                Primary Care Provider  Office Phone # Fax #    Liudmila Sindy Bowie [phone] [address]  [facility] [address]        Equal Access to Services     MALATHI CANNON : Hadii simi ku pageo Soomaali, waaxda luqadaha, qaybta kaalmada adeegyada, liliana lee laRicardomaddy del valle. So [facility] [phone].    ATENCIÓN: Si habla español, tiene a beltran disposición servicios gratuitos de asistencia lingüística. Llame al [phone].    We comply with applicable federal civil rights laws and [place] laws. We do not discriminate on the basis of race, color, national origin, age, disability, sex, sexual orientation, or gender identity.            Thank you!     Thank you for choosing [facility]  for your care. Our goal is always to provide you with excellent care. Hearing back from our patients is one way we can continue to improve our services. Please take a few minutes to complete the written survey that you may receive in the mail after your visit with us. Thank you!             Your Updated Medication List - Protect others around you: Learn how to safely use, store and throw away your medicines at www.disposemymeds.org.          This list is accurate as of 6/16/18 12:55 PM.  Always use your most recent med list.                   Brand Name Dispense Instructions for use Diagnosis    acyclovir 400 MG tablet    ZOVIRAX    90 tablet    TAKE 1 TABLET BY MOUTH DAILY    Herpes simplex virus infection       aspirin 81 MG tablet      1 TABLET DAILY        CENTRUM SILVER per tablet      1 TABLET DAILY        cephALEXin 500 MG capsule    KEFLEX    21 capsule    Take 1 capsule (500 mg) by mouth 3 times daily for 7 days    Partial thickness burn of left forearm, initial encounter       fish oil-omega-3 fatty acids 1000 MG capsule     OTC    2 CAPSULES DAILY  (? DOSE)    Other and unspecified hyperlipidemia       folic acid 1 MG tablet    FOLVITE     4 mg TABS DAILY    Rheumatoid arthritis(714.0), Obesity,  unspecified, Other abnormal glucose       hydrochlorothiazide 12.5 MG capsule    MICROZIDE    90 capsule    Take 1 capsule (12.5 mg) by mouth daily    Hypertension, goal below 140/90       LIQUID TEARS 1.4 % ophthalmic solution   Generic drug:  polyvinyl alcohol      1 drop as needed.        meloxicam 7.5 MG tablet    MOBIC    90 tablet    TAKE 1 TABLET BY MOUTH ONCE DAILY    Rheumatoid arthritis involving multiple sites with positive rheumatoid factor (H)       methotrexate 2.5 MG tablet CHEMO      TK  6 TS  PO  PER WEEK        metoprolol succinate 50 MG 24 hr tablet    TOPROL-XL    90 tablet    TAKE 1 TABLET BY MOUTH ONCE DAILY    Rapid heart rate       mupirocin 2 % ointment    BACTROBAN    15 g    Apply topically 3 times daily for 7 days    Partial thickness burn of left forearm, initial encounter       nitroFURantoin macrocrystal 100 MG ED starter pack    MACRODANTIN     Take 1 capsule by mouth daily.        omeprazole 20 MG CR capsule    priLOSEC    90 capsule    TAKE 1 CAPSULE(20 MG) BY MOUTH DAILY    Gastroesophageal reflux disease with esophagitis       simvastatin 20 MG tablet    ZOCOR    90 tablet    Take 1 tablet (20 mg) by mouth At Bedtime    Hyperlipidemia LDL goal <130       Vitamin D-3 1000 units Caps      Take by mouth daily        ZYRTEC ALLERGY PO      Take 1 tablet by mouth daily

## 2018-06-17 PROBLEM — E83.52 HYPERCALCEMIA: Status: ACTIVE | Noted: 2018-06-17

## 2018-06-17 NOTE — PROGRESS NOTES
Dear Brenda    Your test results are attached. I am happy to let you know that they are stable.    You have persistent hypercalcemia or high calcium level in your blood. We should check a parathyroid hormone level and a vitamin D level. The kidneys are healthy. The blood sugar is normal and you do not have diabetes. Your hemoglobin is low but stable and not too low for surgery. The urinalysis is normal. Please schedule a follow up lab visit at your convenience to check the PTH level.    Please contact me by [employer] if you have any questions about your labs or management.    Liudmila Bowie MD

## 2018-06-25 ENCOUNTER — MYC MEDICAL ADVICE (OUTPATIENT)
Dept: ORTHOPEDICS | Facility: CLINIC | Age: 83
End: 2018-06-25

## 2018-06-27 ENCOUNTER — HOSPITAL ENCOUNTER (INPATIENT)
Facility: CLINIC | Age: 83
LOS: 4 days | Discharge: NURSING, REHAB OR LONG-TERM CARE | DRG: 470 | End: 2018-07-01
Attending: ORTHOPAEDIC SURGERY | Admitting: ORTHOPAEDIC SURGERY
Payer: MEDICARE

## 2018-06-27 ENCOUNTER — ANESTHESIA EVENT (OUTPATIENT)
Dept: SURGERY | Facility: CLINIC | Age: 83
DRG: 470 | End: 2018-06-27
Payer: MEDICARE

## 2018-06-27 ENCOUNTER — ANESTHESIA (OUTPATIENT)
Dept: SURGERY | Facility: CLINIC | Age: 83
DRG: 470 | End: 2018-06-27
Payer: MEDICARE

## 2018-06-27 ENCOUNTER — APPOINTMENT (OUTPATIENT)
Dept: GENERAL RADIOLOGY | Facility: CLINIC | Age: 83
DRG: 470 | End: 2018-06-27
Attending: ORTHOPAEDIC SURGERY
Payer: MEDICARE

## 2018-06-27 DIAGNOSIS — Z98.890 STATUS POST HIP SURGERY: Primary | ICD-10-CM

## 2018-06-27 LAB
ABO + RH BLD: NORMAL
ABO + RH BLD: NORMAL
ALBUMIN UR-MCNC: NEGATIVE MG/DL
APPEARANCE UR: CLEAR
BACTERIA #/AREA URNS HPF: ABNORMAL /HPF
BILIRUB UR QL STRIP: NEGATIVE
BLD GP AB SCN SERPL QL: NORMAL
BLOOD BANK CMNT PATIENT-IMP: NORMAL
BLOOD BANK CMNT PATIENT-IMP: NORMAL
COLOR UR AUTO: YELLOW
CREAT SERPL-MCNC: 0.68 MG/DL (ref 0.52–1.04)
GFR SERPL CREATININE-BSD FRML MDRD: 83 ML/MIN/1.7M2
GLUCOSE BLDC GLUCOMTR-MCNC: 107 MG/DL (ref 70–99)
GLUCOSE UR STRIP-MCNC: NEGATIVE MG/DL
HGB UR QL STRIP: NEGATIVE
KETONES UR STRIP-MCNC: NEGATIVE MG/DL
LEUKOCYTE ESTERASE UR QL STRIP: NEGATIVE
MUCOUS THREADS #/AREA URNS LPF: PRESENT /LPF
NITRATE UR QL: NEGATIVE
PH UR STRIP: 6.5 PH (ref 5–7)
PLATELET # BLD AUTO: 349 10E9/L (ref 150–450)
RBC #/AREA URNS AUTO: <1 /HPF (ref 0–2)
SOURCE: ABNORMAL
SP GR UR STRIP: 1.01 (ref 1–1.03)
SPECIMEN EXP DATE BLD: NORMAL
SQUAMOUS #/AREA URNS AUTO: 1 /HPF (ref 0–1)
UROBILINOGEN UR STRIP-MCNC: NORMAL MG/DL (ref 0–2)
WBC #/AREA URNS AUTO: 0 /HPF (ref 0–5)

## 2018-06-27 PROCEDURE — 81001 URINALYSIS AUTO W/SCOPE: CPT | Performed by: ORTHOPAEDIC SURGERY

## 2018-06-27 PROCEDURE — C1776 JOINT DEVICE (IMPLANTABLE): HCPCS | Performed by: ORTHOPAEDIC SURGERY

## 2018-06-27 PROCEDURE — 25000125 ZZHC RX 250: Performed by: ANESTHESIOLOGY

## 2018-06-27 PROCEDURE — 25000125 ZZHC RX 250: Performed by: NURSE ANESTHETIST, CERTIFIED REGISTERED

## 2018-06-27 PROCEDURE — 99207 ZZC CONSULT E&M CHANGED TO SUBSEQUENT LEVEL: CPT | Performed by: INTERNAL MEDICINE

## 2018-06-27 PROCEDURE — 36000062 ZZH SURGERY LEVEL 4 1ST 30 MIN - UMMC: Performed by: ORTHOPAEDIC SURGERY

## 2018-06-27 PROCEDURE — 25000128 H RX IP 250 OP 636: Performed by: INTERNAL MEDICINE

## 2018-06-27 PROCEDURE — 25000132 ZZH RX MED GY IP 250 OP 250 PS 637: Mod: GY | Performed by: STUDENT IN AN ORGANIZED HEALTH CARE EDUCATION/TRAINING PROGRAM

## 2018-06-27 PROCEDURE — 99232 SBSQ HOSP IP/OBS MODERATE 35: CPT | Performed by: INTERNAL MEDICINE

## 2018-06-27 PROCEDURE — 25000125 ZZHC RX 250: Performed by: ORTHOPAEDIC SURGERY

## 2018-06-27 PROCEDURE — A9270 NON-COVERED ITEM OR SERVICE: HCPCS | Mod: GY | Performed by: STUDENT IN AN ORGANIZED HEALTH CARE EDUCATION/TRAINING PROGRAM

## 2018-06-27 PROCEDURE — 25000128 H RX IP 250 OP 636: Performed by: ORTHOPAEDIC SURGERY

## 2018-06-27 PROCEDURE — 25800025 ZZH RX 258: Performed by: ORTHOPAEDIC SURGERY

## 2018-06-27 PROCEDURE — 25000128 H RX IP 250 OP 636: Performed by: ANESTHESIOLOGY

## 2018-06-27 PROCEDURE — 25000128 H RX IP 250 OP 636: Performed by: NURSE ANESTHETIST, CERTIFIED REGISTERED

## 2018-06-27 PROCEDURE — 25000132 ZZH RX MED GY IP 250 OP 250 PS 637: Mod: GY | Performed by: INTERNAL MEDICINE

## 2018-06-27 PROCEDURE — 37000008 ZZH ANESTHESIA TECHNICAL FEE, 1ST 30 MIN: Performed by: ORTHOPAEDIC SURGERY

## 2018-06-27 PROCEDURE — 37000009 ZZH ANESTHESIA TECHNICAL FEE, EACH ADDTL 15 MIN: Performed by: ORTHOPAEDIC SURGERY

## 2018-06-27 PROCEDURE — A9270 NON-COVERED ITEM OR SERVICE: HCPCS | Mod: GY | Performed by: INTERNAL MEDICINE

## 2018-06-27 PROCEDURE — 40000170 ZZH STATISTIC PRE-PROCEDURE ASSESSMENT II: Performed by: ORTHOPAEDIC SURGERY

## 2018-06-27 PROCEDURE — 27810169 ZZH OR IMPLANT GENERAL: Performed by: ORTHOPAEDIC SURGERY

## 2018-06-27 PROCEDURE — 36415 COLL VENOUS BLD VENIPUNCTURE: CPT | Performed by: ORTHOPAEDIC SURGERY

## 2018-06-27 PROCEDURE — 85049 AUTOMATED PLATELET COUNT: CPT | Performed by: ORTHOPAEDIC SURGERY

## 2018-06-27 PROCEDURE — 0SRB019 REPLACEMENT OF LEFT HIP JOINT WITH METAL SYNTHETIC SUBSTITUTE, CEMENTED, OPEN APPROACH: ICD-10-PCS | Performed by: ORTHOPAEDIC SURGERY

## 2018-06-27 PROCEDURE — 12000001 ZZH R&B MED SURG/OB UMMC

## 2018-06-27 PROCEDURE — 36000064 ZZH SURGERY LEVEL 4 EA 15 ADDTL MIN - UMMC: Performed by: ORTHOPAEDIC SURGERY

## 2018-06-27 PROCEDURE — 25000128 H RX IP 250 OP 636: Performed by: STUDENT IN AN ORGANIZED HEALTH CARE EDUCATION/TRAINING PROGRAM

## 2018-06-27 PROCEDURE — 40000985 XR PELVIS AND HIP LEFT 1 VIEW

## 2018-06-27 PROCEDURE — 71000014 ZZH RECOVERY PHASE 1 LEVEL 2 FIRST HR: Performed by: ORTHOPAEDIC SURGERY

## 2018-06-27 PROCEDURE — A9270 NON-COVERED ITEM OR SERVICE: HCPCS | Mod: GY | Performed by: ORTHOPAEDIC SURGERY

## 2018-06-27 PROCEDURE — 25000132 ZZH RX MED GY IP 250 OP 250 PS 637: Mod: GY | Performed by: ORTHOPAEDIC SURGERY

## 2018-06-27 PROCEDURE — C1713 ANCHOR/SCREW BN/BN,TIS/BN: HCPCS | Performed by: ORTHOPAEDIC SURGERY

## 2018-06-27 PROCEDURE — 00000146 ZZHCL STATISTIC GLUCOSE BY METER IP

## 2018-06-27 PROCEDURE — 27210995 ZZH RX 272: Performed by: ORTHOPAEDIC SURGERY

## 2018-06-27 PROCEDURE — 82565 ASSAY OF CREATININE: CPT | Performed by: ORTHOPAEDIC SURGERY

## 2018-06-27 PROCEDURE — 27210794 ZZH OR GENERAL SUPPLY STERILE: Performed by: ORTHOPAEDIC SURGERY

## 2018-06-27 DEVICE — IMP HEAD FEMORAL SNN BIPOLAR COBALT 36MM +4 71303604: Type: IMPLANTABLE DEVICE | Site: HIP | Status: FUNCTIONAL

## 2018-06-27 DEVICE — BONE CEMENT SIMPLEX W/TOBRAMYCIN 6197-9-001: Type: IMPLANTABLE DEVICE | Site: HIP | Status: FUNCTIONAL

## 2018-06-27 DEVICE — IMP LINER SNR ACET R3 XLPE 0DEG 36X54MM 71332754: Type: IMPLANTABLE DEVICE | Site: HIP | Status: FUNCTIONAL

## 2018-06-27 DEVICE — IMP SHELL SNR ACET R3 3H 54MM 71335554: Type: IMPLANTABLE DEVICE | Site: HIP | Status: FUNCTIONAL

## 2018-06-27 DEVICE — IMPLANTABLE DEVICE: Type: IMPLANTABLE DEVICE | Site: HIP | Status: FUNCTIONAL

## 2018-06-27 DEVICE — BONE CEMENT RESTRICTOR BUCK FEMORAL 25MM 129419: Type: IMPLANTABLE DEVICE | Site: HIP | Status: FUNCTIONAL

## 2018-06-27 DEVICE — IMP SCR ACET SNN SPHERICAL HEAD 6.5X25MM 71332525: Type: IMPLANTABLE DEVICE | Site: HIP | Status: FUNCTIONAL

## 2018-06-27 DEVICE — IMP SCR ACET SNN SPHERICAL HEAD 6.5X40MM 71332540: Type: IMPLANTABLE DEVICE | Site: HIP | Status: FUNCTIONAL

## 2018-06-27 RX ORDER — CEFAZOLIN SODIUM 1 G/3ML
1 INJECTION, POWDER, FOR SOLUTION INTRAMUSCULAR; INTRAVENOUS SEE ADMIN INSTRUCTIONS
Status: DISCONTINUED | OUTPATIENT
Start: 2018-06-27 | End: 2018-06-27

## 2018-06-27 RX ORDER — HYDROMORPHONE HYDROCHLORIDE 1 MG/ML
.3-.5 INJECTION, SOLUTION INTRAMUSCULAR; INTRAVENOUS; SUBCUTANEOUS
Status: DISCONTINUED | OUTPATIENT
Start: 2018-06-27 | End: 2018-06-27

## 2018-06-27 RX ORDER — CEFAZOLIN SODIUM 1 G/3ML
1 INJECTION, POWDER, FOR SOLUTION INTRAMUSCULAR; INTRAVENOUS EVERY 8 HOURS
Status: COMPLETED | OUTPATIENT
Start: 2018-06-27 | End: 2018-06-28

## 2018-06-27 RX ORDER — SODIUM CHLORIDE, SODIUM LACTATE, POTASSIUM CHLORIDE, CALCIUM CHLORIDE 600; 310; 30; 20 MG/100ML; MG/100ML; MG/100ML; MG/100ML
INJECTION, SOLUTION INTRAVENOUS CONTINUOUS PRN
Status: DISCONTINUED | OUTPATIENT
Start: 2018-06-27 | End: 2018-06-27

## 2018-06-27 RX ORDER — ACETAMINOPHEN 325 MG/1
650 TABLET ORAL EVERY 4 HOURS PRN
Status: DISCONTINUED | OUTPATIENT
Start: 2018-06-30 | End: 2018-07-01 | Stop reason: HOSPADM

## 2018-06-27 RX ORDER — ONDANSETRON 2 MG/ML
INJECTION INTRAMUSCULAR; INTRAVENOUS PRN
Status: DISCONTINUED | OUTPATIENT
Start: 2018-06-27 | End: 2018-06-27

## 2018-06-27 RX ORDER — CETIRIZINE HYDROCHLORIDE 5 MG/1
5 TABLET ORAL EVERY EVENING
Status: DISCONTINUED | OUTPATIENT
Start: 2018-06-27 | End: 2018-07-01 | Stop reason: HOSPADM

## 2018-06-27 RX ORDER — ACYCLOVIR 400 MG/1
400 TABLET ORAL DAILY
Status: DISCONTINUED | OUTPATIENT
Start: 2018-06-28 | End: 2018-07-01 | Stop reason: HOSPADM

## 2018-06-27 RX ORDER — BUPIVACAINE HYDROCHLORIDE 7.5 MG/ML
INJECTION, SOLUTION EPIDURAL; RETROBULBAR PRN
Status: DISCONTINUED | OUTPATIENT
Start: 2018-06-27 | End: 2018-06-27

## 2018-06-27 RX ORDER — EPINEPHRINE 1 MG/ML
INJECTION, SOLUTION, CONCENTRATE INTRAVENOUS PRN
Status: DISCONTINUED | OUTPATIENT
Start: 2018-06-27 | End: 2018-06-27 | Stop reason: HOSPADM

## 2018-06-27 RX ORDER — ACETAMINOPHEN 325 MG/1
975 TABLET ORAL ONCE
Status: COMPLETED | OUTPATIENT
Start: 2018-06-27 | End: 2018-06-27

## 2018-06-27 RX ORDER — CEFAZOLIN SODIUM 2 G/100ML
2 INJECTION, SOLUTION INTRAVENOUS
Status: COMPLETED | OUTPATIENT
Start: 2018-06-27 | End: 2018-06-27

## 2018-06-27 RX ORDER — NALOXONE HYDROCHLORIDE 0.4 MG/ML
.1-.4 INJECTION, SOLUTION INTRAMUSCULAR; INTRAVENOUS; SUBCUTANEOUS
Status: DISCONTINUED | OUTPATIENT
Start: 2018-06-27 | End: 2018-06-27 | Stop reason: HOSPADM

## 2018-06-27 RX ORDER — SODIUM CHLORIDE 9 MG/ML
INJECTION, SOLUTION INTRAVENOUS CONTINUOUS
Status: DISCONTINUED | OUTPATIENT
Start: 2018-06-27 | End: 2018-06-29

## 2018-06-27 RX ORDER — OXYCODONE HYDROCHLORIDE 5 MG/1
5-10 TABLET ORAL EVERY 4 HOURS PRN
Status: DISCONTINUED | OUTPATIENT
Start: 2018-06-27 | End: 2018-06-27

## 2018-06-27 RX ORDER — HYDROMORPHONE HCL/0.9% NACL/PF 0.2MG/0.2
.2-.4 SYRINGE (ML) INTRAVENOUS
Status: DISCONTINUED | OUTPATIENT
Start: 2018-06-27 | End: 2018-07-01 | Stop reason: HOSPADM

## 2018-06-27 RX ORDER — HYDROMORPHONE HYDROCHLORIDE 1 MG/ML
.3-.5 INJECTION, SOLUTION INTRAMUSCULAR; INTRAVENOUS; SUBCUTANEOUS EVERY 10 MIN PRN
Status: DISCONTINUED | OUTPATIENT
Start: 2018-06-27 | End: 2018-06-27 | Stop reason: HOSPADM

## 2018-06-27 RX ORDER — MEPERIDINE HYDROCHLORIDE 25 MG/ML
12.5 INJECTION INTRAMUSCULAR; INTRAVENOUS; SUBCUTANEOUS
Status: DISCONTINUED | OUTPATIENT
Start: 2018-06-27 | End: 2018-06-27 | Stop reason: HOSPADM

## 2018-06-27 RX ORDER — EPHEDRINE SULFATE 50 MG/ML
INJECTION, SOLUTION INTRAVENOUS PRN
Status: DISCONTINUED | OUTPATIENT
Start: 2018-06-27 | End: 2018-06-27

## 2018-06-27 RX ORDER — AMOXICILLIN 250 MG
2 CAPSULE ORAL 2 TIMES DAILY
Status: DISCONTINUED | OUTPATIENT
Start: 2018-06-27 | End: 2018-07-01 | Stop reason: HOSPADM

## 2018-06-27 RX ORDER — SODIUM CHLORIDE, SODIUM LACTATE, POTASSIUM CHLORIDE, CALCIUM CHLORIDE 600; 310; 30; 20 MG/100ML; MG/100ML; MG/100ML; MG/100ML
INJECTION, SOLUTION INTRAVENOUS CONTINUOUS
Status: DISCONTINUED | OUTPATIENT
Start: 2018-06-27 | End: 2018-06-27

## 2018-06-27 RX ORDER — MAGNESIUM HYDROXIDE 1200 MG/15ML
LIQUID ORAL PRN
Status: DISCONTINUED | OUTPATIENT
Start: 2018-06-27 | End: 2018-06-27 | Stop reason: HOSPADM

## 2018-06-27 RX ORDER — ONDANSETRON 2 MG/ML
4 INJECTION INTRAMUSCULAR; INTRAVENOUS EVERY 30 MIN PRN
Status: DISCONTINUED | OUTPATIENT
Start: 2018-06-27 | End: 2018-06-27 | Stop reason: HOSPADM

## 2018-06-27 RX ORDER — LIDOCAINE HYDROCHLORIDE 20 MG/ML
INJECTION, SOLUTION INFILTRATION; PERINEURAL PRN
Status: DISCONTINUED | OUTPATIENT
Start: 2018-06-27 | End: 2018-06-27

## 2018-06-27 RX ORDER — SODIUM CHLORIDE, SODIUM LACTATE, POTASSIUM CHLORIDE, CALCIUM CHLORIDE 600; 310; 30; 20 MG/100ML; MG/100ML; MG/100ML; MG/100ML
INJECTION, SOLUTION INTRAVENOUS CONTINUOUS
Status: DISCONTINUED | OUTPATIENT
Start: 2018-06-27 | End: 2018-06-27 | Stop reason: HOSPADM

## 2018-06-27 RX ORDER — AMOXICILLIN 250 MG
1 CAPSULE ORAL 2 TIMES DAILY
Status: DISCONTINUED | OUTPATIENT
Start: 2018-06-27 | End: 2018-07-01 | Stop reason: HOSPADM

## 2018-06-27 RX ORDER — NALOXONE HYDROCHLORIDE 0.4 MG/ML
.1-.4 INJECTION, SOLUTION INTRAMUSCULAR; INTRAVENOUS; SUBCUTANEOUS
Status: DISCONTINUED | OUTPATIENT
Start: 2018-06-27 | End: 2018-07-01 | Stop reason: HOSPADM

## 2018-06-27 RX ORDER — ONDANSETRON 4 MG/1
4 TABLET, ORALLY DISINTEGRATING ORAL EVERY 6 HOURS PRN
Status: DISCONTINUED | OUTPATIENT
Start: 2018-06-27 | End: 2018-07-01 | Stop reason: HOSPADM

## 2018-06-27 RX ORDER — ACETAMINOPHEN 325 MG/1
975 TABLET ORAL EVERY 8 HOURS
Status: COMPLETED | OUTPATIENT
Start: 2018-06-27 | End: 2018-06-30

## 2018-06-27 RX ORDER — BISACODYL 10 MG
10 SUPPOSITORY, RECTAL RECTAL DAILY
Status: DISCONTINUED | OUTPATIENT
Start: 2018-06-28 | End: 2018-07-01 | Stop reason: HOSPADM

## 2018-06-27 RX ORDER — ONDANSETRON 2 MG/ML
4 INJECTION INTRAMUSCULAR; INTRAVENOUS EVERY 6 HOURS PRN
Status: DISCONTINUED | OUTPATIENT
Start: 2018-06-27 | End: 2018-07-01 | Stop reason: HOSPADM

## 2018-06-27 RX ORDER — FENTANYL CITRATE 50 UG/ML
25-50 INJECTION, SOLUTION INTRAMUSCULAR; INTRAVENOUS
Status: DISCONTINUED | OUTPATIENT
Start: 2018-06-27 | End: 2018-06-27 | Stop reason: HOSPADM

## 2018-06-27 RX ORDER — ONDANSETRON 4 MG/1
4 TABLET, ORALLY DISINTEGRATING ORAL EVERY 30 MIN PRN
Status: DISCONTINUED | OUTPATIENT
Start: 2018-06-27 | End: 2018-06-27 | Stop reason: HOSPADM

## 2018-06-27 RX ORDER — LIDOCAINE 40 MG/G
CREAM TOPICAL
Status: DISCONTINUED | OUTPATIENT
Start: 2018-06-27 | End: 2018-07-01 | Stop reason: HOSPADM

## 2018-06-27 RX ORDER — FOLIC ACID 1 MG/1
2 TABLET ORAL DAILY
Status: DISCONTINUED | OUTPATIENT
Start: 2018-06-27 | End: 2018-06-28

## 2018-06-27 RX ORDER — PROPOFOL 10 MG/ML
INJECTION, EMULSION INTRAVENOUS CONTINUOUS PRN
Status: DISCONTINUED | OUTPATIENT
Start: 2018-06-27 | End: 2018-06-27

## 2018-06-27 RX ORDER — DEXAMETHASONE SODIUM PHOSPHATE 4 MG/ML
INJECTION, SOLUTION INTRA-ARTICULAR; INTRALESIONAL; INTRAMUSCULAR; INTRAVENOUS; SOFT TISSUE PRN
Status: DISCONTINUED | OUTPATIENT
Start: 2018-06-27 | End: 2018-06-27

## 2018-06-27 RX ORDER — METOPROLOL SUCCINATE 50 MG/1
50 TABLET, EXTENDED RELEASE ORAL DAILY
Status: DISCONTINUED | OUTPATIENT
Start: 2018-06-28 | End: 2018-07-01 | Stop reason: HOSPADM

## 2018-06-27 RX ORDER — GABAPENTIN 100 MG/1
100 CAPSULE ORAL
Status: COMPLETED | OUTPATIENT
Start: 2018-06-27 | End: 2018-06-27

## 2018-06-27 RX ORDER — SIMVASTATIN 20 MG
20 TABLET ORAL AT BEDTIME
Status: DISCONTINUED | OUTPATIENT
Start: 2018-06-27 | End: 2018-07-01 | Stop reason: HOSPADM

## 2018-06-27 RX ORDER — MULTIPLE VITAMINS W/ MINERALS TAB 9MG-400MCG
1 TAB ORAL DAILY
Status: DISCONTINUED | OUTPATIENT
Start: 2018-06-28 | End: 2018-07-01 | Stop reason: HOSPADM

## 2018-06-27 RX ADMIN — PROPOFOL 30 MCG/KG/MIN: 10 INJECTION, EMULSION INTRAVENOUS at 11:22

## 2018-06-27 RX ADMIN — GABAPENTIN 100 MG: 100 CAPSULE ORAL at 09:41

## 2018-06-27 RX ADMIN — ACETAMINOPHEN 975 MG: 325 TABLET, FILM COATED ORAL at 09:27

## 2018-06-27 RX ADMIN — CETIRIZINE HYDROCHLORIDE 5 MG: 5 TABLET ORAL at 20:25

## 2018-06-27 RX ADMIN — SODIUM CHLORIDE, POTASSIUM CHLORIDE, SODIUM LACTATE AND CALCIUM CHLORIDE: 600; 310; 30; 20 INJECTION, SOLUTION INTRAVENOUS at 14:00

## 2018-06-27 RX ADMIN — SENNOSIDES AND DOCUSATE SODIUM 1 TABLET: 8.6; 5 TABLET ORAL at 20:26

## 2018-06-27 RX ADMIN — ONDANSETRON 4 MG: 2 INJECTION INTRAMUSCULAR; INTRAVENOUS at 13:33

## 2018-06-27 RX ADMIN — LIDOCAINE HYDROCHLORIDE 50 MG: 20 INJECTION, SOLUTION INFILTRATION; PERINEURAL at 11:30

## 2018-06-27 RX ADMIN — SODIUM CHLORIDE, POTASSIUM CHLORIDE, SODIUM LACTATE AND CALCIUM CHLORIDE: 600; 310; 30; 20 INJECTION, SOLUTION INTRAVENOUS at 12:44

## 2018-06-27 RX ADMIN — BUPIVACAINE HYDROCHLORIDE 1.6 ML: 7.5 INJECTION, SOLUTION EPIDURAL; RETROBULBAR at 11:15

## 2018-06-27 RX ADMIN — CEFAZOLIN 1 G: 1 INJECTION, POWDER, FOR SOLUTION INTRAMUSCULAR; INTRAVENOUS at 13:13

## 2018-06-27 RX ADMIN — VITAMIN D, TAB 1000IU (100/BT) 2000 UNITS: 25 TAB at 17:01

## 2018-06-27 RX ADMIN — HYDROMORPHONE HYDROCHLORIDE 0.3 MG: 1 INJECTION, SOLUTION INTRAMUSCULAR; INTRAVENOUS; SUBCUTANEOUS at 14:27

## 2018-06-27 RX ADMIN — EPHEDRINE SULFATE 5 MG: 50 INJECTION, SOLUTION INTRAVENOUS at 12:00

## 2018-06-27 RX ADMIN — ACETAMINOPHEN 975 MG: 325 TABLET, FILM COATED ORAL at 17:01

## 2018-06-27 RX ADMIN — SODIUM CHLORIDE, POTASSIUM CHLORIDE, SODIUM LACTATE AND CALCIUM CHLORIDE: 600; 310; 30; 20 INJECTION, SOLUTION INTRAVENOUS at 11:08

## 2018-06-27 RX ADMIN — SODIUM CHLORIDE 1 G: 9 INJECTION, SOLUTION INTRAVENOUS at 13:33

## 2018-06-27 RX ADMIN — PHENYLEPHRINE HYDROCHLORIDE 0.2 MCG/KG/MIN: 10 INJECTION, SOLUTION INTRAMUSCULAR; INTRAVENOUS; SUBCUTANEOUS at 12:00

## 2018-06-27 RX ADMIN — OXYCODONE HYDROCHLORIDE 5 MG: 5 TABLET ORAL at 17:01

## 2018-06-27 RX ADMIN — SIMVASTATIN 20 MG: 20 TABLET, FILM COATED ORAL at 21:26

## 2018-06-27 RX ADMIN — CEFAZOLIN 1 G: 330 INJECTION, POWDER, FOR SOLUTION INTRAMUSCULAR; INTRAVENOUS at 20:27

## 2018-06-27 RX ADMIN — DEXAMETHASONE SODIUM PHOSPHATE 8 MG: 4 INJECTION, SOLUTION INTRAMUSCULAR; INTRAVENOUS at 13:33

## 2018-06-27 RX ADMIN — SODIUM CHLORIDE 1 G: 9 INJECTION, SOLUTION INTRAVENOUS at 11:10

## 2018-06-27 RX ADMIN — Medication 1 MG: at 21:26

## 2018-06-27 RX ADMIN — FAMOTIDINE 20 MG: 10 INJECTION, SOLUTION INTRAVENOUS at 13:38

## 2018-06-27 RX ADMIN — SODIUM CHLORIDE: 9 INJECTION, SOLUTION INTRAVENOUS at 18:46

## 2018-06-27 RX ADMIN — CEFAZOLIN SODIUM 2 G: 2 INJECTION, SOLUTION INTRAVENOUS at 11:17

## 2018-06-27 NOTE — ANESTHESIA CARE TRANSFER NOTE
Patient: Brenda Barker    Procedure(s):  Left Total Hip Arthroplasty   - Wound Class: I-Clean    Diagnosis: Osteoarthritis Left Hip   Diagnosis Additional Information: No value filed.    Anesthesia Type:   Spinal     Note:  Airway :Face Mask  Patient transferred to:PACU  Comments: To DORA.  Report to RN.  VSS  122/79, HR 70, sat 100%, RR 16, T 36.6Handoff Report: Identifed the Patient, Identified the Reponsible Provider, Reviewed the pertinent medical history, Discussed the surgical course, Reviewed Intra-OP anesthesia mangement and issues during anesthesia, Set expectations for post-procedure period and Allowed opportunity for questions and acknowledgement of understanding      Vitals: (Last set prior to Anesthesia Care Transfer)    CRNA VITALS  6/27/2018 1339 - 6/27/2018 1418      6/27/2018             Pulse: 72    SpO2: 100 %                Electronically Signed By: ELIZABETH Villafuerte CRNA  June 27, 2018  2:18 PM

## 2018-06-27 NOTE — ANESTHESIA PREPROCEDURE EVALUATION
Anesthesia Evaluation     . Pt has had prior anesthetic. 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 : . . . :. valvular problems/murmurs AV sclerosis noted on 2002 TTE. No stenosis.:. Previous cardiac testing Echodate:2008results:date: results:ECG reviewed date: results: date: results:         (-) SANCHEZ   METS/Exercise Tolerance:     Hematologic:         Musculoskeletal: Comment: Cervical spine stenosis   (+) arthritis, , , -       GI/Hepatic:  - neg GI/hepatic ROS       Renal/Genitourinary:         Endo:     (+) type II DM (diet controlled.) Obesity, .      Psychiatric:     (+) psychiatric history depression      Infectious Disease: Comment: HSV        Malignancy:         Other:    (+) H/O Chronic Pain,                   Physical Exam      Airway   Mallampati: II  Neck ROM: full    Dental     Cardiovascular   Rhythm and rate: regular and normal  (-) no murmur    Pulmonary     Other findings: Left facial hemiparalysis                 Anesthesia Plan      History & Physical Review      ASA Status:  3 .    NPO Status:  > 8 hours    Plan for Spinal Maintenance will be TIVA.    PONV prophylaxis:  Ondansetron (or other 5HT-3)       Postoperative Care  Postoperative pain management:  IV analgesics and Oral pain medications.      Consents  Anesthetic plan, risks, benefits and alternatives discussed with:  Patient.  Use of blood products discussed: Yes.   Use of blood products discussed with Patient.  Consented to blood products.  .                          .

## 2018-06-27 NOTE — OP NOTE
OPERATIVE REPORT    DATE OF SERVICE: 6/27/18    SURGEON: Keo Priest MD.    ASSISTANT(S):  Lm Olivarez MD    PREOPERATIVE DIAGNOSIS:  Osteoarthritis    POSTOPERATIVE DIAGNOSIS:  Osteoarthritis    OPERATION PERFORMED:  Left total hip arthroplasty    IMPLANTS:    Implant Name Type Inv. Item Serial No.  Lot No. LRB No. Used   BONE CEMENT SIMPLEX W/TOBRAMYCIN 6197-9-001 Cement, Bone BONE CEMENT SIMPLEX W/TOBRAMYCIN 6197-9-001  [employer] [id number] Left 2   IMP SHELL SNR ACET R3 3H 54MM [id number] Total Joint Component/Insert IMP SHELL SNR ACET R3 3H 54MM [id number]  [place]  [id number] Left 1   IMP SCR ACET SNN SPHERICAL HEAD 6.5X25MM [id number] Metallic Hardware/[place] IMP SCR ACET SNN SPHERICAL HEAD 6.5X25MM [id number]  [employer] [id number] Left 1   IMP LINER SNR ACET R3 XLPE 0DEG [id number] Total Joint Component/Insert IMP LINER SNR ACET R3 XLPE 0DEG [id number]  [place]  [id number] Left 1   IMP SCR ACET SNN SPHERICAL HEAD 6.5X40MM [id number] Metallic Hardware/[place] IMP SCR ACET SNN SPHERICAL HEAD 6.5X40MM [id number]  [employer]R [id number] Left 1   BONE CEMENT RESTRICTOR BEST FEMORAL 25MM [id number] Cement, Bone BONE CEMENT RESTRICTOR BEST FEMORAL 25MM [id number]  [place]  [id number] Left 1   IMP STEM FEMORAL SNR BIPOLAR SPECTRON HO SIZE 2 [id number] Total Joint Component/Insert IMP STEM FEMORAL SNR BIPOLAR SPECTRON HO SIZE 2 [id number]  [place]  [id number] Left 1   IMP HEAD FEMORAL SNN BIPOLAR CO[place] 36MM +4 [id number] Total Joint Component/Insert IMP HEAD FEMORAL SNN BIPOLAR COBALT 36MM +4 [id number]   [place]  [id number] Left 1         ANESTHETIC: General     OPERATIVE FINDINGS:  End stage arthrosis of the hip    BLOOD LOSS: 350 cc    COMPLICATIONS:  None apparent    OPERATIVE INDICATIONS:  The patient has a long history of debilitating pain secondary to ostearthritis of the hip.  Despite comprehensive non-operative management these symptoms continued to interfere with activities of daily living.   After discussion of further treatment options including the risks and benefits that patient elected to proceed with a total hip.    DESCRIPTION OF THE PROCEDURE:  The patient was identified in the preoperative holding area.  The consent form including the risks and benefits were reviewed with the patient.  The operative limb was identified and marked.  The patient was brought back to the operating room and placed supine on the operating table.  An anesthetic was induced by the anesthesia team.   The patient was placed in the lateral decubitus position and prepped and draped in the normal standard fashion for a hip replacement.  A time-out was called.  Antibiotics were given.  We utilized an approximately 15 cm curvilinear incision, centered on the vastus ridge, and performed a standard posterior approach to the hip.  The tensor fascia was split.  A small portion of gluteus noris was split in line with its fibers.  The sciatic nerve was palpated.  The east-west retractor was placed.  The posterior border of gluteus medius was exposed and retracted.  The tendon of piriformis and that of the obturators was released from their attachments.  A trapdoor posterior capsulotomy was performed.  The hip was dislocated.  The lesser trochanter was exposed.  A ruler was used to measure and electrocautery was used to oneal our neck cut as preoperatively templated.  The head was measured with a caliper and found to be 51 mm.  This measurement was used to choose our first reamer.  The neck cut was re-measured. The femur was elevated.  A Hohmann was placed over the anterior rim of the acetabulum and the femur was subluxed anterior.  A split was made in the inferior capsule.  The transverse acetabular ligament was left intact and used a guide for the anterversion of the acetabular component.  Circumferential retractors were placed.  We began reaming and went up by two until sufficient contact was made with the acetabular rim.  We  "then went up by one millimeter for a one millimeter press-fit.  We were within one size of our preoperative plan.   A trail was placed.  It had an excellent press fit.  We then placed out final component in 40 degrees of inclination and approximately 20 degrees of anteversion, parallel to the transverse ligament.  The press fit was excellent.  Screws were placed for additional initial fixation.  A flat liner was then placed. It locked into place.  Attention was turned to the femur.  Retractors were placed to elevated the proximal femur and to protect the tendon of gluteus medius.  Remaining lateral neck was removed and the piriformis fossa was cleared of soft-tissue.  A box osteotome and canal finder were used to prepare for broaching.  A sharp broach was used to lateralize slightly.  We then broached up sequentially to a size 2.  It was rotationally stable. Preoperatively the patient had templated to a high offset stem.  The high offset stem appropriately tensioned the abductors.  We trialed the following fmoeral heads: 0 and +4.  The +4 most appropriately tensioned the abductors and clinically equalized the leg-lengths.  The stability exam was excellent.  The hip was stable and there was no impingement posteriorly with hyper-extension and maximal external rotation.  With full extension, the knee could be fixed to bring the foot nearly to the buttock.  With the hip in ninety degrees of flexion and neutral rotation there was greater than 60 degrees of internal rotation before subluxation.  There appropriate movement with a \"clinton\" test.  Happy with our stability exam, the final implant was placed in approximately twenty degrees of anteversion.  It sat within 1 mm of the broach.  We then trailed with a +4 head.  The stability exam was identical.  We then placed the final head on a clean, dry neck and impacted it into place. The hip was reduced after directly visualizing the entire acetabulum.  The wound was then " irrigated.  The posterior capsule and short external rotators were sutured to the greater trochanter with non-absorbable suture through bone tunnels.The fascia was closed with interrupted Vicryl, the dermis with interrupted Vicryl, and skin with running monocryl, Dermabond and steri-strips.  At the end of the procedure the sponge and needle counts were correct times two.  The patient tolerated the procedure well and returned to the PAR extubated and stable.    POSTOPERATIVE PLAN:  1. Weight bearing as tolerated  2. Standard posterior hip precautions  3. DVT prophylaxis   4. 24 hours of prophylactic antibiotics  5. Follow-up:  Wound clinic in 2 weeks and with Cem in clinic in 6 weeks for x-rays and a rehabilitation check.

## 2018-06-27 NOTE — IP AVS SNAPSHOT
Brenda Barker #[id number] (CSN: [id number])  (82 year old F)  (Adm: 18)     [id number]               UR 8A: [phone]            Patient Demographics     Patient Name Sex          Age SSN Address Phone    Brenda Barker Female 1935 (82 year old) [id number]   [facility] [address] [phone] (Home)  [phone] (Mobile)      Emergency Contact(s)     Name Relation Home Work Mobile    David Barker Spouse [phone]    Dusty Us Son [phone]    Andre Barker Son   [phone]    Margot Barker Relative [phone]        Admission Information     Attending Provider Admitting Provider Admission Type Admission Date/Time    Keo Priest MD Morgan, Patrick M, MD Elective 18  0732    Discharge Date Hospital Service Auth/Cert Status Service Area     Orthopedics Incomplete [facility]    Unit Room/Bed Admission Status       UR 8A  Admission (Confirmed)       Admission     Complaint    Osteoarthritis Left Hip , Status post hip surgery      Hospital Account     Name Acct ID Class Status Primary Coverage    Bethany Barkernathan HANEY [id number] Inpatient Open MEDICARE - MEDICARE FOR HB SUPPLEMENT            Guarantor Account (for Hospital Account #[id number])     Name Relation to Pt Service Area Active? Acct Type    Brenda Barker  FCS Yes Personal/Family    Address Phone          [address] [place] [id number] [phone](H)              Coverage Information (for Hospital Account #[id number])     1. MEDICARE/MEDICARE FOR HB SUPPLEMENT     F/O Payor/Plan Precert #    MEDICARE/MEDICARE FOR HB SUPPLEMENT     Subscriber Subscriber #    Brenda Barker [id number]    Address Phone    ATTN CLAIMS  [address]  [facility] [place] [address] [phone]          2. BCBS/BCBS PLATINUM BLUE     F/O Payor/Plan Precert #    BCBS/BCBS PLATINUM BLUE     Subscriber Subscriber  "Brenda Weaver [id number]    Address Phone    [address] [address]  [place] [address] [phone]                                                      INTERAGENCY TRANSFER FORM - PHYSICIAN ORDERS   6/27/2018                       UR 8A: [phone]            Attending Provider: Keo Priest MD     Allergies:  Diatrizoate, Ciprofloxacin, Contrast Dye, Sulfasalazine    Infection:  None   Service:  ORTHOPEDICS    Ht:  1.575 m (5' 2\")   Wt:  75.1 kg (165 lb 9.1 oz)   Admission Wt:  75.1 kg (165 lb 9.1 oz)    BMI:  30.28 kg/m 2   BSA:  1.81 m 2            ED Clinical Impression     Diagnosis Description Comment Added By Time Added    Status post hip surgery [Z98.890] Status post hip surgery [Z98.890]  Ara Diaz APRN CNP 6/29/2018  2:59 PM      Hospital Problems as of 7/1/2018              Priority Class Noted POA    Status post hip surgery Medium  6/27/2018 Yes      Non-Hospital Problems as of 7/1/2018              Priority Class Noted    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    Hemorrhoids Medium  6/24/2003    Aortic valve disorder Medium  6/24/2003    Cervicalgia Medium  6/2/2004    Rheumatoid arthritis (H) Medium  6/28/2004    Status post total left knee replacement Medium  10/20/2008    SNHL (sensorineural hearing loss) Medium  5/30/2013    Obesity Medium  10/20/2017    High risk medication use Medium  10/20/2017    Gastroesophageal reflux disease with esophagitis Medium  12/11/2017    Hypertension, goal below 140/90 Medium  12/19/2017    Hyperlipidemia LDL goal <130 Medium  12/19/2017    Rapid heart rate Medium  12/19/2017    Resting tremor Medium  12/19/2017    ACP (advance care planning) Medium  3/23/2018    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 Medium  6/12/2018    Hypercalcemia Medium  6/17/2018      Code Status History     Date Active Date Inactive Code Status Order ID Comments User Context    6/29/2018  3:05 PM  Full Code [id number]  Ara Diaz APRN CNP " Outpatient    6/29/2018  3:04 PM 6/29/2018  3:05 PM Full Code [id number]  Emily AraELIZABETH Alejandro CNP Outpatient    6/27/2018  4:35 PM 6/29/2018  3:04 PM Full Code [id number]  Lm Olivarez MD Inpatient      Current Code Status     Date Active Code Status Order ID Comments User Context       Prior      Summary of Visit     Reason for your hospital stay       You were admitted to the hospital following your total hip arthroplasty.                Medication Review      START taking        Dose / Directions Comments    aspirin 325 MG EC tablet   Replaces:  aspirin 81 MG tablet        Dose:  325 mg   Start taking on:  7/12/2018   Take 1 tablet (325 mg) by mouth daily for 14 days   Quantity:  40 tablet   Refills:  0    Start after lovenox is complete       enoxaparin 40 MG/0.4ML injection   Commonly known as:  LOVENOX        Dose:  40 mg   Inject 0.4 mLs (40 mg) Subcutaneous every 24 hours for 11 days   Refills:  0        HYDROmorphone 2 MG tablet   Commonly known as:  DILAUDID        For pain concerns of 1-5 give 1 mg  for pain concerns of 6-10 give two mg every 4 hours as needed   Quantity:  30 tablet   Refills:  0        senna-docusate 8.6-50 MG per tablet   Commonly known as:  SENOKOT-S;PERICOLACE        Dose:  1-2 tablet   Take 1-2 tablets by mouth 2 times daily   Quantity:  100 tablet   Refills:  0    Hold prn loose BM.         CONTINUE these medications which may have CHANGED, or have new prescriptions. If we are uncertain of the size of tablets/capsules you have at home, strength may be listed as something that might have changed.        Dose / Directions Comments    methotrexate 2.5 MG tablet CHEMO   Indication:  Rheumatoid Arthritis   This may have changed:  See the new instructions.        Dose:  15 mg   Start taking on:  7/7/2018   Take 6 tablets (15 mg) by mouth every 7 days   Refills:  0    Last dose 6/30/18         CONTINUE these medications which have NOT CHANGED        Dose / Directions Comments     acyclovir 400 MG tablet   Commonly known as:  ZOVIRAX   Used for:  Herpes simplex virus infection        TAKE 1 TABLET BY MOUTH DAILY   Quantity:  90 tablet   Refills:  0        CENTRUM SILVER per tablet        1 TABLET DAILY   Refills:  0        fish oil-omega-3 fatty acids 1000 MG capsule   Used for:  Other and unspecified hyperlipidemia        2 CAPSULES DAILY  (? DOSE)   Quantity:  OTC   Refills:  --        folic acid 1 MG tablet   Commonly known as:  FOLVITE   Used for:  Rheumatoid arthritis(714.0), Obesity, unspecified, Other abnormal glucose        4 mg TABS DAILY   Refills:  0        LIQUID TEARS 1.4 % ophthalmic solution   Generic drug:  polyvinyl alcohol        Dose:  1 drop   1 drop as needed.   Refills:  0        metoprolol succinate 50 MG 24 hr tablet   Commonly known as:  TOPROL-XL   Used for:  Rapid heart rate        TAKE 1 TABLET BY MOUTH ONCE DAILY   Quantity:  90 tablet   Refills:  0        nitroFURantoin macrocrystal 100 MG ED starter pack   Commonly known as:  MACRODANTIN        Dose:  1 capsule   Take 1 capsule by mouth daily.   Refills:  0        omeprazole 20 MG CR capsule   Commonly known as:  priLOSEC   Used for:  Gastroesophageal reflux disease with esophagitis        TAKE 1 CAPSULE(20 MG) BY MOUTH DAILY   Quantity:  90 capsule   Refills:  2        simvastatin 20 MG tablet   Commonly known as:  ZOCOR   Used for:  Hyperlipidemia LDL goal <130        Dose:  20 mg   Take 1 tablet (20 mg) by mouth At Bedtime   Quantity:  90 tablet   Refills:  3        TYLENOL PO        Dose:  650 mg   Take 650 mg by mouth every 6 hours as needed for mild pain or fever   Refills:  0        Vitamin D-3 1000 units Caps        Dose:  2000 Int'l Units   Take 2,000 Int'l Units by mouth daily   Refills:  0        ZYRTEC ALLERGY PO        Dose:  1 tablet   Take 1 tablet by mouth daily   Refills:  0          STOP taking     aspirin 81 MG tablet   Replaced by:  aspirin 325 MG EC tablet           hydrochlorothiazide 12.5  MG capsule   Commonly known as:  MICROZIDE           meloxicam 7.5 MG tablet   Commonly known as:  MOBIC                   After Care     Activity - Up with nursing assistance           Advance Diet as Tolerated       Follow this diet upon discharge: regular adult diet       Diet       You may return to your regular, pre-surgery diet unless instructed otherwise by your provider.       Discharge Instructions       TOTAL HIP ARTHROPLASTY POST OPERATIVE DISCHARGE INSTRUCTIONS    FOLLOW UP APPOINTMENT  You are scheduled for a post operative wound check with Dr. Priest s nurse approximately two weeks after surgery. At approximately six weeks after surgery, you will see Dr. Priest in clinic. During this visit, repeat X rays of your operative hip will be performed.      Your follow up appointments will be at the location that you regularly see Dr. Priest:    [facility]  [address] [place] [address] [phone]    [address] [address] [phone]    [facility]  [address] [place] [address] [phone]    Physical therapy:   A prescription for physical therapy will be provided at the time of discharge.       ACTIVITY  Weight bearing status:   You may bear weight on your operative extremity as tolerated, using assistive devices (walker, cane) as needed. As you begin to feel more comfortable ambulating, you may gradually transition from a walker to a cane. Eventually, you should wean from all assistive devices. Although we would like you to discontinue use of assistive devices as soon as possible, do not transition until you have worked with your physical therapist to achieve safe balance and comfort.     Posterior hip precautions:  You must maintain the following posterior hip precautions for the next 12 weeks with no exceptions:  1) no hip flexion  >90 degrees (no bending down at the waist)  2) no internal rotation past neutral (no pivoting)  3) no adduction past midline (no crossing your legs)    Exercises:   Perform the following exercises at least three times per day for the first four weeks after surgery to prevent complications, such as blood clots in your legs:  1) Point and flex your feet  2) Move your ankle around in big circles  3) Wiggle your toes   Also, perform thigh muscle tightening exercises for 10 to 15 minutes at least three times per day for the first four weeks after surgery.    Athletic Activities:  Activities such as swimming, bicycling, jogging, running, and stop-and-go sports should be avoided until permitted by your provider.    Driving:  Driving is not permitted until directed by your provider. Under no circumstance are you permitted to drive while using narcotic pain medications.    Return to Work:  You may return to work when directed by your provider.       COMFORT AND PAIN MANAGEMENT  Icing:  An ice pack will be provided to control swelling and discomfort after surgery. Place a thin towel on your skin and apply the ice pack overtop. You may apply ice for 20 minutes as often as two times per hour.    Pain Medications:  You will be discharged with acetaminophen (Tylenol) and a narcotic medication for pain management after surgery. Acetaminophen can help to effectively manage pain when used as prescribed, however, do not exceed the maximum daily dose of 3000 mg. The narcotic pain medication should be reserved for severe, breakthrough pain. Take the narcotic medication as prescribed and use only as often as necessary.       ANTICOAGULATION  Take enoxaparin (Lovenox) as prescribed for a total of two weeks after surgery. After you complete your enoxaparin regimen, take one aspirin 325mg daily for the next two weeks.       WOUND CARE AND SHOWERING  Wound care:  You have a clean Aquacel dressing on your surgical wound. This dressing should  stay in place for seven days to allow the incision to heal. After seven days, you may change the dressing. Please use sterile 4x4 gauze dressings with tape over top to secure the dressing. If the Aquacel dressing becomes wet or saturated with wound drainage, it is appropriate to change the dressing before seven days. If drainage persists from the incision site to the extent that it is frequently saturating the dressing, please contact Dr. Priest s clinic.    Showering:  You may shower 48 hours after surgery, provided the Aquacel dressing is in place. You may allow water to run over the dressing, but do not to soak or submerge your wound underwater. The steri-strips (small white tape that is directly on the incision areas) should be left on until they fall off or are removed at your first office visit.    Tub Bathing:  Tub bathing, swimming, or any other activities that cause your incision to be submerged should be avoided until allowed by your provider. Typically, patients are allowed to return to these activities four weeks after surgery.      CONTACTING YOUR PHYSICIAN:  You may experience symptoms that require follow-up before your scheduled two week appointment. Please contact Dr. Priest's office if you experience:  1) Pain that persists or worsens in the first few days after surgery  2) Excessive redness or drainage of cloudy or bloody material from the wounds (Clear red tinted fluid and some mild drainage should be expected) or drainage of any kind five days after surgery  3) A temperature elevation greater than 101.5    4) Pain, swelling or redness in your calf  5) Numbness or weakness in your leg or foot      Regular business hours (Monday - Friday, 8am - 5pm):  [facility]: [phone]  [facility]: [phone]  [facility]: [phone]    After hours and weekends:  [facility] on call Orthopedic  resident: [phone]       Discharge Instructions       Accepting provider to decide when or if to resume microzide (had mild hypercalcemia on admit 10.7)  Hgb 7/3 - update provider.       General info for SNF       Length of Stay Estimate: Short Term Care: Estimated # of Days <30  Condition at Discharge: Improving  Level of care:skilled   Rehabilitation Potential: Excellent  Admission H&P remains valid and up-to-date: Yes  Recent Chemotherapy: N/A  Use Nursing Home Standing Orders: Yes       Hip precautions           Mantoux instructions       Give two-step Mantoux (PPD) Per Facility Policy Yes             Referrals     Occupational Therapy Adult Consult       Evaluate and treat as clinically indicated.    Reason:  *s/p truman       Physical Therapy Adult Consult       Evaluate and treat as clinically indicated.    Reason: s/p truman             Follow-Up Appointment Instructions     Adult [facility] Follow-up and recommended labs and tests       You are to return to clinic in 2 weeks for wound check with the clinic nurse. Approximately 6 weeks after your surgery, you will be scheduled for an appointment with Dr. Priest. At this time, AP pelvis imaging will be obtained.    If you need to schedule your 2 and 6 week postoperative visits or haven't received confirmation regarding these visits, please call one of the following numbers within 7 days of discharge.    For patients that see Dr. Priest at:   -[facility]: [phone]  -[facility]: [phone]  -[facility]: [phone]             Your next 10 appointments already scheduled     Jul 10, 2018 11:00 AM CDT   Nurse Only with MG NURSE ONLY ORTHO   [facility]    [address] [place] [address] [phone]            Aug 07, 2018 11:30 AM CDT   (Arrive by 11:00 AM)   Return Visit with Keo Priest MD   [facility]  "([facility])    [address] [address] [place] [address] [phone]              Statement of Approval     Ordered          07/01/18 1213  I have reviewed and agree with all the recommendations and orders detailed in this document.     Approved and electronically signed by:  Michael Pride MD                                                 INTERAGENCY TRANSFER FORM - NURSING   6/27/2018                       UR 8A: [phone]            Attending Provider: Keo Priest MD     Allergies:  Diatrizoate, Ciprofloxacin, Contrast Dye, Sulfasalazine    Infection:  None   Service:  ORTHOPEDICS    Ht:  1.575 m (5' 2\")   Wt:  75.1 kg (165 lb 9.1 oz)   Admission Wt:  75.1 kg (165 lb 9.1 oz)    BMI:  30.28 kg/m 2   BSA:  1.81 m 2            Advance Directives        Scanned docmt in ACP Activity?           Yes, scanned ACP docmt        Immunizations     Name Date      HEPA 02/10/05     HEPA 02/03/04     Influenza (High Dose) 3 valent vaccine 10/03/17     Influenza (High Dose) 3 valent vaccine 09/26/16     Influenza (High Dose) 3 valent vaccine 10/28/15     Influenza (High Dose) 3 valent vaccine 10/06/14     Influenza (High Dose) 3 valent vaccine 10/23/13     Influenza (High Dose) 3 valent vaccine 09/27/12     Influenza (IIV3) PF 08/31/11     Influenza (IIV3) PF 10/02/09     Influenza (IIV3) PF 11/03/08     Influenza (IIV3) PF 11/19/07     Influenza (IIV3) PF 01/12/07     Influenza (IIV3) PF 12/10/04     Influenza (IIV3) PF 11/06/02     Influenza (IIV3) PF 01/04/02     Influenza (IIV3) PF 12/12/00     Pneumo Conj 13-V (2010&after) 12/07/15     Pneumococcal 23 valent 10/23/13     Pneumococcal 23 valent 06/10/03     TD (ADULT, 7+) 03/13/08     TD (ADULT, 7+) 07/14/98     Zoster vaccine recombinant adjuvanted (SHINGRIX) 05/17/18     Zoster vaccine, live 05/05/09       ASSESSMENT     Discharge Profile Flowsheet     EXPECTED DISCHARGE     COMMUNICATION ASSESSMENT      Expected Discharge Date " " 06/30/18 06/28/18 1450   Patient's communication style  spoken language (English or Bilingual) 06/25/18 1446    DISCHARGE NEEDS ASSESSMENT     SKIN      Patient/family verbalizes understanding of discharge plan recommendations?  Yes 06/28/18 1450   Inspection of bony prominences  Full 07/01/18 1205    Medical Team notified of plan?  yes 06/28/18 1450   Inspection under devices  Full (UTV under dressing) 07/01/18 1205    Readmission Within The Last 30 Days  no previous admission in last 30 days 06/28/18 1450   Not Inspected under devices  -- (not under dressing) 06/30/18 1051    Anticipated Changes Related to Illness  inability to care for self 06/28/18 1450   Skin WDL  ex 07/01/18 1205    Equipment Currently Used at Home  grab bar;walker, rolling;shower chair (has access to shower chair) 06/28/18 1046   Skin Integrity  incision(s) 07/01/18 1205    Transportation Available  family or friend will provide 06/28/18 1450   Full except areas not inspected   Buttock, left;Buttock, right;Sacrum;Coccyx;Spine;Scapula, left;Scapula, right 07/01/18 0404    GASTROINTESTINAL (ADULT,PEDIATRIC,OB)     SAFETY      GI WDL  WDL 07/01/18 1205   Safety WDL  WDL 07/01/18 1205    Last Bowel Movement  06/29/18 07/01/18 1205   Safety Factors  bed in low position;wheels locked;call light in reach;upper side rails raised x 2;ID band on 07/01/18 0404    Passing flatus  yes 07/01/18 1205   All Alarms  none present 07/01/18 1205                 Assessment WDL (Within Defined Limits) Definitions           Safety WDL     Effective: 09/28/15    Row Information: <b>WDL Definition:</b> Bed in low position, wheels locked; call light in reach; upper side rails up x 2; ID band on<br> <font color=\"gray\"><i>Item=AS safety wdl>>List=AS safety wdl>>Version=F14</i></font>      Skin WDL     Effective: 09/28/15    Row Information: <b>WDL Definition:</b> Warm; dry; intact; elastic; without discoloration; pressure points without redness<br> <font " "color=\"gray\"><i>Item=AS skin wdl>>List=AS skin wdl>>Version=F14</i></font>      Vitals     Vital Signs Flowsheet     VITAL SIGNS     CLINICALLY ALIGNED PAIN ASSESSMENT (CAPA) ([facility], [facility] ADULTS ONLY)      Temp  96.4  F (35.8  C) 07/01/18 0723   Comfort  tolerable with discomfort 07/01/18 0811    Temp src  Oral 07/01/18 0723   Change in Pain  about the same 07/01/18 0811    Resp  16 07/01/18 0723   Pain Control  partially effective 07/01/18 0811    Pulse  82 07/01/18 0723   Functioning  can do most things, but pain gets in the way of some 07/01/18 0811    Heart Rate  76 06/30/18 2218   Sleep  awake with occasional pain 07/01/18 0400    Pulse/Heart Rate Source  Monitor 07/01/18 0723   ANALGESIA SIDE EFFECTS MONITORING      BP  137/75 07/01/18 0723   Side Effects Monitoring: Respiratory Quality  R 07/01/18 0811    BP Location  Right arm 07/01/18 0723   Side Effects Monitoring: Respiratory Depth  N 07/01/18 0811    Patient Position  Sitting 06/27/18 0801   Side Effects Monitoring: Sedation Level  1 07/01/18 0811    OXYGEN THERAPY     HEIGHT AND WEIGHT      SpO2  100 % 07/01/18 0723   Height  1.575 m (5' 2\") 06/27/18 0801    O2 Device  None (Room air) 07/01/18 0723   Weight  75.1 kg (165 lb 9.1 oz) 06/27/18 0801    Oxygen Delivery  4 LPM 06/27/18 1439   BSA (Calculated - sq m)  1.81 06/27/18 0801    RESPIRATORY MONITORING     BMI (Calculated)  30.35 06/27/18 0801    Respiratory Monitoring (EtCO2)  29 mmHg 06/27/18 2228   POSITIONING      Integrated Pulmonary Index (IPI)  8-9 06/27/18 2228   Body Position  independently positioning 07/01/18 1205    PAIN/COMFORT     Head of Bed (HOB)  HOB at 20-30 degrees 07/01/18 0404    Patient Currently in Pain  yes 07/01/18 1127   Positioning/Transfer Devices  aBduction splint/pillow;pillows;in use 06/28/18 1540    Preferred Pain Scale  CAPA (Clinically Aligned Pain Assessment) ([facility], [facility] Adults Only) 07/01/18 1127   Chair  Upright in chair 07/01/18 1205 "    0-10 Pain Scale  7 06/28/18 1609   ECG      Pain Location  Hip 07/01/18 1127   ECG Rhythm  Normal sinus rhythm 06/27/18 1538    Pain Orientation  Left 07/01/18 1127   Ectopy  None 06/27/18 1439    Pain Descriptors  Dull 07/01/18 1127   Lead Monitored  Lead II 06/27/18 1439    Pain Management Interventions  analgesia administered 07/01/18 1127   DAILY CARE      Pain Intervention(s)  Medication (See eMAR) 07/01/18 1127   Activity Management  activity adjusted per tolerance 07/01/18 1205    Response to Interventions  Relief 06/30/18 1439   Activity Assistance Provided  assistance, 1 person 07/01/18 1205            Patient Lines/Drains/Airways Status    Active LINES/DRAINS/AIRWAYS     Name: Placement date: Placement time: Site: Days: Last dressing change:    Incision/Surgical Site 06/27/18 Left Hip 06/27/18   1208    4             Patient Lines/Drains/Airways Status    Active PICC/CVC     None            Intake/Output Detail Report     None      Last Void/BM       Most Recent Value    Urine Occurrence 1 at 07/01/2018 0130    Stool Occurrence 1 at 06/29/2018 2028      Case Management/Discharge Planning     Case Management/Discharge Planning Flowsheet     LIVING ENVIRONMENT     Transportation Available  family or friend will provide 06/28/18 1450    Lives With  spouse 06/28/18 1046   FINAL RESOURCES      Living Arrangements  apartment 06/28/18 1046   Equipment Currently Used at Home  grab bar;walker, rolling;shower chair (has access to shower chair) 06/28/18 1046    COPING/STRESS     ABUSE RISK SCREEN      Major Change/Loss/Stressor  none 06/25/18 1446   QUESTION TO PATIENT:  Has a member of your family or a partner(now or in the past) intimidated, hurt, manipulated, or controlled you in any way?  no 06/27/18 0948    EXPECTED DISCHARGE     QUESTION TO PATIENT: Do you feel safe going back to the place where you are living?  yes 06/27/18 0948    Expected Discharge Date  06/30/18 06/28/18 1450   OBSERVATION: Is there  reason to believe there has been maltreatment of a vulnerable adult (ie. Physical/Sexual/Emotional abuse, self neglect, lack of adequate food, shelter, medical care, or financial exploitation)?  no 06/27/18 0948    DISCHARGE PLANNING     OTHER      Patient/family verbalizes understanding of discharge plan recommendations?  Yes 06/28/18 1450   Are you depressed or being treated for depression?  No 06/28/18 0423    Medical Team notified of plan?  yes 06/28/18 1450   MH/BH CAREGIVER      Readmission Within The Last 30 Days  no previous admission in last 30 days 06/28/18 1450   Filed Complexity Screen Score  1 06/28/18 1450    Anticipated Changes Related to Illness  inability to care for self 06/28/18 1450                       UR 8A: [phone]            Medication Administration Report for Brenda Barker as of 07/01/18 1243   Legend:    Given Hold Not Given Due Canceled Entry Other Actions    Time Time (Time) Time  Time-Action       Inactive    Active    Linked        Medications 06/25/18 06/26/18 06/27/18 06/28/18 06/29/18 06/30/18 07/01/18    acetaminophen (TYLENOL) tablet 650 mg  Dose: 650 mg  Freq: EVERY 4 HOURS PRN Route: PO  PRN Reason: other  PRN Comment: multimodal surgical pain management along with NSAIDS and opioid medication as indicated based on pain control and physical function.  Start: 06/30/18 1330   Admin Instructions: May give first dose 4 hours after last scheduled dose of acetaminophen.  Maximum acetaminophen dose from all sources = 75 mg/kg/day not to exceed 4 grams/day.    Admin. Amount: 2 tablet (2 × 325 mg tablet)  Last Admin: 07/01/18 0227  Dispense Loc: [facility]  POC: Post-procedure          1337 (650 mg)-Given       1800 (650 mg)-Given       2211 (650 mg)-Given        0227 (650 mg)-Given           acyclovir (ZOVIRAX) tablet 400 mg  Dose: 400 mg  Freq: DAILY Route: PO  Indications of Use: PROPHYLAXIS OF HERPES SIMPLEX  Start: 06/28/18 0800   Admin. Amount: 1 tablet (1 × 400 mg  tablet)  Last Admin: 07/01/18 0745  Dispense Loc: [facility]        0809 (400 mg)-Given        0915 (400 mg)-Given        0909 (400 mg)-Given        0745 (400 mg)-Given           benzocaine-menthol (CEPACOL) 15-3.6 MG lozenge 1-2 lozenge  Dose: 1-2 lozenge  Freq: EVERY 1 HOUR PRN Route: BU  PRN Reason: sore throat  PRN Comment: sore throat without fever  Start: 06/27/18 1635   Admin. Amount: 1-2 lozenge  [facility]  POC: Post-procedure               bisacodyl (DULCOLAX) Suppository 10 mg  Dose: 10 mg  Freq: DAILY Route: RE  Start: 06/28/18 0800   Admin Instructions: Start POD 1.  May discontinue if patient having bowel movement.    Admin. Amount: 1 suppository (1 × 10 mg suppository)  [facility]  POC: Post-procedure        (0810)-Not Given        (0916)-Not Given        (0911)-Not Given        (0748)-Not Given           cetirizine (zyrTEC) tablet 5 mg  Dose: 5 mg  Freq: EVERY EVENING Route: PO  Start: 06/27/18 2000   Admin. Amount: 1 tablet (1 × 5 mg tablet)  Last Admin: 06/30/18 2016  [facility]       2025 (5 mg)-Given        2041 (5 mg)-Given        2012 (5 mg)-Given        2016 (5 mg)-Given        [ ] 2000           cholecalciferol (vitamin D3) tablet 2,000 Units  Dose: 2,000 Units  Freq: DAILY Route: PO  Start: 06/27/18 1645   Admin. Amount: 2 tablet (2 × 1,000 Units tablet)  Last Admin: 07/01/18 0745  Dispense Loc: [facility]       1701 (2,000 Units)-Given        0809 (2,000 Units)-Given        0915 (2,000 Units)-Given        0909 (2,000 Units)-Given        0745 (2,000 Units)-Given           enoxaparin (LOVENOX) injection 40 mg  Dose: 40 mg  Freq: EVERY 24 HOURS Route: SC  Start: 06/28/18 1200   Admin Instructions: Check to make sure start date/time is 12-24 hours post op unless documented complication, AND no sooner than 22 hours post op if spinal anesthesia used.   Continue until discharge to home. HOLD if platelet count falls  below 50% of baseline or less than 100,000/ L and notify provider.    Admin. Amount: 40 mg = 0.4 mL Conc: 40 mg/0.4 mL  Last Admin: 07/01/18 1125  Dispense Loc: [facility]  Volume: 0.4 mL  POC: Post-procedure        1125 (40 mg)-Given        1152 (40 mg)-Given        1338 (40 mg)-Given        1125 (40 mg)-Given           folic acid (FOLVITE) tablet 2 mg  Dose: 2 mg  Freq: 2 TIMES DAILY Route: PO  Start: 06/28/18 0945   Admin. Amount: 2 tablet (2 × 1 mg tablet)  Last Admin: 07/01/18 0745  Dispense Loc: [facility]        1036 (2 mg)-Given       2041 (2 mg)-Given        0915 (2 mg)-Given       2011 (2 mg)-Given        0909 (2 mg)-Given       2017 (2 mg)-Given        0745 (2 mg)-Given       [ ] 2000           HYDROmorphone (DILAUDID) half-tab 1-2 mg  Dose: 1-2 mg  Freq: EVERY 4 HOURS PRN Route: PO  PRN Reason: moderate to severe pain  Start: 06/27/18 2100   Admin. Amount: 1-2 half-tab (1-2 × 1 mg half-tab)  Last Admin: 07/01/18 1125  Dispense Loc: [facility]       2126 (1 mg)-Given        0110 (1 mg)-Given       0320 (1 mg)-Given       0659 (1 mg)-Given       1122 (1 mg)-Given       1226 (1 mg)-Given [C]       1607 (2 mg)-Given       2042 (1 mg)-Given        0042 (1 mg)-Given       0733 (1 mg)-Given       1152 (1 mg)-Given       1719 (1 mg)-Given       2149 (1 mg)-Given        0250 (1 mg)-Given       0922 (1 mg)-Given       1337 (1 mg)-Given       1800 (1 mg)-Given       2211 (1 mg)-Given        0226 (1 mg)-Given       0633 (1 mg)-Given       1125 (1 mg)-Given           HYDROmorphone (DILAUDID) injection 0.2-0.4 mg  Dose: 0.2-0.4 mg  Freq: EVERY 2 HOURS PRN Route: IV  PRN Reason: other  PRN Comment: pain control or improvement in physical function. Hold dose for analgesic side effects.  Start: 06/27/18 1900   Admin Instructions: Start at the lowest dose.  May adjust dose by 0.1 mg every 2 hours as needed.   Notify provider to assess for uncontrolled pain or analgesic side effects.  Hold while on  "IV PCA or with regular IV opioid dosing.    Admin. Amount: 0.2-0.4 mg = 0.2-0.4 mL Conc: 1 mg/mL  Dispense Loc: [facility]  Volume: 0.4 mL  POC: Post-procedure               lidocaine (LMX4) kit  Freq: EVERY 1 HOUR PRN Route: Top  PRN Reason: pain  PRN Comment: with VAD insertion or accessing implanted port.  Start: 06/27/18 1635   Admin Instructions: Do NOT give if patient has a history of allergy to any local anesthetic or any \"nilesh\" product.   Apply 30 minutes prior to VAD insertion or port access.  MAX Dose:  2.5 g (  of 5 g tube)    Dispense Loc: [facility] Stock  POC: Post-procedure               lidocaine 1 % 1 mL  Dose: 1 mL  Freq: EVERY 1 HOUR PRN Route: OTHER  PRN Comment: mild pain with VAD insertion or accessing implanted port  Start: 06/27/18 1635   Admin Instructions: Do NOT give if patient has a history of allergy to any local anesthetic or any \"nilesh\" product. MAX dose 1 mL subcutaneous OR intradermal in divided doses.    Admin. Amount: 1 mL  Dispense Loc: [facility]  Volume: 2 mL  POC: Post-procedure               melatonin tablet 1 mg  Dose: 1 mg  Freq: AT BEDTIME PRN Route: PO  PRN Reason: sleep  Start: 06/28/18 2000   Admin Instructions: POD 1.  Do not give unless at least 6 hours of uninterrupted sleep is expected.    Admin. Amount: 1 tablet (1 × 1 mg tablet)  Dispense Loc: [facility]  POC: Post-procedure               methotrexate tablet CHEMO 15 mg  Dose: 15 mg  Freq: EVERY 7 DAYS Route: PO  Indications of Use: RHEUMATOID ARTHRITIS  Start: 06/30/18 0800   Admin Instructions: Do not crush    Admin. Amount: 6 tablet (6 × 2.5 mg tablet)  Last Admin: 06/30/18 0920  Dispense Loc: [facility]          0920 (15 mg)-Given            metoprolol succinate (TOPROL-XL) 24 hr tablet 50 mg  Dose: 50 mg  Freq: DAILY Route: PO  Start: 06/28/18 0800   Admin Instructions: DO NOT CRUSH. Tablet may be split in half along score line.  Hold prn SBP < 100 or HR < 60.    Admin. " Amount: 1 tablet (1 × 50 mg tablet)  Last Admin: 07/01/18 0745  Dispense Loc: [facility]        0809 (50 mg)-Given        0914 (50 mg)-Given        0910 (50 mg)-Given        0745 (50 mg)-Given           multivitamin, therapeutic with minerals (THERA-VIT-M) tablet 1 tablet  Dose: 1 tablet  Freq: DAILY Route: PO  Start: 06/28/18 0800   Admin. Amount: 1 tablet  Last Admin: 07/01/18 0745  Dispense Loc: [facility]        0809 (1 tablet)-Given        0914 (1 tablet)-Given        0909 (1 tablet)-Given        0745 (1 tablet)-Given           naloxone (NARCAN) injection 0.1-0.4 mg  Dose: 0.1-0.4 mg  Freq: EVERY 2 MIN PRN Route: IV  PRN Reason: opioid reversal  Start: 06/27/18 1635   Admin Instructions: For respiratory rate LESS than or EQUAL to 8.  Partial reversal dose:  0.1 mg titrated q 2 minutes for Analgesia Side Effects Monitoring Sedation Level of 3 (frequently drowsy, arousable, drifts to sleep during conversation).Full reversal dose:  0.4 mg bolus for Analgesia Side Effects Monitoring Sedation Level of 4 (somnolent, minimal or no response to stimulation).  For ordered doses up to 2mg give IVP. Give each 0.4mg over 15 seconds in emergency situations. For non-emergent situations further dilute in 9mL of NS to facilitate titration of response.    Admin. Amount: 0.1-0.4 mg = 0.25-1 mL Conc: 0.4 mg/mL  Dispense Loc: [facility]  Volume: 1 mL  POC: Post-procedure               nitroFURantoin macrocrystal (MACRODANTIN) 100 MG capsule 100 mg  Dose: 100 mg  Freq: DAILY Route: PO  Indications Comment: prophylaxis  Start: 06/28/18 0945   Admin Instructions: Document that medication was not given, but add comment that starter pack was provided to patient.    Admin. Amount: 1 capsule (1 × 100 mg capsule)  Last Admin: 07/01/18 0745  Dispense Loc: [facility]        1036 (100 mg)-Given        0915 (100 mg)-Given        0909 (100 mg)-Given        0745 (100 mg)-Given           omeprazole (priLOSEC) CR  capsule 20 mg  Dose: 20 mg  Freq: EVERY MORNING BEFORE BREAKFAST Route: PO  Start: 06/28/18 0730   Admin. Amount: 1 capsule (1 × 20 mg capsule)  Last Admin: 07/01/18 0635  Dispense Loc: [facility]        0659 (20 mg)-Given        0914 (20 mg)-Given        0953 (20 mg)-Given        0635 (20 mg)-Given           ondansetron (ZOFRAN-ODT) ODT tab 4 mg  Dose: 4 mg  Freq: EVERY 6 HOURS PRN Route: PO  PRN Reasons: nausea,vomiting  Start: 06/27/18 1930   Admin Instructions: This is Step 1 of nausea and vomiting management.  If nausea not resolved in 15 minutes, go to Step 2 prochlorperazine (COMPAZINE). Do not push through foil backing. Peel back foil and gently remove. Place on tongue immediately. Administration with liquid unnecessary  With dry hands, peel back foil backing and gently remove tablet; do not push oral disintegrating tablet through foil backing; administer immediately on tongue and oral disintegrating tablet dissolves in seconds; then swallow with saliva; liquid not required.    Admin. Amount: 1 tablet (1 × 4 mg tablet)  Dispense Loc: [facility]  POC: Post-procedure              Or  ondansetron (ZOFRAN) injection 4 mg  Dose: 4 mg  Freq: EVERY 6 HOURS PRN Route: IV  PRN Reasons: nausea,vomiting  Start: 06/27/18 1930   Admin Instructions: This is Step 1 of nausea and vomiting management.  If nausea not resolved in 15 minutes, go to Step 2 prochlorperazine (COMPAZINE).  Irritant. For ordered doses up to 4 mg, give IV Push undiluted over 2-5 minutes.    Admin. Amount: 4 mg = 2 mL Conc: 4 mg/2 mL  Dispense Loc: [facility]  Infused Over: 2-5 Minutes  Volume: 2 mL  POC: Post-procedure               senna-docusate (SENOKOT-S;PERICOLACE) 8.6-50 MG per tablet 1 tablet  Dose: 1 tablet  Freq: 2 TIMES DAILY Route: PO  Start: 06/27/18 1635   Admin Instructions: If no bowel movement in 24 hours, increase to 2 tablets PO.  Hold for loose stools.    Admin. Amount: 1 tablet  Last Admin: 06/30/18  2017  Dispense Loc: [facility]  POC: Post-procedure       2026 (1 tablet)-Given               2042 (1 tablet)-Given        0914 (1 tablet)-Given       2011 (1 tablet)-Given        0910 (1 tablet)-Given       2017 (1 tablet)-Given               [ ] 2000          Or  senna-docusate (SENOKOT-S;PERICOLACE) 8.6-50 MG per tablet 2 tablet  Dose: 2 tablet  Freq: 2 TIMES DAILY Route: PO  Start: 06/27/18 1635   Admin Instructions: Hold for loose stools.    Admin. Amount: 2 tablet  Last Admin: 07/01/18 0745  Dispense Loc: [facility]  POC: Post-procedure               0809 (1 tablet)-Given [C]                                             0745 (1 tablet)-Given [C]       [ ] 2000           simvastatin (ZOCOR) tablet 20 mg  Dose: 20 mg  Freq: AT BEDTIME Route: PO  Start: 06/27/18 2200   Admin. Amount: 1 tablet (1 × 20 mg tablet)  Last Admin: 06/30/18 2211  Dispense Loc: [facility]       2126 (20 mg)-Given        2151 (20 mg)-Given        2149 (20 mg)-Given        2211 (20 mg)-Given        [ ] 2200           sodium chloride (PF) 0.9% PF flush 3 mL  Dose: 3 mL  Freq: EVERY 8 HOURS Route: IK  Start: 06/27/18 1645   Admin Instructions: And Q1H PRN, to lock peripheral IV dormant line.    Admin. Amount: 3 mL  Last Admin: 06/28/18 0922  Dispense Loc: [facility] Floor Stock  Volume: 3 mL  POC: Post-procedure       (1657)-Not Given               0922 (3 mL)-Given       (1749)-Not Given        (0045)-Not Given       (0920)-Not Given       (1645)-Not Given        (0231)-Not Given       (0953)-Not Given       (1750)-Not Given        (0041)-Not Given       (0748)-Not Given       [ ] 1645           sodium chloride (PF) 0.9% PF flush 3 mL  Dose: 3 mL  Freq: EVERY 1 HOUR PRN Route: IK  PRN Reason: line flush  PRN Comment: for peripheral IV flush post IV meds  Start: 06/27/18 1635   Admin. Amount: 3 mL  Dispense Loc: [facility] Stock  Volume: 3 mL  POC: Post-procedure              Completed Medications  Medications 06/25/18  18         Dose: 250 mL  Freq: ONCE Route: IV  Last Dose: 250 mL (18 1643)  Start: 18 1545   End: 18 1743   Admin. Amount: 250 mL  Last Admin: 18 1643  Dispense Loc: [facility] Stock  Infused Over: 1 Hours  Administrations Remainin  Volume: 250 mL        1643 (250 mL)-New Bag                Dose: 975 mg  Freq: EVERY 8 HOURS Route: PO  Start: 18 1730   End: 18 0921   Admin Instructions: Do not use if patient has an active opioid/acetaminophen combined analgesic product ordered for pain.  Maximum acetaminophen dose from all sources = 75 mg/kg/day not to exceed 4 grams/day.    Admin. Amount: 3 tablet (3 × 325 mg tablet)  Last Admin: 18  Dispense Loc: [facility]  Administrations Remainin  POC: Post-procedure       1701 (975 mg)-Given        0110 (975 mg)-Given       0922 (975 mg)-Given       1656 (975 mg)-Given        0042 (975 mg)-Given       0914 (975 mg)-Given       1719 (975 mg)-Given        0237 (975 mg)-Given       0921 (975 mg)-Given           Discontinued Medications  Medications 18         Start: 18 1631   End: 18 0444   Admin Instructions: Gregorio Bone : cabinet override    Administrations Remainin                0444-Med Discontinued           Rate: 75 mL/hr   Freq: CONTINUOUS Route: IV  Start: 18 1800   End: 18 0804   Last Admin: 187  Dispense Loc: [facility] Floor Stock  Volume: 1,000 mL       1846 ( )-New Bag        0928-Stopped [C]       2155 ( )-Rate/Dose Verify        0047 ( )-New Bag       0804-Med Discontinued                  INTERAGENCY TRANSFER FORM - NOTES (H&P, Discharge Summary, Consults, Procedures, Therapies)   2018                       UR 8A: [phone]               History & Physicals      H&P signed by Murphy Munoz at 2018 10:57 AM      Author:  Murphy Munoz  Service:  (none) Author Type:  Physician    Filed:  6/28/2018 10:57 AM Date of Service:  6/28/2018  9:00 AM Creation Time:  6/28/2018 10:57 AM    Status:  Signed :  Murphy Munoz (Physician)     Scan on 6/28/2018 10:57 AM by Tammy, Provider : JAIME TANG PRE-OP EVAL/06/12/2018 1          Revision History        User Key Date/Time User Provider Type Action    > [N/A] 6/28/2018 10:57 AM Tammy, Provider Physician Sign                     Discharge Summaries      Discharge Summaries by Lm Olivarez MD at 6/30/2018  8:47 AM     Author:  Lm Olivarez MD Service:  Orthopedics Author Type:  Resident    Filed:  6/30/2018  8:49 AM Date of Service:  6/30/2018  8:47 AM Creation Time:  6/30/2018  8:47 AM    Status:  Cosign Needed :  Lm Olivarez MD (Resident)    Cosign Required:  Yes             [facility] Discharge Summary    Brenda Barker MRN# [id number]   Age: 82 year old YOB: 1935     Date of Admission:  6/27/2018  Date of Discharge::[EB1.1]  6/30/2018[EB1.2]  Admitting Physician:  Keo Priest MD  Discharge Physician:  Lm Olivarez MD             Admission Diagnoses:   Hip arthritis [M16.10]          Discharge Diagnosis:   Osteoarthritis          Procedures:   Procedure(s): Total hip arthoplasty (Left)                  Medications Prior to Admission:     Prescriptions Prior to Admission   Medication Sig Dispense Refill Last Dose     Acetaminophen (TYLENOL PO) Take 650 mg by mouth every 6 hours as needed for mild pain or fever   6/26/2018 at 2130     acyclovir (ZOVIRAX) 400 MG tablet TAKE 1 TABLET BY MOUTH DAILY 90 tablet 0 6/27/2018 at 0630     CENTRUM SILVER OR TABS 1 TABLET DAILY   6/26/2018 at 0930     Cetirizine HCl (ZYRTEC ALLERGY PO) Take 1 tablet by mouth daily   6/26/2018 at 2130     Cholecalciferol (VITAMIN D-3) 1000 UNITS CAPS Take 2,000 Int'l Units by mouth daily    6/26/2018 at 2130     FOLIC ACID 1 MG OR TABS 4 mg TABS DAILY   6/26/2018  at 2130     hydrochlorothiazide (MICROZIDE) 12.5 MG capsule Take 1 capsule (12.5 mg) by mouth daily 90 capsule 3 6/27/2018 at 0630     methotrexate 2.5 MG tablet CHEMO TK  6 TS  PO  PER WEEK  2 6/23/2018     metoprolol succinate (TOPROL-XL) 50 MG 24 hr tablet TAKE 1 TABLET BY MOUTH ONCE DAILY 90 tablet 0 6/27/2018 at 0630     nitrofurantoin 100 MG Take 1 capsule by mouth daily.   6/27/2018 at 0630     omeprazole (PRILOSEC) 20 MG CR capsule TAKE 1 CAPSULE(20 MG) BY MOUTH DAILY 90 capsule 2 6/26/2018 at 0930     polyvinyl alcohol (LIQUID TEARS) 1.4 % ophthalmic solution 1 drop as needed.   Past Week at Unknown time     simvastatin (ZOCOR) 20 MG tablet Take 1 tablet (20 mg) by mouth At Bedtime 90 tablet 3 6/26/2018 at 2130     FISH OIL 1000 MG OR CAPS 2 CAPSULES DAILY  (? DOSE) OTC -- 6/17/2018     [DISCONTINUED] ASPIRIN 81 MG OR TABS 1 TABLET DAILY   6/23/2018     [DISCONTINUED] meloxicam (MOBIC) 7.5 MG tablet TAKE 1 TABLET BY MOUTH ONCE DAILY 90 tablet 0 6/17/2018             Discharge Medications:     Current Discharge Medication List      START taking these medications    Details   aspirin 325 MG EC tablet Take 1 tablet (325 mg) by mouth daily for 14 days  Qty: 40 tablet    Comments: Start after lovenox is complete  Associated Diagnoses: Status post hip surgery      enoxaparin (LOVENOX) 40 MG/0.4ML injection Inject 0.4 mLs (40 mg) Subcutaneous every 24 hours for 11 days    Associated Diagnoses: Status post hip surgery      HYDROmorphone (DILAUDID) 2 MG tablet For pain concerns of 1-5 give 1 mg  for pain concerns of 6-10 give two mg every 4 hours as needed  Qty: 30 tablet, Refills: 0    Associated Diagnoses: Status post hip surgery         CONTINUE these medications which have NOT CHANGED    Details   Acetaminophen (TYLENOL PO) Take 650 mg by mouth every 6 hours as needed for mild pain or fever      acyclovir (ZOVIRAX) 400 MG tablet TAKE 1 TABLET BY MOUTH DAILY  Qty: 90 tablet, Refills: 0    Associated Diagnoses:  Herpes simplex virus infection      CENTRUM SILVER OR TABS 1 TABLET DAILY      Cetirizine HCl (ZYRTEC ALLERGY PO) Take 1 tablet by mouth daily      Cholecalciferol (VITAMIN D-3) 1000 UNITS CAPS Take 2,000 Int'l Units by mouth daily       FOLIC ACID 1 MG OR TABS 4 mg TABS DAILY    Associated Diagnoses: Rheumatoid arthritis(714.0); Obesity, unspecified; Other abnormal glucose      hydrochlorothiazide (MICROZIDE) 12.5 MG capsule Take 1 capsule (12.5 mg) by mouth daily  Qty: 90 capsule, Refills: 3    Associated Diagnoses: Hypertension, goal below 140/90      methotrexate 2.5 MG tablet CHEMO TK  6 TS  PO  PER WEEK  Refills: 2      metoprolol succinate (TOPROL-XL) 50 MG 24 hr tablet TAKE 1 TABLET BY MOUTH ONCE DAILY  Qty: 90 tablet, Refills: 0    Associated Diagnoses: Rapid heart rate      nitrofurantoin 100 MG Take 1 capsule by mouth daily.      omeprazole (PRILOSEC) 20 MG CR capsule TAKE 1 CAPSULE(20 MG) BY MOUTH DAILY  Qty: 90 capsule, Refills: 2    Associated Diagnoses: Gastroesophageal reflux disease with esophagitis      polyvinyl alcohol (LIQUID TEARS) 1.4 % ophthalmic solution 1 drop as needed.      simvastatin (ZOCOR) 20 MG tablet Take 1 tablet (20 mg) by mouth At Bedtime  Qty: 90 tablet, Refills: 3    Associated Diagnoses: Hyperlipidemia LDL goal <130      FISH OIL 1000 MG OR CAPS 2 CAPSULES DAILY  (? DOSE)  Qty: OTC, Refills: --    Associated Diagnoses: Other and unspecified hyperlipidemia         STOP taking these medications       ASPIRIN 81 MG OR TABS Comments:   Reason for Stopping:         meloxicam (MOBIC) 7.5 MG tablet Comments:   Reason for Stopping:                     Consultations:   Consultation during this admission received from internal medicine.  No medical intervention was indicated.            Brief History of Illness:   Reason for admission requiring a surgical or invasive procedure:   Osteoarthritis   The patient underwent the following procedure(s):   Total hip arthroplasty- L    There were  no immediate complications during this procedure.    Please refer to the full operative summary for details.             Hospital Course:   The patient's hospital course was unremarkable.  She recovered as anticipated and experienced no post-operative complications.           Discharge Instructions and Follow-Up:   Discharge diet: Regular   Discharge activity: Activity as tolerated   Discharge follow-up: Follow up with Dr. Priest's RN for wound check in 2 weeks   Wound care: Keep aquacel on x 7 days post op. OK to shower.            Discharge Disposition:   Discharged to rehabilitation facility       Lm Olivarez MD  PGY-4  [phone][EB1.1]          Revision History        User Key Date/Time User Provider Type Action    > EB1.2 6/30/2018  8:49 AM Lm Olivarez MD Resident Sign     EB1.1 6/30/2018  8:47 AM Lm Olivarez MD Resident                      Consult Notes      Consults signed by Michael Pride MD at 6/30/2018  6:47 AM      Author:  Michael Pride MD Service:  Hospitalist Author Type:  Physician    Filed:  6/30/2018  6:47 AM Date of Service:  6/27/2018  5:43 PM Creation Time:  6/27/2018  6:22 PM    Status:  Signed :  Michael Pride MD (Physician)     Consult Orders:    1. Internal Medicine Adult IP Consult for [facility]: Patient to be seen: Routine within 24 hrs; Call back #: [phone]; post op co management; Consultant may enter orders: Yes [[id number]] ordered by Lm Olivarez MD at 06/27/18 1057                Consult Date:  06/27/2018      INTERNAL MEDICINE CONSULTATION       REQUESTING PHYSICIAN:  Keo Priest MD       HISTORY OF PRESENT ILLNESS:  A pleasant 82-year-old female who underwent left total hip arthroplasty earlier today by Dr. Keo Priest from Orthopedic Surgery.  We were asked to see the patient postoperatively for internal medicine consultation to include assistance with perioperative fluid and pain management, as well as to  "assess status and intervene with regard to a known history of essential hypertension, rheumatoid arthritis and gastroesophageal reflux disease.      Indication for procedure, osteoarthritis, left hip.  Painful mobility compromise.  No opiate requirement preoperatively.  Use of p.r.n. Tylenol Arthritis.  Surgical intervention as above.  Tolerated well under spinal anesthesia.  Relatively stable hemodynamics.  Estimated blood loss 330 mL.  Received 250 mL of lactated Ringer's.  Mild-to-moderate degree of left hip pain with wear off effect from spinal anesthesia.  No nausea.  No chest discomfort or dyspnea.  No known history of heart disease.  Indicates underwent a stress echocardiogram several years ago which was unremarkable.  Limited exercise activity preoperatively due to the hip pain necessitating that patient ambulate with a walker.  Resides at [facility] Hospital, indicating that she actually has not gone outside lately.      No prior issues with anesthesia.  No history of abnormal bleeding tendency, except for following wisdom teeth extraction.  Evaluated at the [facility] with bleeding issue attributed to aspirin excess.  No history of blood clots.  Prior transfusion (remote) without reaction.  No recent steroids.  No ongoing upper respiratory infection or flu-like illness.      The patient did receive oxycodone at time of spine surgery at [facility] 2013.  No nausea.  Did develop delirium attributed to \"overdosing.\"  Actually pulled her drain out at that time.      Essential hypertension for which the patient did take a.m. Toprol and Microzide 12.5 mg.      PAST MEDICAL HISTORY:   1.  Osteoarthritis, left hip (as above).   2.  Essential hypertension.   3.  Hyperlipidemia.   4.  Rheumatoid arthritis on continued methotrexate throughout the operative course (apparently discussed by patient's outside provider with orthopedics).   5.  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   6.  Resting tremor.   7.  Gastroesophageal reflux disease with esophagitis, controlled on omeprazole.   8.  Obesity.   9.  Sensorineural hearing loss with bilateral augmentation.   10.  Cervical discomfort.   11.  Hemorrhoids.   12.  Aortic valve sclerosis on echo 2002 with mild tricuspid regurgitation and trace mitral regurgitation.   13.  Asymptomatic PVCs.   14.  History of basal cell carcinoma of the scalp.   15.  Borderline A1c of 6.1% in 04/2004.   16.  Carpal tunnel syndrome, right greater than left.   17.  Cervical spondylosis with C5-C6 bilateral uncinate spurs.   18.  Depression, subsequent to the death of her son by suicide in 1982.  Presently well compensated.   19.  History of herpes simplex.   20.  History of adenomatous colonic polyp.   21.  Osteopenia.   22.  History of recurrent urinary tract infection.      PAST SURGICAL HISTORY:   1.  L3-L4 decompression and fusion [facility] 2011.   2.  Benign thyroid biopsy 2013.   3.  Oophorectomy 1980.   4.  Total abdominal hysterectomy and single oophorectomy 1972.   5.  Cataract extraction 2010 and 2011.   6.  Screening colonoscopy.   7.  Tonsillectomy.   8.  Left total knee arthroplasty 2008.   9.  Left acoustic neuroma excision 1998 (as above).      Without known coronary disease, diabetes, asthma, intrinsic renal disease, peptic ulcer disease, hepatitis, gallbladder disease, thyroid disease, seizure, tuberculosis, or anemia.      MEDICATION ALLERGIES:  CONTRAST DYE (URTICARIA AND ANAPHYLAXIS), DIATRIZOATE, CIPROFLOXACIN (NAUSEA) AND SULFASALAZINE (RASH).      MEDICATIONS:  Prior to admission:  Hydrochlorothiazide 12.5 mg daily, Zovirax 400 mg daily,    vitamin D3 2000 units daily, folic acid 2 mg daily, Toprol-XL 50 mg daily, Macrodantin 100 mg daily with first dose on 06/28, omeprazole 20 mg daily, Zocor 20 mg at bedtime, Zyrtec 5 mg q.p.m.,    Tylenol p.r.n., aspirin 81 mg daily,  Centrum Silver daily,  fish oil 2 capsules daily, Mobic 7.5 mg daily,    methotrexate 2.5 mg 6 tablets per week with last dose on 06/23/2018 and liquid tears presumably p.r.n.      FAMILY HISTORY:  Mother with thyroid cancer and laryngeal cancer.  Grandmother with CVA.  No diabetes.      HABITS:  Nonsmoker.  Occasional alcohol.      SOCIAL HISTORY:  .  Three sons, one committing suicide.  Resides in independent living at    [facility].      REVIEW OF SYSTEMS:  Denies fever, chills or sweats.  No headache or dizziness, no visual change.  Without chest discomfort, dyspnea, cough, palpitations.  No nausea or vomiting, no reflux or abdominal pain.  Prone to constipation, taking prunes this week.  Last bowel movement yesterday.  No signs of GI blood loss.  No voiding complaints.  Right hip arthralgia.  No rash.  Occasional tingling of the hands without other focal neurologic complaint.      OBJECTIVE:   GENERAL:  Well-preserved elderly female sitting upright in bed in no distress.  Alert and oriented.   VITAL SIGNS:  Last blood pressure 172/57, heart rate 70s and regular, respirations nonlabored, O2 saturation 100% on room air, temperature normal.   HEENT:  Extraocular movements full.  No nystagmus.  Pupils equal and reacting symmetrically.  Sclerae nonicteric.  Fundi deferred.  Oropharynx is clear.  Dentition, adequate repair.  Mucosal membranes are dry.  Carotid upstroke brisk.  No bruits.  There is no thyromegaly or lymphadenopathy.   SKIN:  No rash (exposed areas).   CHEST:  Clear lung fields.   CARDIAC:  Regular with 1/6 systolic ejection best heard at the base without radiation.  No S3, no jugular venous distention.   BREASTS:  Exam deferred.   ABDOMEN:  Nondistended, soft, nontender without palpable organomegaly or mass.  Bowel sounds are present.  No epigastric or iliac bruits.   EXTREMITIES:  Distal lower extremities are well perfused.  There is no cyanosis or clubbing.  No edema.  No posterior calf or thigh  tenderness/induration to suggest DVT.   PELVIC/RECTAL:  Exam deferred.   NEUROLOGIC:  Demonstrates a left peripheral seventh nerve palsy, otherwise nonlateralizing.  Sensory exam not performed.      LABORATORY DATA:  06/15/2018 includes basic metabolic profile with normal electrolytes, BUN 22, creatinine 0.78, calcium mildly elevated at 10.7.  White count 5300, hemoglobin of 10.4 with MCV 91, platelet count of 294,000.  Urinalysis 2018 demonstrated a few bacteria with presence of squamous epithelial cells.        EKG 2018 demonstrated a normal sinus rhythm with left axis deviation.  Voltage criteria for LVH.  No ischemic change.  Normal MO, QRS and QT interval.      ASSESSMENT:  Pleasant 82-year-old female admitted with the followin.  Left total hip arthroplasty.  Spinal anesthesia.  Indication, osteoarthritis.  Tolerable level of pain control despite wear-off effect from spinal anesthesia.   2.  Anticipated acute blood loss anemia superimposed on anemia prior to admission, potentially on the basis of chronic disease secondary to rheumatoid arthritis/methotrexate.   3.  Essential hypertension with blood pressure elevation perioperatively despite taking a.m. meds.  Potentially related to surgical stress/pain.  To be monitored for now as I anticipate may drop with opiate analgesia and blood loss.   4.  Hyperlipidemia, on a statin.   5.  Rheumatoid arthritis on methotrexate to be continued throughout the operative course.   6.  Status post excision, left acoustic neuroma,  with peripheral seventh nerve palsy.   7.  Resting tremor noted presently on exam.   8.  Gastroesophageal reflux disease, symptomatically controlled on omeprazole.   9.  Obesity.   10.  Sensorineural hearing loss with augmentation.   11.  Cervical spondylosis, C5-C6, with bilateral uncinate spurs.   12.  Carpal tunnel syndrome.   13.  Distal upper extremity paresthesias potentially related to carpal tunnel syndrome or cervical  disk disease.   14.  Depression, largely situational subsequent to the death of her son in .  Presently well compensated.   15.  Hemorrhoids.   16.  Aortic valve sclerosis.   17.  Negative objective evaluation for coronary artery disease several years ago.  Limited functional capacity preop due to left hip pain with unknown coronary status presently.   18.  Hypercalcemia potentially related to thiazide diuretic.  To be rechecked subsequent to IV hydration.   19.  Issue of delirium in the past with oxycodone.  Family concerned that may have been dose-related.  Advised that can occur with any opiate even at lower dosing.      PLAN:   1.  Intravenous fluid support until adequate p.o. intake.   2.  Parameters for which nursing staff should call.   3.  Serial followup labs.   4.  Incentive spirometry/continuous oximetry.   5.  Review/resume prior to admission meds as appropriate.  Continue methotrexate as per discussion with patient.  Hold Microzide.  Continue Toprol with parameters.   6.  Review opiate regimen as per orthopedics.  With oxycodone-associated delirium, would like to proceed with low-dose Dilaudid 1-2 mg q. 4 hours p.r.n. in addition to p.r.n. IV Dilaudid as well as scheduled Extra Strength Tylenol.   7.  Bowel meds to reduce constipation on opiates.   8.  Lovenox followed by aspirin per Dr. Priest's protocol for DVT prophylaxis.   9.  Clarify basis for Macrobid therapy.   10.  Close clinical monitoring.      Thank you for the consultation.  We will follow with you.         PRAVEEN PRIDE MD             D: 2018   T: 2018   MT: NTS      Name:     MARION UNDERWOOD   MRN:      [id number]        Account:       [id number]   :      1935           Consult Date:  2018      Document: [id number]       cc: Liudmila Priest MD[WR1.1]        Revision History        User Key Date/Time User Provider Type Action    > WR1.1 2018  6:47 AM Praveen Pride MD  Physician Sign     [N/A] 2018  6:22 PM Michael Pride MD Physician Edit            Consults by Halina España MSW at 2018  2:51 PM     Author:  Halina España MSW Service:  Social Work Author Type:      Filed:  2018  2:59 PM Date of Service:  2018  2:51 PM Creation Time:  2018  2:50 PM    Status:  Signed :  Halina España MSW ()         Social Work: Assessment with Discharge Plan    Patient Name:  Brenda Barker  :  1935  Age:  82 year old  MRN:  [id number]  Risk/Complexity Score:  Filed Complexity Screen Score: 1  Completed assessment with:  Pt, pt's spouse, 8A IDT    Presenting Information   Reason for Referral:  Discharge plan  Date of Intake:  2018  Referral Source:  Chart Review  Decision Maker:  pt  Alternate Decision Maker:  Spouse David   Health Care Directive:  Copy in Chart  Living Situation:  Apartment  Previous Functional Status:  Independent  Patient and family understanding of hospitalization:  Seeking total hip replacement  Cultural/Language/Spiritual Considerations:    Adjustment to Illness:  Accepting, adjusting    Physical Health  Reason for Admission:[MK1.1]  No diagnosis found.[MK1.2]  Services Needed/Recommended:  TCU    Mental Health/Chemical Dependency  Diagnosis:  None noted  Support/Services in Place:  NA  Services Needed/Recommended:  NA    Support System  Significant relationship at present time:  yes  Family of origin is available for support:  Friends and other family  Other support available:  yes  Gaps in support system:  None noted  Patient is caregiver to:  None     Provider Information   Primary Care Physician:  Liudmila Bowie   [phone]   Clinic:  [facility] [address] [place] [address]      :      Financial   Income Source:  Pension, social security  Financial Concerns:  None noted  Insurance:    Payor/Plan Subscriber Name Rel Member #  "Group #   MEDICARE - MEDICARE F* MARION UNDERWOOD  [id number]       ATTN CLAIMS, [address]   BCBS - BCBS PLATINUM * MARION UNDERWOOD  [id number]      [address] [address]       Discharge Plan   Patient and family discharge goal:  TCU   Provided education on discharge plan:  YES  Patient agreeable to discharge plan:  YES  A list of Medicare Certified Facilities was provided to the patient and/or family to encourage patient choice. Patient's choices for facility are:  [facility] [place] [phone]  Will NH provide Skilled rehabilitation or complex medical:  YES  General information regarding anticipated insurance coverage and possible out of pocket cost was discussed. Patient and patient's family are aware patient may incur the cost of transportation to the facility, pending insurance payment: YES  Barriers to discharge:  Medical stability.    Discharge Recommendations   Anticipated Disposition:  [facility] [place] [phone]  Transportation Needs:  Family:  David will provide transportation      Additional comments   Writer introduced role/reason for visit. Pt is anticipating needing short term rehab stay, and has requested referral to [facility]. Pt is campus resident and believes she has \"priority\".    Writer provided support, reassurance and validation. Pt is hopeful for improved quality of life with this surgery. Pt and spouse use gentle humor as coping strategy.  SW left contact information as resource.    Writer left voicemail message for Admissions at [facility] and sent referral information via Epic. Await assessment response. SW con't to follow[MK1.1]     Revision History        User Key Date/Time User Provider Type Action    > MK1.2 6/28/2018  2:59 PM Halina España MSW  Sign     MK1.1 6/28/2018  2:50 PM Halina España MSW                       Progress Notes - Physician (Notes for yesterday and today)      Progress Notes by " "Michael Pride MD at 7/1/2018 12:04 PM     Author:  Michael Pride MD Service:  Hospitalist Author Type:  Physician    Filed:  7/1/2018 12:07 PM Date of Service:  7/1/2018 12:04 PM Creation Time:  7/1/2018 12:04 PM    Status:  Signed :  Michael Pride MD (Physician)         [facility] Internal Medicine Progress Note            Interval History:   Record reviewed.  Seen with RN.  Plan for DC to [facility] today. Clinically feeling well.  No further chills, fever spike.   Adequate pain control.  Tolerating low dose dilaudid 1 mg.  Ambulated in lipscomb to nurses station without LH.  No CP, SOB, cough, nausea, reflux, abd pain or distention.  Passing flatus. BM 6/29.  Voiding OK.            Medications:   All medications reviewed today          Physical Exam:   Blood pressure 137/75, pulse 82, temperature 96.4  F (35.8  C), temperature source Oral, resp. rate 16, height 1.575 m (5' 2\"), weight 75.1 kg (165 lb 9.1 oz), last menstrual period 05/17/1972, SpO2 100 %, not currently breastfeeding.    Intake/Output Summary (Last 24 hours) at 06/28/18 1440  Last data filed at 06/28/18 1414   Gross per 24 hour   Intake                0 ml   Output             1350 ml   Net            -1350 ml          General:  Alert.  Appropriate.  No distress. No  O2.     Heent:      Neck:    Skin:    Chest:  clear    Cardiac:  Reg without gallop, murmur.  No JVD.     Abdomen:  Non distended, soft, non-tender.  BS normal.     Extremities:  Perfused.  No edema, calf, posterior thigh tenderness to suggest DVT.     Neuro:            Data:     Results for orders placed or performed during the hospital encounter of 06/27/18 (from the past 24 hour(s))   Hemoglobin   Result Value Ref Range    Hemoglobin 8.2 (L) 11.7 - 15.7 g/dL[WR1.1]     Hemoglobin   Date Value Ref Range Status   07/01/2018 8.2 (L) 11.7 - 15.7 g/dL Final   06/30/2018 7.8 (L) 11.7 - 15.7 g/dL Final[WR1.2]     Last Basic Metabolic Panel:  Lab Results "   Component Value Date     06/30/2018      Lab Results   Component Value Date    POTASSIUM 4.0 06/30/2018     Lab Results   Component Value Date    CHLORIDE 109 06/30/2018     Lab Results   Component Value Date    SUZIE 9.7 06/30/2018     Lab Results   Component Value Date    CO2 25 06/30/2018     Lab Results   Component Value Date    BUN 12 06/30/2018     Lab Results   Component Value Date    CR 0.63 06/30/2018     Lab Results   Component Value Date     06/30/2018                Assessment and Plan:   1.  Left total hip arthroplasty. Spinal anesthesia.  Indication, osteoarthritis. Mobilizing well.   Adequate pain control with low dose opiates.   2.  Acute blood loss anemia superimposed on anemia prior to admission, potentially on the basis of chronic disease secondary to rheumatoid arthritis/methotrexate.  Interval drop possible soft tissue hemorrhage.  Possible dilution.  Adequate.   3.  Essential hypertension with adequate BP on reduced meds.    4.  Hyperlipidemia, on a statin.   5.  Rheumatoid arthritis on methotrexate to be continued throughout the operative course.   6.  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   7.  Resting tremor (noted on exam).   8.  Gastroesophageal reflux disease, symptomatically controlled on omeprazole.   9.  Fever with normal WBC.  No clear source bacterial infection.  Suspect likely 2nd to #1 (post op inflammatory change, hematoma). UA neg.  Improved.   10.  Sensorineural hearing loss with augmentation.   11.  Cervical spondylosis, C5-C6, with bilateral uncinate spurs.   12.  Carpal tunnel syndrome.   13.  Distal upper extremity paresthesias potentially related to carpal tunnel syndrome or cervical disk disease.   14.  Depression, largely situational subsequent to the death of her son in 1982.  Presently well compensated.   15.  Hemorrhoids.   16.  Aortic valve sclerosis.   17.  Negative objective evaluation for coronary artery  "disease several years ago.  Limited functional capacity preop due to left hip pain with unknown coronary status presently.   18.  Hypercalcemia potentially related to thiazide diuretic.  Normalized off microzide with IV fluids.   19.  Issue of delirium in the past with oxycodone.  Family concerned that may have been dose-related.  No recurrence.         PLAN:  1)    Clinically stable for DC.  2)  Meds, orders reviewed.  Continue to hold microzide.  Same opiates.  3)  F/u Hgb.     Disposition Plan   Expected discharge today.      Entered: Michael Pride 07/01/2018, 12:04 PM              Attestation:  I have reviewed today's vital signs, notes, medications, labs and imaging.     Michael Pride MD[WR1.1]             Revision History        User Key Date/Time User Provider Type Action    > WR1.2 7/1/2018 12:07 PM Michael Pride MD Physician Sign     WR1.1 7/1/2018 12:04 PM Michael Pride MD Physician             Progress Notes by Michael Pride MD at 6/30/2018 12:16 PM     Author:  Michael Pride MD Service:  Hospitalist Author Type:  Physician    Filed:  6/30/2018 12:30 PM Date of Service:  6/30/2018 12:16 PM Creation Time:  6/30/2018 12:16 PM    Status:  Signed :  Michael Pride MD (Physician)         [facility] Internal Medicine Progress Note            Interval History:   Record reviewed.  Seen with RN.  Clinically feeling well.  No further chills, fever spike.    Adequate pain control.  Tolerating low dose dilaudid 1 mg.  Ambulated in lipscomb without LH.  No CP, SOB, cough, nausea, reflux, abd pain or distention.  Passing flatus. BM last night.  Voiding OK.            Medications:   All medications reviewed today          Physical Exam:   Blood pressure 124/50, pulse 85, temperature 97.4  F (36.3  C), temperature source Oral, resp. rate 16, height 1.575 m (5' 2\"), weight 75.1 kg (165 lb 9.1 oz), last menstrual period 05/17/1972, SpO2 100 %, not currently " breastfeeding.    Intake/Output Summary (Last 24 hours) at 06/28/18 1440  Last data filed at 06/28/18 1414   Gross per 24 hour   Intake                0 ml   Output             1350 ml   Net            -1350 ml          General:  Alert.  Appropriate.  No distress. No  O2.     Heent:      Neck:    Skin:    Chest:  clear    Cardiac:  Reg without gallop, murmur.  No JVD.     Abdomen:  Non distended, soft, non-tender.  BS normal.     Extremities:  Perfused.  No edema, calf, posterior thigh tenderness to suggest DVT.     Neuro:            Data:     Results for orders placed or performed during the hospital encounter of 06/27/18 (from the past 24 hour(s))   CBC with platelets   Result Value Ref Range    WBC 8.4 4.0 - 11.0 10e9/L    RBC Count 2.88 (L) 3.8 - 5.2 10e12/L    Hemoglobin 8.1 (L) 11.7 - 15.7 g/dL    Hematocrit 25.8 (L) 35.0 - 47.0 %    MCV 90 78 - 100 fl    MCH 28.1 26.5 - 33.0 pg    MCHC 31.4 (L) 31.5 - 36.5 g/dL    RDW 16.8 (H) 10.0 - 15.0 %    Platelet Count 265 150 - 450 10e9/L   CBC with platelets   Result Value Ref Range    WBC 7.8 4.0 - 11.0 10e9/L    RBC Count 2.80 (L) 3.8 - 5.2 10e12/L    Hemoglobin 7.8 (L) 11.7 - 15.7 g/dL    Hematocrit 25.3 (L) 35.0 - 47.0 %    MCV 90 78 - 100 fl    MCH 27.9 26.5 - 33.0 pg    MCHC 30.8 (L) 31.5 - 36.5 g/dL    RDW 16.6 (H) 10.0 - 15.0 %    Platelet Count 251 150 - 450 10e9/L   Basic metabolic panel   Result Value Ref Range    Sodium 141 133 - 144 mmol/L    Potassium 4.0 3.4 - 5.3 mmol/L    Chloride 109 94 - 109 mmol/L    Carbon Dioxide 25 20 - 32 mmol/L    Anion Gap 7 3 - 14 mmol/L    Glucose 116 (H) 70 - 99 mg/dL    Urea Nitrogen 12 7 - 30 mg/dL    Creatinine 0.63 0.52 - 1.04 mg/dL    GFR Estimate >90 >60 mL/min/1.7m2    GFR Estimate If Black >90 >60 mL/min/1.7m2    Calcium 9.7 8.5 - 10.1 mg/dL   UA with Microscopic   Result Value Ref Range    Color Urine Yellow     Appearance Urine Clear     Glucose Urine Negative NEG^Negative mg/dL    Bilirubin Urine Negative  NEG^Negative    Ketones Urine Negative NEG^Negative mg/dL    Specific Gravity Urine 1.017 1.003 - 1.035    Blood Urine Negative NEG^Negative    pH Urine 5.5 5.0 - 7.0 pH    Protein Albumin Urine 10 (A) NEG^Negative mg/dL    Urobilinogen mg/dL Normal 0.0 - 2.0 mg/dL    Nitrite Urine Negative NEG^Negative    Leukocyte Esterase Urine Negative NEG^Negative    Source Clean catch urine     WBC Urine <1 0 - 5 /HPF    RBC Urine <1 0 - 2 /HPF    Squamous Epithelial /HPF Urine 1 0 - 1 /HPF    Mucous Urine Present (A) NEG^Negative /LPF   Urine Culture Aerobic Bacterial   Result Value Ref Range    Specimen Description Unspecified Urine     Special Requests Specimen received in preservative     Culture Micro PENDING                 Assessment and Plan:   1.  Left total hip arthroplasty. Spinal anesthesia.  Indication, osteoarthritis. Mobilizing. Adequate pain control with low dose opiates.   2.  Acute blood loss anemia superimposed on anemia prior to admission, potentially on the basis of chronic disease secondary to rheumatoid arthritis/methotrexate.  Interval drop possible soft tissue hemorrhage.  Possible dilution.  Adequate.   3.  Essential hypertension with adequate BP on reduced meds.    4.  Hyperlipidemia, on a statin.   5.  Rheumatoid arthritis on methotrexate to be continued throughout the operative course.   6.  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   7.  Resting tremor (noted on exam).   8.  Gastroesophageal reflux disease, symptomatically controlled on omeprazole.   9.  Fever with normal WBC.  No clear source bacterial infection.  Suspect likely 2nd to #1 (post op inflammatory change, hematoma).  Improved.   10.  Sensorineural hearing loss with augmentation.   11.  Cervical spondylosis, C5-C6, with bilateral uncinate spurs.   12.  Carpal tunnel syndrome.   13.  Distal upper extremity paresthesias potentially related to carpal tunnel syndrome or cervical disk disease.    14.  Depression, largely situational subsequent to the death of her son in 1982.  Presently well compensated.   15.  Hemorrhoids.   16.  Aortic valve sclerosis.   17.  Negative objective evaluation for coronary artery disease several years ago.  Limited functional capacity preop due to left hip pain with unknown coronary status presently.   18.  Hypercalcemia potentially related to thiazide diuretic.  Normalized off microzide with IV fluids.   19.  Issue of delirium in the past with oxycodone.  Family concerned that may have been dose-related.  No recurrence.         PLAN:  1)    IS, same opiates, bowel meds, mobilize, lovenox.  2)  Trend Hgb.  3)  Monitor clinically.  4)  Work on dispo.     Disposition Plan   Expected discharge in 1 days to TCU pending available facility.       Entered: Michael Pride 06/30/2018, 12:16 PM              Attestation:  I have reviewed today's vital signs, notes, medications, labs and imaging.     Michael Pride MD[WR1.1]             Revision History        User Key Date/Time User Provider Type Action    > WR1.1 6/30/2018 12:30 PM Michael Pride MD Physician Sign            Progress Notes by Lm Olivarez MD at 6/30/2018  8:46 AM     Author:  Lm Olivarez MD Service:  Orthopedics Author Type:  Resident    Filed:  6/30/2018  8:47 AM Date of Service:  6/30/2018  8:46 AM Creation Time:  6/30/2018  8:46 AM    Status:  Signed :  Lm Olivarez MD (Resident)           Orthopaedic Surgery Daily Progress Note   Subjective: Brenda Barker is a 82 year old female POD # 3 s/p L RENETTA  Pain is controlled. Tolerating regular diet. Doing well overall, ready to DC     REVIEW OF SYSTEMS: As noted above. No headache, dizziness. No fever, chills. No chest pain or shortness of breath. No abdominal pain, nausea, vomiting. No diarrhea or constipation. No urinary difficulties.     Physical Exam   BP (!) 119/37 (BP Location: Right arm)  Pulse 78  Temp 97.4  F  "(36.3  C) (Oral)  Resp 16  Ht 1.575 m (5' 2\")  Wt 75.1 kg (165 lb 9.1 oz)  LMP 05/17/1972  SpO2 100%  BMI 30.28 kg/m2    General: Alert, well-appearing  in no acute distress.  Respiratory: Non-labored breathing.   Cardiovascular: Extremities warm and well perfused   Extremities: moving all four extremities.   LE:  -Sens: SILT dp/sp/s/s/t  -Motor: 5/5 EHL/FHL/TA/GSc  -Vasc: 2+ pulses, wwp, brisk cap refill    Dressing CDI    Skin: As noted above. No rashes or lesions appreciated.    Labs    Complete Blood Count     Recent Labs  Lab 06/30/18  0641 06/29/18  1757 06/29/18  0721 06/28/18  0657 06/27/18  1826   WBC 7.8 8.4 7.1  --   --    HGB 7.8* 8.1* 7.9* 9.2*  --     265  --   --  349     Basic Metabolic Panel    Recent Labs  Lab 06/30/18  0641 06/29/18  0721 06/28/18  0857 06/27/18  1826    142 141  --    POTASSIUM 4.0 3.6 4.0  --    CHLORIDE 109 109 108  --    CO2 25 24 20  --    BUN 12 16 16  --    CR 0.63 0.69 0.62 0.68   * 105* 134*  --        Assessment/Plan:  Assessment/Plan: Brenda Barker is a 82 year old female s/p Procedure: Left Total Hip Arthroplasty     Activity: Up with assist and assistive devices as needed until independent. Posterior hip precautions x 3 months:  1) No L hip flexion beyond 90 degrees  2) No adduction beyond midline  3) No internal rotation beyond neutral   Weight bearing status: WBAT  Antibiotics: Cefazolin x 24 hours completed   Diet: Begin with clear fluids and progress diet as tolerated. Bowel regimen. Anti-emetics PRN.    DVT prophylaxis: Mechanical while in hospital with Lovenox x 2 weeks, followed by aspirin x 2 weeks  Elevation: Elevate heels off of bed on pillows   Wound Care: Aquacel x 5-7 days.    Drains: none  Pain management: Orals PRN, IV for breakthrough only  X-rays: AP Pelvis and Lateral L hip in PACU.   Physical Therapy: Mobilization, ROM, ADL's, Posterior hip precautions.    Occupational Therapy: ADL's  Consults: PT, OT. Hospitalist, " appreciate assistance in caring for this patient throughout the perioperative period    Anticipate DC to rehab today    Lm Olivarez MD   Orthopaedic Surgery Resident, PGY-4  P: [phone][EB1.1]       Revision History        User Key Date/Time User Provider Type Action    > EB1.1 6/30/2018  8:47 AM Lm Olivarez MD Resident Sign                  Procedure Notes     No notes of this type exist for this encounter.         Progress Notes - Therapies (Notes from 06/28/18 through 07/01/18)      Progress Notes by Mehnaz Remy OT at 6/28/2018  1:26 PM     Author:  Mehnaz Remy OT Service:  (none) Author Type:  Occupational Therapist    Filed:  6/28/2018  1:26 PM Date of Service:  6/28/2018  1:26 PM Creation Time:  6/28/2018  1:26 PM    Status:  Signed :  Mehnaz Remy OT (Occupational Therapist)          06/28/18 1042   Quick Adds   Type of Visit Initial Occupational Therapy Evaluation   Living Environment   Lives With spouse   Living Arrangements apartment   Home Accessibility no concerns   Number of Stairs to Enter Home 0   Number of Stairs Within Home 0   Living Environment Comment Lives in independent sr. apt place. They have extended care facilities, a warm water pool and exercise facility on campus   Self-Care   Dominant Hand right   Usual Activity Tolerance good   Current Activity Tolerance fair   Regular Exercise yes   Activity/Exercise Type strength training;swimming;other (see comments)   Exercise Amount/Frequency daily  (exercises in pool)   Equipment Currently Used at Home grab bar;walker, rolling;shower chair  (has access to shower chair)   Activity/Exercise/Self-Care Comment Pt takes a balance class with strengthening, she loves the pool, she generally exercises daily   Functional Level Prior   Ambulation 1-->assistive equipment   Transferring 1-->assistive equipment   Toileting 0-->independent   Bathing 0-->independent   Dressing 0-->independent   Eating 0-->independent  "  Communication 0-->understands/communicates without difficulty   Swallowing 0-->swallows foods/liquids without difficulty   Cognition 0 - no cognition issues reported   Fall history within last six months no   Which of the above functional risks had a recent onset or change? ambulation;transferring;toileting;bathing;dressing   Prior Functional Level Comment indep. with BADLs and functional mobility   General Information   Onset of Illness/Injury or Date of Surgery - Date 06/27/18   Referring Physician Keo Priest MD   Additional Occupational Profile Info/Pertinent History of Current Problem s/p Left RENETTA.  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 osteopenia, UTI, L3-4 decompression and fusion, JOHN, left TKA 2008   Precautions/Limitations left hip precautions   Weight-Bearing Status - LLE weight-bearing as tolerated   General Info Comments activity orders; amb. with A   Cognitive Status Examination   Cognitive Comment cognition appears baseline; WNL   Visual Perception   Visual Perception Wears glasses   Pain Assessment   Patient Currently in Pain (post op pain; per pt. \"managable\")   Range of Motion (ROM)   ROM Quick Adds No deficits were identified   Strength   Manual Muscle Testing Quick Adds No deficits were identified   Transfer Skill: Bed to Chair/Chair to Bed   Level of [place]: Bed to Chair contact guard   Physical Assist/Nonphysical Assist: Bed to Chair set-up required;1 person assist   Assistive Device - Transfer Skill Bed to Chair Chair to Bed Rehab Eval rolling walker   Transfer Skill: Sit to Stand   Level of [place]: Sit/Stand contact guard   Physical Assist/Nonphysical Assist: Sit/Stand set-up required;1 person assist   Assistive Device for Transfer: Sit/Stand rolling walker   Transfer Skill: Toilet Transfer   Level of [place]: Toilet contact guard  (commode) " "  Physical Assist/Nonphysical Assist: Toilet set-up required;1 person assist   Assistive Device rolling walker  (commode)   Upper Body Dressing   Level of [place]: Dress Upper Body independent   Lower Body Dressing   Level of [place]: Dress Lower Body minimum assist (75% patients effort)   Physical Assist/Nonphysical Assist: Dress Lower Body set-up required;1 person assist   Assistive Device sock-aid;reacher   Grooming   Level of [place]: Grooming stand-by assist   Physical Assist/Nonphysical Assist: Grooming set-up required;1 person assist   Eating/Self Feeding   Level of [place]: Eating independent   Instrumental Activities of Daily Living (IADL)   Previous Responsibilities meal prep;medication management;finances;laundry;shopping;driving   Activities of Daily Living Analysis   Impairments Contributing to Impaired Activities of Daily Living pain;post surgical precautions;strength decreased   General Therapy Interventions   Planned Therapy Interventions ADL retraining;transfer training;progressive activity/exercise;home program guidelines   Clinical Impression   Criteria for Skilled Therapeutic Interventions Met yes, treatment indicated   OT Diagnosis impaired ADls/mobility   Influenced by the following impairments post op precautions   Assessment of Occupational Performance 3-5 Performance Deficits   Identified Performance Deficits dressing, toileting, bathing, home mgmt.   Clinical Decision Making (Complexity) Low complexity   Therapy Frequency daily   Predicted Duration of Therapy Intervention (days/wks) 6/30/18   Anticipated Equipment Needs at Discharge (TBD)   Anticipated Discharge Disposition Home with Assist;Transitional Care Facility   Risks and Benefits of Treatment have been explained. Yes   Patient, Family & other staff in agreement with plan of care Yes   [facility] AM-PAC TM \"6 Clicks\"   2016, Trustees of [facility], under license to [employer].  All rights reserved. " "  6 Clicks Short Forms Daily Activity Inpatient Short Form   [facility] AM-PAC  \"6 Clicks\" Daily Activity Inpatient Short Form   1. Putting on and taking off regular lower body clothing? 3 - A Little   2. Bathing (including washing, rinsing, drying)? 2 - A Lot   3. Toileting, which includes using toilet, bedpan or urinal? 3 - A Little   4. Putting on and taking off regular upper body clothing? 4 - None   5. Taking care of personal grooming such as brushing teeth? 4 - None   6. Eating meals? 4 - None   Daily Activity Raw Score (Score out of 24.Lower scores equate to lower levels of function) 20   Total Evaluation Time   Total Evaluation Time (Minutes) 8[KW1.1]        Revision History        User Key Date/Time User Provider Type Action    > KW1.1 6/28/2018  1:26 PM Mehnaz Remy OT Occupational Therapist Sign            Progress Notes by Jes Bates PT at 6/28/2018  9:40 AM     Author:  Jes Bates PT Service:  Physical Medicine and Rehabilitation Author Type:  Physical Therapist    Filed:  6/28/2018 12:34 PM Date of Service:  6/28/2018  9:40 AM Creation Time:  6/28/2018 12:34 PM    Status:  Signed :  Jes Bates PT (Physical Therapist)          06/28/18 0940   Quick Adds   Type of Visit Initial PT Evaluation   Living Environment   Lives With spouse   Living Arrangements apartment   Home Accessibility no concerns   Number of Stairs to Enter Home 0   Number of Stairs Within Home 0   Transportation Available car  ([employer])   Living Environment Comment LIves in independent sr. apt. They have extended care facilities available on sit, a warm water pool and exercise facility   Self-Care   Dominant Hand right   Usual Activity Tolerance fair   Current Activity Tolerance fair   Regular Exercise yes   Activity/Exercise Type strength training;swimming;other (see comments)   Exercise Amount/Frequency daily  (execises in the pool. )   Equipment Currently Used at Home walker, rolling "   Activity/Exercise/Self-Care Comment Pt takes a balance class with strengthening, she loves the pool, she generally exercises daily   Functional Level Prior   Ambulation 1-->assistive equipment   Transferring 1-->assistive equipment   Toileting 0-->independent   Bathing 0-->independent   Eating 0-->independent   Communication 0-->understands/communicates without difficulty   Swallowing 0-->swallows foods/liquids without difficulty   Cognition 0 - no cognition issues reported   Fall history within last six months no   Which of the above functional risks had a recent onset or change? ambulation   Prior Functional Level Comment Could walk a couple blocks with her wheeled walker. Independent with ADL's,    General Information   Onset of Illness/Injury or Date of Surgery - Date 06/27/18   Referring Physician Dr. Keo Priest/ Dr. Lm Olivarez   Patient/Family Goals Statement Pt wants to walk without her walker.    Pertinent History of Current Problem (include personal factors and/or comorbidities that impact the POC) POD # 1 Left RENETTA.  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 osteopenia, UTI, L3-4 decompression and fusion, JOHN, left TKA 2008   Weight-Bearing Status - LLE weight-bearing as tolerated   General Observations Pt has pneumoboots, capnography,    General Info Comments Pt has been up 6 times to the commode already. She states she did the on-line teaching.    Cognitive Status Examination   Orientation orientation to person, place and time   Level of Consciousness alert   Follows Commands and Answers Questions 100% of the time   Personal Safety and Judgment intact   Memory intact   Pain Assessment   Patient Currently in Pain Yes, see Vital Sign flowsheet   Integumentary/Edema   Integumentary/Edema no deficits were identifed   Range of Motion (ROM)   ROM Comment Limited by hip precautions,  " knees and Left hip are WFl   Strength   Strength Comments Pt is unable to lift LLE agaist gravity,    Bed Mobility   Bed Mobility Comments min A with LLE to enter and exit bed,   Transfer Skills   Transfer Comments sit<>stand transfer with supervision.    Gait   Gait Comments walked 60' with ww and cues , walker sized for better fit   Balance   Balance Comments can stand without UE support, Needs bilateral push off to stand up.    Sensory Examination   Sensory Perception Comments tingling in bilateral hands (CTS vs cervical neuropathy)  at baseline   Coordination   Coordination no deficits were identified   Modality Interventions   Planned Modality Interventions Cryotherapy   General Therapy Interventions   Planned Therapy Interventions gait training;ROM;strengthening;bed mobility training;transfer training;home program guidelines;progressive activity/exercise   Clinical Impression   Criteria for Skilled Therapeutic Intervention yes, treatment indicated   PT Diagnosis impaired functional mobility s/p left RENETTA   Influenced by the following impairments pain, lightheadedness, decr. strength,    Functional limitations due to impairments decr. independence with gait , transfers and bed mobitliy   Clinical Presentation Stable/Uncomplicated   Clinical Presentation Rationale per clinical observation, pt is motivated to move, familiar with rehab process from prior knee replacement surgery   Clinical Decision Making (Complexity) Low complexity   Therapy Frequency` 2 times/day   Predicted Duration of Therapy Intervention (days/wks) 3   Anticipated Equipment Needs at Discharge (owns a 4ww)   Anticipated Discharge Disposition Transitional Care Facility   Risk & Benefits of therapy have been explained Yes   Patient, Family & other staff in agreement with plan of care Yes   [facility] AM-PAC TM \"6 Clicks\"   2016, Trustees of [facility], under license to [employer].  All rights reserved.   6 Clicks Short Forms " "Basic Mobility Inpatient Short Form   [facility] AM-PAC  \"6 Clicks\" V.2 Basic Mobility Inpatient Short Form   1. Turning from your back to your side while in a flat bed without using bedrails? 3 - A Little   2. Moving from lying on your back to sitting on the side of a flat bed without using bedrails? 3 - A Little   3. Moving to and from a bed to a chair (including a wheelchair)? 3 - A Little   4. Standing up from a chair using your arms (e.g., wheelchair, or bedside chair)? 3 - A Little   5. To walk in hospital room? 3 - A Little   6. Climbing 3-5 steps with a railing? 2 - A Lot   Basic Mobility Raw Score (Score out of 24.Lower scores equate to lower levels of function) 17   Total Evaluation Time   Total Evaluation Time (Minutes) 12[PK1.1]        Revision History        User Key Date/Time User Provider Type Action    > PK1.1 6/28/2018 12:34 PM Jes Bates, PT Physical Therapist Sign                                                      INTERAGENCY TRANSFER FORM - LAB / IMAGING / EKG / EMG RESULTS   6/27/2018                       UR 8A: [phone]            Unresulted Labs (24h ago through future)    Start       Ordered    06/30/18 0600  Platelet count  (enoxaparin (LOVENOX) (Weight  kg with CrCl greater than 30 mL/min is prechecked))  EVERY THREE DAYS,   Routine     Comments:  Repeat every 3 days while on VTE prophylaxis. If no result is listed, this lab has not been done the past 365 days. LATEST LAB RESULT: Platelet Count (10e9/L)       Date                     Value                 06/15/2018               294              ----------      06/27/18 1635    Unscheduled  Hemoglobin  CONDITIONAL X 2,   Routine     Comments:  Release on POD 1 and POD 2 if the morning Hgb is less than 8.0    06/27/18 1635         Lab Results - 3 Days      Urine Culture Aerobic Bacterial [[id number]]  Resulted: 07/01/18 1226, Result status: Final result    Ordering provider: Michael Pride MD  06/29/18 1214 " Resulting lab: [facility]    Specimen Information    Type Source Collected On   Unspecified Urine Urine clean catch 06/30/18 0835          Components       Value Reference Range Flag Lab   Specimen Description Unspecified Urine      Special Requests Specimen received in preservative   75   Culture Micro No growth   225            Hemoglobin [[id number]] (Abnormal)  Resulted: 07/01/18 1109, Result status: Final result    Ordering provider: Michael Pride MD  07/01/18 0000 Resulting lab: [facility]    Specimen Information    Type Source Collected On   Blood  07/01/18 0640          Components       Value Reference Range Flag Lab   Hemoglobin 8.2 11.7 - 15.7 g/dL L 13            UA with Microscopic [[id number]] (Abnormal)  Resulted: 06/30/18 0941, Result status: Final result    Ordering provider: Michael Pride MD  06/29/18 1214 Resulting lab: [facility]    Specimen Information    Type Source Collected On   Urine Urine clean catch 06/30/18 0835          Components       Value Reference Range Flag Lab   Color Urine Yellow   13   Appearance Urine Clear   13   Glucose Urine Negative NEG^Negative mg/dL  13   Bilirubin Urine Negative NEG^Negative  13   Ketones Urine Negative NEG^Negative mg/dL  13   Specific Gravity Urine 1.017 1.003 - 1.035  13   Blood Urine Negative NEG^Negative  13   pH Urine 5.5 5.0 - 7.0 pH  13   Protein Albumin Urine 10 NEG^Negative mg/dL A 13   Urobilinogen mg/dL Normal 0.0 - 2.0 mg/dL  13   Nitrite Urine Negative NEG^Negative  13   Leukocyte Esterase Urine Negative NEG^Negative  13   Source Clean catch urine   13   WBC Urine <1 0 - 5 /HPF  13   RBC Urine <1 0 - 2 /HPF  13   Squamous Epithelial /HPF Urine 1 0 - 1 /HPF  13   Mucous Urine Present NEG^Negative /LPF A 13            Basic metabolic panel [[id number]] (Abnormal)  Resulted: 06/30/18 0722, Result status: Final result    Ordering provider:  Michael Pride MD  06/30/18 0000 Resulting lab: [facility]    Specimen Information    Type Source Collected On   Blood  06/30/18 0641          Components       Value Reference Range Flag Lab   Sodium 141 133 - 144 mmol/L  13   Potassium 4.0 3.4 - 5.3 mmol/L  13   Chloride 109 94 - 109 mmol/L  13   Carbon Dioxide 25 20 - 32 mmol/L  13   Anion Gap 7 3 - 14 mmol/L  13   Glucose 116 70 - 99 mg/dL H 13   Urea Nitrogen 12 7 - 30 mg/dL  13   Creatinine 0.63 0.52 - 1.04 mg/dL  13   GFR Estimate >90 >60 mL/min/1.7m2  13   Comment:  Non  GFR Calc   GFR Estimate If Black >90 >60 mL/min/1.7m2  13   Comment:  African American GFR Calc   Calcium 9.7 8.5 - 10.1 mg/dL  13            CBC with platelets [[id number]] (Abnormal)  Resulted: 06/30/18 0705, Result status: Final result    Ordering provider: Michael Pride MD  06/30/18 0000 Resulting lab: [facility]    Specimen Information    Type Source Collected On   Blood  06/30/18 0641          Components       Value Reference Range Flag Lab   WBC 7.8 4.0 - 11.0 10e9/L  13   RBC Count 2.80 3.8 - 5.2 10e12/L L 13   Hemoglobin 7.8 11.7 - 15.7 g/dL L 13   Hematocrit 25.3 35.0 - 47.0 % L 13   MCV 90 78 - 100 fl  13   MCH 27.9 26.5 - 33.0 pg  13   MCHC 30.8 31.5 - 36.5 g/dL L 13   RDW 16.6 10.0 - 15.0 % H 13   Platelet Count 251 150 - 450 10e9/L  13            CBC with platelets [[id number]] (Abnormal)  Resulted: 06/29/18 1820, Result status: Final result    Ordering provider: Michael Pride MD  06/29/18 1709 Resulting lab: [facility]    Specimen Information    Type Source Collected On   Blood  06/29/18 1757          Components       Value Reference Range Flag Lab   WBC 8.4 4.0 - 11.0 10e9/L  13   RBC Count 2.88 3.8 - 5.2 10e12/L L 13   Hemoglobin 8.1 11.7 - 15.7 g/dL L 13   Hematocrit 25.8 35.0 - 47.0 % L 13   MCV 90 78 - 100 fl  13   MCH 28.1 26.5 - 33.0 pg  13    MCHC 31.4 31.5 - 36.5 g/dL L 13   RDW 16.8 10.0 - 15.0 % H 13   Platelet Count 265 150 - 450 10e9/L  13            Basic metabolic panel [[id number]] (Abnormal)  Resulted: 06/29/18 0754, Result status: Final result    Ordering provider: Michael Pride MD  06/29/18 0000 Resulting lab: [facility]    Specimen Information    Type Source Collected On   Blood  06/29/18 0721          Components       Value Reference Range Flag Lab   Sodium 142 133 - 144 mmol/L  13   Potassium 3.6 3.4 - 5.3 mmol/L  13   Chloride 109 94 - 109 mmol/L  13   Carbon Dioxide 24 20 - 32 mmol/L  13   Anion Gap 9 3 - 14 mmol/L  13   Glucose 105 70 - 99 mg/dL H 13   Urea Nitrogen 16 7 - 30 mg/dL  13   Creatinine 0.69 0.52 - 1.04 mg/dL  13   GFR Estimate 81 >60 mL/min/1.7m2  13   Comment:  Non  GFR Calc   GFR Estimate If Black >90 >60 mL/min/1.7m2  13   Comment:  African American GFR Calc   Calcium 9.4 8.5 - 10.1 mg/dL  13            Hemoglobin [[id number]] (Abnormal)  Resulted: 06/29/18 0737, Result status: Final result    Ordering provider: Lm Olivarez MD  06/29/18 0000 Resulting lab: [facility]    Specimen Information    Type Source Collected On   Blood  06/29/18 0721          Components       Value Reference Range Flag Lab   Hemoglobin 7.9 11.7 - 15.7 g/dL L 13            WBC and differential [[id number]]  Resulted: 06/29/18 0737, Result status: Final result    Ordering provider: Keo Priest MD  06/29/18 0654 Resulting lab: [facility]    Specimen Information    Type Source Collected On     06/29/18 0721          Components       Value Reference Range Flag Lab   WBC 7.1 4.0 - 11.0 10e9/L  13   Diff Method Automated Method   13   % Neutrophils 70.8 %  13   % Lymphocytes 15.5 %  13   % Monocytes 13.5 %  13   % Eosinophils 0.1 %  13   % Basophils 0.0 %  13   % Immature Granulocytes 0.1 %  13   Nucleated RBCs 0 0 /100  13    Absolute Neutrophil 5.0 1.6 - 8.3 10e9/L  13   Absolute Lymphocytes 1.1 0.8 - 5.3 10e9/L  13   Absolute Monocytes 1.0 0.0 - 1.3 10e9/L  13   Absolute Eosinophils 0.0 0.0 - 0.7 10e9/L  13   Absolute Basophils 0.0 0.0 - 0.2 10e9/L  13   Abs Immature Granulocytes 0.0 0 - 0.4 10e9/L  13   Absolute Nucleated RBC 0.0   13            Basic metabolic panel [[id number]] (Abnormal)  Resulted: 06/28/18 0938, Result status: Final result    Ordering provider: Michael Pride MD  06/28/18 0803 Resulting lab: [facility]    Specimen Information    Type Source Collected On   Blood  06/28/18 0857          Components       Value Reference Range Flag Lab   Sodium 141 133 - 144 mmol/L  13   Potassium 4.0 3.4 - 5.3 mmol/L  13   Chloride 108 94 - 109 mmol/L  13   Carbon Dioxide 20 20 - 32 mmol/L  13   Anion Gap 13 3 - 14 mmol/L  13   Glucose 134 70 - 99 mg/dL H 13   Urea Nitrogen 16 7 - 30 mg/dL  13   Creatinine 0.62 0.52 - 1.04 mg/dL  13   GFR Estimate >90 >60 mL/min/1.7m2  13   Comment:  Non  GFR Calc   GFR Estimate If Black >90 >60 mL/min/1.7m2  13   Comment:  African American GFR Calc   Calcium 10.1 8.5 - 10.1 mg/dL  13            Magnesium [[id number]]  Resulted: 06/28/18 0938, Result status: Final result    Ordering provider: Michael Pride MD  06/28/18 0803 Resulting lab: [facility]    Specimen Information    Type Source Collected On   Blood  06/28/18 0857          Components       Value Reference Range Flag Lab   Magnesium 1.9 1.6 - 2.3 mg/dL  13            Glucose by meter [[id number]] (Abnormal)  Resulted: 06/28/18 0834, Result status: Final result    Ordering provider: Keo Priest MD  06/28/18 0822 Resulting lab: POINT OF CARE TEST, GLUCOSE    Specimen Information    Type Source Collected On     06/28/18 0822          Components       Value Reference Range Flag Lab   Glucose 116 70 - 99 mg/dL H 170            Hemoglobin [[id number]]  (Abnormal)  Resulted: 06/28/18 0708, Result status: Final result    Ordering provider: Lm Olivarez MD  06/28/18 0001 Resulting lab: [facility]    Specimen Information    Type Source Collected On   Blood  06/28/18 0657          Components       Value Reference Range Flag Lab   Hemoglobin 9.2 11.7 - 15.7 g/dL L 13            Testing Performed By     Lab - Abbreviation Name Director Address Valid Date Range    13 - Unknown [facility] Unknown [address] [facility] [address] 01/15/15 0916 - Present    75 - Unknown [facility] Unknown [address] [facility] [address] 01/15/15 1019 - Present    170 - Unknown POINT OF CARE TEST, GLUCOSE Unknown Unknown 10/31/11 1114 - Present    225 - Unknown INFECTIOUS DISEASE DIAGNOSTIC LABORATORY Unknown [address] [facility] [address] 12/19/14 0954 - Present            Encounter-Level Documents:     There are no encounter-level documents.      Order-Level Documents:     There are no order-level documents.

## 2018-06-27 NOTE — BRIEF OP NOTE
[facility], [place]    Brief Operative Note    Pre-operative diagnosis: Osteoarthritis Left Hip   Post-operative diagnosis * No post-op diagnosis entered *  Procedure: Procedure(s):  Left Total Hip Arthroplasty   - Wound Class: I-Clean  Surgeon: Surgeon(s) and Role:     * Keo Priest MD - Primary     * Lm Olivarez MD - Resident - Assisting     * Lm Olivarez MD - Assisting  Anesthesia: Spinal   Estimated blood loss: 300 ml  Drains: None  Specimens: * No specimens in log *  Findings:   None.  Complications: None.  Implants: See op report       POST OPERATIVE PLAN    Assessment/Plan: Brenda Barker is a 82 year old female s/p Procedure(s):  Left Total Hip Arthroplasty   - Wound Class: I-Clean on 6/27/2018 with Dr. Priest.    Activity: Up with assist and assistive devices as needed until independent. Posterior hip precautions x 3 months:  1) No L hip flexion beyond 90 degrees  2) No adduction beyond midline  3) No internal rotation beyond neutral   Weight bearing status: WBAT  Antibiotics: Cefazolin x 24 hours  Diet: Begin with clear fluids and progress diet as tolerated. Bowel regimen. Anti-emetics PRN.    DVT prophylaxis: Mechanical while in hospital with Lovenox x 2 weeks, followed by aspirin x 2 weeks  Elevation: Elevate heels off of bed on pillows   Wound Care: Aquacel x 5-7 days.    Drains: none  Pain management: Orals PRN, IV for breakthrough only  X-rays: AP Pelvis and Lateral L hip in PACU.   Physical Therapy: Mobilization, ROM, ADL's, Posterior hip precautions.    Occupational Therapy: ADL's  Labs: Trend Hgb on POD #1, 2  Consults: PT, OT. Hospitalist, appreciate assistance in caring for this patient throughout the perioperative period    Future Appointments  Date Time Provider Department [place]   6/28/2018 9:30 AM Jes Bates PT [facility] [place]   6/28/2018 12:30 PM Quin Hermosillo OT [facility] [place]   6/28/2018 2:00 PM Jes Bates PT URPT  [place]   7/10/2018 11:00 AM MG NURSE ONLY ORTHO [facility] [place]   8/7/2018 11:30 AM Keo Priest MD [facility] [place]       Disposition: Pending progress with therapies, pain control on orals, and medical stability, anticipate discharge to Home vs TCU on POD #2-3.    Lm Olivarez MD   PGY 4 Orthopaedic Surgery   [phone]

## 2018-06-27 NOTE — ANESTHESIA PROCEDURE NOTES
Spinal/LP Procedure Note    Spinal Block  Staff:     Anesthesiologist:  LEAH REILLY  Location: OR  Procedure Start/Stop Times:     patient identified, IV checked, site marked, risks and benefits discussed, informed consent, monitors and equipment checked, pre-op evaluation, at physician/surgeon's request and post-op pain management      Correct Patient: Yes      Correct Position: Yes      Correct Site: Yes      Correct Procedure: Yes      Correct Laterality:  N/A    Site Marked:  N/A  Procedure:     Procedure:  Intrathecal    ASA:  3    Diagnosis:  Primary anesthetic     Position:  Sitting    Sterile Prep: chloraprep      Insertion site:  L3-4    Approach:  Midline    Needle Type:  Kim    Needle gauge (G):  25    Local Skin Infiltration:  1% lidocaine    amount (ml):  3    Needle Length (in):  3.5    Introducer used: Yes      Attempts:  2    Redirects:  0    CSF:  Clear    Paresthesias:  Resolved    Time injected:  11:15

## 2018-06-27 NOTE — IP AVS SNAPSHOT
UR 8A    [address]    [place] [address]    Phone:  [phone]                                       After Visit Summary   6/27/2018    Brenda Barker    MRN: [id number]           After Visit Summary Signature Page     I have received my discharge instructions, and my questions have been answered. I have discussed any challenges I see with this plan with the nurse or doctor.    ..........................................................................................................................................  Patient/Patient Representative Signature      ..........................................................................................................................................  Patient Representative Print Name and Relationship to Patient    ..................................................               ................................................  Date                                            Time    ..........................................................................................................................................  Reviewed by Signature/Title    ...................................................              ..............................................  Date                                                            Time

## 2018-06-27 NOTE — CONSULTS
"Consult Date:  06/27/2018      INTERNAL MEDICINE CONSULTATION       REQUESTING PHYSICIAN:  Keo Priest MD       HISTORY OF PRESENT ILLNESS:  A pleasant 82-year-old female who underwent left total hip arthroplasty earlier today by Dr. Keo Priest from Orthopedic Surgery.  We were asked to see the patient postoperatively for internal medicine consultation to include assistance with perioperative fluid and pain management, as well as to assess status and intervene with regard to a known history of essential hypertension, rheumatoid arthritis and gastroesophageal reflux disease.      Indication for procedure, osteoarthritis, left hip.  Painful mobility compromise.  No opiate requirement preoperatively.  Use of p.r.n. Tylenol Arthritis.  Surgical intervention as above.  Tolerated well under spinal anesthesia.  Relatively stable hemodynamics.  Estimated blood loss 330 mL.  Received 250 mL of lactated Ringer's.  Mild-to-moderate degree of left hip pain with wear off effect from spinal anesthesia.  No nausea.  No chest discomfort or dyspnea.  No known history of heart disease.  Indicates underwent a stress echocardiogram several years ago which was unremarkable.  Limited exercise activity preoperatively due to the hip pain necessitating that patient ambulate with a walker.  Resides at [facility], indicating that she actually has not gone outside lately.      No prior issues with anesthesia.  No history of abnormal bleeding tendency, except for following wisdom teeth extraction.  Evaluated at the [facility] with bleeding issue attributed to aspirin excess.  No history of blood clots.  Prior transfusion (remote) without reaction.  No recent steroids.  No ongoing upper respiratory infection or flu-like illness.      The patient did receive oxycodone at time of spine surgery at [facility] 2013.  No nausea.  Did develop delirium attributed to \"overdosing.\"  Actually pulled her drain out at that " time.      Essential hypertension for which the patient did take a.m. Toprol and Microzide 12.5 mg.      PAST MEDICAL HISTORY:   1.  Osteoarthritis, left hip (as above).   2.  Essential hypertension.   3.  Hyperlipidemia.   4.  Rheumatoid arthritis on continued methotrexate throughout the operative course (apparently discussed by patient's outside provider with orthopedics).   5.  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   6.  Resting tremor.   7.  Gastroesophageal reflux disease with esophagitis, controlled on omeprazole.   8.  Obesity.   9.  Sensorineural hearing loss with bilateral augmentation.   10.  Cervical discomfort.   11.  Hemorrhoids.   12.  Aortic valve sclerosis on echo 2002 with mild tricuspid regurgitation and trace mitral regurgitation.   13.  Asymptomatic PVCs.   14.  History of basal cell carcinoma of the scalp.   15.  Borderline A1c of 6.1% in 04/2004.   16.  Carpal tunnel syndrome, right greater than left.   17.  Cervical spondylosis with C5-C6 bilateral uncinate spurs.   18.  Depression, subsequent to the death of her son by suicide in 1982.  Presently well compensated.   19.  History of herpes simplex.   20.  History of adenomatous colonic polyp.   21.  Osteopenia.   22.  History of recurrent urinary tract infection.      PAST SURGICAL HISTORY:   1.  L3-L4 decompression and fusion [facility] 2011.   2.  Benign thyroid biopsy 2013.   3.  Oophorectomy 1980.   4.  Total abdominal hysterectomy and single oophorectomy 1972.   5.  Cataract extraction 2010 and 2011.   6.  Screening colonoscopy.   7.  Tonsillectomy.   8.  Left total knee arthroplasty 2008.   9.  Left acoustic neuroma excision 1998 (as above).      Without known coronary disease, diabetes, asthma, intrinsic renal disease, peptic ulcer disease, hepatitis, gallbladder disease, thyroid disease, seizure, tuberculosis, or anemia.      MEDICATION ALLERGIES:  CONTRAST DYE  (URTICARIA AND ANAPHYLAXIS), DIATRIZOATE, CIPROFLOXACIN (NAUSEA) AND SULFASALAZINE (RASH).      MEDICATIONS:  Prior to admission:  Hydrochlorothiazide 12.5 mg daily, Zovirax 400 mg daily,    vitamin D3 2000 units daily, folic acid 2 mg daily, Toprol-XL 50 mg daily, Macrodantin 100 mg daily with first dose on 06/28, omeprazole 20 mg daily, Zocor 20 mg at bedtime, Zyrtec 5 mg q.p.m.,    Tylenol p.r.n., aspirin 81 mg daily,  Centrum Silver daily, fish oil 2 capsules daily, Mobic 7.5 mg daily,    methotrexate 2.5 mg 6 tablets per week with last dose on 06/23/2018 and liquid tears presumably p.r.n.      FAMILY HISTORY:  Mother with thyroid cancer and laryngeal cancer.  Grandmother with CVA.  No diabetes.      HABITS:  Nonsmoker.  Occasional alcohol.      SOCIAL HISTORY:  .  Three sons, one committing suicide.  Resides in independent living at    [facility].      REVIEW OF SYSTEMS:  Denies fever, chills or sweats.  No headache or dizziness, no visual change.  Without chest discomfort, dyspnea, cough, palpitations.  No nausea or vomiting, no reflux or abdominal pain.  Prone to constipation, taking prunes this week.  Last bowel movement yesterday.  No signs of GI blood loss.  No voiding complaints.  Right hip arthralgia.  No rash.  Occasional tingling of the hands without other focal neurologic complaint.      OBJECTIVE:   GENERAL:  Well-preserved elderly female sitting upright in bed in no distress.  Alert and oriented.   VITAL SIGNS:  Last blood pressure 172/57, heart rate 70s and regular, respirations nonlabored, O2 saturation 100% on room air, temperature normal.   HEENT:  Extraocular movements full.  No nystagmus.  Pupils equal and reacting symmetrically.  Sclerae nonicteric.  Fundi deferred.  Oropharynx is clear.  Dentition, adequate repair.  Mucosal membranes are dry.  Carotid upstroke brisk.  No bruits.  There is no thyromegaly or lymphadenopathy.   SKIN:  No rash (exposed areas).   CHEST:  Clear lung  fields.   CARDIAC:  Regular with 1/6 systolic ejection best heard at the base without radiation.  No S3, no jugular venous distention.   BREASTS:  Exam deferred.   ABDOMEN:  Nondistended, soft, nontender without palpable organomegaly or mass.  Bowel sounds are present.  No epigastric or iliac bruits.   EXTREMITIES:  Distal lower extremities are well perfused.  There is no cyanosis or clubbing.  No edema.  No posterior calf or thigh tenderness/induration to suggest DVT.   PELVIC/RECTAL:  Exam deferred.   NEUROLOGIC:  Demonstrates a left peripheral seventh nerve palsy, otherwise nonlateralizing.  Sensory exam not performed.      LABORATORY DATA:  06/15/2018 includes basic metabolic profile with normal electrolytes, BUN 22, creatinine 0.78, calcium mildly elevated at 10.7.  White count 5300, hemoglobin of 10.4 with MCV 91, platelet count of 294,000.  Urinalysis 2018 demonstrated a few bacteria with presence of squamous epithelial cells.        EKG 2018 demonstrated a normal sinus rhythm with left axis deviation.  Voltage criteria for LVH.  No ischemic change.  Normal KS, QRS and QT interval.      ASSESSMENT:  Pleasant 82-year-old female admitted with the followin.  Left total hip arthroplasty.  Spinal anesthesia.  Indication, osteoarthritis.  Tolerable level of pain control despite wear-off effect from spinal anesthesia.   2.  Anticipated acute blood loss anemia superimposed on anemia prior to admission, potentially on the basis of chronic disease secondary to rheumatoid arthritis/methotrexate.   3.  Essential hypertension with blood pressure elevation perioperatively despite taking a.m. meds.  Potentially related to surgical stress/pain.  To be monitored for now as I anticipate may drop with opiate analgesia and blood loss.   4.  Hyperlipidemia, on a statin.   5.  Rheumatoid arthritis on methotrexate to be continued throughout the operative course.   6.  Status post excision, left acoustic neuroma,  1998 with peripheral seventh nerve palsy.   7.  Resting tremor noted presently on exam.   8.  Gastroesophageal reflux disease, symptomatically controlled on omeprazole.   9.  Obesity.   10.  Sensorineural hearing loss with augmentation.   11.  Cervical spondylosis, C5-C6, with bilateral uncinate spurs.   12.  Carpal tunnel syndrome.   13.  Distal upper extremity paresthesias potentially related to carpal tunnel syndrome or cervical disk disease.   14.  Depression, largely situational subsequent to the death of her son in 1982.  Presently well compensated.   15.  Hemorrhoids.   16.  Aortic valve sclerosis.   17.  Negative objective evaluation for coronary artery disease several years ago.  Limited functional capacity preop due to left hip pain with unknown coronary status presently.   18.  Hypercalcemia potentially related to thiazide diuretic.  To be rechecked subsequent to IV hydration.   19.  Issue of delirium in the past with oxycodone.  Family concerned that may have been dose-related.  Advised that can occur with any opiate even at lower dosing.      PLAN:   1.  Intravenous fluid support until adequate p.o. intake.   2.  Parameters for which nursing staff should call.   3.  Serial followup labs.   4.  Incentive spirometry/continuous oximetry.   5.  Review/resume prior to admission meds as appropriate.  Continue methotrexate as per discussion with patient.  Hold Microzide.  Continue Toprol with parameters.   6.  Review opiate regimen as per orthopedics.  With oxycodone-associated delirium, would like to proceed with low-dose Dilaudid 1-2 mg q. 4 hours p.r.n. in addition to p.r.n. IV Dilaudid as well as scheduled Extra Strength Tylenol.   7.  Bowel meds to reduce constipation on opiates.   8.  Lovenox followed by aspirin per Dr. Priest's protocol for DVT prophylaxis.   9.  Clarify basis for Macrobid therapy.   10.  Close clinical monitoring.      Thank you for the consultation.  We will follow with you.          PRAVEEN AGUILAR MD             D: 2018   T: 2018   MT: NTS      Name:     MARION UNDERWOOD   MRN:      [id number]        Account:       [id number]   :      1935           Consult Date:  2018      Document: [id number]       cc: Liudmila Priest MD

## 2018-06-27 NOTE — OR NURSING
Patient voided twice on bedpan, incontinence in bed. Bladder scanned for 600 after voiding so straight cathed for 700 ml.

## 2018-06-27 NOTE — IP AVS SNAPSHOT
MRN:[id number]                      After Visit Summary   6/27/2018    Brenda Barker    MRN: [id number]           Thank you!     Thank you for choosing [facility] for your care. Our goal is always to provide you with excellent care. Hearing back from our patients is one way we can continue to improve our services. Please take a few minutes to complete the written survey that you may receive in the mail after you visit with us. Thank you!        Patient Information     Date Of Birth          1935        Designated Caregiver       Most Recent Value    Caregiver    Will someone help with your care after discharge? yes    Name of designated caregiver David ()    Phone number of caregiver [phone]    Caregiver address same as pt      About your hospital stay     You were admitted on:  June 27, 2018 You last received care in the:  [facility] 8[facility]    You were discharged on:  July 1, 2018        Reason for your hospital stay       You were admitted to the hospital following your total hip arthroplasty.                  Who to Call     For medical emergencies, please call 911.  For non-urgent questions about your medical care, please call your primary care provider or clinic, [phone]  For questions related to your surgery, please call your surgery clinic        Attending Provider     Provider Specialty    Keo Priest MD Orthopedics       Primary Care Provider Office Phone # Fax #    Liudmila Sindy Bowie [phone]      After Care Instructions     Activity - Up with nursing assistance           Advance Diet as Tolerated       Follow this diet upon discharge: regular adult diet            Diet       You may return to your regular, pre-surgery diet unless instructed otherwise by your provider.            Discharge Instructions       TOTAL HIP ARTHROPLASTY POST OPERATIVE DISCHARGE INSTRUCTIONS    FOLLOW UP APPOINTMENT  You are scheduled for a post operative wound check with   Cem s nurse approximately two weeks after surgery. At approximately six weeks after surgery, you will see Dr. Priest in clinic. During this visit, repeat X rays of your operative hip will be performed.      Your follow up appointments will be at the location that you regularly see Dr. Priest:    [facility]  [address] [place] [address] [phone]    [facility]  [address] [address] [place] [id number] [phone]    [facility]  [address] [place], [place] [id number] [phone]    Physical therapy:   A prescription for physical therapy will be provided at the time of discharge.       ACTIVITY  Weight bearing status:   You may bear weight on your operative extremity as tolerated, using assistive devices (walker, cane) as needed. As you begin to feel more comfortable ambulating, you may gradually transition from a walker to a cane. Eventually, you should wean from all assistive devices. Although we would like you to discontinue use of assistive devices as soon as possible, do not transition until you have worked with your physical therapist to achieve safe balance and comfort.     Posterior hip precautions:  You must maintain the following posterior hip precautions for the next 12 weeks with no exceptions:  1) no hip flexion >90 degrees (no bending down at the waist)  2) no internal rotation past neutral (no pivoting)  3) no adduction past midline (no crossing your legs)    Exercises:   Perform the following exercises at least three times per day for the first four weeks after surgery to prevent complications, such as blood clots in your legs:  1) Point and flex your feet  2) Move your ankle around in big circles  3) Wiggle your toes   Also, perform thigh muscle tightening exercises for 10 to 15 minutes at least three times per day for the first four weeks after  surgery.    Athletic Activities:  Activities such as swimming, bicycling, jogging, running, and stop-and-go sports should be avoided until permitted by your provider.    Driving:  Driving is not permitted until directed by your provider. Under no circumstance are you permitted to drive while using narcotic pain medications.    Return to Work:  You may return to work when directed by your provider.       COMFORT AND PAIN MANAGEMENT  Icing:  An ice pack will be provided to control swelling and discomfort after surgery. Place a thin towel on your skin and apply the ice pack overtop. You may apply ice for 20 minutes as often as two times per hour.    Pain Medications:  You will be discharged with acetaminophen (Tylenol) and a narcotic medication for pain management after surgery. Acetaminophen can help to effectively manage pain when used as prescribed, however, do not exceed the maximum daily dose of 3000 mg. The narcotic pain medication should be reserved for severe, breakthrough pain. Take the narcotic medication as prescribed and use only as often as necessary.       ANTICOAGULATION  Take enoxaparin (Lovenox) as prescribed for a total of two weeks after surgery. After you complete your enoxaparin regimen, take one aspirin 325mg daily for the next two weeks.       WOUND CARE AND SHOWERING  Wound care:  You have a clean Aquacel dressing on your surgical wound. This dressing should stay in place for seven days to allow the incision to heal. After seven days, you may change the dressing. Please use sterile 4x4 gauze dressings with tape over top to secure the dressing. If the Aquacel dressing becomes wet or saturated with wound drainage, it is appropriate to change the dressing before seven days. If drainage persists from the incision site to the extent that it is frequently saturating the dressing, please contact Dr. Priest s clinic.    Showering:  You may shower 48 hours after surgery, provided the Aquacel dressing is  in place. You may allow water to run over the dressing, but do not to soak or submerge your wound underwater. The steri-strips (small white tape that is directly on the incision areas) should be left on until they fall off or are removed at your first office visit.    Tub Bathing:  Tub bathing, swimming, or any other activities that cause your incision to be submerged should be avoided until allowed by your provider. Typically, patients are allowed to return to these activities four weeks after surgery.      CONTACTING YOUR PHYSICIAN:  You may experience symptoms that require follow-up before your scheduled two week appointment. Please contact Dr. Priest's office if you experience:  1) Pain that persists or worsens in the first few days after surgery  2) Excessive redness or drainage of cloudy or bloody material from the wounds (Clear red tinted fluid and some mild drainage should be expected) or drainage of any kind five days after surgery  3) A temperature elevation greater than 101.5    4) Pain, swelling or redness in your calf  5) Numbness or weakness in your leg or foot      Regular business hours (Monday - Friday, 8am - 5pm):  [facility]: [phone]  [facility]: [phone]  [facility]: [phone]    After hours and weekends:  [facility] on call Orthopedic resident: [phone]            Discharge Instructions       Accepting provider to decide when or if to resume microzide (had mild hypercalcemia on admit 10.7)  Hgb 7/3 - update provider.            General info for SNF       Length of Stay Estimate: Short Term Care: Estimated # of Days <30  Condition at Discharge: Improving  Level of care:skilled   Rehabilitation Potential: Excellent  Admission H&P remains valid and up-to-date: Yes  Recent Chemotherapy: N/A  Use Nursing Home Standing Orders: Yes            Hip precautions            Mantoux instructions       Give two-step Mantoux (PPD) Per Facility Policy Yes                  Follow-up Appointments     Adult [facility] Follow-up and recommended labs and tests       You are to return to clinic in 2 weeks for wound check with the clinic nurse. Approximately 6 weeks after your surgery, you will be scheduled for an appointment with Dr. Priest. At this time, AP pelvis imaging will be obtained.    If you need to schedule your 2 and 6 week postoperative visits or haven't received confirmation regarding these visits, please call one of the following numbers within 7 days of discharge.    For patients that see Dr. Priest at:   -[facility]: [phone]  -[facility]: [phone]  -[facility]: [phone]                  Your next 10 appointments already scheduled     Jul 10, 2018 11:00 AM CDT   Nurse Only with MG NURSE ONLY ORTHO   [facility]    [address] [place] [id number] [phone]            Aug 07, 2018 11:30 AM CDT   (Arrive by 11:00 AM)   Return Visit with Keo Priest MD   [facility]    [address] [place] [address] [phone]              Additional Services     Occupational Therapy Adult Consult       Evaluate and treat as clinically indicated.    Reason:  *s/p truman            Physical Therapy Adult Consult       Evaluate and treat as clinically indicated.    Reason: s/p truman                  Pending Results     No orders found from 6/25/2018 to 6/28/2018.            Statement of Approval     Ordered          07/01/18 1213  I have reviewed and agree with all the recommendations and orders detailed in this document.     Approved and electronically signed by:  Michael Pride MD             Admission Information     Date & Time Provider Department Dept. Phone    6/27/2018 [facility]  "Keo ENCARNACION MD [facility] [phone]      Your Vitals Were     Blood Pressure Pulse Temperature Respirations Height Weight    137/75 (BP Location: Right arm) 82 96.4  F (35.8  C) (Oral) 16 1.575 m (5' 2\") 75.1 kg (165 lb 9.1 oz)    Last Period Pulse Oximetry BMI (Body Mass Index)             05/17/1972 100% 30.28 kg/m2         [employer] Information     [employer] gives you secure access to your electronic health record. If you see a primary care provider, you can also send messages to your care team and make appointments. If you have questions, please call your primary care clinic.  If you do not have a primary care provider, please call [phone] and they will assist you.        Care EveryWhere ID     This is your Care EveryWhere ID. This could be used by other organizations to access your [facility] medical records  [id number]        Equal Access to Services     MALATHI CANNON AH: Angel Maldonado, rabia tripp, reid fernandez, liliana del valle. So [facility] [phone].    ATENCIÓN: Si habla español, tiene a beltran disposición servicios gratuitos de asistencia lingüística. Llame al [phone].    We comply with applicable federal civil rights laws and [place] laws. We do not discriminate on the basis of race, color, national origin, age, disability, sex, sexual orientation, or gender identity.               Review of your medicines      START taking        Dose / Directions    aspirin 325 MG EC tablet   Replaces:  aspirin 81 MG tablet        Dose:  325 mg   Start taking on:  7/12/2018   Take 1 tablet (325 mg) by mouth daily for 14 days   Quantity:  40 tablet   Refills:  0       enoxaparin 40 MG/0.4ML injection   Commonly known as:  LOVENOX        Dose:  40 mg   Inject 0.4 mLs (40 mg) Subcutaneous every 24 hours for 11 days   Refills:  0       HYDROmorphone 2 MG tablet   Commonly known as:  DILAUDID        For pain concerns of 1-5 give 1 mg  for pain concerns of 6-10 give two " mg every 4 hours as needed   Quantity:  30 tablet   Refills:  0       senna-docusate 8.6-50 MG per tablet   Commonly known as:  SENOKOT-S;PERICOLACE        Dose:  1-2 tablet   Take 1-2 tablets by mouth 2 times daily   Quantity:  100 tablet   Refills:  0         CONTINUE these medicines which may have CHANGED, or have new prescriptions. If we are uncertain of the size of tablets/capsules you have at home, strength may be listed as something that might have changed.        Dose / Directions    methotrexate 2.5 MG tablet CHEMO   Indication:  Rheumatoid Arthritis   This may have changed:  See the new instructions.        Dose:  15 mg   Start taking on:  7/7/2018   Take 6 tablets (15 mg) by mouth every 7 days   Refills:  0         CONTINUE these medicines which have NOT CHANGED        Dose / Directions    acyclovir 400 MG tablet   Commonly known as:  ZOVIRAX   Used for:  Herpes simplex virus infection        TAKE 1 TABLET BY MOUTH DAILY   Quantity:  90 tablet   Refills:  0       CENTRUM SILVER per tablet        1 TABLET DAILY   Refills:  0       fish oil-omega-3 fatty acids 1000 MG capsule   Used for:  Other and unspecified hyperlipidemia        2 CAPSULES DAILY  (? DOSE)   Quantity:  OTC   Refills:  --       folic acid 1 MG tablet   Commonly known as:  FOLVITE   Used for:  Rheumatoid arthritis(714.0), Obesity, unspecified, Other abnormal glucose        4 mg TABS DAILY   Refills:  0       LIQUID TEARS 1.4 % ophthalmic solution   Generic drug:  polyvinyl alcohol        Dose:  1 drop   1 drop as needed.   Refills:  0       metoprolol succinate 50 MG 24 hr tablet   Commonly known as:  TOPROL-XL   Used for:  Rapid heart rate        TAKE 1 TABLET BY MOUTH ONCE DAILY   Quantity:  90 tablet   Refills:  0       nitroFURantoin macrocrystal 100 MG ED starter pack   Commonly known as:  MACRODANTIN        Dose:  1 capsule   Take 1 capsule by mouth daily.   Refills:  0       omeprazole 20 MG CR capsule   Commonly known as:  priLOSEC    Used for:  Gastroesophageal reflux disease with esophagitis        TAKE 1 CAPSULE(20 MG) BY MOUTH DAILY   Quantity:  90 capsule   Refills:  2       simvastatin 20 MG tablet   Commonly known as:  ZOCOR   Used for:  Hyperlipidemia LDL goal <130        Dose:  20 mg   Take 1 tablet (20 mg) by mouth At Bedtime   Quantity:  90 tablet   Refills:  3       TYLENOL PO        Dose:  650 mg   Take 650 mg by mouth every 6 hours as needed for mild pain or fever   Refills:  0       Vitamin D-3 1000 units Caps        Dose:  2000 Int'l Units   Take 2,000 Int'l Units by mouth daily   Refills:  0       ZYRTEC ALLERGY PO        Dose:  1 tablet   Take 1 tablet by mouth daily   Refills:  0         STOP taking     aspirin 81 MG tablet   Replaced by:  aspirin 325 MG EC tablet           hydrochlorothiazide 12.5 MG capsule   Commonly known as:  MICROZIDE           meloxicam 7.5 MG tablet   Commonly known as:  MOBIC                Where to get your medicines      Some of these will need a paper prescription and others can be bought over the counter. Ask your nurse if you have questions.     You don't need a prescription for these medications     aspirin 325 MG EC tablet    enoxaparin 40 MG/0.4ML injection         Information about where to get these medications is not yet available     ! Ask your nurse or doctor about these medications     HYDROmorphone 2 MG tablet                Protect others around you: Learn how to safely use, store and throw away your medicines at www.disposemymeds.org.        Information about OPIOIDS     PRESCRIPTION OPIOIDS: WHAT YOU NEED TO KNOW   We gave you an opioid (narcotic) pain medicine. It is important to manage your pain, but opioids are not always the best choice. You should first try all the other options your care team gave you. Take this medicine for as short a time (and as few doses) as possible.     These medicines have risks:    DO NOT drive when on new or higher doses of pain medicine. These  medicines can affect your alertness and reaction times, and you could be arrested for driving under the influence (DUI). If you need to use opioids long-term, talk to your care team about driving.    DO NOT operate heave machinery    DO NOT do any other dangerous activities while taking these medicines.     DO NOT drink any alcohol while taking these medicines.      If the opioid prescribed includes acetaminophen, DO NOT take with any other medicines that contain acetaminophen. Read all labels carefully. Look for the word  acetaminophen  or  Tylenol.  Ask your pharmacist if you have questions or are unsure.    You can get addicted to pain medicines, especially if you have a history of addiction (chemical, alcohol or substance dependence). Talk to your care team about ways to reduce this risk.    Store your pills in a secure place, locked if possible. We will not replace any lost or stolen medicine. If you don t finish your medicine, please throw away (dispose) as directed by your pharmacist. The [place] Pollution Control Agency has more information about safe disposal: https://www.pca.[facility].[place].us/living-green/managing-unwanted-medications.     All opioids tend to cause constipation. Drink plenty of water and eat foods that have a lot of fiber, such as fruits, vegetables, prune juice, apple juice and high-fiber cereal. Take a laxative (Miralax, milk of magnesia, Colace, Senna) if you don t move your bowels at least every other day.              Medication List: This is a list of all your medications and when to take them. Check marks below indicate your daily home schedule. Keep this list as a reference.      Medications           Morning Afternoon Evening Bedtime As Needed    acyclovir 400 MG tablet   Commonly known as:  ZOVIRAX   TAKE 1 TABLET BY MOUTH DAILY   Last time this was given:  400 mg on 7/1/2018  7:45 AM                                aspirin 325 MG EC tablet   Take 1 tablet (325 mg) by mouth daily for  14 days   Start taking on:  7/12/2018                                CENTRUM SILVER per tablet   1 TABLET DAILY   Last time this was given:  1 tablet on 7/1/2018  7:45 AM                                enoxaparin 40 MG/0.4ML injection   Commonly known as:  LOVENOX   Inject 0.4 mLs (40 mg) Subcutaneous every 24 hours for 11 days   Last time this was given:  40 mg on 7/1/2018 11:25 AM                                fish oil-omega-3 fatty acids 1000 MG capsule   2 CAPSULES DAILY  (? DOSE)                                folic acid 1 MG tablet   Commonly known as:  FOLVITE   4 mg TABS DAILY   Last time this was given:  2 mg on 7/1/2018  7:45 AM                                HYDROmorphone 2 MG tablet   Commonly known as:  DILAUDID   For pain concerns of 1-5 give 1 mg  for pain concerns of 6-10 give two mg every 4 hours as needed   Last time this was given:  1 mg on 7/1/2018 11:25 AM                                LIQUID TEARS 1.4 % ophthalmic solution   1 drop as needed.   Generic drug:  polyvinyl alcohol                                methotrexate 2.5 MG tablet CHEMO   Take 6 tablets (15 mg) by mouth every 7 days   Start taking on:  7/7/2018   Last time this was given:  15 mg on 6/30/2018  9:20 AM                                metoprolol succinate 50 MG 24 hr tablet   Commonly known as:  TOPROL-XL   TAKE 1 TABLET BY MOUTH ONCE DAILY   Last time this was given:  50 mg on 7/1/2018  7:45 AM                                nitroFURantoin macrocrystal 100 MG ED starter pack   Commonly known as:  MACRODANTIN   Take 1 capsule by mouth daily.   Last time this was given:  100 mg on 7/1/2018  7:45 AM                                omeprazole 20 MG CR capsule   Commonly known as:  priLOSEC   TAKE 1 CAPSULE(20 MG) BY MOUTH DAILY   Last time this was given:  20 mg on 7/1/2018  6:35 AM                                senna-docusate 8.6-50 MG per tablet   Commonly known as:  SENOKOT-S;PERICOLACE   Take 1-2 tablets by mouth 2 times  daily   Last time this was given:  1 tablet on 7/1/2018  7:45 AM                                simvastatin 20 MG tablet   Commonly known as:  ZOCOR   Take 1 tablet (20 mg) by mouth At Bedtime   Last time this was given:  20 mg on 6/30/2018 10:11 PM                                TYLENOL PO   Take 650 mg by mouth every 6 hours as needed for mild pain or fever   Last time this was given:  650 mg on 7/1/2018  2:27 AM                                Vitamin D-3 1000 units Caps   Take 2,000 Int'l Units by mouth daily                                ZYRTEC ALLERGY PO   Take 1 tablet by mouth daily

## 2018-06-28 ENCOUNTER — APPOINTMENT (OUTPATIENT)
Dept: OCCUPATIONAL THERAPY | Facility: CLINIC | Age: 83
DRG: 470 | End: 2018-06-28
Attending: ORTHOPAEDIC SURGERY
Payer: MEDICARE

## 2018-06-28 ENCOUNTER — APPOINTMENT (OUTPATIENT)
Dept: PHYSICAL THERAPY | Facility: CLINIC | Age: 83
DRG: 470 | End: 2018-06-28
Attending: ORTHOPAEDIC SURGERY
Payer: MEDICARE

## 2018-06-28 LAB
ANION GAP SERPL CALCULATED.3IONS-SCNC: 13 MMOL/L (ref 3–14)
BUN SERPL-MCNC: 16 MG/DL (ref 7–30)
CALCIUM SERPL-MCNC: 10.1 MG/DL (ref 8.5–10.1)
CHLORIDE SERPL-SCNC: 108 MMOL/L (ref 94–109)
CO2 SERPL-SCNC: 20 MMOL/L (ref 20–32)
CREAT SERPL-MCNC: 0.62 MG/DL (ref 0.52–1.04)
GFR SERPL CREATININE-BSD FRML MDRD: >90 ML/MIN/1.7M2
GLUCOSE BLDC GLUCOMTR-MCNC: 116 MG/DL (ref 70–99)
GLUCOSE SERPL-MCNC: 134 MG/DL (ref 70–99)
HGB BLD-MCNC: 9.2 G/DL (ref 11.7–15.7)
MAGNESIUM SERPL-MCNC: 1.9 MG/DL (ref 1.6–2.3)
POTASSIUM SERPL-SCNC: 4 MMOL/L (ref 3.4–5.3)
SODIUM SERPL-SCNC: 141 MMOL/L (ref 133–144)

## 2018-06-28 PROCEDURE — 40000133 ZZH STATISTIC OT WARD VISIT: Performed by: OCCUPATIONAL THERAPIST

## 2018-06-28 PROCEDURE — 83735 ASSAY OF MAGNESIUM: CPT | Performed by: INTERNAL MEDICINE

## 2018-06-28 PROCEDURE — 25000128 H RX IP 250 OP 636: Performed by: STUDENT IN AN ORGANIZED HEALTH CARE EDUCATION/TRAINING PROGRAM

## 2018-06-28 PROCEDURE — A9270 NON-COVERED ITEM OR SERVICE: HCPCS | Mod: GY | Performed by: STUDENT IN AN ORGANIZED HEALTH CARE EDUCATION/TRAINING PROGRAM

## 2018-06-28 PROCEDURE — 00000146 ZZHCL STATISTIC GLUCOSE BY METER IP

## 2018-06-28 PROCEDURE — 25000132 ZZH RX MED GY IP 250 OP 250 PS 637: Mod: GY | Performed by: INTERNAL MEDICINE

## 2018-06-28 PROCEDURE — 97165 OT EVAL LOW COMPLEX 30 MIN: CPT | Mod: GO | Performed by: OCCUPATIONAL THERAPIST

## 2018-06-28 PROCEDURE — 97116 GAIT TRAINING THERAPY: CPT | Mod: GP | Performed by: PHYSICAL THERAPIST

## 2018-06-28 PROCEDURE — 25000132 ZZH RX MED GY IP 250 OP 250 PS 637: Mod: GY | Performed by: ORTHOPAEDIC SURGERY

## 2018-06-28 PROCEDURE — 99232 SBSQ HOSP IP/OBS MODERATE 35: CPT | Performed by: INTERNAL MEDICINE

## 2018-06-28 PROCEDURE — 97161 PT EVAL LOW COMPLEX 20 MIN: CPT | Mod: GP | Performed by: PHYSICAL THERAPIST

## 2018-06-28 PROCEDURE — A9270 NON-COVERED ITEM OR SERVICE: HCPCS | Mod: GY | Performed by: INTERNAL MEDICINE

## 2018-06-28 PROCEDURE — 97535 SELF CARE MNGMENT TRAINING: CPT | Mod: GO | Performed by: OCCUPATIONAL THERAPIST

## 2018-06-28 PROCEDURE — 36415 COLL VENOUS BLD VENIPUNCTURE: CPT | Performed by: INTERNAL MEDICINE

## 2018-06-28 PROCEDURE — 80048 BASIC METABOLIC PNL TOTAL CA: CPT | Performed by: INTERNAL MEDICINE

## 2018-06-28 PROCEDURE — 97530 THERAPEUTIC ACTIVITIES: CPT | Mod: GP | Performed by: PHYSICAL THERAPIST

## 2018-06-28 PROCEDURE — A9270 NON-COVERED ITEM OR SERVICE: HCPCS | Mod: GY | Performed by: ORTHOPAEDIC SURGERY

## 2018-06-28 PROCEDURE — 12000001 ZZH R&B MED SURG/OB UMMC

## 2018-06-28 PROCEDURE — 97110 THERAPEUTIC EXERCISES: CPT | Mod: GP | Performed by: PHYSICAL THERAPIST

## 2018-06-28 PROCEDURE — 25000128 H RX IP 250 OP 636: Performed by: INTERNAL MEDICINE

## 2018-06-28 PROCEDURE — 25000132 ZZH RX MED GY IP 250 OP 250 PS 637: Mod: GY | Performed by: STUDENT IN AN ORGANIZED HEALTH CARE EDUCATION/TRAINING PROGRAM

## 2018-06-28 PROCEDURE — 36415 COLL VENOUS BLD VENIPUNCTURE: CPT | Performed by: ORTHOPAEDIC SURGERY

## 2018-06-28 PROCEDURE — 85018 HEMOGLOBIN: CPT | Performed by: ORTHOPAEDIC SURGERY

## 2018-06-28 PROCEDURE — 40000193 ZZH STATISTIC PT WARD VISIT: Performed by: PHYSICAL THERAPIST

## 2018-06-28 RX ORDER — FOLIC ACID 1 MG/1
2 TABLET ORAL 2 TIMES DAILY
Status: DISCONTINUED | OUTPATIENT
Start: 2018-06-28 | End: 2018-07-01 | Stop reason: HOSPADM

## 2018-06-28 RX ORDER — NITROFURANTOIN MACROCRYSTAL 100 MG
100 CAPSULE ORAL DAILY
Status: DISCONTINUED | OUTPATIENT
Start: 2018-06-28 | End: 2018-07-01 | Stop reason: HOSPADM

## 2018-06-28 RX ORDER — SODIUM CHLORIDE 9 MG/ML
INJECTION, SOLUTION INTRAVENOUS
Status: DISPENSED
Start: 2018-06-28 | End: 2018-06-29

## 2018-06-28 RX ADMIN — ACETAMINOPHEN 975 MG: 325 TABLET, FILM COATED ORAL at 01:10

## 2018-06-28 RX ADMIN — VITAMIN D, TAB 1000IU (100/BT) 2000 UNITS: 25 TAB at 08:09

## 2018-06-28 RX ADMIN — Medication 1 MG: at 03:20

## 2018-06-28 RX ADMIN — ACETAMINOPHEN 975 MG: 325 TABLET, FILM COATED ORAL at 09:22

## 2018-06-28 RX ADMIN — Medication 1 MG: at 01:10

## 2018-06-28 RX ADMIN — ACETAMINOPHEN 975 MG: 325 TABLET, FILM COATED ORAL at 16:56

## 2018-06-28 RX ADMIN — OMEPRAZOLE 20 MG: 20 CAPSULE, DELAYED RELEASE ORAL at 06:59

## 2018-06-28 RX ADMIN — Medication 1 MG: at 20:42

## 2018-06-28 RX ADMIN — FOLIC ACID 2 MG: 1 TABLET ORAL at 10:36

## 2018-06-28 RX ADMIN — CEFAZOLIN 1 G: 330 INJECTION, POWDER, FOR SOLUTION INTRAMUSCULAR; INTRAVENOUS at 05:17

## 2018-06-28 RX ADMIN — METOPROLOL SUCCINATE 50 MG: 50 TABLET, EXTENDED RELEASE ORAL at 08:09

## 2018-06-28 RX ADMIN — SODIUM CHLORIDE 250 ML: 9 INJECTION, SOLUTION INTRAVENOUS at 16:43

## 2018-06-28 RX ADMIN — NITROFURANTOIN MACROCRYSTALS 100 MG: 100 CAPSULE ORAL at 10:36

## 2018-06-28 RX ADMIN — ENOXAPARIN SODIUM 40 MG: 40 INJECTION SUBCUTANEOUS at 11:25

## 2018-06-28 RX ADMIN — ACYCLOVIR 400 MG: 400 TABLET ORAL at 08:09

## 2018-06-28 RX ADMIN — SENNOSIDES AND DOCUSATE SODIUM 1 TABLET: 8.6; 5 TABLET ORAL at 08:09

## 2018-06-28 RX ADMIN — FOLIC ACID 2 MG: 1 TABLET ORAL at 20:41

## 2018-06-28 RX ADMIN — Medication 1 MG: at 11:22

## 2018-06-28 RX ADMIN — SENNOSIDES AND DOCUSATE SODIUM 1 TABLET: 8.6; 5 TABLET ORAL at 20:42

## 2018-06-28 RX ADMIN — Medication 1 MG: at 06:59

## 2018-06-28 RX ADMIN — Medication 1 MG: at 12:26

## 2018-06-28 RX ADMIN — Medication 2 MG: at 16:07

## 2018-06-28 RX ADMIN — CETIRIZINE HYDROCHLORIDE 5 MG: 5 TABLET ORAL at 20:41

## 2018-06-28 RX ADMIN — SIMVASTATIN 20 MG: 20 TABLET, FILM COATED ORAL at 21:51

## 2018-06-28 RX ADMIN — MULTIPLE VITAMINS W/ MINERALS TAB 1 TABLET: TAB at 08:09

## 2018-06-28 NOTE — PROGRESS NOTES
"  Orthopaedic Surgery Daily Progress Note 6/28/18  Subjective: Brenda Barker is a 82 year old female POD # 1 s/p L RENETTA  Pain is controlled. Tolerating regular diet. Had one episode of post-op urinary retention yesterday. Now voiding on own. Was able to sleep.     REVIEW OF SYSTEMS: As noted above. No headache, dizziness. No fever, chills. No chest pain or shortness of breath. No abdominal pain, nausea, vomiting. No diarrhea or constipation. No urinary difficulties. No muscle or body aches.     Physical Exam   /53 (BP Location: Right arm)  Temp 97  F (36.1  C) (Oral)  Resp 12  Ht 1.575 m (5' 2\")  Wt 75.1 kg (165 lb 9.1 oz)  LMP 05/17/1972  SpO2 93%  BMI 30.28 kg/m2    Intake/Output Summary (Last 24 hours) at 06/28/18 0631  Last data filed at 06/27/18 1840   Gross per 24 hour   Intake             1250 ml   Output             1530 ml   Net             -280 ml     General: Alert, well-appearing  in no acute distress.  Respiratory: Non-labored breathing.   Cardiovascular: Extremities warm and well perfused   Extremities: moving all four extremities.   LE:  -Sens: SILT dp/sp/s/s/t  -Motor: 5/5 EHL/FHL/TA/GSc  -Vasc: 2+ pulses, wwp, brisk cap refill    Dressing CDI    Skin: As noted above. No rashes or lesions appreciated.    Labs    Complete Blood Count     Recent Labs  Lab 06/27/18  1826        Basic Metabolic Panel    Recent Labs  Lab 06/27/18  1826   CR 0.68     Coagulation Profile  No lab results found in last 7 days.    Assessment/Plan:  Assessment/Plan: Brenda Barker is a 82 year old female s/p Procedure: Left Total Hip Arthroplasty     Activity: Up with assist and assistive devices as needed until independent. Posterior hip precautions x 3 months:  1) No L hip flexion beyond 90 degrees  2) No adduction beyond midline  3) No internal rotation beyond neutral   Weight bearing status: WBAT  Antibiotics: Cefazolin x 24 hours  Diet: Begin with clear fluids and progress diet as tolerated. Bowel " regimen. Anti-emetics PRN.    DVT prophylaxis: Mechanical while in hospital with Lovenox x 2 weeks, followed by aspirin x 2 weeks  Elevation: Elevate heels off of bed on pillows   Wound Care: Aquacel x 5-7 days.    Drains: none  Pain management: Orals PRN, IV for breakthrough only  X-rays: AP Pelvis and Lateral L hip in PACU.   Physical Therapy: Mobilization, ROM, ADL's, Posterior hip precautions.    Occupational Therapy: ADL's  Labs: Trend Hgb on POD #1, 2  Consults: PT, OT. Hospitalist, appreciate assistance in caring for this patient throughout the perioperative period    Lm Olivarez MD   Orthopaedic Surgery Resident, PGY-4  P: [phone]

## 2018-06-28 NOTE — PROGRESS NOTES
"[facility] Internal Medicine Progress Note            Interval History:   Record reviewed.  Seen with RN.  Uneventful night.  Tolerating low dose dilaudid 1 mg with adequate pain control.  Ambulated in lipscomb with mild LH.    No CP, SOB, cough, nausea, reflux, abd pain or distention.  Passing flatus.  Last BM   2 days ago.  Voiding OK.  No noted confusion on opiates.          Medications:   All medications reviewed today          Physical Exam:   Blood pressure (!) 101/34, temperature 96.4  F (35.8  C), temperature source Oral, resp. rate 12, height 1.575 m (5' 2\"), weight 75.1 kg (165 lb 9.1 oz), last menstrual period 05/17/1972, SpO2 93 %, not currently breastfeeding.    Intake/Output Summary (Last 24 hours) at 06/28/18 1440  Last data filed at 06/28/18 1414   Gross per 24 hour   Intake                0 ml   Output             1350 ml   Net            -1350 ml       General:  Alert.  Appropriate.  No distress. No  O2.     Heent:      Neck:    Skin:    Chest:  clear    Cardiac:  Reg without gallop, murmur.  No JVD.     Abdomen:  Non distended, soft, non-tender.  BS normal.     Extremities:  Perfused.  No edema, calf, posterior thigh tenderness to suggest DVT.     Neuro:            Data:     Results for orders placed or performed during the hospital encounter of 06/27/18 (from the past 24 hour(s))   XR Pelvis w Hip Left 1 View    Narrative    Exam: Single frontal view of both hips and a lateral view of the left  hip dated 6/27/2018.    COMPARISON: 5/29/2018.    CLINICAL HISTORY: Postop evaluation.    FINDINGS: Single frontal view of both hips and a lateral view of the  left hip was obtained. Postsurgical changes of placement of a left  total hip arthroplasty. The hardware appears intact. Postoperative air  is noted. Marked joint space narrowing in the right hip joint.      Impression    IMPRESSION: New postsurgical changes of placement of a left total hip  arthroplasty, without complication.    MEREDITH LOPEZ" MD   Platelet count   Result Value Ref Range    Platelet Count 349 150 - 450 10e9/L   Creatinine   Result Value Ref Range    Creatinine 0.68 0.52 - 1.04 mg/dL    GFR Estimate 83 >60 mL/min/1.7m2    GFR Estimate If Black >90 >60 mL/min/1.7m2   Hemoglobin   Result Value Ref Range    Hemoglobin 9.2 (L) 11.7 - 15.7 g/dL   Glucose by meter   Result Value Ref Range    Glucose 116 (H) 70 - 99 mg/dL   Basic metabolic panel   Result Value Ref Range    Sodium 141 133 - 144 mmol/L    Potassium 4.0 3.4 - 5.3 mmol/L    Chloride 108 94 - 109 mmol/L    Carbon Dioxide 20 20 - 32 mmol/L    Anion Gap 13 3 - 14 mmol/L    Glucose 134 (H) 70 - 99 mg/dL    Urea Nitrogen 16 7 - 30 mg/dL    Creatinine 0.62 0.52 - 1.04 mg/dL    GFR Estimate >90 >60 mL/min/1.7m2    GFR Estimate If Black >90 >60 mL/min/1.7m2    Calcium 10.1 8.5 - 10.1 mg/dL   Magnesium   Result Value Ref Range    Magnesium 1.9 1.6 - 2.3 mg/dL                Assessment and Plan:   1.  Left total hip arthroplasty. Spinal anesthesia.  Indication, osteoarthritis.  Mobilizing.  Adequate pain control with low dose opiates.   2.  Acute blood loss anemia superimposed on anemia prior to admission, potentially on the basis of chronic disease secondary to rheumatoid arthritis/methotrexate.  Adequate.   3.  Essential hypertension with low normal BP on reduced meds.    4.  Hyperlipidemia, on a statin.   5.  Rheumatoid arthritis on methotrexate to be continued throughout the operative course.   6.  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   7.  Resting tremor noted presently on exam.   8.  Gastroesophageal reflux disease, symptomatically controlled on omeprazole.   9.  Obesity.   10.  Sensorineural hearing loss with augmentation.   11.  Cervical spondylosis, C5-C6, with bilateral uncinate spurs.   12.  Carpal tunnel syndrome.   13.  Distal upper extremity paresthesias potentially related to carpal tunnel syndrome or cervical disk disease.   14.   Depression, largely situational subsequent to the death of her son in 1982.  Presently well compensated.   15.  Hemorrhoids.   16.  Aortic valve sclerosis.   17.  Negative objective evaluation for coronary artery disease several years ago.  Limited functional capacity preop due to left hip pain with unknown coronary status presently.   18.  Hypercalcemia potentially related to thiazide diuretic.  Normalized off microzide with IV fluids.   19.  Issue of delirium in the past with oxycodone.  Family concerned that may have been dose-related.  No recurrence.         PLAN:  1)  IV fluid bolus if continued LH with mobilization.  Adjust IV fluids.  2)  IS, same opiates, bowel meds, mobilize, lovenox.  3)  Trend labs.  4)  Monitor clinically.   Disposition Plan   Expected discharge in 2-3 days to prior living arrangement once mobilizing safely, stable clinically.      Entered: Michael Pride 06/28/2018, 2:40 PM              Attestation:  I have reviewed today's vital signs, notes, medications, labs and imaging.     Michael Pride MD

## 2018-06-28 NOTE — PLAN OF CARE
Problem: Patient Care Overview  Goal: Plan of Care/Patient Progress Review  Discharge Planner PT   Patient plan for discharge: TCU at her facility  Current status: Evaluated today.  LIves in a sr. apt with spouse. Reviewed hip precautions.  WBAT.  Walked 60' but by end of session pt feeling a little lightheaded and diaphoretic.  Transfers with assist of 1. Needed trapeze bar and min A to scoot and reposition.   Barriers to return to prior living situation: level of assist, lightheadedness  Recommendations for discharge: TCU unless she makes excellent progress in the next two days.   Rationale for recommendations: PT will continue to work on gait and strengthening and transfer training to maximize independence to promote safe discharge plan.           Entered by: Jes Bates 06/28/2018 10:28 AM

## 2018-06-28 NOTE — PLAN OF CARE
Problem: Patient Care Overview  Goal: Plan of Care/Patient Progress Review  VS: VSS   O2: RA   Output: Voiding without difficulty   Last BM: 6/26   Activity: WBAT; 1A with FWW   Skin: Intact except for incision   Pain: Oral dilaudid   CMS: Intact   Dressing: CDI   Diet: Regular   LDA: PIV infusing   Equipment: PCDs, abduction pillow, capnography, IV pole, FWW   Plan: TBD   Additional Info: Pt received spinal anesthesia + cocktail. .

## 2018-06-28 NOTE — PHARMACY
Clarified patient's home med Folic Acid with Patient.  Correct dose & frequency is Folic Acid 2 mg bid. Order changed to reflect correct dose/frequency.

## 2018-06-28 NOTE — PLAN OF CARE
Problem: Hip Arthroplasty (Total, Partial) (Adult)  Goal: Signs and Symptoms of Listed Potential Problems Will be Absent, Minimized or Managed (Hip Arthroplasty)  Signs and symptoms of listed potential problems will be absent, minimized or managed by discharge/transition of care (reference Hip Arthroplasty (Total, Partial) (Adult) CPG).   Outcome: Improving  Patient A & O x4. VS stable. Lung sounds clear bilaterally. Patient denies chest pain, SOB, lightheadedness, dizziness, tingling, numbness, nausea, and vomiting. Patient on regular diet and tolerating well. Bowel sounds active, passing gas, and last BM 6/26. Drinking well and voiding spontaneously without difficulties. Patient is WBAT and A1 with FWW. PIV on right hand, patent and saline locked. Patient able to wiggle toes. CMS intact. Dressing on left. clean, dry, and intact. Pain is moderately controlled and patient taking dilaudid 1-2 mg q4 for pain, ice pack applied to left hip. Spirometer was encouraged and done several times, bilateral heels elevated off bed, hip abductor in place,PCD on BLE. Patient demonstrates ability to use call light appropriately. Will continue to monitor patient.

## 2018-06-28 NOTE — PROGRESS NOTES
06/28/18 1042   Quick Adds   Type of Visit Initial Occupational Therapy Evaluation   Living Environment   Lives With spouse   Living Arrangements apartment   Home Accessibility no concerns   Number of Stairs to Enter Home 0   Number of Stairs Within Home 0   Living Environment Comment Lives in independent sr. apt place. They have extended care facilities, a warm water pool and exercise facility on campus   Self-Care   Dominant Hand right   Usual Activity Tolerance good   Current Activity Tolerance fair   Regular Exercise yes   Activity/Exercise Type strength training;swimming;other (see comments)   Exercise Amount/Frequency daily  (exercises in pool)   Equipment Currently Used at Home grab bar;walker, rolling;shower chair  (has access to shower chair)   Activity/Exercise/Self-Care Comment Pt takes a balance class with strengthening, she loves the pool, she generally exercises daily   Functional Level Prior   Ambulation 1-->assistive equipment   Transferring 1-->assistive equipment   Toileting 0-->independent   Bathing 0-->independent   Dressing 0-->independent   Eating 0-->independent   Communication 0-->understands/communicates without difficulty   Swallowing 0-->swallows foods/liquids without difficulty   Cognition 0 - no cognition issues reported   Fall history within last six months no   Which of the above functional risks had a recent onset or change? ambulation;transferring;toileting;bathing;dressing   Prior Functional Level Comment indep. with BADLs and functional mobility   General Information   Onset of Illness/Injury or Date of Surgery - Date 06/27/18   Referring Physician Keo Priest MD   Additional Occupational Profile Info/Pertinent History of Current Problem s/p Left RENETTA.  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 osteopenia, UTI, L3-4 decompression and fusion, JOHN, left TKA 2008  "  Precautions/Limitations left hip precautions   Weight-Bearing Status - LLE weight-bearing as tolerated   General Info Comments activity orders; amb. with A   Cognitive Status Examination   Cognitive Comment cognition appears baseline; WNL   Visual Perception   Visual Perception Wears glasses   Pain Assessment   Patient Currently in Pain (post op pain; per pt. \"managable\")   Range of Motion (ROM)   ROM Quick Adds No deficits were identified   Strength   Manual Muscle Testing Quick Adds No deficits were identified   Transfer Skill: Bed to Chair/Chair to Bed   Level of [place]: Bed to Chair contact guard   Physical Assist/Nonphysical Assist: Bed to Chair set-up required;1 person assist   Assistive Device - Transfer Skill Bed to Chair Chair to Bed Rehab Eval rolling walker   Transfer Skill: Sit to Stand   Level of [place]: Sit/Stand contact guard   Physical Assist/Nonphysical Assist: Sit/Stand set-up required;1 person assist   Assistive Device for Transfer: Sit/Stand rolling walker   Transfer Skill: Toilet Transfer   Level of [place]: Toilet contact guard  (commode)   Physical Assist/Nonphysical Assist: Toilet set-up required;1 person assist   Assistive Device rolling walker  (commode)   Upper Body Dressing   Level of [place]: Dress Upper Body independent   Lower Body Dressing   Level of [place]: Dress Lower Body minimum assist (75% patients effort)   Physical Assist/Nonphysical Assist: Dress Lower Body set-up required;1 person assist   Assistive Device sock-aid;reacher   Grooming   Level of [place]: Grooming stand-by assist   Physical Assist/Nonphysical Assist: Grooming set-up required;1 person assist   Eating/Self Feeding   Level of [place]: Eating independent   Instrumental Activities of Daily Living (IADL)   Previous Responsibilities meal prep;medication management;finances;laundry;shopping;driving   Activities of Daily Living Analysis   Impairments Contributing to Impaired " "Activities of Daily Living pain;post surgical precautions;strength decreased   General Therapy Interventions   Planned Therapy Interventions ADL retraining;transfer training;progressive activity/exercise;home program guidelines   Clinical Impression   Criteria for Skilled Therapeutic Interventions Met yes, treatment indicated   OT Diagnosis impaired ADls/mobility   Influenced by the following impairments post op precautions   Assessment of Occupational Performance 3-5 Performance Deficits   Identified Performance Deficits dressing, toileting, bathing, home mgmt.   Clinical Decision Making (Complexity) Low complexity   Therapy Frequency daily   Predicted Duration of Therapy Intervention (days/wks) 6/30/18   Anticipated Equipment Needs at Discharge (TBD)   Anticipated Discharge Disposition Home with Assist;Transitional Care Facility   Risks and Benefits of Treatment have been explained. Yes   Patient, Family & other staff in agreement with plan of care Yes   [facility] TM \"6 Clicks\"   2016, Trustees of [facility], under license to [employer].  All rights reserved.   6 Clicks Short Forms Daily Activity Inpatient Short Form   [facility]  \"6 Clicks\" Daily Activity Inpatient Short Form   1. Putting on and taking off regular lower body clothing? 3 - A Little   2. Bathing (including washing, rinsing, drying)? 2 - A Lot   3. Toileting, which includes using toilet, bedpan or urinal? 3 - A Little   4. Putting on and taking off regular upper body clothing? 4 - None   5. Taking care of personal grooming such as brushing teeth? 4 - None   6. Eating meals? 4 - None   Daily Activity Raw Score (Score out of 24.Lower scores equate to lower levels of function) 20   Total Evaluation Time   Total Evaluation Time (Minutes) 8     "

## 2018-06-28 NOTE — PROGRESS NOTES
06/28/18 0940   Quick Adds   Type of Visit Initial PT Evaluation   Living Environment   Lives With spouse   Living Arrangements apartment   Home Accessibility no concerns   Number of Stairs to Enter Home 0   Number of Stairs Within Home 0   Transportation Available car  ([place])   Living Environment Comment LIves in independent sr. apt. They have extended care facilities available on sit, a warm water pool and exercise facility   Self-Care   Dominant Hand right   Usual Activity Tolerance fair   Current Activity Tolerance fair   Regular Exercise yes   Activity/Exercise Type strength training;swimming;other (see comments)   Exercise Amount/Frequency daily  (execises in the pool. )   Equipment Currently Used at Home walker, rolling   Activity/Exercise/Self-Care Comment Pt takes a balance class with strengthening, she loves the pool, she generally exercises daily   Functional Level Prior   Ambulation 1-->assistive equipment   Transferring 1-->assistive equipment   Toileting 0-->independent   Bathing 0-->independent   Eating 0-->independent   Communication 0-->understands/communicates without difficulty   Swallowing 0-->swallows foods/liquids without difficulty   Cognition 0 - no cognition issues reported   Fall history within last six months no   Which of the above functional risks had a recent onset or change? ambulation   Prior Functional Level Comment Could walk a couple blocks with her wheeled walker. Independent with ADL's,    General Information   Onset of Illness/Injury or Date of Surgery - Date 06/27/18   Referring Physician Dr. Keo Priest/ Dr. Lm Olivarez   Patient/Family Goals Statement Pt wants to walk without her walker.    Pertinent History of Current Problem (include personal factors and/or comorbidities that impact the POC) POD # 1 Left RENETTA.  PMH:  HTN, HLD, RA, acoustic neuroma 2005 with hearing loss, 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, resting tremor, GERD, obesity, CTS, R>L, C5-6 with spondylosis and spurs,  alisee. osteopenia, UTI, L3-4 decompression and fusion, JOHN, left TKA 2008   Weight-Bearing Status - LLE weight-bearing as tolerated   General Observations Pt has pneumoboots, capnography,    General Info Comments Pt has been up 6 times to the commode already. She states she did the on-line teaching.    Cognitive Status Examination   Orientation orientation to person, place and time   Level of Consciousness alert   Follows Commands and Answers Questions 100% of the time   Personal Safety and Judgment intact   Memory intact   Pain Assessment   Patient Currently in Pain Yes, see Vital Sign flowsheet   Integumentary/Edema   Integumentary/Edema no deficits were identifed   Range of Motion (ROM)   ROM Comment Limited by hip precautions,  knees and Left hip are WFl   Strength   Strength Comments Pt is unable to lift LLE agaist gravity,    Bed Mobility   Bed Mobility Comments min A with LLE to enter and exit bed,   Transfer Skills   Transfer Comments sit<>stand transfer with supervision.    Gait   Gait Comments walked 60' with ww and cues , walker sized for better fit   Balance   Balance Comments can stand without UE support, Needs bilateral push off to stand up.    Sensory Examination   Sensory Perception Comments tingling in bilateral hands (CTS vs cervical neuropathy)  at baseline   Coordination   Coordination no deficits were identified   Modality Interventions   Planned Modality Interventions Cryotherapy   General Therapy Interventions   Planned Therapy Interventions gait training;ROM;strengthening;bed mobility training;transfer training;home program guidelines;progressive activity/exercise   Clinical Impression   Criteria for Skilled Therapeutic Intervention yes, treatment indicated   PT Diagnosis impaired functional mobility s/p left RENETTA   Influenced by the following impairments pain, lightheadedness, decr. strength,    Functional limitations due to impairments decr. independence with gait , transfers and bed  "mobitlinathan   Clinical Presentation Stable/Uncomplicated   Clinical Presentation Rationale per clinical observation, pt is motivated to move, familiar with rehab process from prior knee replacement surgery   Clinical Decision Making (Complexity) Low complexity   Therapy Frequency` 2 times/day   Predicted Duration of Therapy Intervention (days/wks) 3   Anticipated Equipment Needs at Discharge (owns a 4ww)   Anticipated Discharge Disposition Transitional Care Facility   Risk & Benefits of therapy have been explained Yes   Patient, Family & other staff in agreement with plan of care Yes   [facility] TM \"6 Clicks\"   2016, Trustees of [facility], under license to [employer].  All rights reserved.   6 Clicks Short Forms Basic Mobility Inpatient Short Form   [facility]  \"6 Clicks\" V.2 Basic Mobility Inpatient Short Form   1. Turning from your back to your side while in a flat bed without using bedrails? 3 - A Little   2. Moving from lying on your back to sitting on the side of a flat bed without using bedrails? 3 - A Little   3. Moving to and from a bed to a chair (including a wheelchair)? 3 - A Little   4. Standing up from a chair using your arms (e.g., wheelchair, or bedside chair)? 3 - A Little   5. To walk in hospital room? 3 - A Little   6. Climbing 3-5 steps with a railing? 2 - A Lot   Basic Mobility Raw Score (Score out of 24.Lower scores equate to lower levels of function) 17   Total Evaluation Time   Total Evaluation Time (Minutes) 12     "

## 2018-06-28 NOTE — PROGRESS NOTES
Care Coordinator Progress Note    Admission Date/Time:  6/27/2018  Attending MD:  Keo Priest MD    Data  Chart reviewed, discussed with interdisciplinary team.   Patient was admitted for: Data Unavailable.      Coordination of Care and Referrals: At this time therapy is recommending TCU. A dual plan is in place, TCU v. Home. RNCC will continue to follow patient progress.        Plan  Anticipated Discharge Date:  TBD  Anticipated Discharge Plan:  Home v. TCU    Denise Choudhary RN, BSN  Care Coordinator, 8A  Phone [phone]  Pager [phone]

## 2018-06-28 NOTE — ANESTHESIA POSTPROCEDURE EVALUATION
Patient: Brenda Barker    Procedure(s):  Left Total Hip Arthroplasty   - Wound Class: I-Clean    Diagnosis:Osteoarthritis Left Hip   Diagnosis Additional Information: No value filed.    Anesthesia Type:  Spinal    Note:  Anesthesia Post Evaluation    Patient location during evaluation: PACU  Patient participation: Able to fully participate in evaluation  Level of consciousness: awake and alert  Pain management: adequate  Airway patency: patent  Cardiovascular status: acceptable  Respiratory status: acceptable  Hydration status: acceptable  PONV: none             Last vitals:  Vitals:    06/27/18 2030 06/27/18 2228 06/28/18 0804   BP: 112/49 118/53 125/57   Resp:  12 12   Temp:  36.1  C (97  F) 35.8  C (96.4  F)   SpO2:  93% 93%         Electronically Signed By: Monique Palacio MD  June 28, 2018  1:34 PM

## 2018-06-28 NOTE — PLAN OF CARE
Problem: Patient Care Overview  Goal: Plan of Care/Patient Progress Review  Discharge Planner PT   Patient plan for discharge: TCU  Current status: Pt walking 40' with ww.  More painful this afternoon. /57 in standing to start. Unable to stand until 2nd standing BP reading completed due to lightheadedness.  BP dropped to 103/28 after activity.    Barriers to return to prior living situation: limited activity tolerance, lightheadedness,  Level of assist with transfers and bed mobility.   Recommendations for discharge: TCU         Entered by: Jes Bates 06/28/2018 3:15 PM

## 2018-06-28 NOTE — CONSULTS
Social Work: Assessment with Discharge Plan    Patient Name:  Marion Barker  :  1935  Age:  82 year old  MRN:  [id number]  Risk/Complexity Score:  Filed Complexity Screen Score: 1  Completed assessment with:  Pt, pt's spouse, 8A IDT    Presenting Information   Reason for Referral:  Discharge plan  Date of Intake:  2018  Referral Source:  Chart Review  Decision Maker:  pt  Alternate Decision Maker:  Spouse David   Health Care Directive:  Copy in Chart  Living Situation:  Apartment  Previous Functional Status:  Independent  Patient and family understanding of hospitalization:  Seeking total hip replacement  Cultural/Language/Spiritual Considerations:    Adjustment to Illness:  Accepting, adjusting    Physical Health  Reason for Admission:  No diagnosis found.  Services Needed/Recommended:  TCU    Mental Health/Chemical Dependency  Diagnosis:  None noted  Support/Services in Place:  NA  Services Needed/Recommended:  NA    Support System  Significant relationship at present time:  yes  Family of origin is available for support:  Friends and other family  Other support available:  yes  Gaps in support system:  None noted  Patient is caregiver to:  None     Provider Information   Primary Care Physician:  Liudmila Bowie   [phone]   Clinic:  [address] [place], [place] [address]MINDA MARADIAGA / [facility] [id number]      :      Financial   Income Source:  Pension, social security  Financial Concerns:  None noted  Insurance:    Payor/Plan Subscriber Name Rel Member # Group #   MEDICARE - MEDICARE F* MOHIT BARKERAGUSTINA HANEY  [id number]       ATTN CLAIMS, [address]   BCBS - BCBS PLATINUM * MOHIT BARKERAGUSTINA HANEY  [id number]      [address] [address]       Discharge Plan   Patient and family discharge goal:  TCU   Provided education on discharge plan:  YES  Patient agreeable to discharge plan:  YES  A list of Medicare Certified Facilities was provided to the patient and/or family to encourage patient choice. Patient's  "choices for facility are:  [facility] [phone]  Will NH provide Skilled rehabilitation or complex medical:  YES  General information regarding anticipated insurance coverage and possible out of pocket cost was discussed. Patient and patient's family are aware patient may incur the cost of transportation to the facility, pending insurance payment: YES  Barriers to discharge:  Medical stability.    Discharge Recommendations   Anticipated Disposition:  [facility] [phone]  Transportation Needs:  Family:  David will provide transportation      Additional comments   Writer introduced role/reason for visit. Pt is anticipating needing short term rehab stay, and has requested referral to [facility]. Pt is campus resident and believes she has \"priority\".    Writer provided support, reassurance and validation. Pt is hopeful for improved quality of life with this surgery. Pt and spouse use gentle humor as coping strategy.  SW left contact information as resource.    Writer left voicemail message for Admissions at [facility] and sent referral information via Epic. Await assessment response. SW con't to follow  "

## 2018-06-28 NOTE — PLAN OF CARE
Problem: Hip Arthroplasty (Total, Partial) (Adult)  Goal: Signs and Symptoms of Listed Potential Problems Will be Absent, Minimized or Managed (Hip Arthroplasty)  Signs and symptoms of listed potential problems will be absent, minimized or managed by discharge/transition of care (reference Hip Arthroplasty (Total, Partial) (Adult) CPG).   Outcome: Improving  Pt arrived to unit 1630 and was oriented to room and call light  VS: BP elevated upon arrival and has come down. MD notified   O2: >90% on RA   Output: Voided 350, PVR 26   Last BM: 6/26   Activity: WBAT; 1A with FWW   Skin: Intact except for incision   Pain: Managed with 5mg oxycidone x1, and was then changed to PO dilaudid   CMS: Intact   Dressing: CDI   Diet: Regular   LDA: PIV infusing   Equipment: PCDs, abduction pillow, capnography, IV pole, FWW   Plan: TBD   Additional Info: Pt received spinal anesthesia + cocktail. .

## 2018-06-28 NOTE — PLAN OF CARE
Problem: Patient Care Overview  Goal: Plan of Care/Patient Progress Review  Discharge Planner OT   Patient plan for discharge: TCU vs. Home with A  Current status: pt. Educated on and demo Min/CGA with BADLs  with use of A.E. And CGA functional mobility using FWW throughout room.  CGA with commode transf. Pt. Derek with initiating surgery leg into bed.   Barriers to return to prior living situation: below baseline with ADLs/mobility, post op prec.  Recommendations for discharge: rehab;TCU. May be able to d/c home with A pending continued progress  Rationale for recommendations: to max. Safety/indep. With ADls/mobility in home/community setting.       Entered by: Mehnaz Remy 06/28/2018 1:32 PM

## 2018-06-29 ENCOUNTER — APPOINTMENT (OUTPATIENT)
Dept: PHYSICAL THERAPY | Facility: CLINIC | Age: 83
DRG: 470 | End: 2018-06-29
Attending: ORTHOPAEDIC SURGERY
Payer: MEDICARE

## 2018-06-29 LAB
ANION GAP SERPL CALCULATED.3IONS-SCNC: 9 MMOL/L (ref 3–14)
BASOPHILS # BLD AUTO: 0 10E9/L (ref 0–0.2)
BASOPHILS NFR BLD AUTO: 0 %
BUN SERPL-MCNC: 16 MG/DL (ref 7–30)
CALCIUM SERPL-MCNC: 9.4 MG/DL (ref 8.5–10.1)
CHLORIDE SERPL-SCNC: 109 MMOL/L (ref 94–109)
CO2 SERPL-SCNC: 24 MMOL/L (ref 20–32)
CREAT SERPL-MCNC: 0.69 MG/DL (ref 0.52–1.04)
DIFFERENTIAL METHOD BLD: NORMAL
EOSINOPHIL # BLD AUTO: 0 10E9/L (ref 0–0.7)
EOSINOPHIL NFR BLD AUTO: 0.1 %
ERYTHROCYTE [DISTWIDTH] IN BLOOD BY AUTOMATED COUNT: 16.8 % (ref 10–15)
GFR SERPL CREATININE-BSD FRML MDRD: 81 ML/MIN/1.7M2
GLUCOSE SERPL-MCNC: 105 MG/DL (ref 70–99)
HCT VFR BLD AUTO: 25.8 % (ref 35–47)
HGB BLD-MCNC: 7.9 G/DL (ref 11.7–15.7)
HGB BLD-MCNC: 8.1 G/DL (ref 11.7–15.7)
IMM GRANULOCYTES # BLD: 0 10E9/L (ref 0–0.4)
IMM GRANULOCYTES NFR BLD: 0.1 %
LYMPHOCYTES # BLD AUTO: 1.1 10E9/L (ref 0.8–5.3)
LYMPHOCYTES NFR BLD AUTO: 15.5 %
MCH RBC QN AUTO: 28.1 PG (ref 26.5–33)
MCHC RBC AUTO-ENTMCNC: 31.4 G/DL (ref 31.5–36.5)
MCV RBC AUTO: 90 FL (ref 78–100)
MONOCYTES # BLD AUTO: 1 10E9/L (ref 0–1.3)
MONOCYTES NFR BLD AUTO: 13.5 %
NEUTROPHILS # BLD AUTO: 5 10E9/L (ref 1.6–8.3)
NEUTROPHILS NFR BLD AUTO: 70.8 %
NRBC # BLD AUTO: 0 10*3/UL
NRBC BLD AUTO-RTO: 0 /100
PLATELET # BLD AUTO: 265 10E9/L (ref 150–450)
POTASSIUM SERPL-SCNC: 3.6 MMOL/L (ref 3.4–5.3)
RBC # BLD AUTO: 2.88 10E12/L (ref 3.8–5.2)
SODIUM SERPL-SCNC: 142 MMOL/L (ref 133–144)
WBC # BLD AUTO: 7.1 10E9/L (ref 4–11)
WBC # BLD AUTO: 8.4 10E9/L (ref 4–11)

## 2018-06-29 PROCEDURE — 12000001 ZZH R&B MED SURG/OB UMMC

## 2018-06-29 PROCEDURE — 25000132 ZZH RX MED GY IP 250 OP 250 PS 637: Mod: GY | Performed by: STUDENT IN AN ORGANIZED HEALTH CARE EDUCATION/TRAINING PROGRAM

## 2018-06-29 PROCEDURE — 40000193 ZZH STATISTIC PT WARD VISIT: Performed by: PHYSICAL THERAPIST

## 2018-06-29 PROCEDURE — A9270 NON-COVERED ITEM OR SERVICE: HCPCS | Mod: GY | Performed by: STUDENT IN AN ORGANIZED HEALTH CARE EDUCATION/TRAINING PROGRAM

## 2018-06-29 PROCEDURE — 25000132 ZZH RX MED GY IP 250 OP 250 PS 637: Mod: GY | Performed by: ORTHOPAEDIC SURGERY

## 2018-06-29 PROCEDURE — 85027 COMPLETE CBC AUTOMATED: CPT | Performed by: INTERNAL MEDICINE

## 2018-06-29 PROCEDURE — A9270 NON-COVERED ITEM OR SERVICE: HCPCS | Mod: GY | Performed by: INTERNAL MEDICINE

## 2018-06-29 PROCEDURE — A9270 NON-COVERED ITEM OR SERVICE: HCPCS | Mod: GY | Performed by: ORTHOPAEDIC SURGERY

## 2018-06-29 PROCEDURE — 36415 COLL VENOUS BLD VENIPUNCTURE: CPT | Performed by: ORTHOPAEDIC SURGERY

## 2018-06-29 PROCEDURE — 25000128 H RX IP 250 OP 636: Performed by: INTERNAL MEDICINE

## 2018-06-29 PROCEDURE — 97116 GAIT TRAINING THERAPY: CPT | Mod: GP | Performed by: PHYSICAL THERAPIST

## 2018-06-29 PROCEDURE — 36415 COLL VENOUS BLD VENIPUNCTURE: CPT | Performed by: INTERNAL MEDICINE

## 2018-06-29 PROCEDURE — 25000132 ZZH RX MED GY IP 250 OP 250 PS 637: Mod: GY | Performed by: INTERNAL MEDICINE

## 2018-06-29 PROCEDURE — 80048 BASIC METABOLIC PNL TOTAL CA: CPT | Performed by: ORTHOPAEDIC SURGERY

## 2018-06-29 PROCEDURE — 85025 COMPLETE CBC W/AUTO DIFF WBC: CPT | Performed by: ORTHOPAEDIC SURGERY

## 2018-06-29 PROCEDURE — 25000128 H RX IP 250 OP 636: Performed by: STUDENT IN AN ORGANIZED HEALTH CARE EDUCATION/TRAINING PROGRAM

## 2018-06-29 PROCEDURE — 99232 SBSQ HOSP IP/OBS MODERATE 35: CPT | Performed by: INTERNAL MEDICINE

## 2018-06-29 PROCEDURE — 97110 THERAPEUTIC EXERCISES: CPT | Mod: GP | Performed by: PHYSICAL THERAPIST

## 2018-06-29 RX ORDER — HYDROMORPHONE HYDROCHLORIDE 2 MG/1
TABLET ORAL
Qty: 30 TABLET | Refills: 0 | Status: SHIPPED | DISCHARGE
Start: 2018-06-29 | End: 2018-07-10

## 2018-06-29 RX ORDER — ASPIRIN 325 MG
325 TABLET, DELAYED RELEASE (ENTERIC COATED) ORAL DAILY
Qty: 40 TABLET | DISCHARGE
Start: 2018-07-12 | End: 2018-07-26

## 2018-06-29 RX ADMIN — ACYCLOVIR 400 MG: 400 TABLET ORAL at 09:15

## 2018-06-29 RX ADMIN — SENNOSIDES AND DOCUSATE SODIUM 1 TABLET: 8.6; 5 TABLET ORAL at 20:11

## 2018-06-29 RX ADMIN — OMEPRAZOLE 20 MG: 20 CAPSULE, DELAYED RELEASE ORAL at 09:14

## 2018-06-29 RX ADMIN — Medication 1 MG: at 17:19

## 2018-06-29 RX ADMIN — METOPROLOL SUCCINATE 50 MG: 50 TABLET, EXTENDED RELEASE ORAL at 09:14

## 2018-06-29 RX ADMIN — FOLIC ACID 2 MG: 1 TABLET ORAL at 09:15

## 2018-06-29 RX ADMIN — ACETAMINOPHEN 975 MG: 325 TABLET, FILM COATED ORAL at 17:19

## 2018-06-29 RX ADMIN — SENNOSIDES AND DOCUSATE SODIUM 1 TABLET: 8.6; 5 TABLET ORAL at 09:14

## 2018-06-29 RX ADMIN — Medication 1 MG: at 11:52

## 2018-06-29 RX ADMIN — ACETAMINOPHEN 975 MG: 325 TABLET, FILM COATED ORAL at 09:14

## 2018-06-29 RX ADMIN — MULTIPLE VITAMINS W/ MINERALS TAB 1 TABLET: TAB at 09:14

## 2018-06-29 RX ADMIN — SODIUM CHLORIDE: 9 INJECTION, SOLUTION INTRAVENOUS at 00:47

## 2018-06-29 RX ADMIN — FOLIC ACID 2 MG: 1 TABLET ORAL at 20:11

## 2018-06-29 RX ADMIN — Medication 1 MG: at 21:49

## 2018-06-29 RX ADMIN — Medication 1 MG: at 07:33

## 2018-06-29 RX ADMIN — Medication 1 MG: at 00:42

## 2018-06-29 RX ADMIN — ACETAMINOPHEN 975 MG: 325 TABLET, FILM COATED ORAL at 00:42

## 2018-06-29 RX ADMIN — ENOXAPARIN SODIUM 40 MG: 40 INJECTION SUBCUTANEOUS at 11:52

## 2018-06-29 RX ADMIN — CETIRIZINE HYDROCHLORIDE 5 MG: 5 TABLET ORAL at 20:12

## 2018-06-29 RX ADMIN — NITROFURANTOIN MACROCRYSTALS 100 MG: 100 CAPSULE ORAL at 09:15

## 2018-06-29 RX ADMIN — SIMVASTATIN 20 MG: 20 TABLET, FILM COATED ORAL at 21:49

## 2018-06-29 RX ADMIN — VITAMIN D, TAB 1000IU (100/BT) 2000 UNITS: 25 TAB at 09:15

## 2018-06-29 NOTE — PROGRESS NOTES
"[facility] Internal Medicine Progress Note            Interval History:   Record reviewed.  Seen with RN.  Feeling poorly this AM.  Chills with fever last night with recurrent chills this AM with PT.  Tmax 101.2.  Adequate pain control.    Tolerating low dose dilaudid 1 mg.  Ambulated in lipscomb without LH.    No CP, SOB, cough, nausea, reflux, abd pain or distention.  Passing flatus.  Last BM   3 days ago.  Prefers to try prunes over supp.  Voiding OK. No dysuria.  No noted confusion on opiates.          Medications:   All medications reviewed today          Physical Exam:   Blood pressure 138/43, pulse 78, temperature 96.7  F (35.9  C), temperature source Oral, resp. rate 14, height 1.575 m (5' 2\"), weight 75.1 kg (165 lb 9.1 oz), last menstrual period 05/17/1972, SpO2 98 %, not currently breastfeeding.    Intake/Output Summary (Last 24 hours) at 06/28/18 1440  Last data filed at 06/28/18 1414   Gross per 24 hour   Intake                0 ml   Output             1350 ml   Net            -1350 ml   I/O this shift:  In: -   Out: 200 [Urine:200]      General:  Alert.  Appropriate.  No distress. No  O2.     Heent:      Neck:    Skin:    Chest:  clear    Cardiac:  Reg without gallop, murmur.  No JVD.     Abdomen:  Non distended, soft, non-tender.  BS normal.     Extremities:  Perfused.  No edema, calf, posterior thigh tenderness to suggest DVT.     Neuro:            Data:     Results for orders placed or performed during the hospital encounter of 06/27/18 (from the past 24 hour(s))   Hemoglobin   Result Value Ref Range    Hemoglobin 7.9 (L) 11.7 - 15.7 g/dL   Basic metabolic panel   Result Value Ref Range    Sodium 142 133 - 144 mmol/L    Potassium 3.6 3.4 - 5.3 mmol/L    Chloride 109 94 - 109 mmol/L    Carbon Dioxide 24 20 - 32 mmol/L    Anion Gap 9 3 - 14 mmol/L    Glucose 105 (H) 70 - 99 mg/dL    Urea Nitrogen 16 7 - 30 mg/dL    Creatinine 0.69 0.52 - 1.04 mg/dL    GFR Estimate 81 >60 mL/min/1.7m2    GFR " Estimate If Black >90 >60 mL/min/1.7m2    Calcium 9.4 8.5 - 10.1 mg/dL   WBC and differential   Result Value Ref Range    WBC 7.1 4.0 - 11.0 10e9/L    Diff Method Automated Method     % Neutrophils 70.8 %    % Lymphocytes 15.5 %    % Monocytes 13.5 %    % Eosinophils 0.1 %    % Basophils 0.0 %    % Immature Granulocytes 0.1 %    Nucleated RBCs 0 0 /100    Absolute Neutrophil 5.0 1.6 - 8.3 10e9/L    Absolute Lymphocytes 1.1 0.8 - 5.3 10e9/L    Absolute Monocytes 1.0 0.0 - 1.3 10e9/L    Absolute Eosinophils 0.0 0.0 - 0.7 10e9/L    Absolute Basophils 0.0 0.0 - 0.2 10e9/L    Abs Immature Granulocytes 0.0 0 - 0.4 10e9/L    Absolute Nucleated RBC 0.0                 Assessment and Plan:   1.  Left total hip arthroplasty. Spinal anesthesia.  Indication, osteoarthritis. Mobilizing.  Adequate pain control with low dose opiates.   2.  Acute blood loss anemia superimposed on anemia prior to admission, potentially on the basis of chronic disease secondary to rheumatoid arthritis/methotrexate.  Interval drop possible soft tissue hemorrhage.  Possible dilution.    3.  Essential hypertension with adequate BP on reduced meds.    4.  Hyperlipidemia, on a statin.   5.  Rheumatoid arthritis on methotrexate to be continued throughout the operative course.   6.  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   7.  Resting tremor (noted on exam).   8.  Gastroesophageal reflux disease, symptomatically controlled on omeprazole.   9.  Fever with normal WBC.  No clear source bacterial infection.  Suspect likely 2nd to #1 (post op inflammatory change, hematoma).   10.  Sensorineural hearing loss with augmentation.   11.  Cervical spondylosis, C5-C6, with bilateral uncinate spurs.   12.  Carpal tunnel syndrome.   13.  Distal upper extremity paresthesias potentially related to carpal tunnel syndrome or cervical disk disease.   14.  Depression, largely situational subsequent to the death of her son in  1982.  Presently well compensated.   15.  Hemorrhoids.   16.  Aortic valve sclerosis.   17.  Negative objective evaluation for coronary artery disease several years ago.  Limited functional capacity preop due to left hip pain with unknown coronary status presently.   18.  Hypercalcemia potentially related to thiazide diuretic.  Normalized off microzide with IV fluids.   19.  Issue of delirium in the past with oxycodone.  Family concerned that may have been dose-related.  No recurrence.         PLAN:  1)  SL IV.   2)  IS, same opiates, bowel meds, mobilize, lovenox.  3)  Trend labs.  UA/UC.  Check PM CBC.  4)  Monitor clinically.   Disposition Plan   Expected discharge in 1-2 days to TCU pending fever course.      Entered: Michael Pride 06/29/2018, 4:58 PM              Attestation:  I have reviewed today's vital signs, notes, medications, labs and imaging.     Michael Pride MD

## 2018-06-29 NOTE — PROGRESS NOTES
"  Orthopaedic Surgery Daily Progress Note 6/28/18  Subjective: Brenda Barker is a 82 year old female POD # 2 s/p L RENETTA  Pain is controlled. Tolerating regular diet. One fever to 101.2 overnight. No dysuria, SOB.     REVIEW OF SYSTEMS: As noted above. No headache, dizziness. No fever, chills. No chest pain or shortness of breath. No abdominal pain, nausea, vomiting. No diarrhea or constipation. No urinary difficulties.     Physical Exam   /57  Pulse 78  Temp 99.8  F (37.7  C) (Oral)  Resp 16  Ht 1.575 m (5' 2\")  Wt 75.1 kg (165 lb 9.1 oz)  LMP 05/17/1972  SpO2 97%  BMI 30.28 kg/m2    General: Alert, well-appearing  in no acute distress.  Respiratory: Non-labored breathing.   Cardiovascular: Extremities warm and well perfused   Extremities: moving all four extremities.   LE:  -Sens: SILT dp/sp/s/s/t  -Motor: 5/5 EHL/FHL/TA/GSc  -Vasc: 2+ pulses, wwp, brisk cap refill    Dressing CDI    Skin: As noted above. No rashes or lesions appreciated.    Labs    Complete Blood Count     Recent Labs  Lab 06/28/18  0657 06/27/18  1826   HGB 9.2*  --    PLT  --  349     Basic Metabolic Panel    Recent Labs  Lab 06/28/18  0857 06/27/18  1826     --    POTASSIUM 4.0  --    CHLORIDE 108  --    CO2 20  --    BUN 16  --    CR 0.62 0.68   *  --      Coagulation Profile  No lab results found in last 7 days.    Assessment/Plan:  Assessment/Plan: Brenda Barker is a 82 year old female s/p Procedure: Left Total Hip Arthroplasty     Activity: Up with assist and assistive devices as needed until independent. Posterior hip precautions x 3 months:  1) No L hip flexion beyond 90 degrees  2) No adduction beyond midline  3) No internal rotation beyond neutral   Weight bearing status: WBAT  Antibiotics: Cefazolin x 24 hours completed   Diet: Begin with clear fluids and progress diet as tolerated. Bowel regimen. Anti-emetics PRN.    DVT prophylaxis: Mechanical while in hospital with Lovenox x 2 weeks, followed by aspirin x 2 " weeks  Elevation: Elevate heels off of bed on pillows   Wound Care: Aquacel x 5-7 days.    Drains: none  Pain management: Orals PRN, IV for breakthrough only  X-rays: AP Pelvis and Lateral L hip in PACU.   Physical Therapy: Mobilization, ROM, ADL's, Posterior hip precautions.    Occupational Therapy: ADL's  Labs: Trend Hgb on POD #1, 2  Consults: PT, OT. Hospitalist, appreciate assistance in caring for this patient throughout the perioperative period    Anticipate DC to rehab tomorrow.     Lm Olivarez MD   Orthopaedic Surgery Resident, PGY-4  P: [phone]

## 2018-06-29 NOTE — PLAN OF CARE
"Problem: Patient Care Overview  Goal: Plan of Care/Patient Progress Review  Outcome: Improving    VS: WDL except T max 101.1, scheduled tylenol   O2: Maintained on RA   Output: Voiding per BSC   Last BM:    Activity: Up to BSC, patient states \"I need to get up to the chair for breakfast\" Patient assisted from commode to chair   Skin: Not full skin assessment, checked back and buttock   Pain: Managed with Dilaudid 1mg infrequently   CMS: intact   Dressing: intact   Diet: Regular   LDA:    Equipment: Walking with walker   Plan:    Additional Info:              "

## 2018-06-29 NOTE — PLAN OF CARE
Problem: Patient Care Overview  Goal: Plan of Care/Patient Progress Review  Discharge Planner PT   Patient plan for discharge: TCU  Current status: Complaining of chills.  Temp 98.3 presently.  Performed LE exercises in bed.  Transferred with min A of 1 in and out of bed.  Voided on commode with pt performing her own hygiene.  Walked about 40' with ww and contact guard.  Denied dizziness today.  's prior to activity. Pt states her  is worried about transporting her to TCU and wants other options.  Notified SW of these concerns.   Barriers to return to prior living situation: level of assist,  Medical status, decreased activity tolerance.   Recommendations for discharge: TCU  Rationale for recommendations: Pt will benefit from skilled PT to address strengthening, transfer and gait training to maximize safety with activity and promote independence.  Not meeting anticipated goals.        Entered by: Jes Bates 06/29/2018 10:45 AM

## 2018-06-29 NOTE — PROGRESS NOTES
Social Work Services Progress Note    Hospital Day: 3    Collaborated with:  Pt, spouse David, 8A IDT, Wendy PT    Data:  DC plan     Intervention:  Writer received message from Piper in Admissions at [facility], they are full. Writer discussed possible alternate placement near pt's home including: [facility], [facility], [facility] or [place]. Pt and spouse request referrals to [facility] and are agreeable to either [facility] or [place] locations. They are aware [facility] charges $40/day for private room are okay with this.     Pt's spouse expresses some anxiety with providing transportation for pt and he is requesting WC ride. Writer provided estimate of private pay for transport including $60pick up fee and charge or $5-6 per mile. They verbalized understanding and are accepting of this information.         Assessment:  pt and spouse remain involved in DC plan. They are receptive to writer's education, support, validation and reassurance provided through active listening and supportive counseling.    Plan:    Anticipated Disposition:  Facility:  [facility] pending bed availability     Barriers to d/c plan:  Assessment, medical stability, anticipate DC Saturday 6/30/18    Follow Up:  SW con't to follow      [facility] [phone]  [facility] [phone] (do not anticipate having beds available but will assess)    [facility] [phone] (do not anticipate having beds available over weekend)      PAS/RR completed via [facility] Line, confirmation [id number]    [id number] Addendum:    Pending bed availability, [facility]: [phone]

## 2018-06-29 NOTE — PLAN OF CARE
Problem: Patient Care Overview  Goal: Individualization & Mutuality  Outcome: Improving  Patient A&O x4, lungs sound clear, Bowel sound active- passing gas but no BM yet- took senokot but refused suppository this morning, Denied CP, lightheadedness, dizziness, numbness, tingling and SOB, iv saline locked, drinking well and voiding spontaneously without difficulties, able to wiggle toes, CMS intact, pain tolerable and taking 1 mg of dilaudid for pain, ice pack applied to hip, dressing clean,dry and intact, incentive spirometer encouraged and done several times, up to bedside commode and with assist of one, heels elevated off bed, demonstrates the ability to use call light appropriately, will continue to monitor patient.

## 2018-06-30 ENCOUNTER — APPOINTMENT (OUTPATIENT)
Dept: PHYSICAL THERAPY | Facility: CLINIC | Age: 83
DRG: 470 | End: 2018-06-30
Attending: ORTHOPAEDIC SURGERY
Payer: MEDICARE

## 2018-06-30 ENCOUNTER — APPOINTMENT (OUTPATIENT)
Dept: OCCUPATIONAL THERAPY | Facility: CLINIC | Age: 83
DRG: 470 | End: 2018-06-30
Attending: ORTHOPAEDIC SURGERY
Payer: MEDICARE

## 2018-06-30 LAB
ALBUMIN UR-MCNC: 10 MG/DL
ANION GAP SERPL CALCULATED.3IONS-SCNC: 7 MMOL/L (ref 3–14)
APPEARANCE UR: CLEAR
BILIRUB UR QL STRIP: NEGATIVE
BUN SERPL-MCNC: 12 MG/DL (ref 7–30)
CALCIUM SERPL-MCNC: 9.7 MG/DL (ref 8.5–10.1)
CHLORIDE SERPL-SCNC: 109 MMOL/L (ref 94–109)
CO2 SERPL-SCNC: 25 MMOL/L (ref 20–32)
COLOR UR AUTO: YELLOW
CREAT SERPL-MCNC: 0.63 MG/DL (ref 0.52–1.04)
ERYTHROCYTE [DISTWIDTH] IN BLOOD BY AUTOMATED COUNT: 16.6 % (ref 10–15)
GFR SERPL CREATININE-BSD FRML MDRD: >90 ML/MIN/1.7M2
GLUCOSE SERPL-MCNC: 116 MG/DL (ref 70–99)
GLUCOSE UR STRIP-MCNC: NEGATIVE MG/DL
HCT VFR BLD AUTO: 25.3 % (ref 35–47)
HGB BLD-MCNC: 7.8 G/DL (ref 11.7–15.7)
HGB UR QL STRIP: NEGATIVE
KETONES UR STRIP-MCNC: NEGATIVE MG/DL
LEUKOCYTE ESTERASE UR QL STRIP: NEGATIVE
MCH RBC QN AUTO: 27.9 PG (ref 26.5–33)
MCHC RBC AUTO-ENTMCNC: 30.8 G/DL (ref 31.5–36.5)
MCV RBC AUTO: 90 FL (ref 78–100)
MUCOUS THREADS #/AREA URNS LPF: PRESENT /LPF
NITRATE UR QL: NEGATIVE
PH UR STRIP: 5.5 PH (ref 5–7)
PLATELET # BLD AUTO: 251 10E9/L (ref 150–450)
POTASSIUM SERPL-SCNC: 4 MMOL/L (ref 3.4–5.3)
RBC # BLD AUTO: 2.8 10E12/L (ref 3.8–5.2)
RBC #/AREA URNS AUTO: <1 /HPF (ref 0–2)
SODIUM SERPL-SCNC: 141 MMOL/L (ref 133–144)
SOURCE: ABNORMAL
SP GR UR STRIP: 1.02 (ref 1–1.03)
SQUAMOUS #/AREA URNS AUTO: 1 /HPF (ref 0–1)
UROBILINOGEN UR STRIP-MCNC: NORMAL MG/DL (ref 0–2)
WBC # BLD AUTO: 7.8 10E9/L (ref 4–11)
WBC #/AREA URNS AUTO: <1 /HPF (ref 0–5)

## 2018-06-30 PROCEDURE — A9270 NON-COVERED ITEM OR SERVICE: HCPCS | Mod: GY | Performed by: STUDENT IN AN ORGANIZED HEALTH CARE EDUCATION/TRAINING PROGRAM

## 2018-06-30 PROCEDURE — 12000001 ZZH R&B MED SURG/OB UMMC

## 2018-06-30 PROCEDURE — 97530 THERAPEUTIC ACTIVITIES: CPT | Mod: GO

## 2018-06-30 PROCEDURE — A9270 NON-COVERED ITEM OR SERVICE: HCPCS | Mod: GY | Performed by: INTERNAL MEDICINE

## 2018-06-30 PROCEDURE — 25000128 H RX IP 250 OP 636: Performed by: STUDENT IN AN ORGANIZED HEALTH CARE EDUCATION/TRAINING PROGRAM

## 2018-06-30 PROCEDURE — 25000132 ZZH RX MED GY IP 250 OP 250 PS 637: Mod: GY | Performed by: INTERNAL MEDICINE

## 2018-06-30 PROCEDURE — 25000131 ZZH RX MED GY IP 250 OP 636 PS 637: Mod: GY | Performed by: INTERNAL MEDICINE

## 2018-06-30 PROCEDURE — 25000132 ZZH RX MED GY IP 250 OP 250 PS 637: Mod: GY | Performed by: ORTHOPAEDIC SURGERY

## 2018-06-30 PROCEDURE — A9270 NON-COVERED ITEM OR SERVICE: HCPCS | Mod: GY | Performed by: ORTHOPAEDIC SURGERY

## 2018-06-30 PROCEDURE — 80048 BASIC METABOLIC PNL TOTAL CA: CPT | Performed by: INTERNAL MEDICINE

## 2018-06-30 PROCEDURE — 25000132 ZZH RX MED GY IP 250 OP 250 PS 637: Mod: GY | Performed by: STUDENT IN AN ORGANIZED HEALTH CARE EDUCATION/TRAINING PROGRAM

## 2018-06-30 PROCEDURE — 40000133 ZZH STATISTIC OT WARD VISIT

## 2018-06-30 PROCEDURE — 87086 URINE CULTURE/COLONY COUNT: CPT | Performed by: INTERNAL MEDICINE

## 2018-06-30 PROCEDURE — 36415 COLL VENOUS BLD VENIPUNCTURE: CPT | Performed by: INTERNAL MEDICINE

## 2018-06-30 PROCEDURE — 85027 COMPLETE CBC AUTOMATED: CPT | Performed by: INTERNAL MEDICINE

## 2018-06-30 PROCEDURE — 40000193 ZZH STATISTIC PT WARD VISIT: Performed by: PHYSICAL THERAPIST

## 2018-06-30 PROCEDURE — 99232 SBSQ HOSP IP/OBS MODERATE 35: CPT | Performed by: INTERNAL MEDICINE

## 2018-06-30 PROCEDURE — 97116 GAIT TRAINING THERAPY: CPT | Mod: GP | Performed by: PHYSICAL THERAPIST

## 2018-06-30 PROCEDURE — 97530 THERAPEUTIC ACTIVITIES: CPT | Mod: GP | Performed by: PHYSICAL THERAPIST

## 2018-06-30 PROCEDURE — 97535 SELF CARE MNGMENT TRAINING: CPT | Mod: GO

## 2018-06-30 PROCEDURE — 81001 URINALYSIS AUTO W/SCOPE: CPT | Performed by: INTERNAL MEDICINE

## 2018-06-30 RX ADMIN — FOLIC ACID 2 MG: 1 TABLET ORAL at 09:09

## 2018-06-30 RX ADMIN — MULTIPLE VITAMINS W/ MINERALS TAB 1 TABLET: TAB at 09:09

## 2018-06-30 RX ADMIN — METOPROLOL SUCCINATE 50 MG: 50 TABLET, EXTENDED RELEASE ORAL at 09:10

## 2018-06-30 RX ADMIN — Medication 1 MG: at 09:22

## 2018-06-30 RX ADMIN — Medication 1 MG: at 13:37

## 2018-06-30 RX ADMIN — Medication 1 MG: at 02:50

## 2018-06-30 RX ADMIN — ACETAMINOPHEN 650 MG: 325 TABLET, FILM COATED ORAL at 18:00

## 2018-06-30 RX ADMIN — Medication 1 MG: at 22:11

## 2018-06-30 RX ADMIN — ENOXAPARIN SODIUM 40 MG: 40 INJECTION SUBCUTANEOUS at 13:38

## 2018-06-30 RX ADMIN — CETIRIZINE HYDROCHLORIDE 5 MG: 5 TABLET ORAL at 20:16

## 2018-06-30 RX ADMIN — METHOTREXATE SODIUM 15 MG: 2.5 TABLET ORAL at 09:20

## 2018-06-30 RX ADMIN — ACETAMINOPHEN 975 MG: 325 TABLET, FILM COATED ORAL at 09:21

## 2018-06-30 RX ADMIN — ACETAMINOPHEN 975 MG: 325 TABLET, FILM COATED ORAL at 02:37

## 2018-06-30 RX ADMIN — VITAMIN D, TAB 1000IU (100/BT) 2000 UNITS: 25 TAB at 09:09

## 2018-06-30 RX ADMIN — ACETAMINOPHEN 650 MG: 325 TABLET, FILM COATED ORAL at 13:37

## 2018-06-30 RX ADMIN — ACYCLOVIR 400 MG: 400 TABLET ORAL at 09:09

## 2018-06-30 RX ADMIN — ACETAMINOPHEN 650 MG: 325 TABLET, FILM COATED ORAL at 22:11

## 2018-06-30 RX ADMIN — SENNOSIDES AND DOCUSATE SODIUM 1 TABLET: 8.6; 5 TABLET ORAL at 20:17

## 2018-06-30 RX ADMIN — SENNOSIDES AND DOCUSATE SODIUM 1 TABLET: 8.6; 5 TABLET ORAL at 09:10

## 2018-06-30 RX ADMIN — FOLIC ACID 2 MG: 1 TABLET ORAL at 20:17

## 2018-06-30 RX ADMIN — SIMVASTATIN 20 MG: 20 TABLET, FILM COATED ORAL at 22:11

## 2018-06-30 RX ADMIN — Medication 1 MG: at 18:00

## 2018-06-30 RX ADMIN — NITROFURANTOIN MACROCRYSTALS 100 MG: 100 CAPSULE ORAL at 09:09

## 2018-06-30 RX ADMIN — OMEPRAZOLE 20 MG: 20 CAPSULE, DELAYED RELEASE ORAL at 09:53

## 2018-06-30 NOTE — PLAN OF CARE
Problem: Patient Care Overview  Goal: Plan of Care/Patient Progress Review    VS: VSS   O2: Stable on %   Output: Voiding in toilet.   Last BM: Last evening 6/29.   Activity: Up with walker, gait belt and SBA. Needs assist to get left leg back into bed. Steady when up, denies any dizziness when up.   Skin: Intact.   Pain: Left hip pain well controlled with Tylenol, Dilaudid (1 mg) and ice packs.   CMS: Intact.   Dressing: Intact on left hip.   Diet: Regular and tolerating without nausea.   LDA: None.   Equipment: Walker.   Plan: Monitor VS and pain.   Additional Info: Plan to TCU tomorrow,

## 2018-06-30 NOTE — PLAN OF CARE
Problem: Patient Care Overview  Goal: Plan of Care/Patient Progress Review  Discharge Planner OT   Patient plan for discharge: TCU   Current status: Min A for bed mobility. Sit<>stand transfers with CGA with cues for proper technique. Patient ambulated to bathroom and in hallway using FWW with CGA. Toilet transfer using BSC as overlay with SBA.   Barriers to return to prior living situation: pain, precautions, assist for mobility and ADLs  Recommendations for discharge: TCU  Rationale for recommendations: To maximize safety and IND with functional mobility and ADLs/IADLs       Entered by: LEANNE PALOMARES 06/30/2018 2:14 PM

## 2018-06-30 NOTE — PROGRESS NOTES
"[facility] Internal Medicine Progress Note            Interval History:   Record reviewed.  Seen with RN.  Clinically feeling well.  No further chills, fever spike.    Adequate pain control.  Tolerating low dose dilaudid 1 mg.  Ambulated in lipscomb without LH.  No CP, SOB, cough, nausea, reflux, abd pain or distention.  Passing flatus. BM last night.  Voiding OK.            Medications:   All medications reviewed today          Physical Exam:   Blood pressure 124/50, pulse 85, temperature 97.4  F (36.3  C), temperature source Oral, resp. rate 16, height 1.575 m (5' 2\"), weight 75.1 kg (165 lb 9.1 oz), last menstrual period 05/17/1972, SpO2 100 %, not currently breastfeeding.    Intake/Output Summary (Last 24 hours) at 06/28/18 1440  Last data filed at 06/28/18 1414   Gross per 24 hour   Intake                0 ml   Output             1350 ml   Net            -1350 ml          General:  Alert.  Appropriate.  No distress. No  O2.     Heent:      Neck:    Skin:    Chest:  clear    Cardiac:  Reg without gallop, murmur.  No JVD.     Abdomen:  Non distended, soft, non-tender.  BS normal.     Extremities:  Perfused.  No edema, calf, posterior thigh tenderness to suggest DVT.     Neuro:            Data:     Results for orders placed or performed during the hospital encounter of 06/27/18 (from the past 24 hour(s))   CBC with platelets   Result Value Ref Range    WBC 8.4 4.0 - 11.0 10e9/L    RBC Count 2.88 (L) 3.8 - 5.2 10e12/L    Hemoglobin 8.1 (L) 11.7 - 15.7 g/dL    Hematocrit 25.8 (L) 35.0 - 47.0 %    MCV 90 78 - 100 fl    MCH 28.1 26.5 - 33.0 pg    MCHC 31.4 (L) 31.5 - 36.5 g/dL    RDW 16.8 (H) 10.0 - 15.0 %    Platelet Count 265 150 - 450 10e9/L   CBC with platelets   Result Value Ref Range    WBC 7.8 4.0 - 11.0 10e9/L    RBC Count 2.80 (L) 3.8 - 5.2 10e12/L    Hemoglobin 7.8 (L) 11.7 - 15.7 g/dL    Hematocrit 25.3 (L) 35.0 - 47.0 %    MCV 90 78 - 100 fl    MCH 27.9 26.5 - 33.0 pg    MCHC 30.8 (L) 31.5 - 36.5 g/dL "    RDW 16.6 (H) 10.0 - 15.0 %    Platelet Count 251 150 - 450 10e9/L   Basic metabolic panel   Result Value Ref Range    Sodium 141 133 - 144 mmol/L    Potassium 4.0 3.4 - 5.3 mmol/L    Chloride 109 94 - 109 mmol/L    Carbon Dioxide 25 20 - 32 mmol/L    Anion Gap 7 3 - 14 mmol/L    Glucose 116 (H) 70 - 99 mg/dL    Urea Nitrogen 12 7 - 30 mg/dL    Creatinine 0.63 0.52 - 1.04 mg/dL    GFR Estimate >90 >60 mL/min/1.7m2    GFR Estimate If Black >90 >60 mL/min/1.7m2    Calcium 9.7 8.5 - 10.1 mg/dL   UA with Microscopic   Result Value Ref Range    Color Urine Yellow     Appearance Urine Clear     Glucose Urine Negative NEG^Negative mg/dL    Bilirubin Urine Negative NEG^Negative    Ketones Urine Negative NEG^Negative mg/dL    Specific Gravity Urine 1.017 1.003 - 1.035    Blood Urine Negative NEG^Negative    pH Urine 5.5 5.0 - 7.0 pH    Protein Albumin Urine 10 (A) NEG^Negative mg/dL    Urobilinogen mg/dL Normal 0.0 - 2.0 mg/dL    Nitrite Urine Negative NEG^Negative    Leukocyte Esterase Urine Negative NEG^Negative    Source Clean catch urine     WBC Urine <1 0 - 5 /HPF    RBC Urine <1 0 - 2 /HPF    Squamous Epithelial /HPF Urine 1 0 - 1 /HPF    Mucous Urine Present (A) NEG^Negative /LPF   Urine Culture Aerobic Bacterial   Result Value Ref Range    Specimen Description Unspecified Urine     Special Requests Specimen received in preservative     Culture Micro PENDING                 Assessment and Plan:   1.  Left total hip arthroplasty. Spinal anesthesia.  Indication, osteoarthritis. Mobilizing. Adequate pain control with low dose opiates.   2.  Acute blood loss anemia superimposed on anemia prior to admission, potentially on the basis of chronic disease secondary to rheumatoid arthritis/methotrexate.  Interval drop possible soft tissue hemorrhage.  Possible dilution.  Adequate.   3.  Essential hypertension with adequate BP on reduced meds.    4.  Hyperlipidemia, on a statin.   5.  Rheumatoid arthritis on methotrexate to be  continued throughout the operative course.   6.  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   7.  Resting tremor (noted on exam).   8.  Gastroesophageal reflux disease, symptomatically controlled on omeprazole.   9.  Fever with normal WBC.  No clear source bacterial infection.  Suspect likely 2nd to #1 (post op inflammatory change, hematoma).  Improved.   10.  Sensorineural hearing loss with augmentation.   11.  Cervical spondylosis, C5-C6, with bilateral uncinate spurs.   12.  Carpal tunnel syndrome.   13.  Distal upper extremity paresthesias potentially related to carpal tunnel syndrome or cervical disk disease.   14.  Depression, largely situational subsequent to the death of her son in 1982.  Presently well compensated.   15.  Hemorrhoids.   16.  Aortic valve sclerosis.   17.  Negative objective evaluation for coronary artery disease several years ago.  Limited functional capacity preop due to left hip pain with unknown coronary status presently.   18.  Hypercalcemia potentially related to thiazide diuretic.  Normalized off microzide with IV fluids.   19.  Issue of delirium in the past with oxycodone.  Family concerned that may have been dose-related.  No recurrence.         PLAN:  1)    IS, same opiates, bowel meds, mobilize, lovenox.  2)  Trend Hgb.  3)  Monitor clinically.  4)  Work on dispo.     Disposition Plan   Expected discharge in 1 days to TCU pending available facility.       Entered: Michael Pride 06/30/2018, 12:16 PM              Attestation:  I have reviewed today's vital signs, notes, medications, labs and imaging.     Michael Pride MD

## 2018-06-30 NOTE — PROGRESS NOTES
"  Orthopaedic Surgery Daily Progress Note   Subjective: Brenda Barker is a 82 year old female POD # 3 s/p L RENETTA  Pain is controlled. Tolerating regular diet. Doing well overall, ready to DC     REVIEW OF SYSTEMS: As noted above. No headache, dizziness. No fever, chills. No chest pain or shortness of breath. No abdominal pain, nausea, vomiting. No diarrhea or constipation. No urinary difficulties.     Physical Exam   BP (!) 119/37 (BP Location: Right arm)  Pulse 78  Temp 97.4  F (36.3  C) (Oral)  Resp 16  Ht 1.575 m (5' 2\")  Wt 75.1 kg (165 lb 9.1 oz)  LMP 05/17/1972  SpO2 100%  BMI 30.28 kg/m2    General: Alert, well-appearing  in no acute distress.  Respiratory: Non-labored breathing.   Cardiovascular: Extremities warm and well perfused   Extremities: moving all four extremities.   LE:  -Sens: SILT dp/sp/s/s/t  -Motor: 5/5 EHL/FHL/TA/GSc  -Vasc: 2+ pulses, wwp, brisk cap refill    Dressing CDI    Skin: As noted above. No rashes or lesions appreciated.    Labs    Complete Blood Count     Recent Labs  Lab 06/30/18  0641 06/29/18  1757 06/29/18  0721 06/28/18  0657 06/27/18  1826   WBC 7.8 8.4 7.1  --   --    HGB 7.8* 8.1* 7.9* 9.2*  --     265  --   --  349     Basic Metabolic Panel    Recent Labs  Lab 06/30/18  0641 06/29/18  0721 06/28/18  0857 06/27/18  1826    142 141  --    POTASSIUM 4.0 3.6 4.0  --    CHLORIDE 109 109 108  --    CO2 25 24 20  --    BUN 12 16 16  --    CR 0.63 0.69 0.62 0.68   * 105* 134*  --        Assessment/Plan:  Assessment/Plan: Brenda Barker is a 82 year old female s/p Procedure: Left Total Hip Arthroplasty     Activity: Up with assist and assistive devices as needed until independent. Posterior hip precautions x 3 months:  1) No L hip flexion beyond 90 degrees  2) No adduction beyond midline  3) No internal rotation beyond neutral   Weight bearing status: WBAT  Antibiotics: Cefazolin x 24 hours completed   Diet: Begin with clear fluids and progress diet as " tolerated. Bowel regimen. Anti-emetics PRN.    DVT prophylaxis: Mechanical while in hospital with Lovenox x 2 weeks, followed by aspirin x 2 weeks  Elevation: Elevate heels off of bed on pillows   Wound Care: Aquacel x 5-7 days.    Drains: none  Pain management: Orals PRN, IV for breakthrough only  X-rays: AP Pelvis and Lateral L hip in PACU.   Physical Therapy: Mobilization, ROM, ADL's, Posterior hip precautions.    Occupational Therapy: ADL's  Consults: PT, OT. Hospitalist, appreciate assistance in caring for this patient throughout the perioperative period    Anticipate DC to rehab today    Lm Olivarez MD   Orthopaedic Surgery Resident, PGY-4  P: [phone]

## 2018-06-30 NOTE — PLAN OF CARE
Problem: Patient Care Overview  Goal: Plan of Care/Patient Progress Review  Discharge Planner PT   Patient plan for discharge: TCU  Current status: Gait with ww and contact guard x 125'. Min A with bed transfers. Good recall for hip precautions. Spouse present and supportive.  No dizziness. Good activity tolerance.  Min A with partial exercise program  Barriers to return to prior living situation: level of assist, inconsistent activity tolerance, safety  Recommendations for discharge: TCU  Rationale for recommendations: Pt making good gains toward goals but inconsistent.  Continue with gait and strengthening and work on transfers with adaptive equipment as needed.        Entered by: Jes Bates 06/30/2018 12:43 PM

## 2018-06-30 NOTE — DISCHARGE SUMMARY
[facility] Discharge Summary    Brenda Barker MRN# [id number]   Age: 82 year old YOB: 1935     Date of Admission:  6/27/2018  Date of Discharge::  6/30/2018  Admitting Physician:  Keo Priest MD  Discharge Physician:  Lm Olivarez MD             Admission Diagnoses:   Hip arthritis [M16.10]          Discharge Diagnosis:   Osteoarthritis          Procedures:   Procedure(s): Total hip arthoplasty (Left)                  Medications Prior to Admission:     Prescriptions Prior to Admission   Medication Sig Dispense Refill Last Dose     Acetaminophen (TYLENOL PO) Take 650 mg by mouth every 6 hours as needed for mild pain or fever   6/26/2018 at 2130     acyclovir (ZOVIRAX) 400 MG tablet TAKE 1 TABLET BY MOUTH DAILY 90 tablet 0 6/27/2018 at 0630     CENTRUM SILVER OR TABS 1 TABLET DAILY   6/26/2018 at 0930     Cetirizine HCl (ZYRTEC ALLERGY PO) Take 1 tablet by mouth daily   6/26/2018 at 2130     Cholecalciferol (VITAMIN D-3) 1000 UNITS CAPS Take 2,000 Int'l Units by mouth daily    6/26/2018 at 2130     FOLIC ACID 1 MG OR TABS 4 mg TABS DAILY   6/26/2018 at 2130     hydrochlorothiazide (MICROZIDE) 12.5 MG capsule Take 1 capsule (12.5 mg) by mouth daily 90 capsule 3 6/27/2018 at 0630     methotrexate 2.5 MG tablet CHEMO TK  6 TS  PO  PER WEEK  2 6/23/2018     metoprolol succinate (TOPROL-XL) 50 MG 24 hr tablet TAKE 1 TABLET BY MOUTH ONCE DAILY 90 tablet 0 6/27/2018 at 0630     nitrofurantoin 100 MG Take 1 capsule by mouth daily.   6/27/2018 at 0630     omeprazole (PRILOSEC) 20 MG CR capsule TAKE 1 CAPSULE(20 MG) BY MOUTH DAILY 90 capsule 2 6/26/2018 at 0930     polyvinyl alcohol (LIQUID TEARS) 1.4 % ophthalmic solution 1 drop as needed.   Past Week at Unknown time     simvastatin (ZOCOR) 20 MG tablet Take 1 tablet (20 mg) by mouth At Bedtime 90 tablet 3 6/26/2018 at 2130     FISH OIL 1000 MG OR CAPS 2 CAPSULES DAILY  (? DOSE) OTC -- 6/17/2018     [DISCONTINUED] ASPIRIN 81 MG OR TABS 1  TABLET DAILY   6/23/2018     [DISCONTINUED] meloxicam (MOBIC) 7.5 MG tablet TAKE 1 TABLET BY MOUTH ONCE DAILY 90 tablet 0 6/17/2018             Discharge Medications:     Current Discharge Medication List      START taking these medications    Details   aspirin 325 MG EC tablet Take 1 tablet (325 mg) by mouth daily for 14 days  Qty: 40 tablet    Comments: Start after lovenox is complete  Associated Diagnoses: Status post hip surgery      enoxaparin (LOVENOX) 40 MG/0.4ML injection Inject 0.4 mLs (40 mg) Subcutaneous every 24 hours for 11 days    Associated Diagnoses: Status post hip surgery      HYDROmorphone (DILAUDID) 2 MG tablet For pain concerns of 1-5 give 1 mg  for pain concerns of 6-10 give two mg every 4 hours as needed  Qty: 30 tablet, Refills: 0    Associated Diagnoses: Status post hip surgery         CONTINUE these medications which have NOT CHANGED    Details   Acetaminophen (TYLENOL PO) Take 650 mg by mouth every 6 hours as needed for mild pain or fever      acyclovir (ZOVIRAX) 400 MG tablet TAKE 1 TABLET BY MOUTH DAILY  Qty: 90 tablet, Refills: 0    Associated Diagnoses: Herpes simplex virus infection      CENTRUM SILVER OR TABS 1 TABLET DAILY      Cetirizine HCl (ZYRTEC ALLERGY PO) Take 1 tablet by mouth daily      Cholecalciferol (VITAMIN D-3) 1000 UNITS CAPS Take 2,000 Int'l Units by mouth daily       FOLIC ACID 1 MG OR TABS 4 mg TABS DAILY    Associated Diagnoses: Rheumatoid arthritis(714.0); Obesity, unspecified; Other abnormal glucose      hydrochlorothiazide (MICROZIDE) 12.5 MG capsule Take 1 capsule (12.5 mg) by mouth daily  Qty: 90 capsule, Refills: 3    Associated Diagnoses: Hypertension, goal below 140/90      methotrexate 2.5 MG tablet CHEMO TK  6 TS  PO  PER WEEK  Refills: 2      metoprolol succinate (TOPROL-XL) 50 MG 24 hr tablet TAKE 1 TABLET BY MOUTH ONCE DAILY  Qty: 90 tablet, Refills: 0    Associated Diagnoses: Rapid heart rate      nitrofurantoin 100 MG Take 1 capsule by mouth daily.       omeprazole (PRILOSEC) 20 MG CR capsule TAKE 1 CAPSULE(20 MG) BY MOUTH DAILY  Qty: 90 capsule, Refills: 2    Associated Diagnoses: Gastroesophageal reflux disease with esophagitis      polyvinyl alcohol (LIQUID TEARS) 1.4 % ophthalmic solution 1 drop as needed.      simvastatin (ZOCOR) 20 MG tablet Take 1 tablet (20 mg) by mouth At Bedtime  Qty: 90 tablet, Refills: 3    Associated Diagnoses: Hyperlipidemia LDL goal <130      FISH OIL 1000 MG OR CAPS 2 CAPSULES DAILY  (? DOSE)  Qty: OTC, Refills: --    Associated Diagnoses: Other and unspecified hyperlipidemia         STOP taking these medications       ASPIRIN 81 MG OR TABS Comments:   Reason for Stopping:         meloxicam (MOBIC) 7.5 MG tablet Comments:   Reason for Stopping:                     Consultations:   Consultation during this admission received from internal medicine.  No medical intervention was indicated.            Brief History of Illness:   Reason for admission requiring a surgical or invasive procedure:   Osteoarthritis   The patient underwent the following procedure(s):   Total hip arthroplasty- L    There were no immediate complications during this procedure.    Please refer to the full operative summary for details.             Hospital Course:   The patient's hospital course was unremarkable.  She recovered as anticipated and experienced no post-operative complications.           Discharge Instructions and Follow-Up:   Discharge diet: Regular   Discharge activity: Activity as tolerated   Discharge follow-up: Follow up with Dr. Priest's RN for wound check in 2 weeks   Wound care: Keep aquacel on x 7 days post op. OK to shower.            Discharge Disposition:   Discharged to rehabilitation facility       Lm Olivarez MD  PGY-4  [phone]

## 2018-06-30 NOTE — PLAN OF CARE
Problem: Patient Care Overview  Goal: Individualization & Mutuality  Pt A&O x's 4. VSS. Afebrile. 02 sats in the 90s on RA.  Lungs clear. Denies SOB, CP and nausea. Tolerating regular diet. Bowel sound active in all quadrants. had BM last night, pt also passing gas. Voiding  Adequately into the bed side commode. Pain well managed with prn dilaudid. CMS intact, denies N/T. bilateral pcds are on. L-hip Dressing clean, dry and intact. PIV patent and SL.Pt sleeping between care and is able to make needs known, call light with in reach. Will continue to monitor.

## 2018-06-30 NOTE — PROGRESS NOTES
Social Work Services Progress Note    Hospital Day: 3  Date of Initial Social Work Evaluation: 6/28/18  Collaborated with: Dr. Pride, admissions at [facility] (Angela is contact today), pt. And     Data: Dr. Pride anticipates pt. Will be ready for d/c tomorrow, Sunday 7/1. SW involved for TCU placement. [facility] does not anticipate a bed available tomorrow. [facility] does not anticipate a bed tomorrow. Per Angela in admissions at [facility] ([phone]), they are able to accept pt. Tomorrow.  Intervention: Met with pt. Earlier in day to update for facility availability. Re-met with pt. After speaking with Angela re: acceptance. They are requesting a medivan be arranged for transportation.  Assessment: Pt. And  agree with d/c Sunday with transferring to the facility, [facility] in [place].   Plan:    Discharge Plans in Progress: Sunday 7/1 to [facility] for ST rehab    Barriers to d/c plan: medical readiness    Follow up Plan: Pt. Is set to d/c to [facility] in [place] - admissions # [phone]; fax d/c orders to [phone]. [facility] w/c ride at 1300

## 2018-07-01 ENCOUNTER — APPOINTMENT (OUTPATIENT)
Dept: PHYSICAL THERAPY | Facility: CLINIC | Age: 83
DRG: 470 | End: 2018-07-01
Attending: ORTHOPAEDIC SURGERY
Payer: MEDICARE

## 2018-07-01 VITALS
RESPIRATION RATE: 16 BRPM | HEIGHT: 62 IN | SYSTOLIC BLOOD PRESSURE: 137 MMHG | WEIGHT: 165.57 LBS | DIASTOLIC BLOOD PRESSURE: 75 MMHG | HEART RATE: 82 BPM | OXYGEN SATURATION: 100 % | BODY MASS INDEX: 30.47 KG/M2 | TEMPERATURE: 96.4 F

## 2018-07-01 LAB
BACTERIA SPEC CULT: NO GROWTH
HGB BLD-MCNC: 8.2 G/DL (ref 11.7–15.7)
Lab: NORMAL
SPECIMEN SOURCE: NORMAL

## 2018-07-01 PROCEDURE — 40000193 ZZH STATISTIC PT WARD VISIT: Performed by: PHYSICAL THERAPIST

## 2018-07-01 PROCEDURE — 25000132 ZZH RX MED GY IP 250 OP 250 PS 637: Mod: GY | Performed by: ORTHOPAEDIC SURGERY

## 2018-07-01 PROCEDURE — 25000132 ZZH RX MED GY IP 250 OP 250 PS 637: Mod: GY | Performed by: INTERNAL MEDICINE

## 2018-07-01 PROCEDURE — 97530 THERAPEUTIC ACTIVITIES: CPT | Mod: GP | Performed by: PHYSICAL THERAPIST

## 2018-07-01 PROCEDURE — 25000128 H RX IP 250 OP 636: Performed by: STUDENT IN AN ORGANIZED HEALTH CARE EDUCATION/TRAINING PROGRAM

## 2018-07-01 PROCEDURE — 97116 GAIT TRAINING THERAPY: CPT | Mod: GP | Performed by: PHYSICAL THERAPIST

## 2018-07-01 PROCEDURE — A9270 NON-COVERED ITEM OR SERVICE: HCPCS | Mod: GY | Performed by: STUDENT IN AN ORGANIZED HEALTH CARE EDUCATION/TRAINING PROGRAM

## 2018-07-01 PROCEDURE — 99232 SBSQ HOSP IP/OBS MODERATE 35: CPT | Performed by: INTERNAL MEDICINE

## 2018-07-01 PROCEDURE — A9270 NON-COVERED ITEM OR SERVICE: HCPCS | Mod: GY | Performed by: INTERNAL MEDICINE

## 2018-07-01 PROCEDURE — 97110 THERAPEUTIC EXERCISES: CPT | Mod: GP | Performed by: PHYSICAL THERAPIST

## 2018-07-01 PROCEDURE — 36415 COLL VENOUS BLD VENIPUNCTURE: CPT | Performed by: INTERNAL MEDICINE

## 2018-07-01 PROCEDURE — 85018 HEMOGLOBIN: CPT | Performed by: INTERNAL MEDICINE

## 2018-07-01 PROCEDURE — A9270 NON-COVERED ITEM OR SERVICE: HCPCS | Mod: GY | Performed by: ORTHOPAEDIC SURGERY

## 2018-07-01 PROCEDURE — 25000132 ZZH RX MED GY IP 250 OP 250 PS 637: Mod: GY | Performed by: STUDENT IN AN ORGANIZED HEALTH CARE EDUCATION/TRAINING PROGRAM

## 2018-07-01 RX ORDER — AMOXICILLIN 250 MG
1-2 CAPSULE ORAL 2 TIMES DAILY
Qty: 100 TABLET | COMMUNITY
Start: 2018-07-01 | End: 2020-12-09

## 2018-07-01 RX ADMIN — OMEPRAZOLE 20 MG: 20 CAPSULE, DELAYED RELEASE ORAL at 06:35

## 2018-07-01 RX ADMIN — ENOXAPARIN SODIUM 40 MG: 40 INJECTION SUBCUTANEOUS at 11:25

## 2018-07-01 RX ADMIN — ACYCLOVIR 400 MG: 400 TABLET ORAL at 07:45

## 2018-07-01 RX ADMIN — Medication 1 MG: at 11:25

## 2018-07-01 RX ADMIN — NITROFURANTOIN MACROCRYSTALS 100 MG: 100 CAPSULE ORAL at 07:45

## 2018-07-01 RX ADMIN — Medication 1 MG: at 02:26

## 2018-07-01 RX ADMIN — MULTIPLE VITAMINS W/ MINERALS TAB 1 TABLET: TAB at 07:45

## 2018-07-01 RX ADMIN — ACETAMINOPHEN 650 MG: 325 TABLET, FILM COATED ORAL at 02:27

## 2018-07-01 RX ADMIN — Medication 1 MG: at 06:33

## 2018-07-01 RX ADMIN — SENNOSIDES AND DOCUSATE SODIUM 1 TABLET: 8.6; 5 TABLET ORAL at 07:45

## 2018-07-01 RX ADMIN — METOPROLOL SUCCINATE 50 MG: 50 TABLET, EXTENDED RELEASE ORAL at 07:45

## 2018-07-01 RX ADMIN — VITAMIN D, TAB 1000IU (100/BT) 2000 UNITS: 25 TAB at 07:45

## 2018-07-01 RX ADMIN — FOLIC ACID 2 MG: 1 TABLET ORAL at 07:45

## 2018-07-01 NOTE — PLAN OF CARE
Problem: Hip Arthroplasty (Total, Partial) (Adult)  Goal: Signs and Symptoms of Listed Potential Problems Will be Absent, Minimized or Managed (Hip Arthroplasty)  Signs and symptoms of listed potential problems will be absent, minimized or managed by discharge/transition of care (reference Hip Arthroplasty (Total, Partial) (Adult) CPG).           VS:       Pt A/O X 4. Afebrile. VSS. Lungs-clear bilaterally with both       anterior and posterior. IS encouraged. Denies nausea, shortness of breath, and chest pain.     Output:       Bowels- active in all four quadrants. Last BM 6/29 per pt report. Voids spontaneously without difficulty in the bathroom.      Activity:       Pt up in room and to bathroom with assist of 1 and walker.     Skin:   Incision to left hip, otherwise intact.     Pain:       Has pain in the left hip and given prn PO Dilaudid, prn Tylenol, ICE applied, and is tolerating well.      CMS:       CMS and Neuro's are intact. Denies numbness and tingling in all extremities.      Dressing:       Left hip incisional dressing is clean, dry, and intact.      Diet:       Pt is on a regular diet.       LDA:       Pt has no PIV access.      Equipment:       PCD's on BLE's. Bilateral heels are elevated off the bed.     Plan:       Pt is able to make needs known and the call light is within the pt's reach. Continue to monitor.       Additional Info:       Plan to discharge to TCU today.

## 2018-07-01 NOTE — PLAN OF CARE
Problem: Patient Care Overview  Goal: Plan of Care/Patient Progress Review  Discharge Planner PT   Patient plan for discharge: TCU  Current status: Walking 200' with wheeled walker and contact guard. C/o Stiffness, no dizziness.  Nicky is slowed and inconsistent pattern.  Fatigued today.  Tried bed exit to the left side unsuccessfully (more pain) .  Tried a modified sidelying transfer into bed with pillot between the legs that seemed to be more effective.  Completed LE exercises with cues.  Tolerated well. Pt has good recall for hip precautions.   Barriers to return to prior living situation: level of assist with transfers, limited activity tolerance, inconsistent performance  Recommendations for discharge: TCU  Rationale for recommendations: see below        Physical Therapy Discharge Summary    Reason for therapy discharge:    Discharged to transitional care facility.    Progress towards therapy goal(s). See goals on Care Plan in [facility] electronic health record for goal details.  Goals partially met.  Barriers to achieving goals:   limited tolerance for therapy.    Therapy recommendation(s):    Continued therapy is recommended.  Rationale/Recommendations:  PT at TCU to focus on bed mobility and transfers as well as strengthening to promote maximal independence for safe return to home setting.  Did not meet goals for independent or modified independent transfers and bed mobility. .           Entered by: Jes Bates 07/01/2018 12:27 PM

## 2018-07-01 NOTE — PROGRESS NOTES
"[facility] Internal Medicine Progress Note            Interval History:   Record reviewed.  Seen with RN.  Plan for DC to [facility] today. Clinically feeling well.  No further chills, fever spike.   Adequate pain control.  Tolerating low dose dilaudid 1 mg.  Ambulated in lipscomb to nurses station without LH.  No CP, SOB, cough, nausea, reflux, abd pain or distention.  Passing flatus. BM 6/29.  Voiding OK.            Medications:   All medications reviewed today          Physical Exam:   Blood pressure 137/75, pulse 82, temperature 96.4  F (35.8  C), temperature source Oral, resp. rate 16, height 1.575 m (5' 2\"), weight 75.1 kg (165 lb 9.1 oz), last menstrual period 05/17/1972, SpO2 100 %, not currently breastfeeding.    Intake/Output Summary (Last 24 hours) at 06/28/18 1440  Last data filed at 06/28/18 1414   Gross per 24 hour   Intake                0 ml   Output             1350 ml   Net            -1350 ml          General:  Alert.  Appropriate.  No distress. No  O2.     Heent:      Neck:    Skin:    Chest:  clear    Cardiac:  Reg without gallop, murmur.  No JVD.     Abdomen:  Non distended, soft, non-tender.  BS normal.     Extremities:  Perfused.  No edema, calf, posterior thigh tenderness to suggest DVT.     Neuro:            Data:     Results for orders placed or performed during the hospital encounter of 06/27/18 (from the past 24 hour(s))   Hemoglobin   Result Value Ref Range    Hemoglobin 8.2 (L) 11.7 - 15.7 g/dL     Hemoglobin   Date Value Ref Range Status   07/01/2018 8.2 (L) 11.7 - 15.7 g/dL Final   06/30/2018 7.8 (L) 11.7 - 15.7 g/dL Final     Last Basic Metabolic Panel:  Lab Results   Component Value Date     06/30/2018      Lab Results   Component Value Date    POTASSIUM 4.0 06/30/2018     Lab Results   Component Value Date    CHLORIDE 109 06/30/2018     Lab Results   Component Value Date    SUZIE 9.7 06/30/2018     Lab Results   Component Value Date    CO2 25 06/30/2018     Lab Results "   Component Value Date    BUN 12 06/30/2018     Lab Results   Component Value Date    CR 0.63 06/30/2018     Lab Results   Component Value Date     06/30/2018                Assessment and Plan:   1.  Left total hip arthroplasty. Spinal anesthesia.  Indication, osteoarthritis. Mobilizing well.   Adequate pain control with low dose opiates.   2.  Acute blood loss anemia superimposed on anemia prior to admission, potentially on the basis of chronic disease secondary to rheumatoid arthritis/methotrexate.  Interval drop possible soft tissue hemorrhage.  Possible dilution.  Adequate.   3.  Essential hypertension with adequate BP on reduced meds.    4.  Hyperlipidemia, on a statin.   5.  Rheumatoid arthritis on methotrexate to be continued throughout the operative course.   6.  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   7.  Resting tremor (noted on exam).   8.  Gastroesophageal reflux disease, symptomatically controlled on omeprazole.   9.  Fever with normal WBC.  No clear source bacterial infection.  Suspect likely 2nd to #1 (post op inflammatory change, hematoma). UA neg.  Improved.   10.  Sensorineural hearing loss with augmentation.   11.  Cervical spondylosis, C5-C6, with bilateral uncinate spurs.   12.  Carpal tunnel syndrome.   13.  Distal upper extremity paresthesias potentially related to carpal tunnel syndrome or cervical disk disease.   14.  Depression, largely situational subsequent to the death of her son in 1982.  Presently well compensated.   15.  Hemorrhoids.   16.  Aortic valve sclerosis.   17.  Negative objective evaluation for coronary artery disease several years ago.  Limited functional capacity preop due to left hip pain with unknown coronary status presently.   18.  Hypercalcemia potentially related to thiazide diuretic.  Normalized off microzide with IV fluids.   19.  Issue of delirium in the past with oxycodone.  Family concerned that may have  been dose-related.  No recurrence.         PLAN:  1)    Clinically stable for DC.  2)  Meds, orders reviewed.  Continue to hold microzide.  Same opiates.  3)  F/u Hgb.     Disposition Plan   Expected discharge today.      Entered: Michael Pride 07/01/2018, 12:04 PM              Attestation:  I have reviewed today's vital signs, notes, medications, labs and imaging.     Michael Pride MD

## 2018-07-01 NOTE — PLAN OF CARE
Problem: Patient Care Overview  Goal: Plan of Care/Patient Progress Review  OT:cx-patient discharged to rehab prior to OT session.     Occupational Therapy Discharge Summary    Reason for therapy discharge:    Discharged to transitional care facility.    Progress towards therapy goal(s). See goals on Care Plan in [facility] electronic health record for goal details.  Goals partially met.  Barriers to achieving goals:   discharge from facility.    Therapy recommendation(s):    Continued therapy is recommended.  Rationale/Recommendations:  To maximize safety and IND with functional mobility and ADLs/IADLs.

## 2018-07-01 NOTE — PLAN OF CARE
"Problem: Patient Care Overview  Goal: Plan of Care/Patient Progress Review  Outcome: Improving    VS: /46 (BP Location: Right arm)  Pulse 85  Temp 98.4  F (36.9  C) (Oral)  Resp 16  Ht 1.575 m (5' 2\")  Wt 75.1 kg (165 lb 9.1 oz)  LMP 05/17/1972  SpO2 98%  BMI 30.28 kg/m2     O2: Room air   Output: Voids spontaneously without difficulty   Last BM: 6/29   Activity: Assist of 1 with walker   Skin: Incision   Pain: 1 mg dilaudid and tylenol q 4 hr.  Patient would like to be woken for pain meds   CMS: Intact   Dressing: Clean, dry, intact   Diet: Regualr   LDA: No access   Equipment: Bed, walker   Plan: TCU tomorrow   Additional Info: Provider ok with low hemoglobin.  Had one low blood pressure that came up quickly.             "

## 2018-07-01 NOTE — PLAN OF CARE
Problem: Hip Arthroplasty (Total, Partial) (Adult)  Goal: Signs and Symptoms of Listed Potential Problems Will be Absent, Minimized or Managed (Hip Arthroplasty)  Signs and symptoms of listed potential problems will be absent, minimized or managed by discharge/transition of care (reference Hip Arthroplasty (Total, Partial) (Adult) CPG).   Outcome: Improving    VS: VSS   O2: Sats >90% on RA   Output: Urine output adequate   Last BM: 6/29   Activity: Up with Ax1 and walker, ambulated in hallway and up in chair for breakfast   Skin: Intact - incision    Pain: Managed with 1mg Dilaudid q4 hours   CMS: Intact - denies numbness or tingling   Dressing: CDI   Diet: Regular diet, well tolerated. No N/V   LDA: N/A   Equipment: Walker   Plan: DC to [facility] today at 1300   Additional Info:      Pt. discharged at 1300 to [facility]. Pt. was accompanied by [facility], and left with personal belongings. HE , Reggie, received complete discharge packet. Pt Tataid hard copy script was sent in the packet.  was going to follow behind ambulance with patient belongings including pts walker. Pt. had no further questions at the time of discharge and no unmet needs were identified.

## 2018-07-05 ENCOUNTER — TELEPHONE (OUTPATIENT)
Dept: ORTHOPEDICS | Facility: CLINIC | Age: 83
End: 2018-07-05

## 2018-07-05 NOTE — TELEPHONE ENCOUNTER
Talked to [facility] and they stopped the Aspirin starting today, they will continue to monitor closely for any signs of bleeding.  They will start the Aspirin 325mg on the 12th as previously scheduled.   Mehnaz Denton RN

## 2018-07-05 NOTE — TELEPHONE ENCOUNTER
Eliza is calling from [facility] to clarify orders regarding injections and Asprin. They noticed that patient was not supposed to start the Asprin until after the injections were complete. The transcription gal at the rehab clinic entered in the date of 7/2 instead of 7/12 for the start of her Asprin. Patient has had 4 doses of Asprin so far and they have since advised patient to stop taking it. They have corrected the date on their system. Would like to know restarting it on the 12th Is okay. Please advise. [phone].

## 2018-07-10 ENCOUNTER — ALLIED HEALTH/NURSE VISIT (OUTPATIENT)
Dept: NURSING | Facility: CLINIC | Age: 83
End: 2018-07-10
Payer: COMMERCIAL

## 2018-07-10 DIAGNOSIS — Z51.89 VISIT FOR WOUND CHECK: Primary | ICD-10-CM

## 2018-07-10 DIAGNOSIS — Z48.02 VISIT FOR SUTURE REMOVAL: ICD-10-CM

## 2018-07-10 DIAGNOSIS — Z98.890 STATUS POST HIP SURGERY: ICD-10-CM

## 2018-07-10 PROCEDURE — 99024 POSTOP FOLLOW-UP VISIT: CPT

## 2018-07-10 NOTE — NURSING NOTE
Brenda Barker comes into clinic today at the request of Dr. Priest for suture removal and wound check.    The patient is status post LTHA on 6/27/18.     Incision clean, dry and intact. Cut bilateral suture ends to incision.  Steri-strips removed. Incision cleaned and new steri-strips applied. Pt advised to let steri-strips fall off on their own. Pt instructed on wound care and symptoms of infection to watch for.     Discussed anticoagulation. Pt is currently taking Lovenox 1 more day then start aspirin. Pt advised against any NSAIDs while on any anticoagulation med (ASA, Lovenox,or Coumadin).      Discussed current pain medication regime. Pt currently taking hydromorphone 1/2 tab 5x/d, patient states she is trying to space out time frame and decrease, would like 1 additional refill to be brought to our pharmacy.  Will have it for Thursday to .     Discussed current physical therapy program. [facility], where she lives.  Patient was recently at [facility] and was doing therapy daily there.     Patient had moderate swelling on Left side, discussed compression stockings (patient has been wearing) and elevating on pillows (patient has not been doing.)      Total time spent with Pt: 30 minutes.    Mehnaz Denton RN

## 2018-07-10 NOTE — MR AVS SNAPSHOT
"              After Visit Summary   7/10/2018    Brenda Barker    MRN: [id number]           Patient Information     Date Of Birth          1935        Visit Information        Provider Department      7/10/2018 11:00 AM MG NURSE ONLY [facility]        Care Instructions    No NSAIDs post op while on aspirin, lovenox. This includes Advil (ibuprofen) and Aleve (naproxen).    Continue anticoagulation plan of: Lovenox until injections are gone then start Aspirin 325mg once daily for 14 days.      Cover incision if draining with gauze. If steri strips (tapes) applied, let fall off on their own. Once dry may leave incision open to air. Please no ointments or lotions to incision. No soaking in tubs or pools for 3 months after surgery.    Elevate surgical leg if swelling present in leg above heart, 2 pillows under knees, 3 pillows under feet.  Laying on back and elevating legs at 45 degree angle    Continue to wear knee high teds for 4 weeks. May have off at night.    *Please call if a sharp increase in pain, change in movement or sensation, chest pain, calf pain, shortness of breath, fevers greater than 101.4, redness, erythema around the incision sites.    *If needing refills on pain meds, please call clinic a day or more before you run out.    *Continue Physical therapy as directed.  If needing orders for Outpatient Physical therapy please call clinic.    *Continue to use assistive device: cane or crutch until no limp. Discuss with therapist if questions.    *Dr. Priest advises no dental work or cleaning until 6 months after any surgery with implants to hips or knees unless emergent issue arises. This includes total joint surgeries. Once you are 6 months past your surgery, you will need prophylactic antibiotics for the rest of your lifetime with any cleaning or dental work.  This is also for any other \"dirty\" procedures that you may have such as a colonoscopy.    *Continue with post-op hip " precautions. These are strict for 3 months post surgery, but should be followed for life. As your muscles gain strength, you will notice that you will have more range of motion, but your risk of dislocating remains.  NO bending past 90 degrees at the waist (operative side)   NO crossing your operative leg over or under your non-operative leg   NO turning your operative leg inward       If you have any questions, call the clinic at [phone] and ask for the Orthopaedics dept.         Thanks for coming today.  [facility] Clinic  [address] [place] [id number]    To schedule future appointments in Ortho Clinic, you may call [phone].    To schedule ordered imaging by your provider:   Call Central Imaging [phone]    To schedule an injection ordered by your provider:  Call Central Imaging Injection scheduling line: [phone]  MyChart available online at:  [email or web address]    Please call if any further questions or concerns ([phone]).  Clinic hours 8 am to 5 pm.    Return to clinic (call) if symptoms worsen or fail to improve.            Follow-ups after your visit        Your next 10 appointments already scheduled     2018  1:40 PM CDT   Urmila Hernandez with Liudmila Bowie MD   [facility] ([facility]    [address] [facility] [address] [phone]            Aug 07, 2018 11:30 AM CDT   (Arrive by 11:00 AM)   Return Visit with Keo Priest MD   [facility] ([facility])    [address] [place] [address] [phone]              Who to contact     If you have questions or need follow up information about today's clinic visit or your schedule please contact [facility] directly at [phone].  Normal or non-critical lab and imaging results will be communicated to you by MyChart, letter or phone within 4 business  days after the clinic has received the results. If you do not hear from us within 7 days, please contact the clinic through [employer] or phone. If you have a critical or abnormal lab result, we will notify you by phone as soon as possible.  Submit refill requests through [employer] or call your pharmacy and they will forward the refill request to us. Please allow 3 business days for your refill to be completed.          Additional Information About Your Visit        [employer] 5 Information     [employer] gives you secure access to your electronic health record. If you see a primary care provider, you can also send messages to your care team and make appointments. If you have questions, please call your primary care clinic.  If you do not have a primary care provider, please call [phone] and they will assist you.      [employer] is an electronic gateway that provides easy, online access to your medical records. With [employer], you can request a clinic appointment, read your test results, renew a prescription or communicate with your care team.     To access your existing account, please contact your [facility] or call [phone] for assistance.        Care EveryWhere ID     This is your Care EveryWhere ID. This could be used by other organizations to access your [place] medical records  [id number]        Your Vitals Were     Last Period                   [birth year]17[birth year]1972            Blood Pressure from Last 3 Encounters:   07/01/18 137/75   06/16/18 163/71   06/12/18 142/66    Weight from Last 3 Encounters:   06/27/18 75.1 kg (165 lb 9.1 oz)   06/16/18 75.3 kg (166 lb)   06/12/18 75.3 kg (166 lb)              Today, you had the following     No orders found for display       Primary Care Provider Office Phone # Fax #    Liudmila Bowie [phone]       [address]  [facility] [address]        Equal Access to Services     MALATHI MACARIO: Angel Maldonado  wakhanhda wagnersandhya, qaybta kagus fernandez, liliana gonzalezaaargelia ah. So [facility] [phone].    ATENCIÓN: Si jodi christianson, tiene a beltran disposición servicios gratuitos de asistencia lingüística. Matt al [phone].    We comply with applicable federal civil rights laws and [place] laws. We do not discriminate on the basis of race, color, national origin, age, disability, sex, sexual orientation, or gender identity.            Thank you!     Thank you for choosing [facility]  for your care. Our goal is always to provide you with excellent care. Hearing back from our patients is one way we can continue to improve our services. Please take a few minutes to complete the written survey that you may receive in the mail after your visit with us. Thank you!             Your Updated Medication List - Protect others around you: Learn how to safely use, store and throw away your medicines at www.disposemymeds.org.          This list is accurate as of 7/10/18 11:55 AM.  Always use your most recent med list.                   Brand Name Dispense Instructions for use Diagnosis    acyclovir 400 MG tablet    ZOVIRAX    90 tablet    TAKE 1 TABLET BY MOUTH DAILY    Herpes simplex virus infection       aspirin 325 MG EC tablet   Start taking on:  7/12/2018     40 tablet    Take 1 tablet (325 mg) by mouth daily for 14 days    Status post hip surgery       CENTRUM SILVER per tablet      1 TABLET DAILY        enoxaparin 40 MG/0.4ML injection    LOVENOX     Inject 0.4 mLs (40 mg) Subcutaneous every 24 hours for 11 days    Status post hip surgery       fish oil-omega-3 fatty acids 1000 MG capsule     OTC    2 CAPSULES DAILY  (? DOSE)    Other and unspecified hyperlipidemia       folic acid 1 MG tablet    FOLVITE     4 mg TABS DAILY    Rheumatoid arthritis(714.0), Obesity, unspecified, Other abnormal glucose       HYDROmorphone 2 MG tablet    DILAUDID    30 tablet    For pain concerns of 1-5 give 1 mg   for pain concerns of 6-10 give two mg every 4 hours as needed    Status post hip surgery       LIQUID TEARS 1.4 % ophthalmic solution   Generic drug:  polyvinyl alcohol      1 drop as needed.        methotrexate 2.5 MG tablet CHEMO      Take 6 tablets (15 mg) by mouth every 7 days        metoprolol succinate 50 MG 24 hr tablet    TOPROL-XL    90 tablet    TAKE 1 TABLET BY MOUTH ONCE DAILY    Rapid heart rate       nitroFURantoin macrocrystal 100 MG ED starter pack    MACRODANTIN     Take 1 capsule by mouth daily.        omeprazole 20 MG CR capsule    priLOSEC    90 capsule    TAKE 1 CAPSULE(20 MG) BY MOUTH DAILY    Gastroesophageal reflux disease with esophagitis       senna-docusate 8.6-50 MG per tablet    SENOKOT-S;PERICOLACE    100 tablet    Take 1-2 tablets by mouth 2 times daily        simvastatin 20 MG tablet    ZOCOR    90 tablet    Take 1 tablet (20 mg) by mouth At Bedtime    Hyperlipidemia LDL goal <130       TYLENOL PO      Take 650 mg by mouth every 6 hours as needed for mild pain or fever        Vitamin D-3 1000 units Caps      Take 2,000 Int'l Units by mouth daily        ZYRTEC ALLERGY PO      Take 1 tablet by mouth daily

## 2018-07-10 NOTE — PATIENT INSTRUCTIONS
"No NSAIDs post op while on aspirin, lovenox. This includes Advil (ibuprofen) and Aleve (naproxen).    Continue anticoagulation plan of: Lovenox until injections are gone then start Aspirin 325mg once daily for 14 days.      Cover incision if draining with gauze. If steri strips (tapes) applied, let fall off on their own. Once dry may leave incision open to air. Please no ointments or lotions to incision. No soaking in tubs or pools for 3 months after surgery.    Elevate surgical leg if swelling present in leg above heart, 2 pillows under knees, 3 pillows under feet.  Laying on back and elevating legs at 45 degree angle    Continue to wear knee high teds for 4 weeks. May have off at night.    *Please call if a sharp increase in pain, change in movement or sensation, chest pain, calf pain, shortness of breath, fevers greater than 101.4, redness, erythema around the incision sites.    *If needing refills on pain meds, please call clinic a day or more before you run out.    *Continue Physical therapy as directed.  If needing orders for Outpatient Physical therapy please call clinic.    *Continue to use assistive device: cane or crutch until no limp. Discuss with therapist if questions.    *Dr. Priest advises no dental work or cleaning until 6 months after any surgery with implants to hips or knees unless emergent issue arises. This includes total joint surgeries. Once you are 6 months past your surgery, you will need prophylactic antibiotics for the rest of your lifetime with any cleaning or dental work.  This is also for any other \"dirty\" procedures that you may have such as a colonoscopy.    *Continue with post-op hip precautions. These are strict for 3 months post surgery, but should be followed for life. As your muscles gain strength, you will notice that you will have more range of motion, but your risk of dislocating remains.  NO bending past 90 degrees at the waist (operative side)   NO crossing your operative " leg over or under your non-operative leg   NO turning your operative leg inward       If you have any questions, call the clinic at [phone] and ask for the Orthopaedics dept.         Thanks for coming today.  [facility]tho[facility] Medicine Clinic  [address] [place] [address]    To schedule future appointments in Ortho Clinic, you may call [phone].    To schedule ordered imaging by your provider:   Call Central Imaging [phone]    To schedule an injection ordered by your provider:  Call Central Imaging Injection scheduling line: [phone]  [email or web address] available online at:  [email or web address]    Please call if any further questions or concerns ([phone]).  Clinic hours 8 am to 5 pm.    Return to clinic (call) if symptoms worsen or fail to improve.

## 2018-07-12 RX ORDER — HYDROMORPHONE HYDROCHLORIDE 2 MG/1
TABLET ORAL
Qty: 30 TABLET | Refills: 0 | Status: SHIPPED | OUTPATIENT
Start: 2018-07-12 | End: 2018-11-13

## 2018-07-13 ENCOUNTER — OFFICE VISIT (OUTPATIENT)
Dept: FAMILY MEDICINE | Facility: CLINIC | Age: 83
End: 2018-07-13
Payer: COMMERCIAL

## 2018-07-13 VITALS
DIASTOLIC BLOOD PRESSURE: 64 MMHG | RESPIRATION RATE: 24 BRPM | OXYGEN SATURATION: 99 % | HEIGHT: 62 IN | WEIGHT: 165 LBS | BODY MASS INDEX: 30.36 KG/M2 | HEART RATE: 87 BPM | TEMPERATURE: 97.2 F | SYSTOLIC BLOOD PRESSURE: 134 MMHG

## 2018-07-13 DIAGNOSIS — Z96.642 STATUS POST TOTAL REPLACEMENT OF LEFT HIP: Primary | ICD-10-CM

## 2018-07-13 DIAGNOSIS — M05.79 RHEUMATOID ARTHRITIS INVOLVING MULTIPLE SITES WITH POSITIVE RHEUMATOID FACTOR (H): ICD-10-CM

## 2018-07-13 DIAGNOSIS — I10 HYPERTENSION, GOAL BELOW 140/90: ICD-10-CM

## 2018-07-13 DIAGNOSIS — Z96.652 STATUS POST TOTAL LEFT KNEE REPLACEMENT: ICD-10-CM

## 2018-07-13 DIAGNOSIS — Z79.899 HIGH RISK MEDICATION USE: ICD-10-CM

## 2018-07-13 DIAGNOSIS — D50.8 OTHER IRON DEFICIENCY ANEMIA: ICD-10-CM

## 2018-07-13 PROBLEM — E83.52 HYPERCALCEMIA: Status: RESOLVED | Noted: 2018-06-17 | Resolved: 2018-07-13

## 2018-07-13 PROBLEM — M16.32 OSTEOARTHRITIS RESULTING FROM LEFT HIP DYSPLASIA: Status: RESOLVED | Noted: 2018-06-12 | Resolved: 2018-07-13

## 2018-07-13 PROBLEM — Z98.890 STATUS POST HIP SURGERY: Status: RESOLVED | Noted: 2018-06-27 | Resolved: 2018-07-13

## 2018-07-13 PROBLEM — Z71.89 ACP (ADVANCE CARE PLANNING): Status: ACTIVE | Noted: 2018-03-23

## 2018-07-13 PROCEDURE — 99214 OFFICE O/P EST MOD 30 MIN: CPT | Performed by: FAMILY MEDICINE

## 2018-07-13 RX ORDER — FERROUS GLUCONATE 324(38)MG
324 TABLET ORAL EVERY OTHER DAY
Qty: 100 TABLET | Refills: 1 | Status: SHIPPED | OUTPATIENT
Start: 2018-07-13 | End: 2019-07-13

## 2018-07-13 ASSESSMENT — PAIN SCALES - GENERAL: PAINLEVEL: MILD PAIN (2)

## 2018-07-13 NOTE — PROGRESS NOTES
SUBJECTIVE:   Brenda Barker is a 82 year old female who presents to clinic today for the following health issues:        Hospital Follow-up Visit: Surgical follow up    Hospital/Nursing Home/IP Rehab Facility: [facility]  Date of Admission: 6/27/18  Date of Discharge: Patient transferred to rehab then just came home 7/8/18  Reason(s) for Admission: Arthroplasty Right Hip            Problems taking medications regularly:  None       Medication changes since discharge: yes on Diluadid and also doing physical therapy at [facility] twice weekly       Problems adhering to non-medication therapy:  None    Summary of hospitalization:  [facility] discharge summary reviewed  Diagnostic Tests/Treatments reviewed.  Follow up needed: none  Other Healthcare Providers Involved in Patient s Care:         Physical Therapy  Update since discharge: improved.     Post Discharge Medication Reconciliation: discharge medications reconciled, continue medications without change.  Plan of care communicated with patient     Coding guidelines for this visit:  Type of Medical   Decision Making Face-to-Face Visit       within 7 Days of discharge Face-to-Face Visit        within 14 days of discharge   Moderate Complexity [id number]   High Complexity [id number]              Hyperlipidemia Follow-Up      Rate your low fat/cholesterol diet?: good    Taking statin?  Yes, no muscle aches from statin    Other lipid medications/supplements?:  none    Hypertension Follow-up      Outpatient blood pressures are not being checked.    Low Salt Diet: no added salt      Problem list and histories reviewed & adjusted, as indicated.  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     Hypercalcemia     Status post total replacement of left hip     Past Surgical History:   Procedure Laterality Date     ARTHROPLASTY HIP Left 6/27/2018    Procedure: ARTHROPLASTY HIP;  Left Total Hip Arthroplasty  ;  Surgeon: Keo Priest MD;  Location: [facility] OR     BACK SURGERY  2011    Fusion L3-4 & decompression; [facility]     BIOPSY  2013    Thyroid - normal result     C DEXA, BONE DENSITY, AXIAL SKEL  7/03 7/03 L1-4 1.7, FemNkL-0.8,R-1.2, FArm Px 0.2,Dist-0.3     C REMOVAL OF OVARY(S)  1980 2nd ovary removed - endometriosis     C TOTAL ABDOM HYSTERECTOMY  1972    uterus, ovary removed - fibroid     EYE SURGERY  2010 & 11    Cataract surgery     HC COLONOSCOPY THRU STOMA W BIOPSY/CAUTERY TUMOR/POLYP/LESION  1993    adenomatous polyp     HC COLONOSCOPY THRU STOMA, DIAGNOSTIC  1998, 10/03    normal, '03 rec repeat in 5 yrs due to +hx polyp and + fam hx     HC FLEX SIGMOIDOSCOPY W/WO KARYNA SPEC BY BRUSH/WASH  12/2001     HC REMOVAL OF TONSILS,<13 Y/O  1942     ORTHOPEDIC SURGERY  2008    Left knee replacement     SURGICAL HISTORY OF -   1989    basal cell Ca scalp excised     SURGICAL HISTORY OF -   1998    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       Social History   Substance Use Topics     Smoking status: Never Smoker     Smokeless tobacco: Never Used     Alcohol use Yes      Comment: occasionally     Family History   Problem Relation Age of Onset     GASTROINTESTINAL DISEASE Father      bleeding ulcer     Cancer Mother      thyroid cancer     Other Cancer Mother      thyroid & throat     Osteoperosis Mother      Thyroid Disease Mother      Diabetes Paternal Grandmother      Cerebrovascular Disease Paternal Grandmother       Colon Cancer Maternal Half-Brother      Osteoperosis Maternal Grandmother          Current Outpatient Prescriptions   Medication Sig Dispense Refill     Acetaminophen (TYLENOL PO) Take 650 mg by mouth every 6 hours as needed for mild pain or fever       acyclovir (ZOVIRAX) 400 MG tablet TAKE 1 TABLET BY MOUTH DAILY 90 tablet 0     aspirin 325 MG EC tablet Take 1 tablet (325 mg) by mouth daily for 14 days 40 tablet      CENTRUM SILVER OR TABS 1 TABLET DAILY       Cetirizine HCl (ZYRTEC ALLERGY PO) Take 1 tablet by mouth daily       Cholecalciferol (VITAMIN D-3) 1000 UNITS CAPS Take 2,000 Int'l Units by mouth daily        FISH OIL 1000 MG OR CAPS 2 CAPSULES DAILY  (? DOSE) OTC --     FOLIC ACID 1 MG OR TABS 4 mg TABS DAILY       HYDROmorphone (DILAUDID) 2 MG tablet For pain concerns of 1-5 give 1 mg  for pain concerns of 6-10 give two mg every 4 hours as needed 30 tablet 0     methotrexate 2.5 MG tablet CHEMO Take 6 tablets (15 mg) by mouth every 7 days       metoprolol succinate (TOPROL-XL) 50 MG 24 hr tablet TAKE 1 TABLET BY MOUTH ONCE DAILY 90 tablet 0     nitrofurantoin 100 MG Take 1 capsule by mouth daily.       omeprazole (PRILOSEC) 20 MG CR capsule TAKE 1 CAPSULE(20 MG) BY MOUTH DAILY 90 capsule 2     polyvinyl alcohol (LIQUID TEARS) 1.4 % ophthalmic solution 1 drop as needed.       senna-docusate (SENOKOT-S;PERICOLACE) 8.6-50 MG per tablet Take 1-2 tablets by mouth 2 times daily 100 tablet      simvastatin (ZOCOR) 20 MG tablet Take 1 tablet (20 mg) by mouth At Bedtime 90 tablet 3     Allergies   Allergen Reactions     Diatrizoate Hives and Shortness Of Breath     Ciprofloxacin      nausea     Contrast Dye      Hives,anaphylaxix     Oxycodone Other (See Comments)     hallucinations     Sulfasalazine      Other reaction(s): Rash     Recent Labs   Lab Test  06/30/18   0641  06/29/18   0721  06/11/18 12/19/17   1223   12/15/16   1129   12/07/15   1035   LDL   --    --    --    --   77   --   115*   --   100*  "  HDL   --    --    --    --   64   --   70   --   59   TRIG   --    --    --    --   131   --   119   --   150*   CR  0.63  0.69   < >   --   0.68   < >   --    < >  0.73   GFRESTIMATED  >90  81   < >   --   83   --    --    --    --    GFRESTBLACK  >90  >90   < >   --   >90   < >   --    < >  >60   POTASSIUM  4.0  3.6   < >   --   4.4   < >   --    --   3.5   TSH   --    --    --   1.05   --    --    --    --    --     < > = values in this interval not displayed.      BP Readings from Last 3 Encounters:   07/13/18 134/64   07/01/18 137/75   06/16/18 163/71    Wt Readings from Last 3 Encounters:   07/13/18 165 lb (74.8 kg)   06/27/18 165 lb 9.1 oz (75.1 kg)   06/16/18 166 lb (75.3 kg)                  Labs reviewed in EPIC    Reviewed and updated as needed this visit by clinical staff  Tobacco  Allergies  Meds  Med Hx  Surg Hx  Fam Hx  Soc Hx      Reviewed and updated as needed this visit by Provider         ROS:  Constitutional, HEENT, cardiovascular, pulmonary, gi and gu systems are negative, except as otherwise noted.    OBJECTIVE:     /64 (BP Location: Right arm, Patient Position: Chair, Cuff Size: Adult Regular)  Pulse 87  Temp 97.2  F (36.2  C) (Oral)  Resp 24  Ht 5' 2\" (1.575 m)  Wt 165 lb (74.8 kg)  [facility] 05/17/1970  SpO2 99%  BMI 30.18 kg/m2  Body mass index is 30.18 kg/(m^2).  GENERAL: healthy, alert, well nourished, well hydrated, no distress, obese  HENT: ear canals- normal; TMs- normal; Nose- normal; Mouth- no ulcers, no lesions, throat is clear with no erythema or exudate.   NECK: no tenderness, no adenopathy, no asymmetry, no masses, no stiffness; thyroid- normal to palpation  RESP: lungs clear to auscultation - no rales, no rhonchi, no wheezes  CV: regular rates and rhythm, normal S1 S2, no S3 or S4 and no murmur, no click or rub -  ABDOMEN: soft, no tenderness, no  hepatosplenomegaly, no masses, normal bowel sounds  MS: extremities- no gross deformities noted, no edema, incisions " healing without erythema or swelling. Decreased ROM hip due to surgery.   SKIN: no suspicious lesions, no rashes  NEURO: strength and tone- normal, sensory exam- grossly normal, mentation- intact, speech- normal, reflexes- symmetric  BACK: no CVA tenderness, no paralumbar tenderness  PSYCH: Alert and oriented times 3; speech- coherent , normal rate and volume; able to articulate logical thoughts, able to abstract reason, no tangential thoughts, no hallucinations or delusions, affect- normal     Diagnostic Test Results:  Results for orders placed or performed during the hospital encounter of 06/27/18   XR Pelvis w Hip Left 1 View    Narrative    Exam: Single frontal view of both hips and a lateral view of the left  hip dated 6/27/2018.    COMPARISON: 5/29/2018.    CLINICAL HISTORY: Postop evaluation.    FINDINGS: Single frontal view of both hips and a lateral view of the  left hip was obtained. Postsurgical changes of placement of a left  total hip arthroplasty. The hardware appears intact. Postoperative air  is noted. Marked joint space narrowing in the right hip joint.      Impression    IMPRESSION: New postsurgical changes of placement of a left total hip  arthroplasty, without complication.    MEREDITH LOPEZ MD   UA with Microscopic reflex to Culture   Result Value Ref Range    Color Urine Yellow     Appearance Urine Clear     Glucose Urine Negative NEG^Negative mg/dL    Bilirubin Urine Negative NEG^Negative    Ketones Urine Negative NEG^Negative mg/dL    Specific Gravity Urine 1.014 1.003 - 1.035    Blood Urine Negative NEG^Negative    pH Urine 6.5 5.0 - 7.0 pH    Protein Albumin Urine Negative NEG^Negative mg/dL    Urobilinogen mg/dL Normal 0.0 - 2.0 mg/dL    Nitrite Urine Negative NEG^Negative    Leukocyte Esterase Urine Negative NEG^Negative    Source Unspecified Urine     WBC Urine 0 0 - 5 /HPF    RBC Urine <1 0 - 2 /HPF    Bacteria Urine Few (A) NEG^Negative /HPF    Squamous Epithelial /HPF Urine 1 0 - 1  /HPF    Mucous Urine Present (A) NEG^Negative /LPF   Glucose by meter   Result Value Ref Range    Glucose 107 (H) 70 - 99 mg/dL   Platelet count   Result Value Ref Range    Platelet Count 349 150 - 450 10e9/L   Creatinine   Result Value Ref Range    Creatinine 0.68 0.52 - 1.04 mg/dL    GFR Estimate 83 >60 mL/min/1.7m2    GFR Estimate If Black >90 >60 mL/min/1.7m2   Hemoglobin   Result Value Ref Range    Hemoglobin 9.2 (L) 11.7 - 15.7 g/dL   Basic metabolic panel   Result Value Ref Range    Sodium 141 133 - 144 mmol/L    Potassium 4.0 3.4 - 5.3 mmol/L    Chloride 108 94 - 109 mmol/L    Carbon Dioxide 20 20 - 32 mmol/L    Anion Gap 13 3 - 14 mmol/L    Glucose 134 (H) 70 - 99 mg/dL    Urea Nitrogen 16 7 - 30 mg/dL    Creatinine 0.62 0.52 - 1.04 mg/dL    GFR Estimate >90 >60 mL/min/1.7m2    GFR Estimate If Black >90 >60 mL/min/1.7m2    Calcium 10.1 8.5 - 10.1 mg/dL   Magnesium   Result Value Ref Range    Magnesium 1.9 1.6 - 2.3 mg/dL   Glucose by meter   Result Value Ref Range    Glucose 116 (H) 70 - 99 mg/dL   Hemoglobin   Result Value Ref Range    Hemoglobin 7.9 (L) 11.7 - 15.7 g/dL   Basic metabolic panel   Result Value Ref Range    Sodium 142 133 - 144 mmol/L    Potassium 3.6 3.4 - 5.3 mmol/L    Chloride 109 94 - 109 mmol/L    Carbon Dioxide 24 20 - 32 mmol/L    Anion Gap 9 3 - 14 mmol/L    Glucose 105 (H) 70 - 99 mg/dL    Urea Nitrogen 16 7 - 30 mg/dL    Creatinine 0.69 0.52 - 1.04 mg/dL    GFR Estimate 81 >60 mL/min/1.7m2    GFR Estimate If Black >90 >60 mL/min/1.7m2    Calcium 9.4 8.5 - 10.1 mg/dL   WBC and differential   Result Value Ref Range    WBC 7.1 4.0 - 11.0 10e9/L    Diff Method Automated Method     % Neutrophils 70.8 %    % Lymphocytes 15.5 %    % Monocytes 13.5 %    % Eosinophils 0.1 %    % Basophils 0.0 %    % Immature Granulocytes 0.1 %    Nucleated RBCs 0 0 /100    Absolute Neutrophil 5.0 1.6 - 8.3 10e9/L    Absolute Lymphocytes 1.1 0.8 - 5.3 10e9/L    Absolute Monocytes 1.0 0.0 - 1.3 10e9/L     Absolute Eosinophils 0.0 0.0 - 0.7 10e9/L    Absolute Basophils 0.0 0.0 - 0.2 10e9/L    Abs Immature Granulocytes 0.0 0 - 0.4 10e9/L    Absolute Nucleated RBC 0.0    UA with Microscopic   Result Value Ref Range    Color Urine Yellow     Appearance Urine Clear     Glucose Urine Negative NEG^Negative mg/dL    Bilirubin Urine Negative NEG^Negative    Ketones Urine Negative NEG^Negative mg/dL    Specific Gravity Urine 1.017 1.003 - 1.035    Blood Urine Negative NEG^Negative    pH Urine 5.5 5.0 - 7.0 pH    Protein Albumin Urine 10 (A) NEG^Negative mg/dL    Urobilinogen mg/dL Normal 0.0 - 2.0 mg/dL    Nitrite Urine Negative NEG^Negative    Leukocyte Esterase Urine Negative NEG^Negative    Source Clean catch urine     WBC Urine <1 0 - 5 /HPF    RBC Urine <1 0 - 2 /HPF    Squamous Epithelial /HPF Urine 1 0 - 1 /HPF    Mucous Urine Present (A) NEG^Negative /LPF   CBC with platelets   Result Value Ref Range    WBC 8.4 4.0 - 11.0 10e9/L    RBC Count 2.88 (L) 3.8 - 5.2 10e12/L    Hemoglobin 8.1 (L) 11.7 - 15.7 g/dL    Hematocrit 25.8 (L) 35.0 - 47.0 %    MCV 90 78 - 100 fl    MCH 28.1 26.5 - 33.0 pg    MCHC 31.4 (L) 31.5 - 36.5 g/dL    RDW 16.8 (H) 10.0 - 15.0 %    Platelet Count 265 150 - 450 10e9/L   CBC with platelets   Result Value Ref Range    WBC 7.8 4.0 - 11.0 10e9/L    RBC Count 2.80 (L) 3.8 - 5.2 10e12/L    Hemoglobin 7.8 (L) 11.7 - 15.7 g/dL    Hematocrit 25.3 (L) 35.0 - 47.0 %    MCV 90 78 - 100 fl    MCH 27.9 26.5 - 33.0 pg    MCHC 30.8 (L) 31.5 - 36.5 g/dL    RDW 16.6 (H) 10.0 - 15.0 %    Platelet Count 251 150 - 450 10e9/L   Basic metabolic panel   Result Value Ref Range    Sodium 141 133 - 144 mmol/L    Potassium 4.0 3.4 - 5.3 mmol/L    Chloride 109 94 - 109 mmol/L    Carbon Dioxide 25 20 - 32 mmol/L    Anion Gap 7 3 - 14 mmol/L    Glucose 116 (H) 70 - 99 mg/dL    Urea Nitrogen 12 7 - 30 mg/dL    Creatinine 0.63 0.52 - 1.04 mg/dL    GFR Estimate >90 >60 mL/min/1.7m2    GFR Estimate If Black >90 >60 mL/min/1.7m2  "   Calcium 9.7 8.5 - 10.1 mg/dL   Hemoglobin   Result Value Ref Range    Hemoglobin 8.2 (L) 11.7 - 15.7 g/dL   Urine Culture Aerobic Bacterial   Result Value Ref Range    Specimen Description Unspecified Urine     Special Requests Specimen received in preservative     Culture Micro No growth        ASSESSMENT/PLAN:         Tobacco Cessation:   reports that she has never smoked. She has never used smokeless tobacco.      BMI:   Estimated body mass index is 30.18 kg/(m^2) as calculated from the following:    Height as of this encounter: 5' 2\" (1.575 m).    Weight as of this encounter: 165 lb (74.8 kg).   Weight management plan: Discussed healthy diet and exercise guidelines and patient will follow up in 3 months in clinic to re-evaluate.        ICD-10-CM    1. Status post total replacement of left hip Z96.642 Recovering well from surgery. Tapering off Dilaudid. Physical therapy twice a week at [facility] where she lives.    2. Other iron deficiency anemia D50.8 ferrous gluconate (FERGON) 324 (38 Fe) MG tablet every other day and follow up in 2 months   3. Hypertension, goal below 140/90 I10 Well controlled on medications, was low in hospital.    4. Rheumatoid arthritis involving multiple sites with positive rheumatoid factor (H) M05.79 Stable on medications and follow up with rheumatology as scheduled    5. Status post total left knee replacement Z96.652 Stable    6. High risk medication use Z79.899 Methotrexate monitored by rheumatologist.        CONSULTATION/REFERRAL to orthopedics and rheumatology as scheduled.   FUTURE LABS:       - Schedule non-fasting labs in 3 months  FUTURE APPOINTMENTS:       - Follow-up visit in 2-3 months or sooner if any questions or concerns. Follow up anemia.   Work on weight loss  Regular exercise  See Patient Instructions    Liudmila Bowie MD  [facility]    "

## 2018-07-13 NOTE — MR AVS SNAPSHOT
After Visit Summary   7/13/2018    Brenda Barker    MRN: [id number]           Patient Information     Date Of Birth          1935        Visit Information        Provider Department      7/13/2018 1:40 PM Liudmila Bowie MD [facility]        Today's Diagnoses     Status post total replacement of left hip    -  1    Other iron deficiency anemia        Hypertension, goal below 140/90        Rheumatoid arthritis involving multiple sites with positive rheumatoid factor (H)        Status post total left knee replacement        High risk medication use          Care Instructions    At [facility], we strive to deliver an exceptional experience to you, every time we see you.  If you receive a survey in the mail, please send us back your thoughts. We really do value your feedback.    Based on your medical history, these are the current health maintenance/preventive care services that you are due for (some may have been done at this visit.)  Health Maintenance Due   Topic Date Due     PHQ-9 Q6 MONTHS  03/09/2017     TETANUS IMMUNIZATION (SYSTEM ASSIGNED)  03/13/2018         Suggested websites for health information:  Www.[employer] : Up to date and easily searchable information on multiple topics.  Www.medlineplus.gov : medication info, interactive tutorials, watch real surgeries online  Www.familydoctor.org : good info from the Academy of Family Physicians  Www.cdc.gov : public health info, travel advisories, epidemics (H1N1)  Www.aap.org : children's health info, normal development, vaccinations  Www.health.state[place].us : [place] dept of health, public health issues in [place], N1N1    Your care team:                            Family Medicine Internal Medicine   MD Jose Alejandro Floyd MD Shantel Branch-Fleming, MD Katya Georgiev PA-C Nam Ho, MD Pediatrics   VINITA Gutiérrez, ANNA Juares APRN CNP   MD Kika Garza  MD Liudmila Prescott MD Kim Thein, APRN CNP      Clinic hours: Monday - Thursday 7 am-7 pm; Fridays 7 am-5 pm.   Urgent care: Monday - Friday 11 am-9 pm; Saturday and Sunday 9 am-5 pm.  Pharmacy : Monday -Thursday 8 am-8 pm; Friday 8 am-6 pm; Saturday and Sunday 9 am-5 pm.     Clinic: [phone]   Pharmacy: [phone]            Follow-ups after your visit        Follow-up notes from your care team     Return in about 3 months (around 10/13/2018) for Lab Work, medication follow up, recheck.      Your next 10 appointments already scheduled     Aug 07, 2018 11:30 AM CDT   (Arrive by 11:00 AM)   [facility] Visit with Keo Priest MD   [facility]    [address] [place] [address] [phone]              Who to contact     If you have questions or need follow up information about today's clinic visit or your schedule please contact [facility] directly at [phone].  Normal or non-critical lab and imaging results will be communicated to you by MyChart, letter or phone within 4 business days after the clinic has received the results. If you do not hear from us within 7 days, please contact the clinic through [employer] or phone. If you have a critical or abnormal lab result, we will notify you by phone as soon as possible.  Submit refill requests through [employer] or call your pharmacy and they will forward the refill request to us. Please allow 3 business days for your refill to be completed.          Additional Information About Your Visit        MyChart Information     [employer] gives you secure access to your electronic health record. If you see a primary care provider, you can also send messages to your care team and make appointments. If you have questions, please call your primary care clinic.  If you do not have a primary care provider, please call [phone] and they will assist you.        Care EveryWhere  "ID     This is your Care EveryWhere ID. This could be used by other organizations to access your [facility] medical records  [id number]        Your Vitals Were     Pulse Temperature Respirations Height Last Period Pulse Oximetry    87 97.2  F (36.2  C) (Oral) 24 5' 2\" (1.575 m) 05/17/1972 99%    BMI (Body Mass Index)                   30.18 kg/m2            Blood Pressure from Last 3 Encounters:   07/13/18 134/64   07/01/18 137/75   06/16/18 163/71    Weight from Last 3 Encounters:   07/13/18 165 lb (74.8 kg)   06/27/18 165 lb 9.1 oz (75.1 kg)   06/16/18 166 lb (75.3 kg)              Today, you had the following     No orders found for display         Today's Medication Changes          These changes are accurate as of 7/13/18  2:46 PM.  If you have any questions, ask your nurse or doctor.               Start taking these medicines.        Dose/Directions    ferrous gluconate 324 (38 Fe) MG tablet   Commonly known as:  FERGON   Used for:  Other iron deficiency anemia   Started by:  Liudmila Bowie MD        Dose:  324 mg   Take 1 tablet (324 mg) by mouth every other day   Quantity:  100 tablet   Refills:  1            Where to get your medicines      [facility] [address] [place] [address] [place] [address] [place] [address] [facility] [address]  [address] [place] [address]     Phone:  [phone]     ferrous gluconate 324 (38 Fe) MG tablet                Primary Care Provider Office Phone # Fax #    Liudmila Bowie [phone]       [address]  [place] [facility] [address]        Equal Access to Services     [facility] AH: Hadii simi rollins Soshine, waaxda luqadaha, qaybta kaalmada adeegyada, liliana del valle. So [facility] [phone].    ATENCIÓN: Si habla español, tiene a beltran disposición servicios gratuitos de asistencia lingüística. Llame al [phone].    We comply with applicable federal civil rights laws and " [place] laws. We do not discriminate on the basis of race, color, national origin, age, disability, sex, sexual orientation, or gender identity.            Thank you!     Thank you for choosing [facility]  for your care. Our goal is always to provide you with excellent care. Hearing back from our patients is one way we can continue to improve our services. Please take a few minutes to complete the written survey that you may receive in the mail after your visit with us. Thank you!             Your Updated Medication List - Protect others around you: Learn how to safely use, store and throw away your medicines at www.disposemymeds.org.          This list is accurate as of 7/13/18  2:46 PM.  Always use your most recent med list.                   Brand Name Dispense Instructions for use Diagnosis    acyclovir 400 MG tablet    ZOVIRAX    90 tablet    TAKE 1 TABLET BY MOUTH DAILY    Herpes simplex virus infection       aspirin 325 MG EC tablet     40 tablet    Take 1 tablet (325 mg) by mouth daily for 14 days    Status post hip surgery       CENTRUM SILVER per tablet      1 TABLET DAILY        ferrous gluconate 324 (38 Fe) MG tablet    FERGON    100 tablet    Take 1 tablet (324 mg) by mouth every other day    Other iron deficiency anemia       fish oil-omega-3 fatty acids 1000 MG capsule     OTC    2 CAPSULES DAILY  (? DOSE)    Other and unspecified hyperlipidemia       folic acid 1 MG tablet    FOLVITE     4 mg TABS DAILY    Rheumatoid arthritis(714.0), Obesity, unspecified, Other abnormal glucose       HYDROmorphone 2 MG tablet    DILAUDID    30 tablet    For pain concerns of 1-5 give 1 mg  for pain concerns of 6-10 give two mg every 4 hours as needed    Status post hip surgery       LIQUID TEARS 1.4 % ophthalmic solution   Generic drug:  polyvinyl alcohol      1 drop as needed.        methotrexate 2.5 MG tablet CHEMO      Take 6 tablets (15 mg) by mouth every 7 days        metoprolol succinate  50 MG 24 hr tablet    TOPROL-XL    90 tablet    TAKE 1 TABLET BY MOUTH ONCE DAILY    Rapid heart rate       nitroFURantoin macrocrystal 100 MG ED starter pack    MACRODANTIN     Take 1 capsule by mouth daily.        omeprazole 20 MG CR capsule    priLOSEC    90 capsule    TAKE 1 CAPSULE(20 MG) BY MOUTH DAILY    Gastroesophageal reflux disease with esophagitis       senna-docusate 8.6-50 MG per tablet    SENOKOT-S;PERICOLACE    100 tablet    Take 1-2 tablets by mouth 2 times daily        simvastatin 20 MG tablet    ZOCOR    90 tablet    Take 1 tablet (20 mg) by mouth At Bedtime    Hyperlipidemia LDL goal <130       TYLENOL PO      Take 650 mg by mouth every 6 hours as needed for mild pain or fever        Vitamin D-3 1000 units Caps      Take 2,000 Int'l Units by mouth daily        ZYRTEC ALLERGY PO      Take 1 tablet by mouth daily

## 2018-07-13 NOTE — PATIENT INSTRUCTIONS
At [facility], we strive to deliver an exceptional experience to you, every time we see you.  If you receive a survey in the mail, please send us back your thoughts. We really do value your feedback.    Based on your medical history, these are the current health maintenance/preventive care services that you are due for (some may have been done at this visit.)  Health Maintenance Due   Topic Date Due     PHQ-9 Q6 MONTHS  03/09/2017     TETANUS IMMUNIZATION (SYSTEM ASSIGNED)  03/13/2018         Suggested websites for health information:  Www.[employer].org : Up to date and easily searchable information on multiple topics.  Www.[employer].gov : medication info, interactive tutorials, watch real surgeries online  Www.familydoctor.org : good info from the Academy of Family Physicians  Www.cdc.gov : public health info, travel advisories, epidemics (H1N1)  Www.aap.org : children's health info, normal development, vaccinations  Www.health.[facility].[place].us : [place] dept of health, public health issues in [place], N1N1    Your care team:                            Family Medicine Internal Medicine   MD Jose Alejandro Floyd MD Shantel Branch-Fleming, MD Katya Georgiev PA-C Nam Ho, MD Pediatrics   VINITA Gutiérrez, ANNA Juares APRN CNP   MD Kika Garza MD Deborah Mielke, MD Kim Thein, APRN CNP      Clinic hours: Monday - Thursday 7 am-7 pm; Fridays 7 am-5 pm.   Urgent care: Monday - Friday 11 am-9 pm; Saturday and Sunday 9 am-5 pm.  Pharmacy : Monday -Thursday 8 am-8 pm; Friday 8 am-6 pm; Saturday and Sunday 9 am-5 pm.     Clinic: [phone]   Pharmacy: [phone]

## 2018-08-07 ENCOUNTER — OFFICE VISIT (OUTPATIENT)
Dept: ORTHOPEDICS | Facility: CLINIC | Age: 83
End: 2018-08-07
Payer: COMMERCIAL

## 2018-08-07 ENCOUNTER — RADIANT APPOINTMENT (OUTPATIENT)
Dept: GENERAL RADIOLOGY | Facility: CLINIC | Age: 83
End: 2018-08-07
Attending: ORTHOPAEDIC SURGERY
Payer: COMMERCIAL

## 2018-08-07 VITALS — OXYGEN SATURATION: 98 % | DIASTOLIC BLOOD PRESSURE: 59 MMHG | HEART RATE: 73 BPM | SYSTOLIC BLOOD PRESSURE: 135 MMHG

## 2018-08-07 DIAGNOSIS — Z96.642 STATUS POST TOTAL REPLACEMENT OF LEFT HIP: ICD-10-CM

## 2018-08-07 DIAGNOSIS — Z47.89 ORTHOPEDIC AFTERCARE: Primary | ICD-10-CM

## 2018-08-07 PROCEDURE — 99024 POSTOP FOLLOW-UP VISIT: CPT | Performed by: ORTHOPAEDIC SURGERY

## 2018-08-07 ASSESSMENT — PAIN SCALES - GENERAL: PAINLEVEL: MILD PAIN (2)

## 2018-08-07 NOTE — LETTER
8/7/2018         RE: Brenda Barker  [address]  [facility] [address]        Dear Colleague,    Thank you for referring your patient, Brenda Barker, to the [facility]. Please see a copy of my visit note below.    Chief Complaint: Surgical Followup of the Left Hip       HPI: Brenda Barker returns today in follow-up for her left hip. She reports that she is doing very well. She is using little to no pain medication. Using a cane off and on otherwise nothing.     Medications and allergies are documented in the EMR and have been reviewed.    Current Outpatient Prescriptions:      Acetaminophen (TYLENOL PO), Take 650 mg by mouth every 6 hours as needed for mild pain or fever, Disp: , Rfl:      acyclovir (ZOVIRAX) 400 MG tablet, TAKE 1 TABLET BY MOUTH DAILY, Disp: 90 tablet, Rfl: 0     CENTRUM SILVER OR TABS, 1 TABLET DAILY, Disp: , Rfl:      Cetirizine HCl (ZYRTEC ALLERGY PO), Take 1 tablet by mouth daily, Disp: , Rfl:      Cholecalciferol (VITAMIN D-3) 1000 UNITS CAPS, Take 2,000 Int'l Units by mouth daily , Disp: , Rfl:      ferrous gluconate (FERGON) 324 (38 Fe) MG tablet, Take 1 tablet (324 mg) by mouth every other day, Disp: 100 tablet, Rfl: 1     FISH OIL 1000 MG OR CAPS, 2 CAPSULES DAILY  (? DOSE), Disp: OTC, Rfl: --     FOLIC ACID 1 MG OR TABS, 4 mg TABS DAILY, Disp: , Rfl:      HYDROmorphone (DILAUDID) 2 MG tablet, For pain concerns of 1-5 give 1 mg  for pain concerns of 6-10 give two mg every 4 hours as needed, Disp: 30 tablet, Rfl: 0     methotrexate 2.5 MG tablet CHEMO, Take 6 tablets (15 mg) by mouth every 7 days, Disp: , Rfl:      metoprolol succinate (TOPROL-XL) 50 MG 24 hr tablet, TAKE 1 TABLET BY MOUTH ONCE DAILY, Disp: 90 tablet, Rfl: 0     nitrofurantoin 100 MG, Take 1 capsule by mouth daily., Disp: , Rfl:      omeprazole (PRILOSEC) 20 MG CR capsule, TAKE 1 CAPSULE(20 MG) BY MOUTH DAILY, Disp: 90 capsule, Rfl: 2     polyvinyl alcohol (LIQUID TEARS) 1.4 %  ophthalmic solution, 1 drop as needed., Disp: , Rfl:      senna-docusate (SENOKOT-S;PERICOLACE) 8.6-50 MG per tablet, Take 1-2 tablets by mouth 2 times daily, Disp: 100 tablet, Rfl:      simvastatin (ZOCOR) 20 MG tablet, Take 1 tablet (20 mg) by mouth At Bedtime, Disp: 90 tablet, Rfl: 3  Allergies: Diatrizoate; Ciprofloxacin; Contrast dye; Oxycodone; and Sulfasalazine    Physical Exam:  On physical examination the patient appears the stated age, is in no acute distress, affect is appropriate, and breathing is non-labored.  /59 (BP Location: Left arm)  Pulse 73  LMP 05/17/1972  SpO2 98%  There is no height or weight on file to calculate BMI.  Gait: normal   Incision: healed   ROM: fluid and painless  Distally, the circulatory, motor, and sensation exam is intact with 5/5 EHL, gastroc-soleus, and tibialis anterior.  Sensation to light touch is intact.  Dorsalis pedis and posterior tibialis pulses are palpable.  There are no sores on the feet, no bruising, and no lymphedema.    X-rays:    I reviewed the x-rays dated today.  Previous films reviewed.    Findings:  Normal progression for a total hip arthroplasty without evidence of loosening or subsidence.    Assessment: doing very well  Plan: RTC in 6 weeks for a recheck    No ref. provider found      Again, thank you for allowing me to participate in the care of your patient.        Sincerely,        Keo Priest MD

## 2018-08-07 NOTE — PROGRESS NOTES
Chief Complaint: Surgical Followup of the Left Hip       HPI: Brenda Barker returns today in follow-up for her left hip. She reports that she is doing very well. She is using little to no pain medication. Using a cane off and on otherwise nothing.     Medications and allergies are documented in the EMR and have been reviewed.    Current Outpatient Prescriptions:      Acetaminophen (TYLENOL PO), Take 650 mg by mouth every 6 hours as needed for mild pain or fever, Disp: , Rfl:      acyclovir (ZOVIRAX) 400 MG tablet, TAKE 1 TABLET BY MOUTH DAILY, Disp: 90 tablet, Rfl: 0     CENTRUM SILVER OR TABS, 1 TABLET DAILY, Disp: , Rfl:      Cetirizine HCl (ZYRTEC ALLERGY PO), Take 1 tablet by mouth daily, Disp: , Rfl:      Cholecalciferol (VITAMIN D-3) 1000 UNITS CAPS, Take 2,000 Int'l Units by mouth daily , Disp: , Rfl:      ferrous gluconate (FERGON) 324 (38 Fe) MG tablet, Take 1 tablet (324 mg) by mouth every other day, Disp: 100 tablet, Rfl: 1     FISH OIL 1000 MG OR CAPS, 2 CAPSULES DAILY  (? DOSE), Disp: OTC, Rfl: --     FOLIC ACID 1 MG OR TABS, 4 mg TABS DAILY, Disp: , Rfl:      HYDROmorphone (DILAUDID) 2 MG tablet, For pain concerns of 1-5 give 1 mg  for pain concerns of 6-10 give two mg every 4 hours as needed, Disp: 30 tablet, Rfl: 0     methotrexate 2.5 MG tablet CHEMO, Take 6 tablets (15 mg) by mouth every 7 days, Disp: , Rfl:      metoprolol succinate (TOPROL-XL) 50 MG 24 hr tablet, TAKE 1 TABLET BY MOUTH ONCE DAILY, Disp: 90 tablet, Rfl: 0     nitrofurantoin 100 MG, Take 1 capsule by mouth daily., Disp: , Rfl:      omeprazole (PRILOSEC) 20 MG CR capsule, TAKE 1 CAPSULE(20 MG) BY MOUTH DAILY, Disp: 90 capsule, Rfl: 2     polyvinyl alcohol (LIQUID TEARS) 1.4 % ophthalmic solution, 1 drop as needed., Disp: , Rfl:      senna-docusate (SENOKOT-S;PERICOLACE) 8.6-50 MG per tablet, Take 1-2 tablets by mouth 2 times daily, Disp: 100 tablet, Rfl:      simvastatin (ZOCOR) 20 MG tablet, Take 1 tablet (20 mg) by mouth At  Bedtime, Disp: 90 tablet, Rfl: 3  Allergies: Diatrizoate; Ciprofloxacin; Contrast dye; Oxycodone; and Sulfasalazine    Physical Exam:  On physical examination the patient appears the stated age, is in no acute distress, affect is appropriate, and breathing is non-labored.  /59 (BP Location: Left arm)  Pulse 73  LMP 05/17/1972  SpO2 98%  There is no height or weight on file to calculate BMI.  Gait: normal   Incision: healed   ROM: fluid and painless  Distally, the circulatory, motor, and sensation exam is intact with 5/5 EHL, gastroc-soleus, and tibialis anterior.  Sensation to light touch is intact.  Dorsalis pedis and posterior tibialis pulses are palpable.  There are no sores on the feet, no bruising, and no lymphedema.    X-rays:    I reviewed the x-rays dated today.  Previous films reviewed.    Findings:  Normal progression for a total hip arthroplasty without evidence of loosening or subsidence.    Assessment: doing very well  Plan: RTC in 6 weeks for a recheck    No ref. provider found

## 2018-08-07 NOTE — NURSING NOTE
Brenda Barker's chief complaint for this visit includes:  Chief Complaint   Patient presents with     Left Hip - Surgical Followup     PCP: Liudmila Bowie    Referring Provider:  No referring provider defined for this encounter.    /59 (BP Location: Left arm)  Pulse 73  LMP 05/17/1972  SpO2 98%  Mild Pain (2)     Do you need any medication refills at today's visit? No    Teagan Alva CMA

## 2018-08-07 NOTE — PATIENT INSTRUCTIONS
Thanks for coming today.  [facility]  [address] [place] [address]    To schedule future appointments in Ortho Clinic, you may call [phone].    To schedule ordered imaging by your provider:   Call Central Imaging [phone]    To schedule an injection ordered by your provider:  Call [facility] Injection scheduling line: [phone]  [employer] available online at:  [email or web address]    Please call if any further questions or concerns ([phone]).  Clinic hours 8 am to 5 pm.    Return to clinic (call) if symptoms worsen or fail to improve.

## 2018-08-07 NOTE — MR AVS SNAPSHOT
After Visit Summary   2018    Brenda Barker    MRN: [id number]           Patient Information     Date Of Birth          1935        Visit Information        Provider Department      2018 11:30 AM Keo Priest MD [facility]        Today's Diagnoses     Orthopedic aftercare    -  1      Care Instructions    Thanks for coming today.  [facility]  [address] [place] [address]    To schedule future appointments in Ortho Clinic, you may call [phone].    To schedule ordered imaging by your provider:   Call Central [facility] [phone]    To schedule an injection ordered by your provider:  Call [facility] Injection scheduling line: [phone]  [employer] available online at:  [employer]    Please call if any further questions or concerns ([phone]).  Clinic hours 8 am to 5 pm.    Return to clinic (call) if symptoms worsen or fail to improve.            Follow-ups after your visit        Who to contact     If you have questions or need follow up information about today's clinic visit or your schedule please contact [facility] directly at [phone].  Normal or non-critical lab and imaging results will be communicated to you by [employer], letter or phone within 4 business days after the clinic has received the results. If you do not hear from us within 7 days, please contact the clinic through [employer] or phone. If you have a critical or abnormal lab result, we will notify you by phone as soon as possible.  Submit refill requests through [employer] or call your pharmacy and they will forward the refill request to us. Please allow 3 business days for your refill to be completed.          Additional Information About Your Visit        MyChart Information     [employer] gives you secure access to your electronic health record. If you see a primary care provider, you can also send messages to your  care team and make appointments. If you have questions, please call your primary care clinic.  If you do not have a primary care provider, please call [phone] and they will assist you.      [employer] is an electronic gateway that provides easy, online access to your medical records. With [employer], you can request a clinic appointment, read your test results, renew a prescription or communicate with your care team.     To access your existing account, please contact your [facility] or call [phone] for assistance.        Care EveryWhere ID     This is your Care EveryWhere ID. This could be used by other organizations to access your [facility] medical records  [id number]        Your Vitals Were     Pulse Last Period Pulse Oximetry             73 05/17/1972 98%          Blood Pressure from Last 3 Encounters:   08/07/18 135/59   07/13/18 134/64   07/01/18 137/75    Weight from Last 3 Encounters:   07/13/18 74.8 kg (165 lb)   06/27/18 75.1 kg (165 lb 9.1 oz)   06/16/18 75.3 kg (166 lb)              Today, you had the following     No orders found for display       Primary Care Provider Office Phone # Fax #    Liudmila Sindy Bowie [phone]       [address]  [facility] [address]        Equal Access to Services     [facility]: Hadii aad ku hadasho Soomaali, waaxda luqadaha, qaybta kaalmada adeegyada, waxay idiin hayaan ramsey khsandrine garcia . So [facility] [phone].    ATENCIÓN: Si habla español, tiene a beltran disposición servicios gratuitos de asistencia lingüística. Llame al [phone].    We comply with applicable federal civil rights laws and [place] laws. We do not discriminate on the basis of race, color, national origin, age, disability, sex, sexual orientation, or gender identity.            Thank you!     Thank you for choosing [facility]  for your care. Our goal is always to provide you with excellent care. Hearing back from our  patients is one way we can continue to improve our services. Please take a few minutes to complete the written survey that you may receive in the mail after your visit with us. Thank you!             Your Updated Medication List - Protect others around you: Learn how to safely use, store and throw away your medicines at www.disposemymeds.org.          This list is accurate as of 8/7/18 12:04 PM.  Always use your most recent med list.                   Brand Name Dispense Instructions for use Diagnosis    acyclovir 400 MG tablet    ZOVIRAX    90 tablet    TAKE 1 TABLET BY MOUTH DAILY    Herpes simplex virus infection       CENTRUM SILVER per tablet      1 TABLET DAILY        ferrous gluconate 324 (38 Fe) MG tablet    FERGON    100 tablet    Take 1 tablet (324 mg) by mouth every other day    Other iron deficiency anemia       fish oil-omega-3 fatty acids 1000 MG capsule     OTC    2 CAPSULES DAILY  (? DOSE)    Other and unspecified hyperlipidemia       folic acid 1 MG tablet    FOLVITE     4 mg TABS DAILY    Rheumatoid arthritis(714.0), Obesity, unspecified, Other abnormal glucose       HYDROmorphone 2 MG tablet    DILAUDID    30 tablet    For pain concerns of 1-5 give 1 mg  for pain concerns of 6-10 give two mg every 4 hours as needed    Status post hip surgery       LIQUID TEARS 1.4 % ophthalmic solution   Generic drug:  polyvinyl alcohol      1 drop as needed.        methotrexate 2.5 MG tablet CHEMO      Take 6 tablets (15 mg) by mouth every 7 days        metoprolol succinate 50 MG 24 hr tablet    TOPROL-XL    90 tablet    TAKE 1 TABLET BY MOUTH ONCE DAILY    Rapid heart rate       nitroFURantoin macrocrystal 100 MG ED starter pack    MACRODANTIN     Take 1 capsule by mouth daily.        omeprazole 20 MG CR capsule    priLOSEC    90 capsule    TAKE 1 CAPSULE(20 MG) BY MOUTH DAILY    Gastroesophageal reflux disease with esophagitis       senna-docusate 8.6-50 MG per tablet    SENOKOT-S;PERICOLACE    100 tablet     Take 1-2 tablets by mouth 2 times daily        simvastatin 20 MG tablet    ZOCOR    90 tablet    Take 1 tablet (20 mg) by mouth At Bedtime    Hyperlipidemia LDL goal <130       TYLENOL PO      Take 650 mg by mouth every 6 hours as needed for mild pain or fever        Vitamin D-3 1000 units Caps      Take 2,000 Int'l Units by mouth daily        ZYRTEC ALLERGY PO      Take 1 tablet by mouth daily

## 2018-08-16 ENCOUNTER — MYC MEDICAL ADVICE (OUTPATIENT)
Dept: FAMILY MEDICINE | Facility: CLINIC | Age: 83
End: 2018-08-16

## 2018-09-07 DIAGNOSIS — M05.79 RHEUMATOID ARTHRITIS INVOLVING MULTIPLE SITES WITH POSITIVE RHEUMATOID FACTOR (H): ICD-10-CM

## 2018-09-07 NOTE — TELEPHONE ENCOUNTER
"Requested Prescriptions   Pending Prescriptions Disp Refills     meloxicam (MOBIC) 7.5 MG tablet [Pharmacy Med Name: MELOXICAM 7.5MG TABLETS]  Last Written Prescription Date:  9/7/18  Last Fill Quantity: 90,  # refills: 0   Last office visit: 7/13/2018 with prescribing provider:  Jamir   Future Office Visit:     90 tablet 0     Sig: TAKE 1 TABLET BY MOUTH ONCE DAILY    NSAID Medications Failed    9/7/2018  9:33 AM       Failed - Normal ALT on file in past 12 months    No lab results found.         Failed - Normal AST on file in past 12 months    No lab results found.         Failed - Patient is age 6-64 years       Passed - Blood pressure under 140/90 in past 12 months    BP Readings from Last 3 Encounters:   08/07/18 135/59   07/13/18 134/64   07/01/18 137/75                Passed - Recent (12 mo) or future (30 days) visit within the authorizing provider's specialty    Patient had office visit in the last 12 months or has a visit in the next 30 days with authorizing provider or within the authorizing provider's specialty.  See \"Patient Info\" tab in inbasket, or \"Choose Columns\" in Meds & Orders section of the refill encounter.           Passed - Normal CBC on file in past 12 months    Recent Labs   Lab Test  07/01/18   0640  06/30/18   0641   08/11/17   1436   WBC   --   7.8   < >   --    [id number]   --    --    --   5.6   RBC   --   2.80*   < >   --    [id number]   --    --    --   3.59*   HGB  8.2*  7.8*   < >  10.6*   HCT   --   25.3*   < >  32.7*   PLT   --   251   < >  255    < > = values in this interval not displayed.       For [facility] ONLY: PUIQ705 = WBC, UMNI821 = RBC         Passed - No active pregnancy on record       Passed - Normal serum creatinine on file in past 12 months    Recent Labs   Lab Test  06/30/18   0641   CR  0.63            Passed - No positive pregnancy test in past 12 months          "

## 2018-09-11 DIAGNOSIS — R00.0 RAPID HEART RATE: ICD-10-CM

## 2018-09-11 RX ORDER — MELOXICAM 7.5 MG/1
TABLET ORAL
Qty: 90 TABLET | Refills: 0 | Status: SHIPPED | OUTPATIENT
Start: 2018-09-11 | End: 2018-11-27

## 2018-09-11 NOTE — TELEPHONE ENCOUNTER
Routing refill request to provider for review/approval because:  Drug not active on patient's medication list  Labs not current:  ALT, AST  Patient is over 64 years old so protocol failed.    Checo Navarrete RN, BSN

## 2018-09-12 NOTE — TELEPHONE ENCOUNTER
"Requested Prescriptions   Pending Prescriptions Disp Refills     metoprolol succinate (TOPROL-XL) 50 MG 24 hr tablet [Pharmacy Med Name: METOPROLOL ER SUCCINATE 50MG TABS] 90 tablet 0     Sig: TAKE 1 TABLET BY MOUTH ONCE DAILY    Beta-Blockers Protocol Passed    9/11/2018  6:14 PM       Passed - Blood pressure under 140/90 in past 12 months    BP Readings from Last 3 Encounters:   08/07/18 135/59   07/13/18 134/64   07/01/18 137/75                Passed - Patient is age 6 or older       Passed - Recent (12 mo) or future (30 days) visit within the authorizing provider's specialty    Patient had office visit in the last 12 months or has a visit in the next 30 days with authorizing provider or within the authorizing provider's specialty.  See \"Patient Info\" tab in inbasket, or \"Choose Columns\" in Meds & Orders section of the refill encounter.            Last Written Prescription Date:  6/12/18  Last Fill Quantity: 90,  # refills: 0   Last office visit: 7/13/2018 with prescribing provider:  NAS   Future Office Visit:      "

## 2018-09-13 RX ORDER — METOPROLOL SUCCINATE 50 MG/1
TABLET, EXTENDED RELEASE ORAL
Qty: 90 TABLET | Refills: 1 | Status: SHIPPED | OUTPATIENT
Start: 2018-09-13 | End: 2018-12-10

## 2018-09-13 NOTE — TELEPHONE ENCOUNTER
Prescription approved per [facility] Refill Protocol.    Checo Navarrete RN, BSN     Complex Repair And Rhombic Flap Text: The defect edges were debeveled with a #15 scalpel blade.  The primary defect was closed partially with a complex linear closure.  Given the location of the remaining defect, shape of the defect and the proximity to free margins a rhombic flap was deemed most appropriate for complete closure of the defect.  Using a sterile surgical marker, an appropriate advancement flap was drawn incorporating the defect and placing the expected incisions within the relaxed skin tension lines where possible.    The area thus outlined was incised deep to adipose tissue with a #15 scalpel blade.  The skin margins were undermined to an appropriate distance in all directions utilizing iris scissors. Epidermal Closure Graft Donor Site (Optional): simple interrupted Modified Advancement Flap Text: The defect edges were debeveled with a #15 scalpel blade.  Given the location of the defect, shape of the defect and the proximity to free margins a modified advancement flap was deemed most appropriate.  Using a sterile surgical marker, an appropriate advancement flap was drawn incorporating the defect and placing the expected incisions within the relaxed skin tension lines where possible.    The area thus outlined was incised deep to adipose tissue with a #15 scalpel blade.  The skin margins were undermined to an appropriate distance in all directions utilizing iris scissors. Complex Repair And Rotation Flap Text: The defect edges were debeveled with a #15 scalpel blade.  The primary defect was closed partially with a complex linear closure.  Given the location of the remaining defect, shape of the defect and the proximity to free margins a rotation flap was deemed most appropriate for complete closure of the defect.  Using a sterile surgical marker, an appropriate advancement flap was drawn incorporating the defect and placing the expected incisions within the relaxed skin tension lines where possible.    The area thus outlined was incised deep to adipose tissue with a #15 scalpel blade.  The skin margins were undermined to an appropriate distance in all directions utilizing iris scissors. Advancement Flap (Double) Text: The defect edges were debeveled with a #15 scalpel blade.  Given the location of the defect and the proximity to free margins a double advancement flap was deemed most appropriate.  Using a sterile surgical marker, the appropriate advancement flaps were drawn incorporating the defect and placing the expected incisions within the relaxed skin tension lines where possible.    The area thus outlined was incised deep to adipose tissue with a #15 scalpel blade.  The skin margins were undermined to an appropriate distance in all directions utilizing iris scissors. Muscle Hinge Flap Text: The defect edges were debeveled with a #15 scalpel blade.  Given the size, depth and location of the defect and the proximity to free margins a muscle hinge flap was deemed most appropriate.  Using a sterile surgical marker, an appropriate hinge flap was drawn incorporating the defect. The area thus outlined was incised with a #15 scalpel blade.  The skin margins were undermined to an appropriate distance in all directions utilizing iris scissors. Crescentic Intermediate Repair Preamble Text (Leave Blank If You Do Not Want): Undermining was performed with blunt dissection. Render The Repair Note As A Separate Paragraph: Yes Skin Substitute Units (Will Override Primary Defect Units If Greater Than 0): 0 Anesthesia Type: 1% lidocaine without epinephrine and a 1:10 solution of 8.4% sodium bicarbonate Additional Epidermal Sutures: 3-0 Surgipro Transposition Flap Text: The defect edges were debeveled with a #15 scalpel blade.  Given the location of the defect and the proximity to free margins a transposition flap was deemed most appropriate.  Using a sterile surgical marker, an appropriate transposition flap was drawn incorporating the defect.    The area thus outlined was incised deep to adipose tissue with a #15 scalpel blade.  The skin margins were undermined to an appropriate distance in all directions utilizing iris scissors. Double Island Pedicle Flap Text: The defect edges were debeveled with a #15 scalpel blade.  Given the location of the defect, shape of the defect and the proximity to free margins a double island pedicle advancement flap was deemed most appropriate.  Using a sterile surgical marker, an appropriate advancement flap was drawn incorporating the defect, outlining the appropriate donor tissue and placing the expected incisions within the relaxed skin tension lines where possible.    The area thus outlined was incised deep to adipose tissue with a #15 scalpel blade.  The skin margins were undermined to an appropriate distance in all directions around the primary defect and laterally outward around the island pedicle utilizing iris scissors.  There was minimal undermining beneath the pedicle flap. Excision Method: Elliptical Intermediate / Complex Repair - Final Wound Length In Cm: 1.5 Patient Will Remove Sutures At Home?: No Complex Repair And Transposition Flap Text: The defect edges were debeveled with a #15 scalpel blade.  The primary defect was closed partially with a complex linear closure.  Given the location of the remaining defect, shape of the defect and the proximity to free margins a transposition flap was deemed most appropriate for complete closure of the defect.  Using a sterile surgical marker, an appropriate advancement flap was drawn incorporating the defect and placing the expected incisions within the relaxed skin tension lines where possible.    The area thus outlined was incised deep to adipose tissue with a #15 scalpel blade.  The skin margins were undermined to an appropriate distance in all directions utilizing iris scissors. Bi-Rhombic Flap Text: The defect edges were debeveled with a #15 scalpel blade.  Given the location of the defect and the proximity to free margins a bi-rhombic flap was deemed most appropriate.  Using a sterile surgical marker, an appropriate rhombic flap was drawn incorporating the defect. The area thus outlined was incised deep to adipose tissue with a #15 scalpel blade.  The skin margins were undermined to an appropriate distance in all directions utilizing iris scissors. Wound Care: Aquaphor Complex Repair And Skin Substitute Graft Text: The defect edges were debeveled with a #15 scalpel blade.  The primary defect was closed partially with a complex linear closure.  Given the location of the remaining defect, shape of the defect and the proximity to free margins a skin substitute graft was deemed most appropriate to repair the remaining defect.  The graft was trimmed to fit the size of the remaining defect.  The graft was then placed in the primary defect, oriented appropriately, and sutured into place. Tissue Cultured Epidermal Autograft Text: The defect edges were debeveled with a #15 scalpel blade.  Given the location of the defect, shape of the defect and the proximity to free margins a tissue cultured epidermal autograft was deemed most appropriate.  The graft was then trimmed to fit the size of the defect.  The graft was then placed in the primary defect and oriented appropriately. Saucerization Excision Additional Text (Leave Blank If You Do Not Want): The margin was drawn around the clinically apparent lesion.  Incisions were then made along these lines, in a tangential fashion, to the appropriate tissue plane and the lesion was extirpated. Post-Care Instructions: Patient given [facility] post-op excision instructions. Medical Necessity Information: It is in your best interest to select a reason for this procedure from the list below. All of these items fulfill various CMS LCD requirements except lesion extends to a margin. Island Pedicle Flap-Requiring Vessel Identification Text: The defect edges were debeveled with a #15 scalpel blade.  Given the location of the defect, shape of the defect and the proximity to free margins an island pedicle advancement flap was deemed most appropriate.  Using a sterile surgical marker, an appropriate advancement flap was drawn, based on the axial vessel mentioned above, incorporating the defect, outlining the appropriate donor tissue and placing the expected incisions within the relaxed skin tension lines where possible.    The area thus outlined was incised deep to adipose tissue with a #15 scalpel blade.  The skin margins were undermined to an appropriate distance in all directions around the primary defect and laterally outward around the island pedicle utilizing iris scissors.  There was minimal undermining beneath the pedicle flap. Bilobed Flap Text: The defect edges were debeveled with a #15 scalpel blade.  Given the location of the defect and the proximity to free margins a bilobe flap was deemed most appropriate.  Using a sterile surgical marker, an appropriate bilobe flap drawn around the defect.    The area thus outlined was incised deep to adipose tissue with a #15 scalpel blade.  The skin margins were undermined to an appropriate distance in all directions utilizing iris scissors. Eliptical Excision Additional Text (Leave Blank If You Do Not Want): The margin was drawn around the clinically apparent lesion.  An elliptical shape was then drawn on the skin incorporating the lesion and margins.  Incisions were then made along these lines to the appropriate tissue plane and the lesion was extirpated. Ear Star Wedge Flap Text: The defect edges were debeveled with a #15 blade scalpel.  Given the location of the defect and the proximity to free margins (helical rim) an ear star wedge flap was deemed most appropriate.  Using a sterile surgical marker, the appropriate flap was drawn incorporating the defect and placing the expected incisions between the helical rim and antihelix where possible.  The area thus outlined was incised through and through with a #15 scalpel blade. Melolabial Transposition Flap Text: The defect edges were debeveled with a #15 scalpel blade.  Given the location of the defect and the proximity to free margins a melolabial flap was deemed most appropriate.  Using a sterile surgical marker, an appropriate melolabial transposition flap was drawn incorporating the defect.    The area thus outlined was incised deep to adipose tissue with a #15 scalpel blade.  The skin margins were undermined to an appropriate distance in all directions utilizing iris scissors. Dermal Autograft Text: The defect edges were debeveled with a #15 scalpel blade.  Given the location of the defect, shape of the defect and the proximity to free margins a dermal autograft was deemed most appropriate.  Using a sterile surgical marker, the primary defect shape was transferred to the donor site. The area thus outlined was incised deep to adipose tissue with a #15 scalpel blade.  The harvested graft was then trimmed of adipose and epidermal tissue until only dermis was left.  The skin graft was then placed in the primary defect and oriented appropriately. Number Of Epidermal Sutures (Optional): 4 Cartilage Graft Text: The defect edges were debeveled with a #15 scalpel blade.  Given the location of the defect, shape of the defect, the fact the defect involved a full thickness cartilage defect a cartilage graft was deemed most appropriate.  An appropriate donor site was identified, cleansed, and anesthetized. The cartilage graft was then harvested and transferred to the recipient site, oriented appropriately and then sutured into place.  The secondary defect was then repaired using a primary closure. Purse String (Intermediate) Text: Given the location of the defect and the characteristics of the surrounding skin a pursestring intermediate closure was deemed most appropriate.  Undermining was performed circumfirentially around the surgical defect.  A purstring suture was then placed and tightened. Dorsal Nasal Flap Text: The defect edges were debeveled with a #15 scalpel blade.  Given the location of the defect and the proximity to free margins a dorsal nasal flap was deemed most appropriate.  Using a sterile surgical marker, an appropriate dorsal nasal flap was drawn around the defect.    The area thus outlined was incised deep to adipose tissue with a #15 scalpel blade.  The skin margins were undermined to an appropriate distance in all directions utilizing iris scissors. Complex Repair And Double Advancement Flap Text: The defect edges were debeveled with a #15 scalpel blade.  The primary defect was closed partially with a complex linear closure.  Given the location of the remaining defect, shape of the defect and the proximity to free margins a double advancement flap was deemed most appropriate for complete closure of the defect.  Using a sterile surgical marker, an appropriate advancement flap was drawn incorporating the defect and placing the expected incisions within the relaxed skin tension lines where possible.    The area thus outlined was incised deep to adipose tissue with a #15 scalpel blade.  The skin margins were undermined to an appropriate distance in all directions utilizing iris scissors. Complex Repair And Z Plasty Text: The defect edges were debeveled with a #15 scalpel blade.  The primary defect was closed partially with a complex linear closure.  Given the location of the remaining defect, shape of the defect and the proximity to free margins a Z plasty was deemed most appropriate for complete closure of the defect.  Using a sterile surgical marker, an appropriate advancement flap was drawn incorporating the defect and placing the expected incisions within the relaxed skin tension lines where possible.    The area thus outlined was incised deep to adipose tissue with a #15 scalpel blade.  The skin margins were undermined to an appropriate distance in all directions utilizing iris scissors. Spiral Flap Text: The defect edges were debeveled with a #15 scalpel blade.  Given the location of the defect, shape of the defect and the proximity to free margins a spiral flap was deemed most appropriate.  Using a sterile surgical marker, an appropriate rotation flap was drawn incorporating the defect and placing the expected incisions within the relaxed skin tension lines where possible. The area thus outlined was incised deep to adipose tissue with a #15 scalpel blade.  The skin margins were undermined to an appropriate distance in all directions utilizing iris scissors. Excision Depth: adipose tissue Deep Sutures: 3-0 Vicryl Complex Repair And V-Y Plasty Text: The defect edges were debeveled with a #15 scalpel blade.  The primary defect was closed partially with a complex linear closure.  Given the location of the remaining defect, shape of the defect and the proximity to free margins a V-Y plasty was deemed most appropriate for complete closure of the defect.  Using a sterile surgical marker, an appropriate advancement flap was drawn incorporating the defect and placing the expected incisions within the relaxed skin tension lines where possible.    The area thus outlined was incised deep to adipose tissue with a #15 scalpel blade.  The skin margins were undermined to an appropriate distance in all directions utilizing iris scissors. Rhombic Flap Text: The defect edges were debeveled with a #15 scalpel blade.  Given the location of the defect and the proximity to free margins a rhombic flap was deemed most appropriate.  Using a sterile surgical marker, an appropriate rhombic flap was drawn incorporating the defect.    The area thus outlined was incised deep to adipose tissue with a #15 scalpel blade.  The skin margins were undermined to an appropriate distance in all directions utilizing iris scissors. Keystone Flap Text: The defect edges were debeveled with a #15 scalpel blade.  Given the location of the defect, shape of the defect a keystone flap was deemed most appropriate.  Using a sterile surgical marker, an appropriate keystone flap was drawn incorporating the defect, outlining the appropriate donor tissue and placing the expected incisions within the relaxed skin tension lines where possible. The area thus outlined was incised deep to adipose tissue with a #15 scalpel blade.  The skin margins were undermined to an appropriate distance in all directions around the primary defect and laterally outward around the flap utilizing iris scissors. Complex Repair And A-T Advancement Flap Text: The defect edges were debeveled with a #15 scalpel blade.  The primary defect was closed partially with a complex linear closure.  Given the location of the remaining defect, shape of the defect and the proximity to free margins an A-T advancement flap was deemed most appropriate for complete closure of the defect.  Using a sterile surgical marker, an appropriate advancement flap was drawn incorporating the defect and placing the expected incisions within the relaxed skin tension lines where possible.    The area thus outlined was incised deep to adipose tissue with a #15 scalpel blade.  The skin margins were undermined to an appropriate distance in all directions utilizing iris scissors. O-T Advancement Flap Text: The defect edges were debeveled with a #15 scalpel blade.  Given the location of the defect, shape of the defect and the proximity to free margins an O-T advancement flap was deemed most appropriate.  Using a sterile surgical marker, an appropriate advancement flap was drawn incorporating the defect and placing the expected incisions within the relaxed skin tension lines where possible.    The area thus outlined was incised deep to adipose tissue with a #15 scalpel blade.  The skin margins were undermined to an appropriate distance in all directions utilizing iris scissors. Purse String (Simple) Text: Given the location of the defect and the characteristics of the surrounding skin a purse string simple closure was deemed most appropriate.  Undermining was performed circumferentially around the surgical defect.  A purse string suture was then placed and tightened. Complex Repair And Melolabial Flap Text: The defect edges were debeveled with a #15 scalpel blade.  The primary defect was closed partially with a complex linear closure.  Given the location of the remaining defect, shape of the defect and the proximity to free margins a melolabial flap was deemed most appropriate for complete closure of the defect.  Using a sterile surgical marker, an appropriate advancement flap was drawn incorporating the defect and placing the expected incisions within the relaxed skin tension lines where possible.    The area thus outlined was incised deep to adipose tissue with a #15 scalpel blade.  The skin margins were undermined to an appropriate distance in all directions utilizing iris scissors. Paramedian Forehead Flap Text: A decision was made to reconstruct the defect utilizing an interpolation axial flap and a staged reconstruction.  A telfa template was made of the defect.  This telfa template was then used to outline the paramedian forehead pedicle flap.  The donor area for the pedicle flap was then injected with anesthesia.  The flap was excised through the skin and subcutaneous tissue down to the layer of the underlying musculature.  The pedicle flap was carefully excised within this deep plane to maintain its blood supply.  The edges of the donor site were undermined.   The donor site was closed in a primary fashion.  The pedicle was then rotated into position and sutured.  Once the tube was sutured into place, adequate blood supply was confirmed with blanching and refill.  The pedicle was then wrapped with xeroform gauze and dressed appropriately with a telfa and gauze bandage to ensure continued blood supply and protect the attached pedicle. Posterior Auricular Interpolation Flap Text: A decision was made to reconstruct the defect utilizing an interpolation axial flap and a staged reconstruction.  A telfa template was made of the defect.  This telfa template was then used to outline the posterior auricular interpolation flap.  The donor area for the pedicle flap was then injected with anesthesia.  The flap was excised through the skin and subcutaneous tissue down to the layer of the underlying musculature.  The pedicle flap was carefully excised within this deep plane to maintain its blood supply.  The edges of the donor site were undermined.   The donor site was closed in a primary fashion.  The pedicle was then rotated into position and sutured.  Once the tube was sutured into place, adequate blood supply was confirmed with blanching and refill.  The pedicle was then wrapped with xeroform gauze and dressed appropriately with a telfa and gauze bandage to ensure continued blood supply and protect the attached pedicle. Crescentic Advancement Flap Text: The defect edges were debeveled with a #15 scalpel blade.  Given the location of the defect and the proximity to free margins a crescentic advancement flap was deemed most appropriate.  Using a sterile surgical marker, the appropriate advancement flap was drawn incorporating the defect and placing the expected incisions within the relaxed skin tension lines where possible.    The area thus outlined was incised deep to adipose tissue with a #15 scalpel blade.  The skin margins were undermined to an appropriate distance in all directions utilizing iris scissors. Path Notes (To The Dermatopathologist): please check margins: Composite Graft Text: The defect edges were debeveled with a #15 scalpel blade.  Given the location of the defect, shape of the defect, the proximity to free margins and the fact the defect was full thickness a composite graft was deemed most appropriate.  The defect was outline and then transferred to the donor site.  A full thickness graft was then excised from the donor site. The graft was then placed in the primary defect, oriented appropriately and then sutured into place.  The secondary defect was then repaired using a primary closure. Melolabial Interpolation Flap Text: A decision was made to reconstruct the defect utilizing an interpolation axial flap and a staged reconstruction.  A telfa template was made of the defect.  This telfa template was then used to outline the melolabial interpolation flap.  The donor area for the pedicle flap was then injected with anesthesia.  The flap was excised through the skin and subcutaneous tissue down to the layer of the underlying musculature.  The pedicle flap was carefully excised within this deep plane to maintain its blood supply.  The edges of the donor site were undermined.   The donor site was closed in a primary fashion.  The pedicle was then rotated into position and sutured.  Once the tube was sutured into place, adequate blood supply was confirmed with blanching and refill.  The pedicle was then wrapped with xeroform gauze and dressed appropriately with a telfa and gauze bandage to ensure continued blood supply and protect the attached pedicle. H Plasty Text: Given the location of the defect, shape of the defect and the proximity to free margins a H-plasty was deemed most appropriate for repair.  Using a sterile surgical marker, the appropriate advancement arms of the H-plasty were drawn incorporating the defect and placing the expected incisions within the relaxed skin tension lines where possible. The area thus outlined was incised deep to adipose tissue with a #15 scalpel blade. The skin margins were undermined to an appropriate distance in all directions utilizing iris scissors.  The opposing advancement arms were then advanced into place in opposite direction and anchored with interrupted buried subcutaneous sutures. Consent was obtained from the patient. The risks and benefits to therapy were discussed in detail. Specifically, the risks of infection, scarring, bleeding, prolonged wound healing, incomplete removal, allergy to anesthesia, nerve injury and recurrence were addressed. Prior to the procedure, the treatment site was clearly identified and confirmed by the patient. All components of Universal Protocol/PAUSE Rule completed. Trilobed Flap Text: The defect edges were debeveled with a #15 scalpel blade.  Given the location of the defect and the proximity to free margins a trilobed flap was deemed most appropriate.  Using a sterile surgical marker, an appropriate trilobed flap drawn around the defect.    The area thus outlined was incised deep to adipose tissue with a #15 scalpel blade.  The skin margins were undermined to an appropriate distance in all directions utilizing iris scissors. Interpolation Flap Text: A decision was made to reconstruct the defect utilizing an interpolation axial flap and a staged reconstruction.  A telfa template was made of the defect.  This telfa template was then used to outline the interpolation flap.  The donor area for the pedicle flap was then injected with anesthesia.  The flap was excised through the skin and subcutaneous tissue down to the layer of the underlying musculature.  The interpolation flap was carefully excised within this deep plane to maintain its blood supply.  The edges of the donor site were undermined.   The donor site was closed in a primary fashion.  The pedicle was then rotated into position and sutured.  Once the tube was sutured into place, adequate blood supply was confirmed with blanching and refill.  The pedicle was then wrapped with xeroform gauze and dressed appropriately with a telfa and gauze bandage to ensure continued blood supply and protect the attached pedicle. Island Pedicle Flap Text: The defect edges were debeveled with a #15 scalpel blade.  Given the location of the defect, shape of the defect and the proximity to free margins an island pedicle advancement flap was deemed most appropriate.  Using a sterile surgical marker, an appropriate advancement flap was drawn incorporating the defect, outlining the appropriate donor tissue and placing the expected incisions within the relaxed skin tension lines where possible.    The area thus outlined was incised deep to adipose tissue with a #15 scalpel blade.  The skin margins were undermined to an appropriate distance in all directions around the primary defect and laterally outward around the island pedicle utilizing iris scissors.  There was minimal undermining beneath the pedicle flap. Repair Performed By Another Provider Text (Leave Blank If You Do Not Want): After the tissue was excised the defect was repaired by another provider. Complex Repair And Dermal Autograft Text: The defect edges were debeveled with a #15 scalpel blade.  The primary defect was closed partially with a complex linear closure.  Given the location of the defect, shape of the defect and the proximity to free margins an dermal autograft was deemed most appropriate to repair the remaining defect.  The graft was trimmed to fit the size of the remaining defect.  The graft was then placed in the primary defect, oriented appropriately, and sutured into place. Mastoid Interpolation Flap Text: A decision was made to reconstruct the defect utilizing an interpolation axial flap and a staged reconstruction.  A telfa template was made of the defect.  This telfa template was then used to outline the mastoid interpolation flap.  The donor area for the pedicle flap was then injected with anesthesia.  The flap was excised through the skin and subcutaneous tissue down to the layer of the underlying musculature.  The pedicle flap was carefully excised within this deep plane to maintain its blood supply.  The edges of the donor site were undermined.   The donor site was closed in a primary fashion.  The pedicle was then rotated into position and sutured.  Once the tube was sutured into place, adequate blood supply was confirmed with blanching and refill.  The pedicle was then wrapped with xeroform gauze and dressed appropriately with a telfa and gauze bandage to ensure continued blood supply and protect the attached pedicle. Complex Repair And Modified Advancement Flap Text: The defect edges were debeveled with a #15 scalpel blade.  The primary defect was closed partially with a complex linear closure.  Given the location of the remaining defect, shape of the defect and the proximity to free margins a modified advancement flap was deemed most appropriate for complete closure of the defect.  Using a sterile surgical marker, an appropriate advancement flap was drawn incorporating the defect and placing the expected incisions within the relaxed skin tension lines where possible.    The area thus outlined was incised deep to adipose tissue with a #15 scalpel blade.  The skin margins were undermined to an appropriate distance in all directions utilizing iris scissors. Cheek Interpolation Flap Text: A decision was made to reconstruct the defect utilizing an interpolation axial flap and a staged reconstruction.  A telfa template was made of the defect.  This telfa template was then used to outline the Cheek Interpolation flap.  The donor area for the pedicle flap was then injected with anesthesia.  The flap was excised through the skin and subcutaneous tissue down to the layer of the underlying musculature.  The interpolation flap was carefully excised within this deep plane to maintain its blood supply.  The edges of the donor site were undermined.   The donor site was closed in a primary fashion.  The pedicle was then rotated into position and sutured.  Once the tube was sutured into place, adequate blood supply was confirmed with blanching and refill.  The pedicle was then wrapped with xeroform gauze and dressed appropriately with a telfa and gauze bandage to ensure continued blood supply and protect the attached pedicle. Lazy S Complex Repair Preamble Text (Leave Blank If You Do Not Want): Extensive wide undermining was performed. Home Suture Removal Text: Patient was provided a home suture removal kit and will remove their sutures at home.  If they have any questions or difficulties they will call the office. Dermal Closure: buried vertical mattress Billing Type: Third-Party Bill Perilesional Excision Additional Text (Leave Blank If You Do Not Want): The margin was drawn around the clinically apparent lesion. Incisions were then made along these lines to the appropriate tissue plane and the lesion was extirpated. No Repair - Repaired With Adjacent Surgical Defect Text (Leave Blank If You Do Not Want): After the excision the defect was repaired concurrently with another surgical defect which was in close approximation. Advancement Flap (Single) Text: The defect edges were debeveled with a #15 scalpel blade.  Given the location of the defect and the proximity to free margins a single advancement flap was deemed most appropriate.  Using a sterile surgical marker, an appropriate advancement flap was drawn incorporating the defect and placing the expected incisions within the relaxed skin tension lines where possible.    The area thus outlined was incised deep to adipose tissue with a #15 scalpel blade.  The skin margins were undermined to an appropriate distance in all directions utilizing iris scissors. Scalpel Size: 15 blade O-L Flap Text: The defect edges were debeveled with a #15 scalpel blade.  Given the location of the defect, shape of the defect and the proximity to free margins an O-L flap was deemed most appropriate.  Using a sterile surgical marker, an appropriate advancement flap was drawn incorporating the defect and placing the expected incisions within the relaxed skin tension lines where possible.    The area thus outlined was incised deep to adipose tissue with a #15 scalpel blade.  The skin margins were undermined to an appropriate distance in all directions utilizing iris scissors. Estimated Blood Loss (Cc): minimal Helical Rim Advancement Flap Text: The defect edges were debeveled with a #15 blade scalpel.  Given the location of the defect and the proximity to free margins (helical rim) a double helical rim advancement flap was deemed most appropriate.  Using a sterile surgical marker, the appropriate advancement flaps were drawn incorporating the defect and placing the expected incisions between the helical rim and antihelix where possible.  The area thus outlined was incised through and through with a #15 scalpel blade.  With a skin hook and iris scissors, the flaps were gently and sharply undermined and freed up. Hatchet Flap Text: The defect edges were debeveled with a #15 scalpel blade.  Given the location of the defect, shape of the defect and the proximity to free margins a hatchet flap was deemed most appropriate.  Using a sterile surgical marker, an appropriate hatchet flap was drawn incorporating the defect and placing the expected incisions within the relaxed skin tension lines where possible.    The area thus outlined was incised deep to adipose tissue with a #15 scalpel blade.  The skin margins were undermined to an appropriate distance in all directions utilizing iris scissors. Complex Repair And Ftsg Text: The defect edges were debeveled with a #15 scalpel blade.  The primary defect was closed partially with a complex linear closure.  Given the location of the defect, shape of the defect and the proximity to free margins a full thickness skin graft was deemed most appropriate to repair the remaining defect.  The graft was trimmed to fit the size of the remaining defect.  The graft was then placed in the primary defect, oriented appropriately, and sutured into place. Epidermal Autograft Text: The defect edges were debeveled with a #15 scalpel blade.  Given the location of the defect, shape of the defect and the proximity to free margins an epidermal autograft was deemed most appropriate.  Using a sterile surgical marker, the primary defect shape was transferred to the donor site. The epidermal graft was then harvested.  The skin graft was then placed in the primary defect and oriented appropriately. Complex Repair And Bilobe Flap Text: The defect edges were debeveled with a #15 scalpel blade.  The primary defect was closed partially with a complex linear closure.  Given the location of the remaining defect, shape of the defect and the proximity to free margins a bilobe flap was deemed most appropriate for complete closure of the defect.  Using a sterile surgical marker, an appropriate advancement flap was drawn incorporating the defect and placing the expected incisions within the relaxed skin tension lines where possible.    The area thus outlined was incised deep to adipose tissue with a #15 scalpel blade.  The skin margins were undermined to an appropriate distance in all directions utilizing iris scissors. Alar Island Pedicle Flap Text: The defect edges were debeveled with a #15 scalpel blade.  Given the location of the defect, shape of the defect and the proximity to the alar rim an island pedicle advancement flap was deemed most appropriate.  Using a sterile surgical marker, an appropriate advancement flap was drawn incorporating the defect, outlining the appropriate donor tissue and placing the expected incisions within the nasal ala running parallel to the alar rim. The area thus outlined was incised with a #15 scalpel blade.  The skin margins were undermined minimally to an appropriate distance in all directions around the primary defect and laterally outward around the island pedicle utilizing iris scissors.  There was minimal undermining beneath the pedicle flap. A-T Advancement Flap Text: The defect edges were debeveled with a #15 scalpel blade.  Given the location of the defect, shape of the defect and the proximity to free margins an A-T advancement flap was deemed most appropriate.  Using a sterile surgical marker, an appropriate advancement flap was drawn incorporating the defect and placing the expected incisions within the relaxed skin tension lines where possible.    The area thus outlined was incised deep to adipose tissue with a #15 scalpel blade.  The skin margins were undermined to an appropriate distance in all directions utilizing iris scissors. Complex Repair And Epidermal Autograft Text: The defect edges were debeveled with a #15 scalpel blade.  The primary defect was closed partially with a complex linear closure.  Given the location of the defect, shape of the defect and the proximity to free margins an epidermal autograft was deemed most appropriate to repair the remaining defect.  The graft was trimmed to fit the size of the remaining defect.  The graft was then placed in the primary defect, oriented appropriately, and sutured into place. Lip Wedge Excision Repair Text: Given the location of the defect and the proximity to free margins a full thickness wedge repair was deemed most appropriate.  Using a sterile surgical marker, the appropriate repair was drawn incorporating the defect and placing the expected incisions perpendicular to the vermillion border.  The vermillion border was also meticulously outlined to ensure appropriate reapproximation during the repair.  The area thus outlined was incised through and through with a #15 scalpel blade.  The muscularis and dermis were reaproximated with deep sutures following hemostasis. Care was taken to realign the vermillion border before proceeding with the superficial closure.  Once the vermillion was realigned the superfical and mucosal closure was finished. Anesthesia Volume In Cc: 5 Complex Repair And O-T Advancement Flap Text: The defect edges were debeveled with a #15 scalpel blade.  The primary defect was closed partially with a complex linear closure.  Given the location of the remaining defect, shape of the defect and the proximity to free margins an O-T advancement flap was deemed most appropriate for complete closure of the defect.  Using a sterile surgical marker, an appropriate advancement flap was drawn incorporating the defect and placing the expected incisions within the relaxed skin tension lines where possible.    The area thus outlined was incised deep to adipose tissue with a #15 scalpel blade.  The skin margins were undermined to an appropriate distance in all directions utilizing iris scissors. Rotation Flap Text: The defect edges were debeveled with a #15 scalpel blade.  Given the location of the defect, shape of the defect and the proximity to free margins a rotation flap was deemed most appropriate.  Using a sterile surgical marker, an appropriate rotation flap was drawn incorporating the defect and placing the expected incisions within the relaxed skin tension lines where possible.    The area thus outlined was incised deep to adipose tissue with a #15 scalpel blade.  The skin margins were undermined to an appropriate distance in all directions utilizing iris scissors. Burow's Advancement Flap Text: The defect edges were debeveled with a #15 scalpel blade.  Given the location of the defect and the proximity to free margins a Burow's advancement flap was deemed most appropriate.  Using a sterile surgical marker, the appropriate advancement flap was drawn incorporating the defect and placing the expected incisions within the relaxed skin tension lines where possible.    The area thus outlined was incised deep to adipose tissue with a #15 scalpel blade.  The skin margins were undermined to an appropriate distance in all directions utilizing iris scissors. Complex Repair And M Plasty Text: The defect edges were debeveled with a #15 scalpel blade.  The primary defect was closed partially with a complex linear closure.  Given the location of the remaining defect, shape of the defect and the proximity to free margins an M plasty was deemed most appropriate for complete closure of the defect.  Using a sterile surgical marker, an appropriate advancement flap was drawn incorporating the defect and placing the expected incisions within the relaxed skin tension lines where possible.    The area thus outlined was incised deep to adipose tissue with a #15 scalpel blade.  The skin margins were undermined to an appropriate distance in all directions utilizing iris scissors. Island Pedicle Flap With Canthal Suspension Text: The defect edges were debeveled with a #15 scalpel blade.  Given the location of the defect, shape of the defect and the proximity to free margins an island pedicle advancement flap was deemed most appropriate.  Using a sterile surgical marker, an appropriate advancement flap was drawn incorporating the defect, outlining the appropriate donor tissue and placing the expected incisions within the relaxed skin tension lines where possible. The area thus outlined was incised deep to adipose tissue with a #15 scalpel blade.  The skin margins were undermined to an appropriate distance in all directions around the primary defect and laterally outward around the island pedicle utilizing iris scissors.  There was minimal undermining beneath the pedicle flap. A suspension suture was placed in the canthal tendon to prevent tension and prevent ectropion. S Plasty Text: Given the location and shape of the defect, and the orientation of relaxed skin tension lines, an S-plasty was deemed most appropriate for repair.  Using a sterile surgical marker, the appropriate outline of the S-plasty was drawn, incorporating the defect and placing the expected incisions within the relaxed skin tension lines where possible.  The area thus outlined was incised deep to adipose tissue with a #15 scalpel blade.  The skin margins were undermined to an appropriate distance in all directions utilizing iris scissors. The skin flaps were advanced over the defect.  The opposing margins were then approximated with interrupted buried subcutaneous sutures. W Plasty Text: The lesion was extirpated to the level of the fat with a #15 scalpel blade.  Given the location of the defect, shape of the defect and the proximity to free margins a W-plasty was deemed most appropriate for repair.  Using a sterile surgical marker, the appropriate transposition arms of the W-plasty were drawn incorporating the defect and placing the expected incisions within the relaxed skin tension lines where possible.    The area thus outlined was incised deep to adipose tissue with a #15 scalpel blade.  The skin margins were undermined to an appropriate distance in all directions utilizing iris scissors.  The opposing transposition arms were then transposed into place in opposite direction and anchored with interrupted buried subcutaneous sutures. Xenograft Text: The defect edges were debeveled with a #15 scalpel blade.  Given the location of the defect, shape of the defect and the proximity to free margins a xenograft was deemed most appropriate.  The graft was then trimmed to fit the size of the defect.  The graft was then placed in the primary defect and oriented appropriately. Ftsg Text: The defect edges were debeveled with a #15 scalpel blade.  Given the location of the defect, shape of the defect and the proximity to free margins a full thickness skin graft was deemed most appropriate.  Using a sterile surgical marker, the primary defect shape was transferred to the donor site. The area thus outlined was incised deep to adipose tissue with a #15 scalpel blade.  The harvested graft was then trimmed of adipose tissue until only dermis and epidermis was left.  The skin margins of the secondary defect were undermined to an appropriate distance in all directions utilizing iris scissors.  The secondary defect was closed with interrupted buried subcutaneous sutures.  The skin edges were then re-apposed with running  sutures.  The skin graft was then placed in the primary defect and oriented appropriately. Cheek-To-Nose Interpolation Flap Text: A decision was made to reconstruct the defect utilizing an interpolation axial flap and a staged reconstruction.  A telfa template was made of the defect.  This telfa template was then used to outline the Cheek-To-Nose Interpolation flap.  The donor area for the pedicle flap was then injected with anesthesia.  The flap was excised through the skin and subcutaneous tissue down to the layer of the underlying musculature.  The interpolation flap was carefully excised within this deep plane to maintain its blood supply.  The edges of the donor site were undermined.   The donor site was closed in a primary fashion.  The pedicle was then rotated into position and sutured.  Once the tube was sutured into place, adequate blood supply was confirmed with blanching and refill.  The pedicle was then wrapped with xeroform gauze and dressed appropriately with a telfa and gauze bandage to ensure continued blood supply and protect the attached pedicle. Graft Donor Site Bandage (Optional-Leave Blank If You Don't Want In Note): Steri-strips and a pressure bandage were applied to the donor site. Medical Necessity Clause: This procedure was medically necessary because the lesion that was treated was: Split-Thickness Skin Graft Text: The defect edges were debeveled with a #15 scalpel blade.  Given the location of the defect, shape of the defect and the proximity to free margins a split thickness skin graft was deemed most appropriate.  Using a sterile surgical marker, the primary defect shape was transferred to the donor site. The split thickness graft was then harvested.  The skin graft was then placed in the primary defect and oriented appropriately. Repair Type: Intermediate Complex Repair And O-L Flap Text: The defect edges were debeveled with a #15 scalpel blade.  The primary defect was closed partially with a complex linear closure.  Given the location of the remaining defect, shape of the defect and the proximity to free margins an O-L flap was deemed most appropriate for complete closure of the defect.  Using a sterile surgical marker, an appropriate flap was drawn incorporating the defect and placing the expected incisions within the relaxed skin tension lines where possible.    The area thus outlined was incised deep to adipose tissue with a #15 scalpel blade.  The skin margins were undermined to an appropriate distance in all directions utilizing iris scissors. O-T Plasty Text: The defect edges were debeveled with a #15 scalpel blade.  Given the location of the defect, shape of the defect and the proximity to free margins an O-T plasty was deemed most appropriate.  Using a sterile surgical marker, an appropriate O-T plasty was drawn incorporating the defect and placing the expected incisions within the relaxed skin tension lines where possible.    The area thus outlined was incised deep to adipose tissue with a #15 scalpel blade.  The skin margins were undermined to an appropriate distance in all directions utilizing iris scissors. V-Y Plasty Text: The defect edges were debeveled with a #15 scalpel blade.  Given the location of the defect, shape of the defect and the proximity to free margins an V-Y advancement flap was deemed most appropriate.  Using a sterile surgical marker, an appropriate advancement flap was drawn incorporating the defect and placing the expected incisions within the relaxed skin tension lines where possible.    The area thus outlined was incised deep to adipose tissue with a #15 scalpel blade.  The skin margins were undermined to an appropriate distance in all directions utilizing iris scissors. O-Z Plasty Text: The defect edges were debeveled with a #15 scalpel blade.  Given the location of the defect, shape of the defect and the proximity to free margins an O-Z plasty (double transposition flap) was deemed most appropriate.  Using a sterile surgical marker, the appropriate transposition flaps were drawn incorporating the defect and placing the expected incisions within the relaxed skin tension lines where possible.    The area thus outlined was incised deep to adipose tissue with a #15 scalpel blade.  The skin margins were undermined to an appropriate distance in all directions utilizing iris scissors.  Hemostasis was achieved with electrocautery.  The flaps were then transposed into place, one clockwise and the other counterclockwise, and anchored with interrupted buried subcutaneous sutures. Mucosal Advancement Flap Text: Given the location of the defect, shape of the defect and the proximity to free margins a mucosal advancement flap was deemed most appropriate. Incisions were made with a 15 blade scalpel in the appropriate fashion along the cutaneous vermillion border and the mucosal lip. The remaining actinically damaged mucosal tissue was excised.  The mucosal advancement flap was then elevated to the gingival sulcus with care taken to preserve the neurovascular structures and advanced into the primary defect. Care was taken to ensure that precise realignment of the vermillion border was achieved. Complex Repair And Double M Plasty Text: The defect edges were debeveled with a #15 scalpel blade.  The primary defect was closed partially with a complex linear closure.  Given the location of the remaining defect, shape of the defect and the proximity to free margins a double M plasty was deemed most appropriate for complete closure of the defect.  Using a sterile surgical marker, an appropriate advancement flap was drawn incorporating the defect and placing the expected incisions within the relaxed skin tension lines where possible.    The area thus outlined was incised deep to adipose tissue with a #15 scalpel blade.  The skin margins were undermined to an appropriate distance in all directions utilizing iris scissors. Complex Repair And Dorsal Nasal Flap Text: The defect edges were debeveled with a #15 scalpel blade.  The primary defect was closed partially with a complex linear closure.  Given the location of the remaining defect, shape of the defect and the proximity to free margins a dorsal nasal flap was deemed most appropriate for complete closure of the defect.  Using a sterile surgical marker, an appropriate flap was drawn incorporating the defect and placing the expected incisions within the relaxed skin tension lines where possible.    The area thus outlined was incised deep to adipose tissue with a #15 scalpel blade.  The skin margins were undermined to an appropriate distance in all directions utilizing iris scissors. Slit Excision Additional Text (Leave Blank If You Do Not Want): A linear line was drawn on the skin overlying the lesion. An incision was made slowly until the lesion was visualized.  Once visualized, the lesion was removed with blunt dissection. Skin Substitute Text: The defect edges were debeveled with a #15 scalpel blade.  Given the location of the defect, shape of the defect and the proximity to free margins a skin substitute graft was deemed most appropriate.  The graft material was trimmed to fit the size of the defect. The graft was then placed in the primary defect and oriented appropriately. Fusiform Excision Additional Text (Leave Blank If You Do Not Want): The margin was drawn around the clinically apparent lesion.  A fusiform shape was then drawn on the skin incorporating the lesion and margins.  Incisions were then made along these lines to the appropriate tissue plane and the lesion was extirpated. Complex Repair And Split-Thickness Skin Graft Text: The defect edges were debeveled with a #15 scalpel blade.  The primary defect was closed partially with a complex linear closure.  Given the location of the defect, shape of the defect and the proximity to free margins a split thickness skin graft was deemed most appropriate to repair the remaining defect.  The graft was trimmed to fit the size of the remaining defect.  The graft was then placed in the primary defect, oriented appropriately, and sutured into place. Complex Repair And Xenograft Text: The defect edges were debeveled with a #15 scalpel blade.  The primary defect was closed partially with a complex linear closure.  Given the location of the defect, shape of the defect and the proximity to free margins an tissue cultured epidermal autograft was deemed most appropriate to repair the remaining defect.  The graft was trimmed to fit the size of the remaining defect.  The graft was then placed in the primary defect, oriented appropriately, and sutured into place. Complex Repair And W Plasty Text: The defect edges were debeveled with a #15 scalpel blade.  The primary defect was closed partially with a complex linear closure.  Given the location of the remaining defect, shape of the defect and the proximity to free margins a W plasty was deemed most appropriate for complete closure of the defect.  Using a sterile surgical marker, an appropriate advancement flap was drawn incorporating the defect and placing the expected incisions within the relaxed skin tension lines where possible.    The area thus outlined was incised deep to adipose tissue with a #15 scalpel blade.  The skin margins were undermined to an appropriate distance in all directions utilizing iris scissors. Star Wedge Flap Text: The defect edges were debeveled with a #15 scalpel blade.  Given the location of the defect, shape of the defect and the proximity to free margins a star wedge flap was deemed most appropriate.  Using a sterile surgical marker, an appropriate rotation flap was drawn incorporating the defect and placing the expected incisions within the relaxed skin tension lines where possible. The area thus outlined was incised deep to adipose tissue with a #15 scalpel blade.  The skin margins were undermined to an appropriate distance in all directions utilizing iris scissors. Complex Repair And Single Advancement Flap Text: The defect edges were debeveled with a #15 scalpel blade.  The primary defect was closed partially with a complex linear closure.  Given the location of the remaining defect, shape of the defect and the proximity to free margins a single advancement flap was deemed most appropriate for complete closure of the defect.  Using a sterile surgical marker, an appropriate advancement flap was drawn incorporating the defect and placing the expected incisions within the relaxed skin tension lines where possible.    The area thus outlined was incised deep to adipose tissue with a #15 scalpel blade.  The skin margins were undermined to an appropriate distance in all directions utilizing iris scissors. Additional Epidermal Closure (Optional): vertical mattress Bilateral Helical Rim Advancement Flap Text: The defect edges were debeveled with a #15 blade scalpel.  Given the location of the defect and the proximity to free margins (helical rim) a bilateral helical rim advancement flap was deemed most appropriate.  Using a sterile surgical marker, the appropriate advancement flaps were drawn incorporating the defect and placing the expected incisions between the helical rim and antihelix where possible.  The area thus outlined was incised through and through with a #15 scalpel blade.  With a skin hook and iris scissors, the flaps were gently and sharply undermined and freed up. Bilobed Transposition Flap Text: The defect edges were debeveled with a #15 scalpel blade.  Given the location of the defect and the proximity to free margins a bilobed transposition flap was deemed most appropriate.  Using a sterile surgical marker, an appropriate bilobe flap drawn around the defect.    The area thus outlined was incised deep to adipose tissue with a #15 scalpel blade.  The skin margins were undermined to an appropriate distance in all directions utilizing iris scissors. Detail Level: Detailed Number Of Deep Sutures (Optional): 3 Surgeon Performing Repair (Optional): Renny Z Plasty Text: The lesion was extirpated to the level of the fat with a #15 scalpel blade.  Given the location of the defect, shape of the defect and the proximity to free margins a Z-plasty was deemed most appropriate for repair.  Using a sterile surgical marker, the appropriate transposition arms of the Z-plasty were drawn incorporating the defect and placing the expected incisions within the relaxed skin tension lines where possible.    The area thus outlined was incised deep to adipose tissue with a #15 scalpel blade.  The skin margins were undermined to an appropriate distance in all directions utilizing iris scissors.  The opposing transposition arms were then transposed into place in opposite direction and anchored with interrupted buried subcutaneous sutures. Suture Removal: 10 days V-Y Flap Text: The defect edges were debeveled with a #15 scalpel blade.  Given the location of the defect, shape of the defect and the proximity to free margins a V-Y flap was deemed most appropriate.  Using a sterile surgical marker, an appropriate advancement flap was drawn incorporating the defect and placing the expected incisions within the relaxed skin tension lines where possible.    The area thus outlined was incised deep to adipose tissue with a #15 scalpel blade.  The skin margins were undermined to an appropriate distance in all directions utilizing iris scissors. Hemostasis: Electrocautery

## 2018-09-21 DIAGNOSIS — B00.9 HERPES SIMPLEX VIRUS INFECTION: ICD-10-CM

## 2018-09-21 NOTE — TELEPHONE ENCOUNTER
"Requested Prescriptions   Pending Prescriptions Disp Refills     acyclovir (ZOVIRAX) 400 MG tablet [Pharmacy Med Name: ACYCLOVIR 400MG TABLETS]  Last Written Prescription Date:  06/05/18  Last Fill Quantity: 90,  # refills: 0   Last Office Visit with NAYANA, DARNELL or [facility] prescribing provider:  07/13/18Juan   Future Office Visit:    90 tablet 0     Sig: TAKE 1 TABLET BY MOUTH DAILY    Antivirals for Herpes Protocol Passed    9/21/2018 10:16 AM       Passed - Patient is age 12 or older       Passed - Recent (12 mo) or future (30 days) visit within the authorizing provider's specialty    Patient had office visit in the last 12 months or has a visit in the next 30 days with authorizing provider or within the authorizing provider's specialty.  See \"Patient Info\" tab in inbasket, or \"Choose Columns\" in Meds & Orders section of the refill encounter.           Passed - Normal serum creatinine on file in past 12 months    Recent Labs   Lab Test  06/30/18   0641   CR  0.63               "

## 2018-09-24 ENCOUNTER — MYC MEDICAL ADVICE (OUTPATIENT)
Dept: FAMILY MEDICINE | Facility: CLINIC | Age: 83
End: 2018-09-24

## 2018-09-24 DIAGNOSIS — Z96.649 STATUS POST HIP REPLACEMENT, UNSPECIFIED LATERALITY: Primary | ICD-10-CM

## 2018-09-24 RX ORDER — ACYCLOVIR 400 MG/1
TABLET ORAL
Qty: 90 TABLET | Refills: 0 | Status: SHIPPED | OUTPATIENT
Start: 2018-09-24 | End: 2018-12-10

## 2018-09-24 NOTE — TELEPHONE ENCOUNTER
[employer] message;     Please send orders for pool use at [facility], now that I'm 3 months out after the hip surgery.    [facility]  [facility]    Fax: [phone]    Thanks, Brenda

## 2018-09-24 NOTE — TELEPHONE ENCOUNTER
Prescription approved per [facility] [place].    Checo Navarrete RN, BSN

## 2018-10-10 ENCOUNTER — MYC MEDICAL ADVICE (OUTPATIENT)
Dept: FAMILY MEDICINE | Facility: CLINIC | Age: 83
End: 2018-10-10

## 2018-10-10 DIAGNOSIS — Z96.652 STATUS POST TOTAL LEFT KNEE REPLACEMENT: ICD-10-CM

## 2018-10-10 DIAGNOSIS — M05.79 RHEUMATOID ARTHRITIS INVOLVING MULTIPLE SITES WITH POSITIVE RHEUMATOID FACTOR (H): Primary | ICD-10-CM

## 2018-10-10 DIAGNOSIS — Z96.642 STATUS POST TOTAL REPLACEMENT OF LEFT HIP: ICD-10-CM

## 2018-10-11 NOTE — TELEPHONE ENCOUNTER
Please refax the referral to [facility].  [facility] rehab orders for pool therapy 9/24 but they need to be resent please.    Fax [phone]    Phone [phone]    Liudmila Bowie MD

## 2018-10-11 NOTE — TELEPHONE ENCOUNTER
Both referrals for Physical Therapy is printed and faxed to [facility] at fax # [phone].  Sundeep Martínez,  [employer] [facility] [facility]

## 2018-10-16 ENCOUNTER — TRANSFERRED RECORDS (OUTPATIENT)
Dept: HEALTH INFORMATION MANAGEMENT | Facility: CLINIC | Age: 83
End: 2018-10-16

## 2018-10-19 RX ORDER — SIMVASTATIN 40 MG
TABLET ORAL
Qty: 90 TABLET | Refills: 0 | OUTPATIENT
Start: 2018-10-19

## 2018-10-23 ENCOUNTER — TELEPHONE (OUTPATIENT)
Dept: ORTHOPEDICS | Facility: CLINIC | Age: 83
End: 2018-10-23

## 2018-10-23 DIAGNOSIS — Z96.642 STATUS POST LEFT HIP REPLACEMENT: Primary | ICD-10-CM

## 2018-10-23 RX ORDER — AMOXICILLIN 500 MG/1
2000 CAPSULE ORAL ONCE
Qty: 4 CAPSULE | Refills: 3 | Status: SHIPPED | OUTPATIENT
Start: 2018-10-23 | End: 2019-03-08

## 2018-10-23 NOTE — TELEPHONE ENCOUNTER
[facility] Call Center    Phone Message    May a detailed message be left on voicemail: Yes    Reason for Call: Other: Pt requesting a call back. States she had hip surgery in June and is supposed to wait a certain amount of time before getting dental work done. States she needs a bridge replaced and would like to know if she is able to get that done. Please advise. [phone]     Action Taken: Message routed to:  Adult Clinics: Orthopedics p [phone]

## 2018-10-23 NOTE — TELEPHONE ENCOUNTER
Called and talked with patient, she relays she broke her bridge and needs to get dental work done.  Let her know this would be ok for her to get this dental work.  Amoxicillin approved for patient, prescription sent to pharmacy.  Mehnaz Denton RN

## 2018-10-25 ENCOUNTER — TRANSFERRED RECORDS (OUTPATIENT)
Dept: HEALTH INFORMATION MANAGEMENT | Facility: CLINIC | Age: 83
End: 2018-10-25

## 2018-11-11 ENCOUNTER — MYC MEDICAL ADVICE (OUTPATIENT)
Dept: ORTHOPEDICS | Facility: CLINIC | Age: 83
End: 2018-11-11

## 2018-11-12 NOTE — TELEPHONE ENCOUNTER
Called and talked with patient, she relays she is having pain with increased activity, not daily and does not limit her activities.  No fever or decrease in ROM.  She was wondering about coming in for an appointment, let her know that yes we will have her come in appointment made for November 27th.  Mehnaz Denton RN

## 2018-11-13 ENCOUNTER — OFFICE VISIT (OUTPATIENT)
Dept: FAMILY MEDICINE | Facility: CLINIC | Age: 83
End: 2018-11-13
Payer: COMMERCIAL

## 2018-11-13 VITALS
HEIGHT: 62 IN | RESPIRATION RATE: 16 BRPM | DIASTOLIC BLOOD PRESSURE: 70 MMHG | BODY MASS INDEX: 29.74 KG/M2 | OXYGEN SATURATION: 97 % | TEMPERATURE: 97.4 F | SYSTOLIC BLOOD PRESSURE: 124 MMHG | WEIGHT: 161.6 LBS | HEART RATE: 66 BPM

## 2018-11-13 DIAGNOSIS — E78.5 HYPERLIPIDEMIA LDL GOAL <130: ICD-10-CM

## 2018-11-13 DIAGNOSIS — I10 HYPERTENSION, GOAL BELOW 140/90: ICD-10-CM

## 2018-11-13 DIAGNOSIS — M05.79 RHEUMATOID ARTHRITIS INVOLVING MULTIPLE SITES WITH POSITIVE RHEUMATOID FACTOR (H): ICD-10-CM

## 2018-11-13 DIAGNOSIS — D62 ANEMIA DUE TO BLOOD LOSS, ACUTE: Primary | ICD-10-CM

## 2018-11-13 LAB
BASOPHILS # BLD AUTO: 0 10E9/L (ref 0–0.2)
BASOPHILS NFR BLD AUTO: 0.2 %
DIFFERENTIAL METHOD BLD: ABNORMAL
EOSINOPHIL # BLD AUTO: 0.1 10E9/L (ref 0–0.7)
EOSINOPHIL NFR BLD AUTO: 1.6 %
ERYTHROCYTE [DISTWIDTH] IN BLOOD BY AUTOMATED COUNT: 19.5 % (ref 10–15)
FERRITIN SERPL-MCNC: 31 NG/ML (ref 8–252)
HCT VFR BLD AUTO: 36.8 % (ref 35–47)
HGB BLD-MCNC: 11.6 G/DL (ref 11.7–15.7)
IRON SATN MFR SERPL: 21 % (ref 15–46)
IRON SERPL-MCNC: 84 UG/DL (ref 35–180)
LYMPHOCYTES # BLD AUTO: 1.3 10E9/L (ref 0.8–5.3)
LYMPHOCYTES NFR BLD AUTO: 25.7 %
MCH RBC QN AUTO: 28.8 PG (ref 26.5–33)
MCHC RBC AUTO-ENTMCNC: 31.5 G/DL (ref 31.5–36.5)
MCV RBC AUTO: 91 FL (ref 78–100)
MONOCYTES # BLD AUTO: 0.5 10E9/L (ref 0–1.3)
MONOCYTES NFR BLD AUTO: 10.3 %
NEUTROPHILS # BLD AUTO: 3.2 10E9/L (ref 1.6–8.3)
NEUTROPHILS NFR BLD AUTO: 62.2 %
PLATELET # BLD AUTO: 304 10E9/L (ref 150–450)
RBC # BLD AUTO: 4.03 10E12/L (ref 3.8–5.2)
RETICS # AUTO: 47.6 10E9/L (ref 25–95)
RETICS/RBC NFR AUTO: 1.2 % (ref 0.5–2)
TIBC SERPL-MCNC: 409 UG/DL (ref 240–430)
WBC # BLD AUTO: 5.1 10E9/L (ref 4–11)

## 2018-11-13 PROCEDURE — 83550 IRON BINDING TEST: CPT | Performed by: FAMILY MEDICINE

## 2018-11-13 PROCEDURE — 82728 ASSAY OF FERRITIN: CPT | Performed by: FAMILY MEDICINE

## 2018-11-13 PROCEDURE — 85045 AUTOMATED RETICULOCYTE COUNT: CPT | Performed by: FAMILY MEDICINE

## 2018-11-13 PROCEDURE — 99214 OFFICE O/P EST MOD 30 MIN: CPT | Performed by: FAMILY MEDICINE

## 2018-11-13 PROCEDURE — 83540 ASSAY OF IRON: CPT | Performed by: FAMILY MEDICINE

## 2018-11-13 PROCEDURE — 36415 COLL VENOUS BLD VENIPUNCTURE: CPT | Performed by: FAMILY MEDICINE

## 2018-11-13 PROCEDURE — 85025 COMPLETE CBC W/AUTO DIFF WBC: CPT | Performed by: FAMILY MEDICINE

## 2018-11-13 ASSESSMENT — PAIN SCALES - GENERAL: PAINLEVEL: MILD PAIN (2)

## 2018-11-13 NOTE — PROGRESS NOTES
SUBJECTIVE:   Brenda Barker is a 83 year old female who presents to clinic today for the following health issues:      Follow up on Hemoglobin low in July after surgery on her hip. Was 10.5 before surgery and 10.0 on 10- in rheumatology office. She did not return to clinic for labs. Had normal B-12 and Folate results earlier this year. Taking ferritin 325 mg daily with some constipation.     Hyperlipidemia Follow-Up      Rate your low fat/cholesterol diet?: good    Taking statin?  Yes, no muscle aches from statin    Other lipid medications/supplements?:  none    Hypertension Follow-up      Outpatient blood pressures are not being checked.    Low Salt Diet: no added salt      Problem list and histories reviewed & adjusted, as indicated.  Additional history: as documented    Patient Active Problem List   Diagnosis     Benign neoplasm of cranial nerve (H)     Hemorrhoids     Aortic valve disorder     Cervicalgia     Rheumatoid arthritis (H)     SNHL (sensorineural hearing loss)     Status post total left knee replacement     Obesity     High risk medication use     Gastroesophageal reflux disease with esophagitis     Hypertension, goal below 140/90     Hyperlipidemia LDL goal <130     Rapid heart rate     Resting tremor     ACP (advance care planning)     Status post total replacement of left hip     Past Surgical History:   Procedure Laterality Date     ARTHROPLASTY HIP Left 6/27/2018    Procedure: ARTHROPLASTY HIP;  Left Total Hip Arthroplasty  ;  Surgeon: Keo Priest MD;  Location: [facility] OR     BACK SURGERY  2011    Fusion L3-4 & decompression; [facility]     BIOPSY  2013    Thyroid - normal result     C DEXA, BONE DENSITY, AXIAL SKEL  7/03 7/03 L1-4 1.7, FemNkL-0.8,R-1.2, FArm Px 0.2,Dist-0.3     C REMOVAL OF OVARY(S)  1980    2nd ovary removed - endometriosis     C TOTAL ABDOM HYSTERECTOMY  1972    uterus, ovary removed - fibroid     EYE SURGERY  2010 & 11    Cataract surgery     HC COLONOSCOPY  THRU STOMA W BIOPSY/CAUTERY TUMOR/POLYP/LESION  1993    adenomatous polyp     HC COLONOSCOPY THRU STOMA, DIAGNOSTIC  1998, 10/03    normal, '03 rec repeat in 5 yrs due to +hx polyp and + fam hx     HC FLEX SIGMOIDOSCOPY W/WO KARYNA SPEC BY BRUSH/WASH  12/2001     HC REMOVAL OF TONSILS,<13 Y/O  1942     ORTHOPEDIC SURGERY  2008    Left knee replacement     SURGICAL HISTORY OF -   1989    basal cell Ca scalp excised     SURGICAL HISTORY OF -   1998    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       Social History   Substance Use Topics     Smoking status: Never Smoker     Smokeless tobacco: Never Used     Alcohol use Yes      Comment: occasionally     Family History   Problem Relation Age of Onset     GASTROINTESTINAL DISEASE Father      bleeding ulcer     Cancer Mother      thyroid cancer     Other Cancer Mother      thyroid & throat     Osteoporosis Mother      Thyroid Disease Mother      Diabetes Paternal Grandmother      Cerebrovascular Disease Paternal Grandmother      Colon Cancer Maternal Half-Brother      Osteoporosis Maternal Grandmother          Current Outpatient Prescriptions   Medication Sig Dispense Refill     Acetaminophen (TYLENOL PO) Take 650 mg by mouth every 6 hours as needed for mild pain or fever       acyclovir (ZOVIRAX) 400 MG tablet TAKE 1 TABLET BY MOUTH DAILY 90 tablet 0     CENTRUM SILVER OR TABS 1 TABLET DAILY       Cetirizine HCl (ZYRTEC ALLERGY PO) Take 1 tablet by mouth daily       Cholecalciferol (VITAMIN D-3) 1000 UNITS CAPS Take 2,000 Int'l Units by mouth daily        ferrous gluconate (FERGON) 324 (38 Fe) MG tablet Take 1 tablet (324 mg) by mouth every other day 100 tablet 1     FISH OIL 1000 MG OR CAPS 2 CAPSULES DAILY  (? DOSE) OTC --     FOLIC ACID 1 MG OR TABS 4 mg TABS DAILY       methotrexate 2.5 MG tablet CHEMO Take 6 tablets (15 mg) by mouth every 7 days       metoprolol succinate (TOPROL-XL) 50 MG 24 hr tablet TAKE 1 TABLET BY MOUTH ONCE DAILY 90 tablet 1      nitroFURantoin macrocrystal (MACRODANTIN) 100 MG ED starter pack Take 1 capsule (100 mg) by mouth daily 28 Package 3     omeprazole (PRILOSEC) 20 MG CR capsule TAKE 1 CAPSULE(20 MG) BY MOUTH DAILY 90 capsule 2     polyvinyl alcohol (LIQUID TEARS) 1.4 % ophthalmic solution 1 drop as needed.       senna-docusate (SENOKOT-S;PERICOLACE) 8.6-50 MG per tablet Take 1-2 tablets by mouth 2 times daily 100 tablet      simvastatin (ZOCOR) 20 MG tablet Take 1 tablet (20 mg) by mouth At Bedtime 90 tablet 3     meloxicam (MOBIC) 7.5 MG tablet TAKE 1 TABLET BY MOUTH ONCE DAILY (Patient not taking: Reported on 11/13/2018) 90 tablet 0     Allergies   Allergen Reactions     Diatrizoate Hives and Shortness Of Breath     Ciprofloxacin      nausea     Contrast Dye      Hives,anaphylaxix     Oxycodone Other (See Comments)     hallucinations     Sulfasalazine      Other reaction(s): Rash     Recent Labs   Lab Test  06/30/18   0641  06/29/18   0721  06/11/18 12/19/17   1223   12/15/16   1129   12/07/15   1035   LDL   --    --    --    --   77   --   115*   --   100*   HDL   --    --    --    --   64   --   70   --   59   TRIG   --    --    --    --   131   --   119   --   150*   CR  0.63  0.69   < >   --   0.68   < >   --    < >  0.73   GFRESTIMATED  >90  81   < >   --   83   --    --    --    --    GFRESTBLACK  >90  >90   < >   --   >90   < >   --    < >  >60   POTASSIUM  4.0  3.6   < >   --   4.4   < >   --    --   3.5   TSH   --    --    --   1.05   --    --    --    --    --     < > = values in this interval not displayed.      BP Readings from Last 3 Encounters:   11/13/18 124/70   08/07/18 135/59   07/13/18 134/64    Wt Readings from Last 3 Encounters:   11/13/18 161 lb 9.6 oz (73.3 kg)   07/13/18 165 lb (74.8 kg)   06/27/18 165 lb 9.1 oz (75.1 kg)                  Labs reviewed in EPIC    Reviewed and updated as needed this visit by clinical staff  Tobacco  Allergies  Meds  Med Hx  Surg Hx  Fam Hx  Soc Hx      Reviewed and  "updated as needed this visit by Provider         ROS:  Constitutional, HEENT, cardiovascular, pulmonary, gi and gu systems are negative, except as otherwise noted.    OBJECTIVE:     /70  Pulse 66  Temp 97.4  F (36.3  C) (Oral)  Resp 16  Ht 5' 2\" (1.575 m)  Wt 161 lb 9.6 oz (73.3 kg)  LMP 05/17/1972  SpO2 97%  BMI 29.56 kg/m2  Body mass index is 29.56 kg/(m^2).  GENERAL: elderly, alert, well nourished, well hydrated, no distress  HENT: ears: did not examine; Nose- normal; Mouth- no ulcers, no lesions, missing dentition  NECK: no tenderness, no adenopathy, no asymmetry, no masses, no stiffness; thyroid- normal to palpation  RESP: lungs clear to auscultation - no rales, no rhonchi, no wheezes  CV: regular rates and rhythm, normal S1 S2, no S3 or S4 and no murmur, no click or rub, normal pulses  ABDOMEN: soft, no tenderness, no  hepatosplenomegaly, no masses, normal bowel sounds  MS: extremities- no gross deformities noted, no edema  SKIN: no suspicious lesions, no rashes, age related skin changes with seborrheic keratosis and no actinic keratosis.    NEURO: strength and tone- normal, sensory exam- grossly normal, reflexes- symmetric  BACK: no CVA tenderness, no paralumbar tenderness  MENTAL STATUS EXAM:  Appearance/Behavior: no apparent distress, neatly groomed, dressed appropriately for weather, appears stated age and is not frail-appearing  Speech: normal  Mood/Affect: normal affect  Insight: Excellent     Diagnostic Test Results:  No results found for this or any previous visit (from the past 24 hour(s)).    ASSESSMENT/PLAN:         Tobacco Cessation:   reports that she has never smoked. She has never used smokeless tobacco.      BMI:   Estimated body mass index is 29.56 kg/(m^2) as calculated from the following:    Height as of this encounter: 5' 2\" (1.575 m).    Weight as of this encounter: 161 lb 9.6 oz (73.3 kg).           ICD-10-CM    1. Anemia due to blood loss, acute- may be due to ongoing GI " losses on meloxicam. Rheumatologist stopped this medication and patient has been taking iron daily since her hip surgery for iron def anemia. May go to every other day on iron to improve absorption.  D62 Ferritin     Iron and iron binding capacity     Reticulocyte count     CBC with platelets differential   2. Hypertension, goal below 140/90 I10 Well controlled on medications    3. Hyperlipidemia LDL goal <130 E78.5 Stable and due for follow up this spring   4. Rheumatoid arthritis involving multiple sites with positive rheumatoid factor (H) M05.79 Symptoms not worse after stopping meloxicam.        FUTURE LABS:       - Schedule fasting labs in 6 months  FUTURE APPOINTMENTS:       - Follow-up visit in 6 months or sooner if any questions or concerns.   Regular exercise  See Patient Instructions    Liudmila Bowie MD  [facility]

## 2018-11-13 NOTE — MR AVS SNAPSHOT
After Visit Summary   11/13/2018    Brenda Barker    MRN: [id number]           Patient Information     Date Of Birth          1935        Visit Information        Provider Department      11/13/2018 2:00 PM Liudmila Bowie MD [facility]        Today's Diagnoses     Anemia due to blood loss, acute    -  1    Hypertension, goal below 140/90        Hyperlipidemia LDL goal <130        Rheumatoid arthritis involving multiple sites with positive rheumatoid factor (H)           Follow-ups after your visit        Follow-up notes from your care team     Return in about 3 months (around 2/13/2019) for medication follow up, Lab Work, BP Recheck.      Your next 10 appointments already scheduled     Nov 27, 2018  1:00 PM CST   Return Visit with Keo Priest MD   [facility]    [address] [place] [address] [phone]              Who to contact     If you have questions or need follow up information about today's clinic visit or your schedule please contact [facility] directly at [phone].  Normal or non-critical lab and imaging results will be communicated to you by [employer], letter or phone within 4 business days after the clinic has received the results. If you do not hear from us within 7 days, please contact the clinic through [employer] or phone. If you have a critical or abnormal lab result, we will notify you by phone as soon as possible.  Submit refill requests through [employer] or call your pharmacy and they will forward the refill request to us. Please allow 3 business days for your refill to be completed.          Additional Information About Your Visit        C[employer] Information     [employer] gives you secure access to your electronic health record. If you see a primary care provider, you can also send messages to your care team and make appointments. If you have questions, please  "call your primary care clinic.  If you do not have a primary care provider, please call [phone] and they will assist you.        Care EveryWhere ID     This is your Care EveryWhere ID. This could be used by other organizations to access your [place] medical records  [id number]        Your Vitals Were     Pulse Temperature Respirations Height Last Period Pulse Oximetry    66 97.4  F (36.3  C) (Oral) 16 5' 2\" (1.575 m) 05/17/1972 97%    BMI (Body Mass Index)                   29.56 kg/m2            Blood Pressure from Last 3 Encounters:   11/13/18 124/70   08/07/18 135/59   07/13/18 134/64    Weight from Last 3 Encounters:   11/13/18 161 lb 9.6 oz (73.3 kg)   07/13/18 165 lb (74.8 kg)   06/27/18 165 lb 9.1 oz (75.1 kg)              We Performed the Following     CBC with platelets differential     Ferritin     Iron and iron binding capacity     Reticulocyte count        Primary Care Provider Office Phone # Fax #    Liudmila Sindy Bowie [phone]       [address]  [facility] [address]        Equal Access to Services     EUGENIO CANNON [facility]: Hadii aad ku hadasho Soomaali, waaxda luqadaha, qaybta kaalmada adeegyada, liliana del valle. So [facility] [phone].    ATENCIÓN: Si habla español, tiene a beltran disposición servicios gratuitos de asistencia lingüística. [facility] [phone].    We comply with applicable federal civil rights laws and [place] laws. We do not discriminate on the basis of race, color, national origin, age, disability, sex, sexual orientation, or gender identity.            Thank you!     Thank you for choosing [facility]  for your care. Our goal is always to provide you with excellent care. Hearing back from our patients is one way we can continue to improve our services. Please take a few minutes to complete the written survey that you may receive in the mail after your visit with us. Thank you!             Your Updated Medication " List - Protect others around you: Learn how to safely use, store and throw away your medicines at www.disposemymeds.org.          This list is accurate as of 11/13/18  2:58 PM.  Always use your most recent med list.                   Brand Name Dispense Instructions for use Diagnosis    acyclovir 400 MG tablet    ZOVIRAX    90 tablet    TAKE 1 TABLET BY MOUTH DAILY    Herpes simplex virus infection       CENTRUM SILVER per tablet      1 TABLET DAILY        ferrous gluconate 324 (38 Fe) MG tablet    FERGON    100 tablet    Take 1 tablet (324 mg) by mouth every other day    Other iron deficiency anemia       fish oil-omega-3 fatty acids 1000 MG capsule     OTC    2 CAPSULES DAILY  (? DOSE)    Other and unspecified hyperlipidemia       folic acid 1 MG tablet    FOLVITE     4 mg TABS DAILY    Rheumatoid arthritis(714.0), Obesity, unspecified, Other abnormal glucose       LIQUID TEARS 1.4 % ophthalmic solution   Generic drug:  polyvinyl alcohol      1 drop as needed.        meloxicam 7.5 MG tablet    MOBIC    90 tablet    TAKE 1 TABLET BY MOUTH ONCE DAILY    Rheumatoid arthritis involving multiple sites with positive rheumatoid factor (H)       methotrexate 2.5 MG tablet CHEMO      Take 6 tablets (15 mg) by mouth every 7 days        metoprolol succinate 50 MG 24 hr tablet    TOPROL-XL    90 tablet    TAKE 1 TABLET BY MOUTH ONCE DAILY    Rapid heart rate       nitroFURantoin macrocrystal 100 MG ED starter pack    MACRODANTIN    28 Package    Take 1 capsule (100 mg) by mouth daily    H/O recurrent urinary tract infection       omeprazole 20 MG CR capsule    priLOSEC    90 capsule    TAKE 1 CAPSULE(20 MG) BY MOUTH DAILY    Gastroesophageal reflux disease with esophagitis       senna-docusate 8.6-50 MG per tablet    SENOKOT-S;PERICOLACE    100 tablet    Take 1-2 tablets by mouth 2 times daily        simvastatin 20 MG tablet    ZOCOR    90 tablet    Take 1 tablet (20 mg) by mouth At Bedtime    Hyperlipidemia LDL goal <130        TYLENOL PO      Take 650 mg by mouth every 6 hours as needed for mild pain or fever        Vitamin D-3 1000 units Caps      Take 2,000 Int'l Units by mouth daily        ZYRTEC ALLERGY PO      Take 1 tablet by mouth daily

## 2018-11-19 DIAGNOSIS — Z96.642 HISTORY OF TOTAL LEFT HIP ARTHROPLASTY: Primary | ICD-10-CM

## 2018-11-27 ENCOUNTER — RADIANT APPOINTMENT (OUTPATIENT)
Dept: GENERAL RADIOLOGY | Facility: CLINIC | Age: 83
End: 2018-11-27
Attending: ORTHOPAEDIC SURGERY
Payer: COMMERCIAL

## 2018-11-27 ENCOUNTER — OFFICE VISIT (OUTPATIENT)
Dept: ORTHOPEDICS | Facility: CLINIC | Age: 83
End: 2018-11-27
Payer: COMMERCIAL

## 2018-11-27 VITALS — HEART RATE: 85 BPM | SYSTOLIC BLOOD PRESSURE: 131 MMHG | DIASTOLIC BLOOD PRESSURE: 64 MMHG

## 2018-11-27 DIAGNOSIS — Z96.642 HISTORY OF TOTAL LEFT HIP ARTHROPLASTY: ICD-10-CM

## 2018-11-27 DIAGNOSIS — M70.62 TROCHANTERIC BURSITIS OF LEFT HIP: Primary | ICD-10-CM

## 2018-11-27 PROCEDURE — 99213 OFFICE O/P EST LOW 20 MIN: CPT | Performed by: ORTHOPAEDIC SURGERY

## 2018-11-27 PROCEDURE — 73502 X-RAY EXAM HIP UNI 2-3 VIEWS: CPT | Mod: LT | Performed by: RADIOLOGY

## 2018-11-27 ASSESSMENT — PAIN SCALES - GENERAL: PAINLEVEL: MODERATE PAIN (5)

## 2018-11-27 NOTE — MR AVS SNAPSHOT
After Visit Summary   2018    Brenda Barker    MRN: [id number]           Patient Information     Date Of Birth          1935        Visit Information        Provider Department      2018 1:00 PM Keo Priest MD [facility]        Today's Diagnoses     Trochanteric bursitis of left hip    -  1      Care Instructions    Thanks for coming today.  Ortho[facility] Medicine Clinic  [address] [place] [address]    To schedule future appointments in Ortho Clinic, you may call [phone].    To schedule ordered imaging by your provider:   Call Central Imaging [phone]    To schedule an injection ordered by your provider:  Call Central Imaging Injection scheduling line: [phone]  [employer] available online at:  [email or web address]    Please call if any further questions or concerns ([phone]).  Clinic hours 8 am to 5 pm.    Return to clinic (call) if symptoms worsen or fail to improve.            Follow-ups after your visit        Who to contact     If you have questions or need follow up information about today's clinic visit or your schedule please contact [facility] directly at [phone].  Normal or non-critical lab and imaging results will be communicated to you by [employer], letter or phone within 4 business days after the clinic has received the results. If you do not hear from us within 7 days, please contact the clinic through [employer] or phone. If you have a critical or abnormal lab result, we will notify you by phone as soon as possible.  Submit refill requests through [employer] or call your pharmacy and they will forward the refill request to us. Please allow 3 business days for your refill to be completed.          Additional Information About Your Visit        MyChart Information     [employer] gives you secure access to your electronic health record. If you see a primary care provider, you can also send  messages to your care team and make appointments. If you have questions, please call your primary care clinic.  If you do not have a primary care provider, please call [phone] and they will assist you.      [employer] is an electronic gateway that provides easy, online access to your medical records. With [employer], you can request a clinic appointment, read your test results, renew a prescription or communicate with your care team.     To access your existing account, please contact your [facility] or call [phone] for assistance.        Care EveryWhere ID     This is your Care EveryWhere ID. This could be used by other organizations to access your [place] medical records  [id number]        Your Vitals Were     Pulse Last Period                85 05/17/1972           Blood Pressure from Last 3 Encounters:   11/27/18 131/64   11/13/18 124/70   08/07/18 135/59    Weight from Last 3 Encounters:   11/13/18 73.3 kg (161 lb 9.6 oz)   07/13/18 74.8 kg (165 lb)   06/27/18 75.1 kg (165 lb 9.1 oz)              Today, you had the following     No orders found for display       Primary Care Provider Office Phone # Fax #    Liudmila Sindy Bowie [phone]       [address]  [facility] [address]        Equal Access to Services     [facility]: Hadii aad ku hadasho Soomaali, waaxda luqadaha, qaybta kaalmada adeegyada, waxay idiin haymaddy garcia . So [facility] [phone].    ATENCIÓN: Si habla español, tiene a beltran disposición servicios gratuitos de asistencia lingüística. Llame al [phone].    We comply with applicable federal civil rights laws and [place] laws. We do not discriminate on the basis of race, color, national origin, age, disability, sex, sexual orientation, or gender identity.            Thank you!     Thank you for choosing [facility]  for your care. Our goal is always to provide you with excellent care. Hearing back  from our patients is one way we can continue to improve our services. Please take a few minutes to complete the written survey that you may receive in the mail after your visit with us. Thank you!             Your Updated Medication List - Protect others around you: Learn how to safely use, store and throw away your medicines at www.disposemymeds.org.          This list is accurate as of 11/27/18 11:59 PM.  Always use your most recent med list.                   Brand Name Dispense Instructions for use Diagnosis    acyclovir 400 MG tablet    ZOVIRAX    90 tablet    TAKE 1 TABLET BY MOUTH DAILY    Herpes simplex virus infection       ferrous gluconate 324 (38 Fe) MG tablet    FERGON    100 tablet    Take 1 tablet (324 mg) by mouth every other day    Other iron deficiency anemia       fish oil-omega-3 fatty acids 1000 MG capsule     OTC    2 CAPSULES DAILY  (? DOSE)    Other and unspecified hyperlipidemia       folic acid 1 MG tablet    FOLVITE     4 mg TABS DAILY    Rheumatoid arthritis(714.0), Obesity, unspecified, Other abnormal glucose       LIQUID TEARS 1.4 % ophthalmic solution   Generic drug:  polyvinyl alcohol      1 drop as needed.        methotrexate 2.5 MG tablet CHEMO      Take 6 tablets (15 mg) by mouth every 7 days        metoprolol succinate 50 MG 24 hr tablet    TOPROL-XL    90 tablet    TAKE 1 TABLET BY MOUTH ONCE DAILY    Rapid heart rate       multivitamin tablet      1 TABLET DAILY        nitroFURantoin macrocrystal 100 MG ED starter pack    MACRODANTIN    28 Package    Take 1 capsule (100 mg) by mouth daily    H/O recurrent urinary tract infection       omeprazole 20 MG DR capsule    priLOSEC    90 capsule    TAKE 1 CAPSULE(20 MG) BY MOUTH DAILY    Gastroesophageal reflux disease with esophagitis       senna-docusate 8.6-50 MG tablet    SENOKOT-S/PERICOLACE    100 tablet    Take 1-2 tablets by mouth 2 times daily        simvastatin 20 MG tablet    ZOCOR    90 tablet    Take 1 tablet (20 mg) by  mouth At Bedtime    Hyperlipidemia LDL goal <130       TYLENOL PO      Take 650 mg by mouth every 6 hours as needed for mild pain or fever        Vitamin D-3 1000 units Caps      Take 2,000 Int'l Units by mouth daily        ZYRTEC ALLERGY PO      Take 1 tablet by mouth daily

## 2018-11-27 NOTE — NURSING NOTE
Brenda Barker's chief complaint for this visit includes:  Chief Complaint   Patient presents with     Left Hip - Pain   Started when patient became more active, a few months ago.    PCP: Liudmila Bowie    Referring Provider:  No referring provider defined for this encounter.    /64  Pulse 85  LMP 05/17/1972  Moderate Pain (5)     Do you need any medication refills at today's visit? NO.  Mehnaz Denton RN

## 2018-11-27 NOTE — PATIENT INSTRUCTIONS
Thanks for coming today.  Ortho[facility] Medicine Clinic  [address] [place] [address]    To schedule future appointments in Ortho Clinic, you may call [phone].    To schedule ordered imaging by your provider:   Call Central Imaging [phone]    To schedule an injection ordered by your provider:  Call Central Imaging Injection scheduling line: [phone]  [employer] available online at:  [email or web address]    Please call if any further questions or concerns ([phone]).  Clinic hours 8 am to 5 pm.    Return to clinic (call) if symptoms worsen or fail to improve.

## 2018-11-27 NOTE — LETTER
11/27/2018         RE: Brenda Barker  [address] [facility] [address]        Dear Colleague,    Thank you for referring your patient, Brenda Barker, to the [facility]. Please see a copy of my visit note below.    Chief Complaint: Pain of the Left Hip     HPI: Brenda Barker returns today in follow-up for her left hip. She reports that she is having lateral hip pain at the level of the greater trochanter similar to the preop symptoms she had there. Worse with lying on the left side in bed and when doing stairs. There has not been a return of the groin pain that she had preoperatively and which motivated proceeding with total hip.    Medications and allergies are documented in the EMR and have been reviewed.    Current Outpatient Prescriptions:      Acetaminophen (TYLENOL PO), Take 650 mg by mouth every 6 hours as needed for mild pain or fever, Disp: , Rfl:      acyclovir (ZOVIRAX) 400 MG tablet, TAKE 1 TABLET BY MOUTH DAILY, Disp: 90 tablet, Rfl: 0     CENTRUM SILVER OR TABS, 1 TABLET DAILY, Disp: , Rfl:      Cetirizine HCl (ZYRTEC ALLERGY PO), Take 1 tablet by mouth daily, Disp: , Rfl:      Cholecalciferol (VITAMIN D-3) 1000 UNITS CAPS, Take 2,000 Int'l Units by mouth daily , Disp: , Rfl:      ferrous gluconate (FERGON) 324 (38 Fe) MG tablet, Take 1 tablet (324 mg) by mouth every other day, Disp: 100 tablet, Rfl: 1     FISH OIL 1000 MG OR CAPS, 2 CAPSULES DAILY  (? DOSE), Disp: OTC, Rfl: --     FOLIC ACID 1 MG OR TABS, 4 mg TABS DAILY, Disp: , Rfl:      methotrexate 2.5 MG tablet CHEMO, Take 6 tablets (15 mg) by mouth every 7 days, Disp: , Rfl:      metoprolol succinate (TOPROL-XL) 50 MG 24 hr tablet, TAKE 1 TABLET BY MOUTH ONCE DAILY, Disp: 90 tablet, Rfl: 1     nitroFURantoin macrocrystal (MACRODANTIN) 100 MG ED starter pack, Take 1 capsule (100 mg) by mouth daily, Disp: 28 Package, Rfl: 3     omeprazole (PRILOSEC) 20 MG CR capsule, TAKE 1 CAPSULE(20 MG) BY MOUTH DAILY,  Disp: 90 capsule, Rfl: 2     polyvinyl alcohol (LIQUID TEARS) 1.4 % ophthalmic solution, 1 drop as needed., Disp: , Rfl:      senna-docusate (SENOKOT-S;PERICOLACE) 8.6-50 MG per tablet, Take 1-2 tablets by mouth 2 times daily, Disp: 100 tablet, Rfl:      simvastatin (ZOCOR) 20 MG tablet, Take 1 tablet (20 mg) by mouth At Bedtime, Disp: 90 tablet, Rfl: 3  Allergies: Diatrizoate; Ciprofloxacin; Contrast dye; Oxycodone; and Sulfasalazine    Physical Exam:  On physical examination the patient appears the stated age, is in no acute distress, affect is appropriate, and breathing is non-labored.  /64  Pulse 85  LMP 05/17/1972  There is no height or weight on file to calculate BMI.  Gait: mild limp on the left   Incision: benign and healed   ROM: fluid and painless, no groin pain  Symptoms are reproduced with palpation at the level of the greater trochanter   Distally, the circulatory, motor, and sensation exam is intact with 5/5 EHL, gastroc-soleus, and tibialis anterior.  Sensation to light touch is intact.  Dorsalis pedis and posterior tibialis pulses are palpable.  There are no sores on the feet, no bruising, and no lymphedema.    X-rays:    I reviewed the x-rays dated today.  Previous films reviewed.    Findings:  Normal progression for a total hip arthroplasty without evidence of loosening or subsidence.    Assessment and Plan: greater trochanter pain consistent with operative findings of fraying in the abductor tendons. We discussed living with it, PT, and injections. He main motivation for the visit today was to get an x-ray and make sure there was not an implant issues. She is going to think about the options for the trochanter symptoms.        No ref. provider found      Again, thank you for allowing me to participate in the care of your patient.        Sincerely,        Keo Priest MD

## 2018-11-29 NOTE — PROGRESS NOTES
Chief Complaint: Pain of the Left Hip     HPI: Brenda Braker returns today in follow-up for her left hip. She reports that she is having lateral hip pain at the level of the greater trochanter similar to the preop symptoms she had there. Worse with lying on the left side in bed and when doing stairs. There has not been a return of the groin pain that she had preoperatively and which motivated proceeding with total hip.    Medications and allergies are documented in the EMR and have been reviewed.    Current Outpatient Prescriptions:      Acetaminophen (TYLENOL PO), Take 650 mg by mouth every 6 hours as needed for mild pain or fever, Disp: , Rfl:      acyclovir (ZOVIRAX) 400 MG tablet, TAKE 1 TABLET BY MOUTH DAILY, Disp: 90 tablet, Rfl: 0     CENTRUM SILVER OR TABS, 1 TABLET DAILY, Disp: , Rfl:      Cetirizine HCl (ZYRTEC ALLERGY PO), Take 1 tablet by mouth daily, Disp: , Rfl:      Cholecalciferol (VITAMIN D-3) 1000 UNITS CAPS, Take 2,000 Int'l Units by mouth daily , Disp: , Rfl:      ferrous gluconate (FERGON) 324 (38 Fe) MG tablet, Take 1 tablet (324 mg) by mouth every other day, Disp: 100 tablet, Rfl: 1     FISH OIL 1000 MG OR CAPS, 2 CAPSULES DAILY  (? DOSE), Disp: OTC, Rfl: --     FOLIC ACID 1 MG OR TABS, 4 mg TABS DAILY, Disp: , Rfl:      methotrexate 2.5 MG tablet CHEMO, Take 6 tablets (15 mg) by mouth every 7 days, Disp: , Rfl:      metoprolol succinate (TOPROL-XL) 50 MG 24 hr tablet, TAKE 1 TABLET BY MOUTH ONCE DAILY, Disp: 90 tablet, Rfl: 1     nitroFURantoin macrocrystal (MACRODANTIN) 100 MG ED starter pack, Take 1 capsule (100 mg) by mouth daily, Disp: 28 Package, Rfl: 3     omeprazole (PRILOSEC) 20 MG CR capsule, TAKE 1 CAPSULE(20 MG) BY MOUTH DAILY, Disp: 90 capsule, Rfl: 2     polyvinyl alcohol (LIQUID TEARS) 1.4 % ophthalmic solution, 1 drop as needed., Disp: , Rfl:      senna-docusate (SENOKOT-S;PERICOLACE) 8.6-50 MG per tablet, Take 1-2 tablets by mouth 2 times daily, Disp: 100 tablet, Rfl:       simvastatin (ZOCOR) 20 MG tablet, Take 1 tablet (20 mg) by mouth At Bedtime, Disp: 90 tablet, Rfl: 3  Allergies: Diatrizoate; Ciprofloxacin; Contrast dye; Oxycodone; and Sulfasalazine    Physical Exam:  On physical examination the patient appears the stated age, is in no acute distress, affect is appropriate, and breathing is non-labored.  /64  Pulse 85  LMP 05/17/1972  There is no height or weight on file to calculate BMI.  Gait: mild limp on the left   Incision: benign and healed   ROM: fluid and painless, no groin pain  Symptoms are reproduced with palpation at the level of the greater trochanter   Distally, the circulatory, motor, and sensation exam is intact with 5/5 EHL, gastroc-soleus, and tibialis anterior.  Sensation to light touch is intact.  Dorsalis pedis and posterior tibialis pulses are palpable.  There are no sores on the feet, no bruising, and no lymphedema.    X-rays:    I reviewed the x-rays dated today.  Previous films reviewed.    Findings:  Normal progression for a total hip arthroplasty without evidence of loosening or subsidence.    Assessment and Plan: greater trochanter pain consistent with operative findings of fraying in the abductor tendons. We discussed living with it, PT, and injections. He main motivation for the visit today was to get an x-ray and make sure there was not an implant issues. She is going to think about the options for the trochanter symptoms.        No ref. provider found

## 2018-12-10 ENCOUNTER — OFFICE VISIT (OUTPATIENT)
Dept: FAMILY MEDICINE | Facility: CLINIC | Age: 83
End: 2018-12-10
Payer: COMMERCIAL

## 2018-12-10 VITALS
BODY MASS INDEX: 29.55 KG/M2 | OXYGEN SATURATION: 98 % | RESPIRATION RATE: 16 BRPM | WEIGHT: 160.6 LBS | TEMPERATURE: 98.5 F | HEART RATE: 76 BPM | HEIGHT: 62 IN | DIASTOLIC BLOOD PRESSURE: 63 MMHG | SYSTOLIC BLOOD PRESSURE: 129 MMHG

## 2018-12-10 DIAGNOSIS — R00.0 RAPID HEART RATE: ICD-10-CM

## 2018-12-10 DIAGNOSIS — Z00.00 ROUTINE HISTORY AND PHYSICAL EXAMINATION OF ADULT: Primary | ICD-10-CM

## 2018-12-10 DIAGNOSIS — Z87.440 H/O RECURRENT URINARY TRACT INFECTION: ICD-10-CM

## 2018-12-10 DIAGNOSIS — D33.3 BENIGN NEOPLASM OF CRANIAL NERVE (H): ICD-10-CM

## 2018-12-10 DIAGNOSIS — B00.9 HERPES SIMPLEX VIRUS INFECTION: ICD-10-CM

## 2018-12-10 DIAGNOSIS — E78.5 HYPERLIPIDEMIA LDL GOAL <130: ICD-10-CM

## 2018-12-10 LAB
CHOLEST SERPL-MCNC: 227 MG/DL
HDLC SERPL-MCNC: 76 MG/DL
LDLC SERPL CALC-MCNC: 112 MG/DL
NONHDLC SERPL-MCNC: 151 MG/DL
TRIGL SERPL-MCNC: 193 MG/DL

## 2018-12-10 PROCEDURE — 99397 PER PM REEVAL EST PAT 65+ YR: CPT | Performed by: PHYSICIAN ASSISTANT

## 2018-12-10 PROCEDURE — 36415 COLL VENOUS BLD VENIPUNCTURE: CPT | Performed by: PHYSICIAN ASSISTANT

## 2018-12-10 PROCEDURE — 80061 LIPID PANEL: CPT | Performed by: PHYSICIAN ASSISTANT

## 2018-12-10 RX ORDER — METOPROLOL SUCCINATE 50 MG/1
50 TABLET, EXTENDED RELEASE ORAL DAILY
Qty: 90 TABLET | Refills: 3 | Status: SHIPPED | OUTPATIENT
Start: 2018-12-10 | End: 2019-10-10

## 2018-12-10 RX ORDER — SIMVASTATIN 20 MG
20 TABLET ORAL AT BEDTIME
Qty: 90 TABLET | Refills: 3 | Status: SHIPPED | OUTPATIENT
Start: 2018-12-10 | End: 2019-10-14

## 2018-12-10 RX ORDER — ACYCLOVIR 400 MG/1
400 TABLET ORAL DAILY
Qty: 90 TABLET | Refills: 3 | Status: SHIPPED | OUTPATIENT
Start: 2018-12-10 | End: 2019-10-10

## 2018-12-10 ASSESSMENT — MIFFLIN-ST. JEOR: SCORE: 1146.73

## 2018-12-10 ASSESSMENT — PATIENT HEALTH QUESTIONNAIRE - PHQ9: SUM OF ALL RESPONSES TO PHQ QUESTIONS 1-9: 0

## 2018-12-10 ASSESSMENT — PAIN SCALES - GENERAL: PAINLEVEL: NO PAIN (0)

## 2018-12-10 NOTE — PATIENT INSTRUCTIONS
Preventive Health Recommendations    See your health care provider every year to    Review health changes.     Discuss preventive care.      Review your medicines if your doctor has prescribed any.      You no longer need a yearly Pap test unless you've had an abnormal Pap test in the past 10 years. If you have vaginal symptoms, such as bleeding or discharge, be sure to talk with your provider about a Pap test.      Every 1 to 2 years, have a mammogram.  If you are over 69, talk with your health care provider about whether or not you want to continue having screening mammograms.      Every 10 years, have a colonoscopy. Or, have a yearly FIT test (stool test). These exams will check for colon cancer.       Have a cholesterol test every 5 years, or more often if your doctor advises it.       Have a diabetes test (fasting glucose) every three years. If you are at risk for diabetes, you should have this test more often.       At age 65, have a bone density scan (DEXA) to check for osteoporosis (brittle bone disease).    Shots:    Get a flu shot each year.    Get a tetanus shot every 10 years.    Talk to your doctor about your pneumonia vaccines. There are now two you should receive - Pneumovax (PPSV 23) and Prevnar (PCV 13).    Talk to your pharmacist about the shingles vaccine.    Talk to your doctor about the hepatitis B vaccine.    Nutrition:     Eat at least 5 servings of fruits and vegetables each day.      Eat whole-grain bread, whole-wheat pasta and brown rice instead of white grains and rice.      Get adequate about Calcium and Vitamin D.     Lifestyle    Exercise at least 150 minutes a week (30 minutes a day, 5 days a week). This will help you control your weight and prevent disease.      Limit alcohol to one drink per day.      No smoking.       Wear sunscreen to prevent skin cancer.       See your dentist twice a year for an exam and cleaning.      See your eye doctor every 1 to 2 years to screen for  conditions such as glaucoma, macular degeneration, cataracts, etc.    Personalized Prevention Plan  You are due for the preventive services outlined below.  Your care team is available to assist you in scheduling these services.  If you have already completed any of these items, please share that information with your care team to update in your medical record.    Health Maintenance Due   Topic Date Due     Diptheria Tetanus Pertussis (DTAP/TDAP/TD) Vaccine (1 - Tdap) 08/28/1944     Zoster (Chicken Pox) Vaccine (1 of 2) 08/28/1987     Pneumococcal Vaccine (1 of 2 - PCV13) 08/28/2002     Depression Assessment - every 6 months  03/09/2017     FALL RISK ASSESSMENT  12/19/2018     Preventive Health Recommendations    See your health care provider every year to    Review health changes.     Discuss preventive care.      Review your medicines if your doctor has prescribed any.      You no longer need a yearly Pap test unless you've had an abnormal Pap test in the past 10 years. If you have vaginal symptoms, such as bleeding or discharge, be sure to talk with your provider about a Pap test.      Every 1 to 2 years, have a mammogram.  If you are over 69, talk with your health care provider about whether or not you want to continue having screening mammograms.      Every 10 years, have a colonoscopy. Or, have a yearly FIT test (stool test). These exams will check for colon cancer.       Have a cholesterol test every 5 years, or more often if your doctor advises it.       Have a diabetes test (fasting glucose) every three years. If you are at risk for diabetes, you should have this test more often.       At age 65, have a bone density scan (DEXA) to check for osteoporosis (brittle bone disease).    Shots:    Get a flu shot each year.    Get a tetanus shot every 10 years.    Talk to your doctor about your pneumonia vaccines. There are now two you should receive - Pneumovax (PPSV 23) and Prevnar (PCV 13).    Talk to your  pharmacist about the shingles vaccine.    Talk to your doctor about the hepatitis B vaccine.    Nutrition:     Eat at least 5 servings of fruits and vegetables each day.      Eat whole-grain bread, whole-wheat pasta and brown rice instead of white grains and rice.      Get adequate Calcium and Vitamin D.     Lifestyle    Exercise at least 150 minutes a week (30 minutes a day, 5 days a week). This will help you control your weight and prevent disease.      Limit alcohol to one drink per day.      No smoking.       Wear sunscreen to prevent skin cancer.       See your dentist twice a year for an exam and cleaning.      See your eye doctor every 1 to 2 years to screen for conditions such as glaucoma, macular degeneration and cataracts.    Personalized Prevention Plan  You are due for the preventive services outlined below.  Your care team is available to assist you in scheduling these services.  If you have already completed any of these items, please share that information with your care team to update in your medical record.  Health Maintenance Due   Topic Date Due     Diptheria Tetanus Pertussis (DTAP/TDAP/TD) Vaccine (1 - Tdap) 08/28/1944     Zoster (Chicken Pox) Vaccine (1 of 2) 08/28/1987     Pneumococcal Vaccine (1 of 2 - PCV13) 08/28/2002     Depression Assessment - every 6 months  03/09/2017     FALL RISK ASSESSMENT  12/19/2018

## 2018-12-10 NOTE — PROGRESS NOTES
"  SUBJECTIVE:   Brenda Barker is a 83 year old female who presents for Preventive Visit.  Are you in the first 12 months of your Medicare Part B coverage?  No    Physical Health:    In general, how would you rate your overall physical health? good    Outside of work, how many days during the week do you exercise? 2-3 days/week    Outside of work, approximately how many minutes a day do you exercise?greater than 60 minutes    If you drink alcohol do you typically have >3 drinks per day or >7 drinks per week? No    Do you usually eat at least 4 servings of fruit and vegetables a day, include whole grains & fiber and avoid regularly eating high fat or \"junk\" foods? NO    Do you have any problems taking medications regularly?  No    Do you have any side effects from medications? none    Needs assistance for the following daily activities: no assistance needed    Which of the following safety concerns are present in your home?  none identified     Hearing impairment: Yes, has hearing aids     In the past 6 months, have you been bothered by leaking of urine? Yes little leaks     Mental Health:    In general, how would you rate your overall mental or emotional health? excellent  PHQ-2 Score:      Do you feel safe in your environment? Yes    Do you have a Health Care Directive? Yes: Advance Directive has been received and scanned.    Additional concerns to address?  No    Fall risk:  Fallen 2 or more times in the past year?: No  Any fall with injury in the past year?: No    Cognitive Screenin) Repeat 3 items (Leader, Season, Table)    2) Clock draw: NORMAL  3) 3 item recall: Recalls 3 objects  Results: 3 items recalled: COGNITIVE IMPAIRMENT LESS LIKELY    Mini-CogTM Copyright ERYN Gonzalez. Licensed by the author for use in [facility]; reprinted with permission ([email or web address]). All rights reserved.      Do you have sleep apnea, excessive snoring or daytime drowsiness?: yes            Reviewed and updated as " needed this visit by clinical staff  Tobacco  Allergies  Meds  Med Hx  Surg Hx  Fam Hx  Soc Hx        Reviewed and updated as needed this visit by Provider        Social History     Tobacco Use     Smoking status: Never Smoker     Smokeless tobacco: Never Used   Substance Use Topics     Alcohol use: Yes     Comment: occasionally                           Current providers sharing in care for this patient include:   Patient Care Team:  Liudmila Bowie MD as PCP - General (Family Practice)    The following health maintenance items are reviewed in Epic and correct as of today:  Health Maintenance   Topic Date Due     DTAP/TDAP/TD IMMUNIZATION (1 - Tdap) 08/28/1944     ZOSTER IMMUNIZATION (1 of 2) 08/28/1987     PNEUMOVAX IMMUNIZATION 65+ HIGH/HIGHEST RISK (1 of 2 - PCV13) 08/28/2002     PHQ-9 Q6 MONTHS  03/09/2017     FALL RISK ASSESSMENT  12/19/2018     ADVANCE DIRECTIVE PLANNING Q5 YRS  03/23/2023     DEXA SCAN SCREENING (SYSTEM ASSIGNED)  Completed     DEPRESSION ACTION PLAN  Completed     INFLUENZA VACCINE  Completed     IPV IMMUNIZATION  Aged Out     MENINGITIS IMMUNIZATION  Aged Out     BP Readings from Last 3 Encounters:   12/10/18 129/63   11/27/18 131/64   11/13/18 124/70    Wt Readings from Last 3 Encounters:   12/10/18 72.8 kg (160 lb 9.6 oz)   11/13/18 73.3 kg (161 lb 9.6 oz)   07/13/18 74.8 kg (165 lb)                  Patient Active Problem List   Diagnosis     Benign neoplasm of cranial nerve (H)     Hemorrhoids     Aortic valve disorder     Cervicalgia     Rheumatoid arthritis (H)     SNHL (sensorineural hearing loss)     Status post total left knee replacement     Obesity     High risk medication use     Gastroesophageal reflux disease with esophagitis     Hypertension, goal below 140/90     Hyperlipidemia LDL goal <130     Rapid heart rate     Resting tremor     ACP (advance care planning)     Status post total replacement of left hip     Past Surgical History:   Procedure Laterality Date      ARTHROPLASTY HIP Left 6/27/2018    Procedure: ARTHROPLASTY HIP;  Left Total Hip Arthroplasty  ;  Surgeon: Keo Priest MD;  Location: [facility] OR     BACK SURGERY  2011    Fusion L3-4 & decompression; [facility]     BIOPSY  2013    Thyroid - normal result     C DEXA, BONE DENSITY, AXIAL SKEL  7/03 7/03 L1-4 1.7, FemNkL-0.8,R-1.2, FArm Px 0.2,Dist-0.3     C REMOVAL OF OVARY(S)  1980 2nd ovary removed - endometriosis     C TOTAL ABDOM HYSTERECTOMY  1972    uterus, ovary removed - fibroid     EYE SURGERY  2010 & 11    Cataract surgery     HC COLONOSCOPY THRU STOMA W BIOPSY/CAUTERY TUMOR/POLYP/LESION  1993    adenomatous polyp     HC COLONOSCOPY THRU STOMA, DIAGNOSTIC  1998, 10/03    normal, '03 rec repeat in 5 yrs due to +hx polyp and + fam hx     HC FLEX SIGMOIDOSCOPY W/WO KARYNA SPEC BY BRUSH/WASH  12/2001     HC REMOVAL OF TONSILS,<13 Y/O  1942     ORTHOPEDIC SURGERY  2008    Left knee replacement     SURGICAL HISTORY OF -   1989    basal cell Ca scalp excised     SURGICAL HISTORY OF -   1998    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       Social History     Tobacco Use     Smoking status: Never Smoker     Smokeless tobacco: Never Used   Substance Use Topics     Alcohol use: Yes     Comment: occasionally     Family History   Problem Relation Age of Onset     Gastrointestinal Disease Father         bleeding ulcer     Cancer Mother         thyroid cancer     Other Cancer Mother         thyroid & throat     Osteoporosis Mother      Thyroid Disease Mother      Diabetes Paternal Grandmother      Cerebrovascular Disease Paternal Grandmother      Colon Cancer Maternal Half-Brother      Osteoporosis Maternal Grandmother          Current Outpatient Medications   Medication Sig Dispense Refill     Acetaminophen (TYLENOL PO) Take 650 mg by mouth every 6 hours as needed for mild pain or fever       acyclovir (ZOVIRAX) 400 MG tablet Take 1 tablet (400 mg) by mouth daily 90 tablet 3     CENTRUM  "SILVER OR TABS 1 TABLET DAILY       Cetirizine HCl (ZYRTEC ALLERGY PO) Take 1 tablet by mouth daily       Cholecalciferol (VITAMIN D-3) 1000 UNITS CAPS Take 2,000 Int'l Units by mouth daily        ferrous gluconate (FERGON) 324 (38 Fe) MG tablet Take 1 tablet (324 mg) by mouth every other day 100 tablet 1     FISH OIL 1000 MG OR CAPS 2 CAPSULES DAILY  (? DOSE) OTC --     FOLIC ACID 1 MG OR TABS 4 mg TABS DAILY       methotrexate 2.5 MG tablet CHEMO Take 6 tablets (15 mg) by mouth every 7 days       metoprolol succinate ER (TOPROL-XL) 50 MG 24 hr tablet Take 1 tablet (50 mg) by mouth daily 90 tablet 3     nitroFURantoin macrocrystal (MACRODANTIN) 100 MG ED starter pack Take 1 capsule (100 mg) by mouth daily 90 capsule 3     omeprazole (PRILOSEC) 20 MG CR capsule TAKE 1 CAPSULE(20 MG) BY MOUTH DAILY 90 capsule 2     polyvinyl alcohol (LIQUID TEARS) 1.4 % ophthalmic solution 1 drop as needed.       senna-docusate (SENOKOT-S;PERICOLACE) 8.6-50 MG per tablet Take 1-2 tablets by mouth 2 times daily 100 tablet      simvastatin (ZOCOR) 20 MG tablet Take 1 tablet (20 mg) by mouth At Bedtime 90 tablet 3     Mammogram Screening: Mammo discussed, not appropriate for or declined by this patient.    ROS:  Constitutional, HEENT, cardiovascular, pulmonary, GI, , musculoskeletal, neuro, skin, endocrine and psych systems are negative, except as otherwise noted.    OBJECTIVE:   /63 (BP Location: Left arm, Patient Position: Sitting, Cuff Size: Adult Large)   Pulse 76   Temp 98.5  F (36.9  C) (Oral)   Resp 16   Ht 1.575 m (5' 2\")   Wt 72.8 kg (160 lb 9.6 oz)   LMP 05/17/1972   SpO2 98%   BMI 29.37 kg/m   Estimated body mass index is 29.37 kg/m  as calculated from the following:    Height as of this encounter: 1.575 m (5' 2\").    Weight as of this encounter: 72.8 kg (160 lb 9.6 oz).  EXAM:   GENERAL APPEARANCE: healthy, alert and no distress  EYES: Eyes grossly normal to inspection, PERRL and conjunctivae and sclerae " normal  HENT: ear canals and TM's normal, nose and mouth without ulcers or lesions, oropharynx clear and oral mucous membranes moist  NECK: no adenopathy, no asymmetry, masses, or scars and thyroid normal to palpation  RESP: lungs clear to auscultation - no rales, rhonchi or wheezes  CV: regular rate and rhythm, normal S1 S2, no S3 or S4, no murmur, click or rub, no peripheral edema and peripheral pulses strong  ABDOMEN: soft, nontender, no hepatosplenomegaly, no masses and bowel sounds normal  MS: no musculoskeletal defects are noted and gait is age appropriate without ataxia  SKIN: no suspicious lesions or rashes  NEURO: Normal strength and tone, sensory exam grossly normal, mentation intact and speech normal  PSYCH: mentation appears normal and affect normal/bright    Diagnostic Test Results:  none     ASSESSMENT / PLAN:       ICD-10-CM    1. Routine history and physical examination of adult Z00.00    2. Benign neoplasm of cranial nerve (H) D33.3    3. Rapid heart rate R00.0 metoprolol succinate ER (TOPROL-XL) 50 MG 24 hr tablet   4. Hyperlipidemia LDL goal <130 E78.5 simvastatin (ZOCOR) 20 MG tablet     Lipid Profile (Chol, Trig, HDL, LDL calc)   5. H/O recurrent urinary tract infection Z87.440 nitroFURantoin macrocrystal (MACRODANTIN) 100 MG ED starter pack   6. Herpes simplex virus infection B00.9 acyclovir (ZOVIRAX) 400 MG tablet     1. Normal physical.   2. Stable.  3. Stable on Metoprolol  4. Stable  No SE from statin  5. Continue nitrofurantoin 100 mg daily for UTI prevention  6. Continue Acyclovir 400 mg daily for prophylaxis   End of Life Planning:  Patient currently has an advanced directive: Yes.  Practitioner is supportive of decision.    COUNSELING:  Reviewed preventive health counseling, as reflected in patient instructions       Dental care       Immunizations    UTD             Aspirin Prophylaxsis    BP Readings from Last 1 Encounters:   12/10/18 129/63     Estimated body mass index is 29.37  "kg/m  as calculated from the following:    Height as of this encounter: 1.575 m (5' 2\").    Weight as of this encounter: 72.8 kg (160 lb 9.6 oz).      Weight management plan: Discussed healthy diet and exercise guidelines     reports that  has never smoked. she has never used smokeless tobacco.      Appropriate preventive services were discussed with this patient, including applicable screening as appropriate for cardiovascular disease, diabetes, osteopenia/osteoporosis, and glaucoma.  As appropriate for age/gender, discussed screening for colorectal cancer, prostate cancer, breast cancer, and cervical cancer. Checklist reviewing preventive services available has been given to the patient.    Reviewed patients plan of care and provided an AVS. The Basic Care Plan (routine screening as documented in Health Maintenance) for Brenda meets the Care Plan requirement. This Care Plan has been established and reviewed with the Patient.    Counseling Resources:  ATP IV Guidelines  Pooled Cohorts Equation Calculator  Breast Cancer Risk Calculator  FRAX Risk Assessment  ICSI Preventive Guidelines  Dietary Guidelines for Americans, 2010  USDA's MyPlate  ASA Prophylaxis  Lung CA Screening    Corin Craig PA-C  [facility]  "

## 2018-12-17 ENCOUNTER — TELEPHONE (OUTPATIENT)
Dept: FAMILY MEDICINE | Facility: CLINIC | Age: 83
End: 2018-12-17

## 2018-12-17 DIAGNOSIS — M05.79 RHEUMATOID ARTHRITIS INVOLVING MULTIPLE SITES WITH POSITIVE RHEUMATOID FACTOR (H): ICD-10-CM

## 2018-12-17 NOTE — TELEPHONE ENCOUNTER
"Requested Prescriptions   Pending Prescriptions Disp Refills     meloxicam (MOBIC) 7.5 MG tablet [Pharmacy Med Name: MELOXICAM 7.5MG TABLETS]  Last Written Prescription Date:  09/11/18  Last Fill Quantity: 90,  # refills: 0   Last Office Visit with G, NIKUNJ or [facility] prescribing provider:  12/10/18Juan   Future Office Visit:    90 tablet 0     Sig: TAKE 1 TABLET BY MOUTH ONCE DAILY    NSAID Medications Failed - 12/17/2018  9:32 AM       Failed - Normal ALT on file in past 12 months    Recent Labs   Lab Test 05/11/17  1416   GICHALT 24            Failed - Normal AST on file in past 12 months    Recent Labs   Lab Test 05/11/17  1416   GICHAST 21            Failed - Patient is age 6-64 years       Passed - Blood pressure under 140/90 in past 12 months    BP Readings from Last 3 Encounters:   12/10/18 129/63   11/27/18 131/64   11/13/18 124/70                Passed - Recent (12 mo) or future (30 days) visit within the authorizing provider's specialty    Patient had office visit in the last 12 months or has a visit in the next 30 days with authorizing provider or within the authorizing provider's specialty.  See \"Patient Info\" tab in inbasket, or \"Choose Columns\" in Meds & Orders section of the refill encounter.             Passed - Normal CBC on file in past 12 months    Recent Labs   Lab Test 11/13/18  1438  08/11/17  1436   WBC 5.1   < >  --    GICHWBC  --   --  5.6   RBC 4.03   < >  --    GICHRBC  --   --  3.59*   HGB 11.6*   < > 10.6*   HCT 36.8   < > 32.7*      < > 255    < > = values in this interval not displayed.                Passed - No active pregnancy on record       Passed - Normal serum creatinine on file in past 12 months    Recent Labs   Lab Test 06/30/18  0641   CR 0.63            Passed - No positive pregnancy test in past 12 months          "

## 2018-12-19 RX ORDER — MELOXICAM 7.5 MG/1
TABLET ORAL
Qty: 90 TABLET | Refills: 0 | Status: SHIPPED | OUTPATIENT
Start: 2018-12-19 | End: 2019-01-09

## 2019-01-09 ENCOUNTER — MYC MEDICAL ADVICE (OUTPATIENT)
Dept: FAMILY MEDICINE | Facility: CLINIC | Age: 84
End: 2019-01-09

## 2019-02-10 ENCOUNTER — MYC MEDICAL ADVICE (OUTPATIENT)
Dept: FAMILY MEDICINE | Facility: CLINIC | Age: 84
End: 2019-02-10

## 2019-03-05 DIAGNOSIS — I10 HYPERTENSION, GOAL BELOW 140/90: ICD-10-CM

## 2019-03-05 DIAGNOSIS — K21.00 GASTROESOPHAGEAL REFLUX DISEASE WITH ESOPHAGITIS: ICD-10-CM

## 2019-03-05 NOTE — TELEPHONE ENCOUNTER
"Requested Prescriptions   Pending Prescriptions Disp Refills     hydrochlorothiazide (MICROZIDE) 12.5 MG capsule [Pharmacy Med Name: HYDROCHLOROTHIAZIDE 12.5MG CAPSULES] 90 capsule 0          Last Written Prescription Date:  12/19/17  Last Fill Quantity: 90,  # refills: 3   Last Office Visit with [facility], [facility] or [facility] prescribing provider:  12/10/18   Future Office Visit:      Sig: TAKE 1 CAPSULE(12.5 MG) BY MOUTH DAILY    Diuretics (Including Combos) Protocol Failed - 3/5/2019  9:09 AM       Failed - Medication is active on med list       Passed - Blood pressure under 140/90 in past 12 months    BP Readings from Last 3 Encounters:   12/10/18 129/63   11/27/18 131/64   11/13/18 124/70                Passed - Recent (12 mo) or future (30 days) visit within the authorizing provider's specialty    Patient had office visit in the last 12 months or has a visit in the next 30 days with authorizing provider or within the authorizing provider's specialty.  See \"Patient Info\" tab in inbasket, or \"Choose Columns\" in Meds & Orders section of the refill encounter.             Passed - Patient is age 18 or older       Passed - No active pregancy on record       Passed - Normal serum creatinine on file in past 12 months    Recent Labs   Lab Test 06/30/18  0641   CR 0.63             Passed - Normal serum potassium on file in past 12 months    Recent Labs   Lab Test 06/30/18  0641   POTASSIUM 4.0                   Passed - Normal serum sodium on file in past 12 months    Recent Labs   Lab Test 06/30/18  0641                Passed - No positive pregnancy test in past 12 months        omeprazole (PRILOSEC) 20 MG DR capsule [Pharmacy Med Name: OMEPRAZOLE 20MG CAPSULES] 90 capsule 0          Last Written Prescription Date:  6/6/18  Last Fill Quantity: 90,  # refills: 2   Last Office Visit with [facility], [facility] or [facility] prescribing provider:  12/10/18   Future Office Visit:      Sig: TAKE 1 CAPSULE(20 MG) BY MOUTH DAILY    PPI Protocol " "Passed - 3/5/2019  9:09 AM       Passed - Not on Clopidogrel (unless Pantoprazole ordered)       Passed - No diagnosis of osteoporosis on record       Passed - Recent (12 mo) or future (30 days) visit within the authorizing provider's specialty    Patient had office visit in the last 12 months or has a visit in the next 30 days with authorizing provider or within the authorizing provider's specialty.  See \"Patient Info\" tab in inbasket, or \"Choose Columns\" in Meds & Orders section of the refill encounter.             Passed - Medication is active on med list       Passed - Patient is age 18 or older       Passed - No active pregnacy on record       Passed - No positive pregnancy test in past 12 months              Moreno Faarax  [facility]  "

## 2019-03-06 RX ORDER — HYDROCHLOROTHIAZIDE 12.5 MG/1
CAPSULE ORAL
Qty: 90 CAPSULE | Refills: 0 | Status: SHIPPED | OUTPATIENT
Start: 2019-03-06 | End: 2019-06-02

## 2019-03-06 NOTE — TELEPHONE ENCOUNTER
For hydrochlorothiazide:  Routing refill request to provider for review/approval because:  Drug not active on patient's medication list    For omeprazole:  Prescription approved per G Refill Protocol.        Checo Navarrete RN, BSN

## 2019-03-08 ENCOUNTER — OFFICE VISIT (OUTPATIENT)
Dept: FAMILY MEDICINE | Facility: CLINIC | Age: 84
End: 2019-03-08
Payer: MEDICARE

## 2019-03-08 VITALS
SYSTOLIC BLOOD PRESSURE: 128 MMHG | WEIGHT: 166.8 LBS | TEMPERATURE: 97.6 F | HEIGHT: 62 IN | DIASTOLIC BLOOD PRESSURE: 68 MMHG | HEART RATE: 62 BPM | BODY MASS INDEX: 30.69 KG/M2 | OXYGEN SATURATION: 99 %

## 2019-03-08 DIAGNOSIS — R30.0 DYSURIA: Primary | ICD-10-CM

## 2019-03-08 DIAGNOSIS — R82.90 NONSPECIFIC FINDING ON EXAMINATION OF URINE: ICD-10-CM

## 2019-03-08 DIAGNOSIS — M05.79 RHEUMATOID ARTHRITIS INVOLVING MULTIPLE SITES WITH POSITIVE RHEUMATOID FACTOR (H): ICD-10-CM

## 2019-03-08 DIAGNOSIS — N39.0 RECURRENT UTI: ICD-10-CM

## 2019-03-08 DIAGNOSIS — N30.00 ACUTE CYSTITIS WITHOUT HEMATURIA: ICD-10-CM

## 2019-03-08 DIAGNOSIS — D33.3 BENIGN NEOPLASM OF CRANIAL NERVE (H): ICD-10-CM

## 2019-03-08 LAB
ALBUMIN UR-MCNC: ABNORMAL MG/DL
APPEARANCE UR: CLEAR
BACTERIA #/AREA URNS HPF: ABNORMAL /HPF
BILIRUB UR QL STRIP: NEGATIVE
COLOR UR AUTO: YELLOW
GLUCOSE UR STRIP-MCNC: NEGATIVE MG/DL
HGB UR QL STRIP: ABNORMAL
KETONES UR STRIP-MCNC: NEGATIVE MG/DL
LEUKOCYTE ESTERASE UR QL STRIP: ABNORMAL
NITRATE UR QL: POSITIVE
NON-SQ EPI CELLS #/AREA URNS LPF: ABNORMAL /LPF
PH UR STRIP: 6 PH (ref 5–7)
RBC #/AREA URNS AUTO: ABNORMAL /HPF
SOURCE: ABNORMAL
SP GR UR STRIP: 1.02 (ref 1–1.03)
UROBILINOGEN UR STRIP-ACNC: 1 EU/DL (ref 0.2–1)
WBC #/AREA URNS AUTO: ABNORMAL /HPF

## 2019-03-08 PROCEDURE — 81001 URINALYSIS AUTO W/SCOPE: CPT | Performed by: FAMILY MEDICINE

## 2019-03-08 PROCEDURE — 87086 URINE CULTURE/COLONY COUNT: CPT | Performed by: FAMILY MEDICINE

## 2019-03-08 PROCEDURE — 99214 OFFICE O/P EST MOD 30 MIN: CPT | Performed by: FAMILY MEDICINE

## 2019-03-08 PROCEDURE — 87088 URINE BACTERIA CULTURE: CPT | Performed by: FAMILY MEDICINE

## 2019-03-08 PROCEDURE — 87186 SC STD MICRODIL/AGAR DIL: CPT | Performed by: FAMILY MEDICINE

## 2019-03-08 RX ORDER — PHENAZOPYRIDINE HYDROCHLORIDE 100 MG/1
100 TABLET, FILM COATED ORAL 3 TIMES DAILY PRN
Qty: 6 TABLET | Refills: 1 | Status: SHIPPED | OUTPATIENT
Start: 2019-03-08 | End: 2019-05-22

## 2019-03-08 ASSESSMENT — MIFFLIN-ST. JEOR: SCORE: 1174.85

## 2019-03-08 ASSESSMENT — PAIN SCALES - GENERAL: PAINLEVEL: SEVERE PAIN (6)

## 2019-03-08 NOTE — PATIENT INSTRUCTIONS
At [facility], we strive to deliver an exceptional experience to you, every time we see you.  If you receive a survey in the mail, please send us back your thoughts. We really do value your feedback.    Based on your medical history, these are the current health maintenance/preventive care services that you are due for (some may have been done at this visit.)  Health Maintenance Due   Topic Date Due     DTAP/TDAP/TD IMMUNIZATION (3 - Td) 03/13/2018         Suggested websites for health information:  Www.HALO2CLOUD.org : Up to date and easily searchable information on multiple topics.  Www.3Scan.gov : medication info, interactive tutorials, watch real surgeries online  Www.familydoctor.org : good info from the Academy of Family Physicians  Www.cdc.gov : public health info, travel advisories, epidemics (H1N1)  Www.aap.org : children's health info, normal development, vaccinations  Www.health[facility].us : [place] dept of health, public health issues in [place], N1N1    Your care team:                            Family Medicine Internal Medicine   MD Jose Alejandro Floyd MD Shantel Branch-Fleming, MD Katya Georgiev PA-C Nam Ho, MD Pediatrics   Kel Pena PAENMA Cook, ANNA JOHNSON CNP   MD Kika Garza MD Deborah Mielke, MD Kim Thein, APRN CNP      Clinic hours: Monday - Thursday 7 am-7 pm; Fridays 7 am-5 pm.   Urgent care: Monday - Friday 11 am-9 pm; Saturday and Sunday 9 am-5 pm.  Pharmacy : Monday -Thursday 8 am-8 pm; Friday 8 am-6 pm; Saturday and Sunday 9 am-5 pm.     Clinic: [phone]   Pharmacy: [phone]

## 2019-03-08 NOTE — PROGRESS NOTES
SUBJECTIVE:                                                    Brenda Barker is a 83 year old female who presents to clinic today for the following health issues:      URINARY TRACT SYMPTOMS  Onset: this am    Description:   Painful urination (Dysuria): YES  Blood in urine (Hematuria): no   Delay in urine (Hesitency): no     Intensity: moderate    Progression of Symptoms:  worsening    Accompanying Signs & Symptoms:  Fever/chills: YES- chills  Flank pain YES  Nausea and vomiting: no   Any vaginal symptoms: none  Abdominal/Pelvic Pain: YES    History:   History of frequent UTI's: YES- was on Macrobid once a day but stopped due to insurance coverage. Tried cephalexin but developed diarrhea on this medication. Has been symptomatic off antibiotics twice now. Appointment with urology next month. History of cystoscopy over 10 years ago, normal.   History of kidney stones: no   Sexually Active: no   Possibility of pregnancy: No    Precipitating factors:   None     Therapies Tried and outcome: none    Can't take ibuprofen due to methotrexate for rheumatoid arthritis. 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 No problems with this. Follow up with ENT for ear wax removal twice a year.     Problem list and histories reviewed & adjusted, as indicated.  Additional history: as documented    Patient Active Problem List   Diagnosis     Benign neoplasm of cranial nerve (H)     Hemorrhoids     Aortic valve disorder     Cervicalgia     Rheumatoid arthritis (H)     SNHL (sensorineural hearing loss)     Status post total left knee replacement     Obesity     High risk medication use     Gastroesophageal reflux disease with esophagitis     Hypertension, goal below 140/90     Hyperlipidemia LDL goal <130     Rapid heart rate     Resting tremor     ACP (advance care planning)     Status post total replacement of left hip     Past Surgical History:   Procedure Laterality Date     ARTHROPLASTY HIP Left 6/27/2018     Procedure: ARTHROPLASTY HIP;  Left Total Hip Arthroplasty  ;  Surgeon: Keo Priest MD;  [facility]: [facility]     BACK SURGERY  2011    Fusion L3-4 & decompression; [facility]     BIOPSY  2013    Thyroid - normal result     C DEXA, BONE DENSITY, AXIAL SKEL  7/03 7/03 L1-4 1.7, FemNkL-0.8,R-1.2, FArm Px 0.2,Dist-0.3     C REMOVAL OF OVARY(S)  1980    2nd ovary removed - endometriosis     C TOTAL ABDOM HYSTERECTOMY  1972    uterus, ovary removed - fibroid     EYE SURGERY  2010 & 11    Cataract surgery     HC COLONOSCOPY THRU STOMA W BIOPSY/CAUTERY TUMOR/POLYP/LESION  1993    adenomatous polyp     HC COLONOSCOPY THRU STOMA, DIAGNOSTIC  1998, 10/03    normal, '03 rec repeat in 5 yrs due to +hx polyp and + fam hx     HC FLEX SIGMOIDOSCOPY W/WO KARYNA SPEC BY BRUSH/WASH  12/2001     HC REMOVAL OF TONSILS,<13 Y/O  1942     ORTHOPEDIC SURGERY  2008    Left knee replacement     SURGICAL HISTORY OF -   1989    basal cell Ca scalp excised     SURGICAL HISTORY OF -   1998    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       Social History     Tobacco Use     Smoking status: Never Smoker     Smokeless tobacco: Never Used   Substance Use Topics     Alcohol use: Yes     Comment: occasionally     Family History   Problem Relation Age of Onset     Gastrointestinal Disease Father         bleeding ulcer     Cancer Mother         thyroid cancer     Other Cancer Mother         thyroid & throat     Osteoporosis Mother      Thyroid Disease Mother      Diabetes Paternal Grandmother      Cerebrovascular Disease Paternal Grandmother      Colon Cancer Maternal Half-Brother      Osteoporosis Maternal Grandmother          Current Outpatient Medications   Medication Sig Dispense Refill     Acetaminophen (TYLENOL PO) Take 650 mg by mouth every 6 hours as needed for mild pain or fever       acyclovir (ZOVIRAX) 400 MG tablet Take 1 tablet (400 mg) by mouth daily 90 tablet 3     CENTRUM SILVER OR TABS 1 TABLET DAILY        Cetirizine HCl (ZYRTEC ALLERGY PO) Take 1 tablet by mouth daily       Cholecalciferol (VITAMIN D-3) 1000 UNITS CAPS Take 2,000 Int'l Units by mouth daily        ferrous gluconate (FERGON) 324 (38 Fe) MG tablet Take 1 tablet (324 mg) by mouth every other day 100 tablet 1     FISH OIL 1000 MG OR CAPS 2 CAPSULES DAILY  (? DOSE) OTC --     FOLIC ACID 1 MG OR TABS 4 mg TABS DAILY       hydrochlorothiazide (MICROZIDE) 12.5 MG capsule TAKE 1 CAPSULE(12.5 MG) BY MOUTH DAILY 90 capsule 0     methotrexate 2.5 MG tablet CHEMO Take 6 tablets (15 mg) by mouth every 7 days       metoprolol succinate ER (TOPROL-XL) 50 MG 24 hr tablet Take 1 tablet (50 mg) by mouth daily 90 tablet 3     omeprazole (PRILOSEC) 20 MG DR capsule TAKE 1 CAPSULE(20 MG) BY MOUTH DAILY 90 capsule 2     phenazopyridine (PYRIDIUM) 100 MG tablet Take 1 tablet (100 mg) by mouth 3 times daily as needed for urinary tract discomfort 6 tablet 1     polyvinyl alcohol (LIQUID TEARS) 1.4 % ophthalmic solution 1 drop as needed.       senna-docusate (SENOKOT-S;PERICOLACE) 8.6-50 MG per tablet Take 1-2 tablets by mouth 2 times daily 100 tablet      simvastatin (ZOCOR) 20 MG tablet Take 1 tablet (20 mg) by mouth At Bedtime 90 tablet 3     cephALEXin (KEFLEX) 500 MG capsule Take 1 capsule (500 mg) by mouth daily (Patient not taking: Reported on 3/8/2019) 30 capsule 3     fluconazole (DIFLUCAN) 150 MG tablet Take 1 tablet (150 mg) by mouth once a week As needed. 1 tablet 3     nitroFURantoin macrocrystal (MACRODANTIN) 100 MG ED starter pack Take 1 capsule (100 mg) by mouth daily (Patient not taking: Reported on 3/8/2019) 90 capsule 3     Allergies   Allergen Reactions     Diatrizoate Hives and Shortness Of Breath     Ciprofloxacin      nausea     Contrast Dye      Hives,anaphylaxix     Oxycodone Other (See Comments)     hallucinations     Sulfasalazine      Other reaction(s): Rash     Recent Labs   Lab Test 12/10/18  1012 06/30/18  0641 06/29/18  0721  06/11/18  "12/19/17  1223  12/15/16  1129   *  --   --   --   --  77  --  115*   HDL 76  --   --   --   --  64  --  70   TRIG 193*  --   --   --   --  131  --  119   CR  --  0.63 0.69   < >  --  0.68   < >  --    GFRESTIMATED  --  >90 81   < >  --  83  --   --    GFRESTBLACK  --  >90 >90   < >  --  >90   < >  --    POTASSIUM  --  4.0 3.6   < >  --  4.4   < >  --    TSH  --   --   --   --  1.05  --   --   --     < > = values in this interval not displayed.      BP Readings from Last 3 Encounters:   03/08/19 128/68   12/10/18 129/63   11/27/18 131/64    Wt Readings from Last 3 Encounters:   03/08/19 75.7 kg (166 lb 12.8 oz)   12/10/18 72.8 kg (160 lb 9.6 oz)   11/13/18 73.3 kg (161 lb 9.6 oz)                  Labs reviewed in EPIC    ROS:  Constitutional, HEENT, cardiovascular, pulmonary, gi and gu systems are negative, except as otherwise noted.    OBJECTIVE:     /68 (BP Location: Right arm, Patient Position: Chair, Cuff Size: Adult Large)   Pulse 62   Temp 97.6  F (36.4  C) (Oral)   Ht 1.575 m (5' 2\")   Wt 75.7 kg (166 lb 12.8 oz)   LMP 05/17/1972   SpO2 99%   BMI 30.51 kg/m    Body mass index is 30.51 kg/m .  GENERAL: healthy, alert, well nourished, well hydrated, no distress, obese  HENT: ear canals- normal; TMs- normal; Nose- normal; Mouth- no ulcers, no lesions, throat is clear with no erythema or exudate.   NECK: no tenderness, no adenopathy, no asymmetry, no masses, no stiffness; thyroid- normal to palpation  RESP: lungs clear to auscultation - no rales, no rhonchi, no wheezes  CV: regular rates and rhythm, normal S1 S2, no S3 or S4 and no murmur, no click or rub -  ABDOMEN: soft, no tenderness, no  hepatosplenomegaly, no masses, normal bowel sounds  MS: extremities- no gross deformities noted, no edema  SKIN: no suspicious lesions, no rashes  NEURO: strength and tone- normal, sensory exam- grossly normal, mentation- intact, speech- normal, reflexes- symmetric  BACK: no CVA tenderness, no paralumbar " "tenderness  PSYCH: Alert and oriented times 3; speech- coherent , normal rate and volume; able to articulate logical thoughts, able to abstract reason, no tangential thoughts, no hallucinations or delusions, affect- normal     Diagnostic Test Results:  Results for orders placed or performed in visit on 03/08/19 (from the past 24 hour(s))   *UA reflex to Microscopic and Culture ([facility] (except [place] and [place])   Result Value Ref Range    Color Urine Yellow     Appearance Urine Clear     Glucose Urine Negative NEG^Negative mg/dL    Bilirubin Urine Negative NEG^Negative    Ketones Urine Negative NEG^Negative mg/dL    Specific Gravity Urine 1.025 1.003 - 1.035    Blood Urine Small (A) NEG^Negative    pH Urine 6.0 5.0 - 7.0 pH    Protein Albumin Urine Trace (A) NEG^Negative mg/dL    Urobilinogen Urine 1.0 0.2 - 1.0 EU/dL    Nitrite Urine Positive (A) NEG^Negative    Leukocyte Esterase Urine Trace (A) NEG^Negative    Source Midstream Urine    Urine Microscopic   Result Value Ref Range    WBC Urine 5-10 (A) OTO5^0 - 5 /HPF    RBC Urine O - 2 OTO2^O - 2 /HPF    Squamous Epithelial /LPF Urine Few FEW^Few /LPF    Bacteria Urine Few (A) NEG^Negative /HPF       ASSESSMENT/PLAN:         Tobacco Cessation:   reports that  has never smoked. she has never used smokeless tobacco.      BMI:   Estimated body mass index is 30.51 kg/m  as calculated from the following:    Height as of this encounter: 1.575 m (5' 2\").    Weight as of this encounter: 75.7 kg (166 lb 12.8 oz).   Weight management plan: Discussed healthy diet and exercise guidelines        ICD-10-CM    1. Dysuria- sudden onset this morning. Will wait for culture results to treat due to recent treatment with antibiotics and multiple allergies.  R30.0 *UA reflex to Microscopic and Culture ([facility] (except [place] and [place])     Urine Microscopic     phenazopyridine (PYRIDIUM) 100 MG tablet three times a day for 2 days.  "   2. Benign neoplasm of cranial nerve (H) D33.3 Stable without recurrence. Follow up with ENT as needed.    3. Rheumatoid arthritis involving multiple sites with positive rheumatoid factor (H) M05.79 Stable on methotrexate. Regular labs per rheumatologist   4. Acute cystitis without hematuria N30.00 Will wait for culture results.    5. Nonspecific finding on examination of urine R82.90 Urine Culture Aerobic Bacterial       CONSULTATION/REFERRAL to urology as scheduled. ENT  FUTURE LABS:       - Schedule fasting labs in 6 months  FUTURE APPOINTMENTS:       - Follow-up visit in 6 months or sooner if any questions or concerns.   Work on weight loss  Regular exercise  See Patient Instructions    Liudmila Bowie MD  [facility]

## 2019-03-11 LAB
BACTERIA SPEC CULT: ABNORMAL
SPECIMEN SOURCE: ABNORMAL

## 2019-03-11 RX ORDER — NITROFURANTOIN 25; 75 MG/1; MG/1
100 CAPSULE ORAL 2 TIMES DAILY
Qty: 10 CAPSULE | Refills: 0 | Status: SHIPPED | OUTPATIENT
Start: 2019-03-11 | End: 2019-05-22

## 2019-03-11 RX ORDER — NITROFURANTOIN 25; 75 MG/1; MG/1
100 CAPSULE ORAL DAILY
Qty: 30 CAPSULE | Refills: 1 | Status: SHIPPED | OUTPATIENT
Start: 2019-03-11 | End: 2019-05-22

## 2019-03-12 NOTE — RESULT ENCOUNTER NOTE
Dear Brenda    Your test results are attached.     The urine shows an infection sensitive to Macrobid. I will have you take 1 twice a day for 5 days, then start one a day again.     Please contact me by [facility] if you have any questions about your labs or management.    Liudmila Bowie MD

## 2019-04-15 ENCOUNTER — OFFICE VISIT (OUTPATIENT)
Dept: UROLOGY | Facility: CLINIC | Age: 84
End: 2019-04-15
Attending: FAMILY MEDICINE
Payer: MEDICARE

## 2019-04-15 VITALS
OXYGEN SATURATION: 99 % | DIASTOLIC BLOOD PRESSURE: 73 MMHG | HEIGHT: 62 IN | SYSTOLIC BLOOD PRESSURE: 146 MMHG | BODY MASS INDEX: 30.51 KG/M2 | HEART RATE: 55 BPM

## 2019-04-15 DIAGNOSIS — N20.0 NEPHROLITHIASIS: ICD-10-CM

## 2019-04-15 DIAGNOSIS — N95.2 ATROPHIC VAGINITIS: ICD-10-CM

## 2019-04-15 DIAGNOSIS — N39.0 RECURRENT UTI: Primary | ICD-10-CM

## 2019-04-15 PROCEDURE — 99204 OFFICE O/P NEW MOD 45 MIN: CPT | Performed by: UROLOGY

## 2019-04-15 NOTE — NURSING NOTE
"Brenda Barker's goals for this visit include:   Chief Complaint   Patient presents with     Consult     Recurrent UTI        She requests these members of her care team be copied on today's visit information: yes    PCP: Liudmila Bowie    Referring Provider:  Liudmila Bowie MD  [id number] [place] [address]    /73   Pulse 55   Ht 1.575 m (5' 2\")   LMP 05/17/1972   SpO2 99%   BMI 30.51 kg/m      Do you need any medication refills at today's visit? No    Meliton Garcia CMA      "

## 2019-04-15 NOTE — PROGRESS NOTES
CC: Recurrent uti's    HPI:  Brenda Barker is a 83 year old female asked to be seen in consultation by Dr. Bowie for the above.  This problem has been going on for many years.  She was on nitrofurantoin low dose for prophylaxis since  and doing well, however then in Dec 2018 it was suggested she stop the medication by her insurance company however she immediately got an infection in 2019 and then got antibiotics and got diarrhea and a yeast infection then another UTI again in Feb and March.  She is now back on nitrofurantoin prophylaxis. The patient voids q2-3 hours, nocturia X 2.  She drinks mainly juice and wine, not much water.  She denies any dysuria,  hematuria, hesitancy, intermittency, feeling of incomplete emptying, or any recent hx of stones.    The patient has no constipation or splinting.  She is not really sexually active and denies any dyspareunia or pelvic pain.   She denies any vaginal bulge.  She has no neurological or balance problems. But she does have some numbness in her hands from a pinched nerve in her neck.    Obstetric Hx:  She is .  The weight of her largest baby was 8 lbs 8oz.  Babies were delivered vaginally.  Menopause after 42 when they removed her ovaries. She had a hysterectomy in her early 30's. HRT:  For over 20 years    Past Medical History:   Diagnosis Date     AORTIC VALVE SCLEROSIS 2002    ECHO AV sclerosis, mild TR, trace MR     ASYMPTOMATIC PVCS      Back pain      Basal cell carcinoma of scalp      BORDERLINE ELEVATED HBA1C- 6.1%  2005     CARPAL TUNNEL SYNDROME, R>L 2001     CERV. 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-6 BILAT UNCINATE SPURS 2001     DEPRESSION 2005     Depressive disorder      suicide of son     DJD (degenerative joint disease)      Endometriosis      Essential and other specified forms of tremor      HEMORRHOIDS      HERPES SIMPLEX  I AND II      HX ADENOMATOUS COLON POLYP      HYPERLIPIDEMIA      HYPERTENSION 2005     L  "ACOUSTIC NEUROMA     post op 7 th nerve palsy and CSF leak     Neoplasm of uncertain behavior of skin      OBESITY -BMI 32      OSTEOPENIA 7/03    L1-4 1.7,FemNck L-0.8,R-1.2,F'arm dist-0.3     Pain in joint, pelvic region and thigh      Rheumatoid arthritis(714.0) 6/28/2004     Spinal stenosis, unspecified region other than cervical      Unspecified hearing loss      URIN TRACT INFECTION, RECURRENT        Past Surgical History:   Procedure Laterality Date     ARTHROPLASTY HIP Left 6/27/2018    Procedure: ARTHROPLASTY HIP;  Left Total Hip Arthroplasty  ;  Surgeon: Keo Priest MD;  [facility]     BACK SURGERY  2011    Fusion L3-4 & decompression; [facility]     BIOPSY  2013    Thyroid - normal result     C DEXA, BONE DENSITY, AXIAL SKEL  7/03 7/03 L1-4 1.7, FemNkL-0.8,R-1.2, FArm Px 0.2,Dist-0.3     C REMOVAL OF OVARY(S)  1980 2nd ovary removed - endometriosis     C TOTAL ABDOM HYSTERECTOMY  1972    uterus, ovary removed - fibroid     EYE SURGERY  2010 & 11    Cataract surgery     HC COLONOSCOPY THRU STOMA W BIOPSY/CAUTERY TUMOR/POLYP/LESION  1993    adenomatous polyp     HC COLONOSCOPY THRU STOMA, DIAGNOSTIC  1998, 10/03    normal, '03 rec repeat in 5 yrs due to +hx polyp and + fam hx     HC FLEX SIGMOIDOSCOPY W/WO KARYNA SPEC BY BRUSH/WASH  12/2001     HC REMOVAL OF TONSILS,<13 Y/O  1942     ORTHOPEDIC SURGERY  2008    Left knee replacement     SURGICAL HISTORY OF -   1989    basal cell Ca scalp excised     SURGICAL HISTORY OF -   1998    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    ROS is negative on a 14 point scale except for RA.  All other positive and pertinent information is mentioned in the HPI.    PEx:   Blood pressure 146/73, pulse 55, height 1.575 m (5' 2\"), last menstrual period 05/17/1972, SpO2 99 %, not currently breastfeeding.  5' 2\", Body mass index is 30.51 kg/m ., 0 lbs 0 oz  Gen appearance:  " Well groomed  HEENT:  EOMI, AT NC  Psych:  Normal Affect  Neuro:  A/O X 3  Skin:  Warm to touch  Resp:  No increased respiratory effort  Vasc:  RRR  lymph:  No LE edema  Musk:  Full ROM in extremities  :  deferred    UA: UA RESULTS:  Recent Labs   Lab Test 03/08/19  1039   COLOR Yellow   APPEARANCE Clear   URINEGLC Negative   URINEBILI Negative   URINEKETONE Negative   SG 1.025   UBLD Small*   URINEPH 6.0   PROTEIN Trace*   UROBILINOGEN 1.0   NITRITE Positive*   LEUKEST Trace*   RBCU O - 2   WBCU 5-10*       Office Visit on 03/08/2019   Component Date Value Ref Range Status     Color Urine 03/08/2019 Yellow   Final     Appearance Urine 03/08/2019 Clear   Final     Glucose Urine 03/08/2019 Negative  NEG^Negative mg/dL Final     Bilirubin Urine 03/08/2019 Negative  NEG^Negative Final     Ketones Urine 03/08/2019 Negative  NEG^Negative mg/dL Final     Specific Gravity Urine 03/08/2019 1.025  1.003 - 1.035 Final     Blood Urine 03/08/2019 Small* NEG^Negative Final     pH Urine 03/08/2019 6.0  5.0 - 7.0 pH Final     Protein Albumin Urine 03/08/2019 Trace* NEG^Negative mg/dL Final     Urobilinogen Urine 03/08/2019 1.0  0.2 - 1.0 EU/dL Final     Nitrite Urine 03/08/2019 Positive* NEG^Negative Final     Leukocyte Esterase Urine 03/08/2019 Trace* NEG^Negative Final     Source 03/08/2019 Midstream Urine   Final     WBC Urine 03/08/2019 5-10* OTO5^0 - 5 /HPF Final     RBC Urine 03/08/2019 O - 2  OTO2^O - 2 /HPF Final     Squamous Epithelial /LPF Urine 03/08/2019 Few  FEW^Few /LPF Final     Bacteria Urine 03/08/2019 Few* NEG^Negative /HPF Final     Specimen Description 03/08/2019 Midstream Urine   Final     Culture Micro 03/08/2019 *  Final                    Value:>100,000 colonies/mL  Escherichia coli         ASSESSMENT and PLAN:  This is a 83 year old female with recurrent uti's and atrophic vaginitis.  Different management options were discussed with the patient including observation, continuing with prophylactic  antibiotics or methenamine.  We also discussed trying some vaginal estrogen cream and more w/u like a CT and repeating the cystoscopy.  Pt. Would like to try the estrogen cream and obtain a CT and perform cystoscopy.  -start estrogen cream  -continue nitrofurantoin for now  -increase fluid intake with water  -obtain CT scan to evaluate for stones  -f/u to review and undergo cystoscopy.    Thank you for allowing me to participate in Ms. Barker's care.  I will keep you updated on her progress.    Luis Alberto Reis MD

## 2019-04-16 NOTE — PROGRESS NOTES
Compounded vaginal estrogen cream ordered per Dr. Reis and sent for Dr. Reis to review and sign. Prescription successfully faxed to [facility] at [phone].    Halina Cavazos RN, BSN

## 2019-04-24 ENCOUNTER — ANCILLARY PROCEDURE (OUTPATIENT)
Dept: CT IMAGING | Facility: CLINIC | Age: 84
End: 2019-04-24
Attending: UROLOGY
Payer: MEDICARE

## 2019-04-24 DIAGNOSIS — N20.0 NEPHROLITHIASIS: ICD-10-CM

## 2019-04-24 PROCEDURE — 74176 CT ABD & PELVIS W/O CONTRAST: CPT | Performed by: RADIOLOGY

## 2019-05-14 ENCOUNTER — MEDICAL CORRESPONDENCE (OUTPATIENT)
Dept: HEALTH INFORMATION MANAGEMENT | Facility: CLINIC | Age: 84
End: 2019-05-14

## 2019-05-14 ENCOUNTER — MYC MEDICAL ADVICE (OUTPATIENT)
Dept: FAMILY MEDICINE | Facility: CLINIC | Age: 84
End: 2019-05-14

## 2019-05-14 NOTE — TELEPHONE ENCOUNTER
Fax is received and forward to Dr. Bowie to address.  Sundeep Martínez,  For [facility]

## 2019-05-16 NOTE — TELEPHONE ENCOUNTER
Aquatic therapy is signed and faxed back to [facility] at fax # [phone].  Sundeep Martínez,  For [facility]

## 2019-05-21 NOTE — PLAN OF CARE
Problem: Hip Arthroplasty (Total, Partial) (Adult)  Goal: Signs and Symptoms of Listed Potential Problems Will be Absent, Minimized or Managed (Hip Arthroplasty)  Signs and symptoms of listed potential problems will be absent, minimized or managed by discharge/transition of care (reference Hip Arthroplasty (Total, Partial) (Adult) CPG).   Outcome: Improving    VS: BP has been labile and low - received bolus, fluids running at 75ml/hr - continue to monitor. Afebrile   O2: Stable on room air mid 90's   Output: Voids without issue in bathroom or commode.    Last BM: 6.26.18 per pt report - accepted Senna declines other interventions   Activity: WBAT up with assist of 1 and walker. In chair for meal    Skin: Intact ex incisions   Pain: Managed with PRN dilaudid 1mg every 4hrs - pt endorsees relief.    CMS: Intact denies numbness and tingling throughout   Dressing: CDI   Diet: Regular appetite is good and tolerates oral fluids   LDA: PIV infusing NS at 75ml/hr   Equipment: Walker, gait belt -    Plan: Anticipate return to home 2-3 days depending on progress with therapies.    Additional Info:             Detail Level: Zone Quality 226: Preventive Care And Screening: Tobacco Use: Screening And Cessation Intervention: Patient screened for tobacco use and is an ex/non-smoker

## 2019-05-22 ENCOUNTER — OFFICE VISIT (OUTPATIENT)
Dept: FAMILY MEDICINE | Facility: CLINIC | Age: 84
End: 2019-05-22
Payer: MEDICARE

## 2019-05-22 VITALS
SYSTOLIC BLOOD PRESSURE: 118 MMHG | WEIGHT: 167 LBS | HEIGHT: 62 IN | DIASTOLIC BLOOD PRESSURE: 70 MMHG | OXYGEN SATURATION: 97 % | TEMPERATURE: 97.8 F | BODY MASS INDEX: 30.73 KG/M2 | HEART RATE: 68 BPM

## 2019-05-22 DIAGNOSIS — R09.82 POST-NASAL DRAINAGE: ICD-10-CM

## 2019-05-22 DIAGNOSIS — B37.31 YEAST INFECTION INVOLVING THE VAGINA AND SURROUNDING AREA: ICD-10-CM

## 2019-05-22 DIAGNOSIS — J06.9 VIRAL URI WITH COUGH: Primary | ICD-10-CM

## 2019-05-22 DIAGNOSIS — M05.79 RHEUMATOID ARTHRITIS INVOLVING MULTIPLE SITES WITH POSITIVE RHEUMATOID FACTOR (H): ICD-10-CM

## 2019-05-22 PROCEDURE — 99214 OFFICE O/P EST MOD 30 MIN: CPT | Performed by: PREVENTIVE MEDICINE

## 2019-05-22 RX ORDER — FLUCONAZOLE 150 MG/1
150 TABLET ORAL ONCE
Qty: 1 TABLET | Refills: 0 | Status: SHIPPED | OUTPATIENT
Start: 2019-05-22 | End: 2019-09-25

## 2019-05-22 RX ORDER — NITROFURANTOIN MACROCRYSTAL 100 MG
CAPSULE ORAL
Refills: 3 | COMMUNITY
Start: 2018-12-10 | End: 2019-09-25

## 2019-05-22 RX ORDER — AMOXICILLIN AND CLAVULANATE POTASSIUM 500; 125 MG/1; MG/1
1 TABLET, FILM COATED ORAL 3 TIMES DAILY
Qty: 21 TABLET | Refills: 0 | Status: SHIPPED | OUTPATIENT
Start: 2019-05-22 | End: 2019-09-25

## 2019-05-22 ASSESSMENT — MIFFLIN-ST. JEOR: SCORE: 1175.76

## 2019-05-22 ASSESSMENT — PAIN SCALES - GENERAL: PAINLEVEL: NO PAIN (0)

## 2019-05-22 NOTE — PATIENT INSTRUCTIONS
At [facility], we strive to deliver an exceptional experience to you, every time we see you.  If you receive a survey in the mail, please send us back your thoughts. We really do value your feedback.    Your care team:                            Family Medicine Internal Medicine   MD Jose Alejandro Floyd MD Shantel Branch-Fleming, MD Katya Georgiev PA-C Megan Hill, APRN ANNA Draper MD Pediatrics   Kel Pena, VINITA Cook, MD Ruba Jones APRN CNP   MD Kika Garza MD Deborah Mielke, MD Sindy Almeida, APRN [facility]      Clinic hours: Monday - Thursday 7 am-7 pm; Fridays 7 am-5 pm.   Urgent care: Monday - Friday 11 am-9 pm; Saturday and Sunday 9 am-5 pm.  Pharmacy : Monday -Thursday 8 am-8 pm; Friday 8 am-6 pm; Saturday and Sunday 9 am-5 pm.     Clinic: [phone]   Pharmacy: [phone]

## 2019-05-22 NOTE — PROGRESS NOTES
Subjective     Brenda Barker is a 83 year old female who presents to clinic today for the following health issues:    HPI     RESPIRATORY SYMPTOMS      Duration: x 3 days    Description  nasal congestion, rhinorrhea, sore throat, facial pain/pressure, cough, chills, ear pain right, headache, fatigue/malaise, hoarse voice and conjunctival irritation    Severity: moderate    Accompanying signs and symptoms: None    History (predisposing factors):  none    Precipitating or alleviating factors: None    Therapies tried and outcome:  Antihistamine    Woke up with a sore throat and congestion    Last in Feb 2019    No emesis    No rash     is also sick     No diarrhea     Left eye has been watery    Sneezing+    Does not usually get seasonal allergies but is on Zyrtec, no nasal spray    Some post nasal drainage     Patient Active Problem List   Diagnosis     Benign neoplasm of cranial nerve (H)     Hemorrhoids     Aortic valve disorder     Cervicalgia     Rheumatoid arthritis (H)     SNHL (sensorineural hearing loss)     Status post total left knee replacement     Obesity     High risk medication use     Gastroesophageal reflux disease with esophagitis     Hypertension, goal below 140/90     Hyperlipidemia LDL goal <130     Rapid heart rate     Resting tremor     ACP (advance care planning)     Status post total replacement of left hip     Past Surgical History:   Procedure Laterality Date     ARTHROPLASTY HIP Left 6/27/2018    Procedure: ARTHROPLASTY HIP;  Left Total Hip Arthroplasty  ;  Surgeon: Keo Priest MD;  Location: [facility]     BACK SURGERY  2011    Fusion L3-4 & decompression; [facility]     BIOPSY  2013    Thyroid - normal result     C DEXA, BONE DENSITY, AXIAL SKEL  7/03 7/03 L1-4 1.7, FemNkL-0.8,R-1.2, FArm Px 0.2,Dist-0.3     C REMOVAL OF OVARY(S)  1980 2nd ovary removed - endometriosis     C TOTAL ABDOM HYSTERECTOMY  1972    uterus, ovary removed - fibroid     EYE SURGERY  2010 & 11     Cataract surgery     HC COLONOSCOPY THRU STOMA W BIOPSY/CAUTERY TUMOR/POLYP/LESION  1993    adenomatous polyp     HC COLONOSCOPY THRU STOMA, DIAGNOSTIC  1998, 10/03    normal, '03 rec repeat in 5 yrs due to +hx polyp and + fam hx     HC FLEX SIGMOIDOSCOPY W/WO KARYNA SPEC BY BRUSH/WASH  12/2001     HC REMOVAL OF TONSILS,<13 Y/O  1942     ORTHOPEDIC SURGERY  2008    Left knee replacement     SURGICAL HISTORY OF -   1989    basal cell Ca scalp excised     SURGICAL HISTORY OF -   1998    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       Social History     Tobacco Use     Smoking status: Never Smoker     Smokeless tobacco: Never Used   Substance Use Topics     Alcohol use: Yes     Comment: occasionally     Family History   Problem Relation Age of Onset     Gastrointestinal Disease Father         bleeding ulcer     Cancer Mother         thyroid cancer     Other Cancer Mother         thyroid & throat     Osteoporosis Mother      Thyroid Disease Mother      Diabetes Paternal Grandmother      Cerebrovascular Disease Paternal Grandmother      Colon Cancer Maternal Half-Brother      Osteoporosis Maternal Grandmother          Current Outpatient Medications   Medication Sig Dispense Refill     Acetaminophen (TYLENOL PO) Take 650 mg by mouth every 6 hours as needed for mild pain or fever       acyclovir (ZOVIRAX) 400 MG tablet Take 1 tablet (400 mg) by mouth daily 90 tablet 3     amoxicillin-clavulanate (AUGMENTIN) 500-125 MG tablet Take 1 tablet by mouth 3 times daily for 7 days 21 tablet 0     CENTRUM SILVER OR TABS 1 TABLET DAILY       Cetirizine HCl (ZYRTEC ALLERGY PO) Take 1 tablet by mouth daily       Cholecalciferol (VITAMIN D-3) 1000 UNITS CAPS Take 2,000 Int'l Units by mouth daily        COMPOUNDED NON-CONTROLLED SUBSTANCE (CMPD RX) - PHARMACY TO MIX COMPOUNDED MEDICATION Estriol 1mg/gram. Place 1 gram vaginally daily for two weeks. Then vaginally twice weekly. 30 g 6     ferrous gluconate (FERGON) 324 (38  "Fe) MG tablet Take 1 tablet (324 mg) by mouth every other day 100 tablet 1     FISH OIL 1000 MG OR CAPS 2 CAPSULES DAILY  (? DOSE) OTC --     fluconazole (DIFLUCAN) 150 MG tablet Take 1 tablet (150 mg) by mouth once for 1 dose 1 tablet 0     FOLIC ACID 1 MG OR TABS 4 mg TABS DAILY       hydrochlorothiazide (MICROZIDE) 12.5 MG capsule TAKE 1 CAPSULE(12.5 MG) BY MOUTH DAILY 90 capsule 0     methotrexate 2.5 MG tablet CHEMO Take 6 tablets (15 mg) by mouth every 7 days       metoprolol succinate ER (TOPROL-XL) 50 MG 24 hr tablet Take 1 tablet (50 mg) by mouth daily 90 tablet 3     nitroFURantoin macrocrystal (MACRODANTIN) 100 MG capsule   3     omeprazole (PRILOSEC) 20 MG DR capsule TAKE 1 CAPSULE(20 MG) BY MOUTH DAILY 90 capsule 2     polyvinyl alcohol (LIQUID TEARS) 1.4 % ophthalmic solution 1 drop as needed.       senna-docusate (SENOKOT-S;PERICOLACE) 8.6-50 MG per tablet Take 1-2 tablets by mouth 2 times daily 100 tablet      simvastatin (ZOCOR) 20 MG tablet Take 1 tablet (20 mg) by mouth At Bedtime 90 tablet 3     Allergies   Allergen Reactions     Diatrizoate Hives and Shortness Of Breath     Ciprofloxacin      nausea     Contrast Dye      Hives,anaphylaxix     Oxycodone Other (See Comments)     hallucinations     Sulfasalazine      Other reaction(s): Rash     BP Readings from Last 3 Encounters:   05/22/19 118/70   04/15/19 146/73   03/08/19 128/68    Wt Readings from Last 3 Encounters:   05/22/19 75.8 kg (167 lb)   03/08/19 75.7 kg (166 lb 12.8 oz)   12/10/18 72.8 kg (160 lb 9.6 oz)               Rheumatoid Arthritis:  -stable  -followed by Rheumatology    Reviewed and updated as needed this visit by Provider  Tobacco  Allergies  Meds  Problems  Med Hx  Surg Hx  Fam Hx         Review of Systems   ROS COMP: Constitutional, HEENT, cardiovascular, pulmonary, gi and gu systems are negative, except as otherwise noted.      Objective    /70   Pulse 68   Temp 97.8  F (36.6  C) (Oral)   Ht 1.575 m (5' 2\") "   Wt 75.8 kg (167 lb)   LMP 05/17/1972   SpO2 97%   Breastfeeding? No   BMI 30.54 kg/m    Body mass index is 30.54 kg/m .  Physical Exam       GENERAL APPEARANCE: healthy, alert and no distress  EYES: Eyes grossly normal to inspection and conjunctivae and sclerae normal  HENT: nose and mouth without ulcers or lesions, no pharyngeal exudates or pus points, no uvular deviation, nasal turbinates are boggy   NECK: no adenopathy and trachea midline and normal to palpation  RESP: lungs clear to auscultation - no rales, rhonchi or wheezes  CV: regular rates and rhythm, normal S1 S2, no S3 or S4 and no murmur, click or rub  ABDOMEN: soft, non-tender and no rebound or guarding   MS: extremities normal- no gross deformities noted and peripheral pulses normal  SKIN: no suspicious lesions or rashes  NEURO: Normal strength and tone, mentation intact and speech normal  PSYCH: mentation appears normal      Diagnostic Test Results:  Labs reviewed in Epic  No results found for this or any previous visit (from the past 24 hour(s)).        Assessment & Plan     Brenda was seen today for sinus problem.    Diagnoses and all orders for this visit:    Viral URI with cough  -     amoxicillin-clavulanate (AUGMENTIN) 500-125 MG tablet; Take 1 tablet by mouth 3 times daily for 7 days, I provided a script for antibiotics to be used only if symptoms are increasing in the next 1 week, will be traveling to [facility]  -symptoms seem viral in nature.     Your symptoms and exam today indicate that you have a viral upper respiratory illness.  This includes viral rhinosinusitis and viral bronchitis.  Antibiotics do not help viral illnesses; the best remedies treat the symptoms (see below).  The typical course of a viral illness is that you feel miserable for the first few days - with sore throat, runny nose/nasal congestion, cough, and sometimes fever and body aches.  You should start to feel better after about 5-7 days and much better by  "10-14 days.  If you develop sudden worsening of symptoms or fever after the first 5-7 days, or if you have persistence of your symptoms beyond 14 days, let us know as you may have developed a secondary bacterial infection.  Get plenty of rest, especially while you have a fever.  Drink lots of fluids such as water and clear soups. Fluids help loosen mucus. Fluids are also important because they help prevent dehydration. Gargle with warm salt water a few times a day to relieve a sore throat. Throat sprays or lozenges may also help relieve the pain.Avoid alcohol. Use saline (salt water) nose drops to help loosen mucus and moisten the tender skin in your nose.      Rheumatoid arthritis involving multiple sites with positive rheumatoid factor (H)  -per Rheumatology     Yeast infection involving the vagina and surrounding area  -     fluconazole (DIFLUCAN) 150 MG tablet; Take 1 tablet (150 mg) by mouth once for 1 dose  -tends to get yeast infections with use of antibiotics     Post-nasal drainage  -will use over the counter Flonase or Nasacort nasal spray        BMI:   Estimated body mass index is 30.54 kg/m  as calculated from the following:    Height as of this encounter: 1.575 m (5' 2\").    Weight as of this encounter: 75.8 kg (167 lb).           See Patient Instructions    Return in about 2 weeks (around 6/5/2019), or if symptoms worsen or fail to improve.    Lynda Lopes MD MPH    [facility]    "

## 2019-05-23 ENCOUNTER — TRANSFERRED RECORDS (OUTPATIENT)
Dept: HEALTH INFORMATION MANAGEMENT | Facility: CLINIC | Age: 84
End: 2019-05-23

## 2019-06-02 DIAGNOSIS — I10 HYPERTENSION, GOAL BELOW 140/90: ICD-10-CM

## 2019-06-02 NOTE — TELEPHONE ENCOUNTER
"Requested Prescriptions   Pending Prescriptions Disp Refills     hydrochlorothiazide (MICROZIDE) 12.5 MG capsule [Pharmacy Med Name: HYDROCHLOROTHIAZIDE 12.5MG CAPSULES]  Last Written Prescription Date:  3/6/19  Last Fill Quantity: 90,  # refills: 0   Last Office Visit with G, P or [facility] prescribing provider:  5/22/19   Future Office Visit:      90 capsule 0     Sig: TAKE 1 CAPSULE(12.5 MG) BY MOUTH DAILY       Diuretics (Including Combos) Protocol Passed - 6/2/2019  9:15 AM        Passed - Blood pressure under 140/90 in past 12 months     BP Readings from Last 3 Encounters:   05/22/19 118/70   04/15/19 146/73   03/08/19 128/68                 Passed - Recent (12 mo) or future (30 days) visit within the authorizing provider's specialty     Patient had office visit in the last 12 months or has a visit in the next 30 days with authorizing provider or within the authorizing provider's specialty.  See \"Patient Info\" tab in inbasket, or \"Choose Columns\" in Meds & Orders section of the refill encounter.              Passed - Medication is active on med list        Passed - Patient is age 18 or older        Passed - No active pregancy on record        Passed - Normal serum creatinine on file in past 12 months     Recent Labs   Lab Test 06/30/18  0641   CR 0.63              Passed - Normal serum potassium on file in past 12 months     Recent Labs   Lab Test 06/30/18  0641   POTASSIUM 4.0                    Passed - Normal serum sodium on file in past 12 months     Recent Labs   Lab Test 06/30/18  0641                 Passed - No positive pregnancy test in past 12 months          "

## 2019-06-03 ENCOUNTER — OFFICE VISIT (OUTPATIENT)
Dept: UROLOGY | Facility: CLINIC | Age: 84
End: 2019-06-03
Payer: MEDICARE

## 2019-06-03 VITALS — OXYGEN SATURATION: 100 % | SYSTOLIC BLOOD PRESSURE: 152 MMHG | HEART RATE: 70 BPM | DIASTOLIC BLOOD PRESSURE: 77 MMHG

## 2019-06-03 DIAGNOSIS — N95.2 ATROPHIC VAGINITIS: ICD-10-CM

## 2019-06-03 DIAGNOSIS — N39.0 RECURRENT UTI: Primary | ICD-10-CM

## 2019-06-03 LAB
ALBUMIN UR-MCNC: 10 MG/DL
APPEARANCE UR: CLEAR
BILIRUB UR QL STRIP: NEGATIVE
COLOR UR AUTO: YELLOW
GLUCOSE UR STRIP-MCNC: NEGATIVE MG/DL
HGB UR QL STRIP: NEGATIVE
HYALINE CASTS #/AREA URNS LPF: ABNORMAL /LPF
KETONES UR STRIP-MCNC: NEGATIVE MG/DL
LEUKOCYTE ESTERASE UR QL STRIP: NEGATIVE
NITRATE UR QL: NEGATIVE
NON-SQ EPI CELLS #/AREA URNS LPF: ABNORMAL /LPF
PH UR STRIP: 5.5 PH (ref 5–7)
RBC #/AREA URNS AUTO: ABNORMAL /HPF
SOURCE: ABNORMAL
SP GR UR STRIP: 1.03 (ref 1–1.03)
UROBILINOGEN UR STRIP-MCNC: 2 MG/DL (ref 0–2)
WBC #/AREA URNS AUTO: ABNORMAL /HPF

## 2019-06-03 PROCEDURE — 52000 CYSTOURETHROSCOPY: CPT | Performed by: UROLOGY

## 2019-06-03 PROCEDURE — 81001 URINALYSIS AUTO W/SCOPE: CPT | Performed by: UROLOGY

## 2019-06-03 NOTE — PROGRESS NOTES
Reason for Visit:  Cystoscopy    Clinical Data: Ms. Brenda Barker is a 83 year old female with a hx of recurrent uti's.  She is on nitrofurantoin and has been doing well.    CT 4/24/19:  IMPRESSION:   1. No CT findings to explain patient's symptoms.  2. Posterior spinal instrumentation and fusion at L3-4 with grade 2  spondylolisthesis of L4 and L5. No prior imaging to compare. Recommend  correlation with outside imaging.  3. Cholelithiasis without acute cholecystitis.    Cystoscopy procedure:  Pt. Was consented and placed in the lithotomy position.  She was cleaned and preparred in the usual fashion.  Lidocain gel was inserted into the urethra and given time to take effect.  A 16 fr flexible cystoscope was then inserted through the urethra and into the bladder.  The urethra was wnl.  The bladder was with 1+ trabeculation.  No tumors, diverticulae, or stones.  Bilateral u/o's were effluxing clear urine.  The cystoscope was then withdrawn.  The pt. Tolerated the procedure well.    A/P:  83 year old female with recurrent uti's doing well on nitrofurantoin and estrogen cream  -continue with nitrofurantoin  -continue estrogen cream  -f/u with Suzan in 6 months to reassess.    Thank you for allowing me to participate in the care of  Ms. Brenda Barker and I will keep you updated on her progress.    Luis Alberto Reis MD

## 2019-06-05 RX ORDER — HYDROCHLOROTHIAZIDE 12.5 MG/1
CAPSULE ORAL
Qty: 90 CAPSULE | Refills: 0 | Status: SHIPPED | OUTPATIENT
Start: 2019-06-05 | End: 2019-08-28

## 2019-06-05 NOTE — TELEPHONE ENCOUNTER
Prescription approved per [facility] Refill Protocol.    Caro Carrington RN

## 2019-06-18 ENCOUNTER — TRANSFERRED RECORDS (OUTPATIENT)
Dept: HEALTH INFORMATION MANAGEMENT | Facility: CLINIC | Age: 84
End: 2019-06-18

## 2019-07-13 DIAGNOSIS — D50.8 OTHER IRON DEFICIENCY ANEMIA: ICD-10-CM

## 2019-07-13 NOTE — TELEPHONE ENCOUNTER
"Requested Prescriptions   Pending Prescriptions Disp Refills     ferrous gluconate (FERGON) 324 (38 Fe) MG tablet [Pharmacy Med Name: FERROUS GLUC 324MG TABLETS] 100 tablet 0     Sig: TAKE 1 TABLET BY MOUTH EVERY OTHER DAY       Iron Supplements Passed - 7/13/2019  3:34 AM        Passed - Patient is 12 years of age or older        Passed - Recent (12 mo) or future (30 days) visit within the authorizing provider's specialty     Patient had office visit in the last 12 months or has a visit in the next 30 days with authorizing provider or within the authorizing provider's specialty.  See \"Patient Info\" tab in inbasket, or \"Choose Columns\" in Meds & Orders section of the refill encounter.              Passed - Hgb OR Hct on record within the past 12 mos.     Patient need only have had a HGB or HCT on file in the past 12 mos. That result does not need to be normal.    Recent Labs   Lab Test 11/13/18  1438 07/01/18  0640 06/30/18  0641   HGB 11.6* 8.2* 7.8*     Recent Labs   Lab Test 11/13/18  1438 06/30/18  0641 06/29/18  1757   HCT 36.8 25.3* 25.8*       Please verify a HGB or HCT has been checked SINCE THE LAST DOSE CHANGE.            Passed - Medication is active on med list        Last Written Prescription Date:  7/13/18  Last Fill Quantity: 100,  # refills: 1   Last office visit: 5/22/2019 with prescribing provider:  Liudmila Bowie     Future Office Visit:      "

## 2019-07-17 RX ORDER — FERROUS GLUCONATE 324(38)MG
TABLET ORAL
Qty: 100 TABLET | Refills: 0 | Status: SHIPPED | OUTPATIENT
Start: 2019-07-17 | End: 2020-08-18

## 2019-07-17 NOTE — TELEPHONE ENCOUNTER
Prescription approved per [facility] Refill Protocol.    Halina Jacome RN, [facility]

## 2019-08-28 ENCOUNTER — TELEPHONE (OUTPATIENT)
Dept: FAMILY MEDICINE | Facility: CLINIC | Age: 84
End: 2019-08-28

## 2019-08-28 DIAGNOSIS — I10 HYPERTENSION, GOAL BELOW 140/90: ICD-10-CM

## 2019-08-28 NOTE — TELEPHONE ENCOUNTER
"Requested Prescriptions   Pending Prescriptions Disp Refills     hydrochlorothiazide (MICROZIDE) 12.5 MG capsule [Pharmacy Med Name: HYDROCHLOROTHIAZIDE 12.5MG CAPSULES]  Last Written Prescription Date:  06/05/19  Last Fill Quantity: 90,  # refills: 0   Last Office Visit with G, NIKUNJ or [facility] prescribing provider:  05/22/19[place]   Future Office Visit:    90 capsule 0     Sig: TAKE 1 CAPSULE(12.5 MG) BY MOUTH DAILY       Diuretics (Including Combos) Protocol Failed - 8/28/2019  7:27 AM        Failed - Blood pressure under 140/90 in past 12 months     BP Readings from Last 3 Encounters:   06/03/19 152/77   05/22/19 118/70   04/15/19 146/73                 Failed - Normal serum creatinine on file in past 12 months     Recent Labs   Lab Test 06/30/18  0641   CR 0.63              Failed - Normal serum potassium on file in past 12 months     Recent Labs   Lab Test 06/30/18  0641   POTASSIUM 4.0                    Failed - Normal serum sodium on file in past 12 months     Recent Labs   Lab Test 06/30/18  0641                 Passed - Recent (12 mo) or future (30 days) visit within the authorizing provider's specialty     Patient had office visit in the last 12 months or has a visit in the next 30 days with authorizing provider or within the authorizing provider's specialty.  See \"Patient Info\" tab in inbasket, or \"Choose Columns\" in Meds & Orders section of the refill encounter.              Passed - Medication is active on med list        Passed - Patient is age 18 or older        Passed - No active pregancy on record        Passed - No positive pregnancy test in past 12 months          "

## 2019-08-30 RX ORDER — HYDROCHLOROTHIAZIDE 12.5 MG/1
CAPSULE ORAL
Qty: 90 CAPSULE | Refills: 0 | Status: SHIPPED | OUTPATIENT
Start: 2019-08-30 | End: 2019-10-10

## 2019-08-30 NOTE — TELEPHONE ENCOUNTER
Routing refill request to provider for review/approval because:  BP Readings from Last 3 Encounters:   06/03/19 152/77   05/22/19 118/70   04/15/19 146/73     Potassium   Date Value Ref Range Status   06/30/2018 4.0 3.4 - 5.3 mmol/L Final

## 2019-08-30 NOTE — TELEPHONE ENCOUNTER
This writer attempted to contact patient on 08/30/19      Reason for call message below and left message.      If patient calls back:   Patient contacted by 1st floor [facility] Team (MA/TC). Inform patient that someone from the team will contact them, document that pt called and route to care team.         Anna Alva MA

## 2019-08-30 NOTE — TELEPHONE ENCOUNTER
I haven't seen Brenda for over a year. Please schedule her for an annual wellness visit or at least a hypertension follow up visit. Prescription refilled once, schedule follow up office visit for more refills.   Liudmila Bowie MD

## 2019-08-30 NOTE — TELEPHONE ENCOUNTER
Informed patient of message below. Patient stated that she was at the grocery store and will call back at a later time to schedule a wellness visit.      SIMBA Thomas MA

## 2019-09-20 ENCOUNTER — DOCUMENTATION ONLY (OUTPATIENT)
Dept: LAB | Facility: CLINIC | Age: 84
End: 2019-09-20

## 2019-09-20 DIAGNOSIS — Z79.899 HIGH RISK MEDICATION USE: ICD-10-CM

## 2019-09-20 DIAGNOSIS — E78.5 HYPERLIPIDEMIA LDL GOAL <130: ICD-10-CM

## 2019-09-20 DIAGNOSIS — D33.3 BENIGN NEOPLASM OF CRANIAL NERVE (H): ICD-10-CM

## 2019-09-20 DIAGNOSIS — I10 HYPERTENSION, GOAL BELOW 140/90: ICD-10-CM

## 2019-09-20 DIAGNOSIS — M05.79 RHEUMATOID ARTHRITIS INVOLVING MULTIPLE SITES WITH POSITIVE RHEUMATOID FACTOR (H): Primary | ICD-10-CM

## 2019-09-20 DIAGNOSIS — D63.8 ANEMIA IN OTHER CHRONIC DISEASES CLASSIFIED ELSEWHERE: ICD-10-CM

## 2019-09-20 ASSESSMENT — ENCOUNTER SYMPTOMS
FEVER: 0
DIZZINESS: 0
COUGH: 0
HEADACHES: 0
SHORTNESS OF BREATH: 0
PALPITATIONS: 0
ABDOMINAL PAIN: 0
CONSTIPATION: 0
NERVOUS/ANXIOUS: 0
HEARTBURN: 0
HEMATURIA: 0
MYALGIAS: 0
NAUSEA: 0
FREQUENCY: 1
JOINT SWELLING: 1
ARTHRALGIAS: 1
BREAST MASS: 0
EYE PAIN: 0
CHILLS: 0
PARESTHESIAS: 0
DYSURIA: 0
SORE THROAT: 0
HEMATOCHEZIA: 0
DIARRHEA: 0
WEAKNESS: 0

## 2019-09-20 ASSESSMENT — ACTIVITIES OF DAILY LIVING (ADL): CURRENT_FUNCTION: HOUSEWORK REQUIRES ASSISTANCE

## 2019-09-20 NOTE — PROGRESS NOTES
Patient has a lab appointment on 9/25/2019. Per the lab schedule scrubbing protocol they are not due for anything. Please review chart and send orders or let patient know appointment is not needed.    Thank you,  Krystal Cordova

## 2019-09-22 PROBLEM — D63.8 ANEMIA IN OTHER CHRONIC DISEASES CLASSIFIED ELSEWHERE: Status: ACTIVE | Noted: 2019-09-22

## 2019-09-22 PROBLEM — D33.3 ACOUSTIC NEUROMA (H): Status: ACTIVE | Noted: 2019-09-22

## 2019-09-25 DIAGNOSIS — E78.5 HYPERLIPIDEMIA LDL GOAL <130: ICD-10-CM

## 2019-09-25 DIAGNOSIS — Z79.899 HIGH RISK MEDICATION USE: ICD-10-CM

## 2019-09-25 DIAGNOSIS — I10 HYPERTENSION, GOAL BELOW 140/90: ICD-10-CM

## 2019-09-25 DIAGNOSIS — D63.8 ANEMIA IN OTHER CHRONIC DISEASES CLASSIFIED ELSEWHERE: ICD-10-CM

## 2019-09-25 LAB
ALBUMIN SERPL-MCNC: 4.1 G/DL (ref 3.4–5)
ALP SERPL-CCNC: 66 U/L (ref 40–150)
ALT SERPL W P-5'-P-CCNC: 31 U/L (ref 0–50)
ANION GAP SERPL CALCULATED.3IONS-SCNC: 8 MMOL/L (ref 3–14)
AST SERPL W P-5'-P-CCNC: 19 U/L (ref 0–45)
BILIRUB SERPL-MCNC: 0.4 MG/DL (ref 0.2–1.3)
BUN SERPL-MCNC: 13 MG/DL (ref 7–30)
CALCIUM SERPL-MCNC: 10.3 MG/DL (ref 8.5–10.1)
CHLORIDE SERPL-SCNC: 106 MMOL/L (ref 94–109)
CHOLEST SERPL-MCNC: 208 MG/DL
CO2 SERPL-SCNC: 27 MMOL/L (ref 20–32)
CREAT SERPL-MCNC: 0.63 MG/DL (ref 0.52–1.04)
DEPRECATED CALCIDIOL+CALCIFEROL SERPL-MC: 68 UG/L (ref 20–75)
ERYTHROCYTE [DISTWIDTH] IN BLOOD BY AUTOMATED COUNT: 13.5 % (ref 10–15)
GFR SERPL CREATININE-BSD FRML MDRD: 84 ML/MIN/{1.73_M2}
GLUCOSE SERPL-MCNC: 101 MG/DL (ref 70–99)
HCT VFR BLD AUTO: 39 % (ref 35–47)
HDLC SERPL-MCNC: 56 MG/DL
HGB BLD-MCNC: 13.1 G/DL (ref 11.7–15.7)
LDLC SERPL CALC-MCNC: 121 MG/DL
MCH RBC QN AUTO: 33.1 PG (ref 26.5–33)
MCHC RBC AUTO-ENTMCNC: 33.6 G/DL (ref 31.5–36.5)
MCV RBC AUTO: 99 FL (ref 78–100)
NONHDLC SERPL-MCNC: 152 MG/DL
PLATELET # BLD AUTO: 262 10E9/L (ref 150–450)
POTASSIUM SERPL-SCNC: 3.8 MMOL/L (ref 3.4–5.3)
PROT SERPL-MCNC: 7.3 G/DL (ref 6.8–8.8)
RBC # BLD AUTO: 3.96 10E12/L (ref 3.8–5.2)
SODIUM SERPL-SCNC: 141 MMOL/L (ref 133–144)
TRIGL SERPL-MCNC: 155 MG/DL
TSH SERPL DL<=0.005 MIU/L-ACNC: 1.08 MU/L (ref 0.4–4)
WBC # BLD AUTO: 4 10E9/L (ref 4–11)

## 2019-09-25 PROCEDURE — 85027 COMPLETE CBC AUTOMATED: CPT | Performed by: FAMILY MEDICINE

## 2019-09-25 PROCEDURE — 84443 ASSAY THYROID STIM HORMONE: CPT | Performed by: FAMILY MEDICINE

## 2019-09-25 PROCEDURE — 82306 VITAMIN D 25 HYDROXY: CPT | Performed by: FAMILY MEDICINE

## 2019-09-25 PROCEDURE — 36415 COLL VENOUS BLD VENIPUNCTURE: CPT | Performed by: FAMILY MEDICINE

## 2019-09-25 PROCEDURE — 80053 COMPREHEN METABOLIC PANEL: CPT | Performed by: FAMILY MEDICINE

## 2019-09-25 PROCEDURE — 80061 LIPID PANEL: CPT | Performed by: FAMILY MEDICINE

## 2019-09-26 NOTE — RESULT ENCOUNTER NOTE
Dear Brenda    Your test results are attached. I am happy to let you know that they are stable.    The thyroid test is normal. The vitamin D test is normal. The blood sugar is normal and you do not have diabetes. The kidneys are healthy. We can discuss your results at your upcoming office visit.     Please contact me by MyChart if you have any questions about your labs or management.    Liudmila Bowie MD

## 2019-10-01 ENCOUNTER — OFFICE VISIT (OUTPATIENT)
Dept: FAMILY MEDICINE | Facility: CLINIC | Age: 84
End: 2019-10-01
Payer: MEDICARE

## 2019-10-01 VITALS
TEMPERATURE: 97.6 F | RESPIRATION RATE: 16 BRPM | OXYGEN SATURATION: 99 % | SYSTOLIC BLOOD PRESSURE: 172 MMHG | HEIGHT: 62 IN | HEART RATE: 69 BPM | DIASTOLIC BLOOD PRESSURE: 74 MMHG | BODY MASS INDEX: 31.65 KG/M2 | WEIGHT: 172 LBS

## 2019-10-01 DIAGNOSIS — Z00.00 ENCOUNTER FOR MEDICARE ANNUAL WELLNESS EXAM: Primary | ICD-10-CM

## 2019-10-01 DIAGNOSIS — D33.3 BENIGN NEOPLASM OF CRANIAL NERVE (H): ICD-10-CM

## 2019-10-01 DIAGNOSIS — E78.5 HYPERLIPIDEMIA LDL GOAL <130: ICD-10-CM

## 2019-10-01 DIAGNOSIS — I10 HYPERTENSION, GOAL BELOW 140/90: ICD-10-CM

## 2019-10-01 DIAGNOSIS — G25.2 RESTING TREMOR: ICD-10-CM

## 2019-10-01 DIAGNOSIS — M05.79 RHEUMATOID ARTHRITIS INVOLVING MULTIPLE SITES WITH POSITIVE RHEUMATOID FACTOR (H): ICD-10-CM

## 2019-10-01 DIAGNOSIS — Z79.899 HIGH RISK MEDICATION USE: ICD-10-CM

## 2019-10-01 DIAGNOSIS — Z23 NEED FOR PROPHYLACTIC VACCINATION AND INOCULATION AGAINST INFLUENZA: ICD-10-CM

## 2019-10-01 PROBLEM — H10.503 UNSPECIFIED BLEPHAROCONJUNCTIVITIS, BILATERAL: Status: ACTIVE | Noted: 2019-10-01

## 2019-10-01 PROCEDURE — 90662 IIV NO PRSV INCREASED AG IM: CPT | Performed by: FAMILY MEDICINE

## 2019-10-01 PROCEDURE — G0008 ADMIN INFLUENZA VIRUS VAC: HCPCS | Performed by: FAMILY MEDICINE

## 2019-10-01 PROCEDURE — G0439 PPPS, SUBSEQ VISIT: HCPCS | Performed by: FAMILY MEDICINE

## 2019-10-01 PROCEDURE — 99214 OFFICE O/P EST MOD 30 MIN: CPT | Mod: 25 | Performed by: FAMILY MEDICINE

## 2019-10-01 ASSESSMENT — ANXIETY QUESTIONNAIRES
1. FEELING NERVOUS, ANXIOUS, OR ON EDGE: NOT AT ALL
7. FEELING AFRAID AS IF SOMETHING AWFUL MIGHT HAPPEN: NOT AT ALL
2. NOT BEING ABLE TO STOP OR CONTROL WORRYING: NOT AT ALL
6. BECOMING EASILY ANNOYED OR IRRITABLE: NOT AT ALL
5. BEING SO RESTLESS THAT IT IS HARD TO SIT STILL: NOT AT ALL
IF YOU CHECKED OFF ANY PROBLEMS ON THIS QUESTIONNAIRE, HOW DIFFICULT HAVE THESE PROBLEMS MADE IT FOR YOU TO DO YOUR WORK, TAKE CARE OF THINGS AT HOME, OR GET ALONG WITH OTHER PEOPLE: NOT DIFFICULT AT ALL
3. WORRYING TOO MUCH ABOUT DIFFERENT THINGS: NOT AT ALL
GAD7 TOTAL SCORE: 0

## 2019-10-01 ASSESSMENT — ENCOUNTER SYMPTOMS
SORE THROAT: 0
MYALGIAS: 0
SHORTNESS OF BREATH: 0
HEMATOCHEZIA: 0
PALPITATIONS: 0
DIARRHEA: 0
DIZZINESS: 0
JOINT SWELLING: 1
PARESTHESIAS: 0
EYE PAIN: 0
FEVER: 0
NAUSEA: 0
HEADACHES: 0
WEAKNESS: 0
NERVOUS/ANXIOUS: 0
ARTHRALGIAS: 1
CHILLS: 0
ABDOMINAL PAIN: 0
HEMATURIA: 0
COUGH: 0
HEARTBURN: 0
DYSURIA: 0
FREQUENCY: 1
BREAST MASS: 0
CONSTIPATION: 0

## 2019-10-01 ASSESSMENT — ACTIVITIES OF DAILY LIVING (ADL): CURRENT_FUNCTION: HOUSEWORK REQUIRES ASSISTANCE

## 2019-10-01 ASSESSMENT — PATIENT HEALTH QUESTIONNAIRE - PHQ9
SUM OF ALL RESPONSES TO PHQ QUESTIONS 1-9: 0
5. POOR APPETITE OR OVEREATING: NOT AT ALL

## 2019-10-01 ASSESSMENT — MIFFLIN-ST. JEOR: SCORE: 1193.44

## 2019-10-01 NOTE — PATIENT INSTRUCTIONS
At [facility], we strive to deliver an exceptional experience to you, every time we see you.  If you receive a survey in the mail, please send us back your thoughts. We really do value your feedback.    Based on your medical history, these are the current health maintenance/preventive care services that you are due for (some may have been done at this visit.)  Health Maintenance Due   Topic Date Due     DTAP/TDAP/TD IMMUNIZATION (3 - Td) 03/13/2018     PHQ-9  06/10/2019     INFLUENZA VACCINE (1) 09/01/2019         Suggested websites for health information:  Www.IdentiGEN.[employer] : Up to date and easily searchable information on multiple topics.  Www.medlineplus.gov : medication info, interactive tutorials, watch real surgeries online  Www.familydoctor.org : good info from the Academy of Family Physicians  Www.cdc.gov : public health info, travel advisories, epidemics (H1N1)  Www.aap.org : children's health info, normal development, vaccinations  Www.health.[facility].[place].us : [place] dept of health, public health issues in [place], N1N1    Your care team:                            Family Medicine Internal Medicine   MD Jose Alejandro Floyd MD Shantel Branch-Fleming, MD Katya Georgiev PA-C Nam Ho, MD Pediatrics   VINITA Gutiérrez, MD Kika Olivia CNP, MD Deborah Mielke, MD Kim Thein, APRN [facility]      Clinic hours: Monday - Thursday 7 am-7 pm; Fridays 7 am-5 pm.   Urgent care: Monday - Friday 11 am-9 pm; Saturday and Sunday 9 am-5 pm.  Pharmacy : Monday -Thursday 8 am-8 pm; Friday 8 am-6 pm; Saturday and Sunday 9 am-5 pm.     Clinic: [phone]   Pharmacy: [phone]    Patient Education   Personalized Prevention Plan  You are due for the preventive services outlined below.  Your care team is available to assist you in scheduling these services.  If you have already completed any of these items, please share that information  with your care team to update in your medical record.  Health Maintenance Due   Topic Date Due     Diptheria Tetanus Pertussis (DTAP/TDAP/TD) Vaccine (3 - Td) 03/13/2018     Depression Assessment  06/10/2019     Flu Vaccine (1) 09/01/2019       Exercise for a Healthier Heart     Exercise with a friend. When activity is fun, you're more likely to stick with it.   You may wonder how you can improve the health of your heart. If you re thinking about exercise, you re on the right track. You don t need to become an athlete, but you do need a certain amount of brisk exercise to help strengthen your heart. If you have been diagnosed with a heart condition, your doctor may recommend exercise to help stabilize your condition. To help make exercise a habit, choose safe, fun activities.  Be sure to check with your healthcare provider before starting an exercise program.   Why exercise?  Exercising regularly offers many healthy rewards. It can help you do all of the following:    Improve your blood cholesterol level to help prevent further heart trouble    Lower your blood pressure to help prevent a stroke or heart attack    Control diabetes, or reduce your risk of getting this disease    Improve your heart and lung function    Reach and maintain a healthy weight    Make your muscles stronger and more limber so you can stay active    Prevent falls and fractures by slowing the loss of bone mass (osteoporosis)    Manage stress better    Reduce your blood pressure    Improve your sense of self and your body image  Exercise tips  Ease into your routine. Set small goals. Then build on them.  Exercise on most days. Aim for a total of 150 or more minutes of moderate to  vigorous intensity activity each week. Consider 40 minutes, 3 to 4 times a week. For best results, activity should last for 40 minutes on average. It is OK to work up to the 40 minute period over time. Examples of moderate-intensity activity is walking 1 mile in 15  minutes or 30 to 45 minutes of yard work.  Step up your daily activity level. Along with your exercise program, try being more active throughout the day. Walk instead of drive. Do more household tasks or yard work.  Choose one or more activities you enjoy. Walking is one of the easiest things you can do. You can also try swimming, riding a bike, dancing, or taking an exercise class.  Stop exercising and call your doctor if you:    Have chest pain or feel dizzy or lightheaded    Feel burning, tightness, pressure, or heaviness in your chest, neck, shoulders, back, or arms    Have unusual shortness of breath    Have increased joint or muscle pain    Have palpitations or an irregular heartbeat   Date Last Reviewed: 5/1/2016 2000-2018 [employer]. [address] [place] [id number]. All rights reserved. This information is not intended as a substitute for professional medical care. Always follow your healthcare professional's instructions.          Understanding B[employer]ique Window MyPlate  The USDA (U.S. Department of Agriculture) has guidelines to help you make healthy food choices. These are called MyPlate. MyPlate shows the food groups that make up healthy meals using the image of a place setting. Before you eat, think about the healthiest choices for what to put onto your plate or into your cup or bowl. To learn more about building a healthy plate, visit www.choosemyplate.gov.    The food groups    Fruits. Any fruit or 100% fruit juice counts as part of the Fruit Group. Fruits may be fresh, canned, frozen, or dried, and may be whole, cut-up, or pureed. Make half your plate fruits and vegetables.    Vegetables. Any vegetable or 100% vegetable juice counts as a member of the Vegetable Group. Vegetables may be fresh, frozen, canned, or dried. They can be served raw or cooked and may be whole, cut-up, or mashed. Make half your plate fruits and vegetables.    Grains. All foods made from grains are part of the  Grains Group. These include wheat, rice, oats, cornmeal, and barley such as bread, pasta, oatmeal, cereal, tortillas, and grits. Grains should be no more than a quarter of your plate. At least half of your grains should be whole grains.    Protein. This group includes meat, poultry, seafood, beans and peas, eggs, processed soy products (like tofu), nuts (including nut butters), and seeds. Make protein choices no more than a quarter of your plate. Meat and poultry choices should be lean or low fat.    Dairy. All fluid milk products and foods made from milk that contain calcium, like yogurt and cheese, are part of the Dairy Group. (Foods that have little calcium, such as cream, butter, and cream cheese, are not part of the group.) Most dairy choices should be low-fat or fat-free.    Oils. These are fats that are liquid at room temperature. They include canola, corn, olive, soybean, and sunflower oil. Foods that are mainly oil include mayonnaise, certain salad dressings, and soft margarines. You should have only 5 to 7 teaspoons of oils a day. You probably already get this much from the food you eat.  Date Last Reviewed: 8/1/2017 2000-2018 [employer]. [address] [place] [address]. All rights reserved. This information is not intended as a substitute for professional medical care. Always follow your healthcare professional's instructions.        Activities of Daily Living    Your Health Risk Assessment indicates you have difficulties with activities of daily living such as housework, bathing, preparing meals, taking medication, etc. Please make a follow up appointment for us to address this issue in more detail.    Signs of Hearing Loss     Hearing much better with one ear can be a sign of hearing loss.     Hearing loss is a problem shared by many people. In fact, it is one of the most common health conditions, particularly as people age. Most people over age 65 have some hearing loss, and  by age 80, almost everyone does. Because hearing loss usually occurs slowly over the years, you may not realize your hearing ability has gotten worse.  Have your hearing checked  Contact your healthcare provider if you:    Have to strain to hear normal conversation    Have to watch other people s faces very carefully to follow what they re saying    Need to ask people to repeat what they ve said    Often misunderstand what people are saying    Turn the volume of the television or radio up so high that others complain    Feel that people are mumbling when they re talking to you    Find that the effort to hear leaves you feeling tired and irritated    Notice, when using the phone, that you hear better with one ear than the other  Date Last Reviewed: 12/1/2016 2000-2018 [employer]. [address] [place] [address], [place] [address]. All rights reserved. This information is not intended as a substitute for professional medical care. Always follow your healthcare professional's instructions.          Urinary Incontinence, Female (Adult)  Urinary incontinence means loss of control of the bladder. This problem affects many women, especially as they get older. If you have incontinence, you may be embarrassed to ask for help. But know that this problem can be treated.  Types of Incontinence  There are different types of incontinence. Two of the main types are described here. You can have more than one type.    Stress incontinence. With this type, urine leaks when pressure (stress) is put on the bladder. This may happen when you cough, sneeze, or laugh. Stress incontinence most often occurs because the pelvic floor muscles that support the bladder and urethra are weak. This can happen after pregnancy and vaginal childbirth or a hysterectomy. It can also be due to excess body weight or hormone changes.    Urge incontinence (also called overactive bladder). With this type, a sudden urge to urinate is felt often. This  may happen even though there may not be much urine in the bladder. The need to urinate often during the night is common. Urge incontinence most often occurs because of bladder spasms. This may be due to bladder irritation or infection. Damage to bladder nerves or pelvic muscles, constipation, and certain medicines can also lead to urge incontinence.  Treatment of urinary incontinence depends on the cause. Further evaluation is needed to find the type you have. This will likely include an exam and certain tests. Based on the results, you and your healthcare provider can then plan treatment. Until a diagnosis is made, the home care tips below can help relieve symptoms.  Home care    Do pelvic floor muscle exercises, if they are prescribed. The pelvic floor muscles help support the bladder and urethra. Many women find that their symptoms improve when doing special exercises that strengthen these muscles. To do the exercises contract the muscles you would use to stop your stream of urine, but do this when you re not urinating. Hold for 10 seconds, then relax. Repeat 10 to 20 times in a row, at least 3 times a day. Your provider may give you other instructions for how to do the exercises and how often.    Keep a bladder diary. This helps track how often and how much you urinate over a set period of time. Bring this diary with you to your next visit with the provider. The information can help your provider learn more about your bladder problem.    Lose weight, if advised to by your provider. Excess weight puts pressure on the bladder. Your provider can help you create a weight-loss plan that s right for you. This may include exercising more and making certain diet changes.    Don't consume foods and drinks that may irritate the bladder. These can include alcohol and caffeinated drinks.    Quit smoking. Smoking and other tobacco use can lead to chronic cough that strains the pelvic floor muscles. Smoking may also damage  the bladder and urethra. Talk with your provider about treatments or methods you can use to quit smoking.    If drinking large amounts of fluid causes you to have symptoms, you may be advised to limit your fluid intake. You may also be advised to drink most of your fluids during the day and to limit fluids at night.    If you re worried about urine leakage or accidents, you may wear absorbent pads to catch urine. Change the pads often. This helps reduce discomfort. It may also reduce the risk of skin or bladder infections.  Follow-up care  Follow up with your healthcare provider, or as directed. It may take some to find the right treatment for your problem. Your treatment plan may include special therapies or medicines. Certain procedures or surgery may also be options. Be sure to discuss any questions you have with your provider.  When to seek medical advice  Call the healthcare provider right away if any of these occur:    Fever of 100.4 F (38 C) or higher, or as directed by your provider    Bladder pain or fullness    Abdominal swelling    Nausea or vomiting    Back pain    Weakness, dizziness or fainting  Date Last Reviewed: 10/1/2017    6750-4609 [employer]. [address] [place] [address], [place] [address]. All rights reserved. This information is not intended as a substitute for professional medical care. Always follow your healthcare professional's instructions.

## 2019-10-01 NOTE — PROGRESS NOTES
"SUBJECTIVE:   Brenda Barker is a 84 year old female who presents for Preventive Visit.    Are you in the first 12 months of your Medicare coverage?  No    Healthy Habits:     In general, how would you rate your overall health?  Good    Frequency of exercise:  1 day/week    Duration of exercise:  N/A    Do you usually eat at least 4 servings of fruit and vegetables a day, include whole grains    & fiber and avoid regularly eating high fat or \"junk\" foods?  No    Taking medications regularly:  Yes    Medication side effects:  None    Ability to successfully perform activities of daily living:  Housework requires assistance    Home Safety:  No safety concerns identified    Hearing Impairment:  Difficulty following a conversation in a noisy restaurant or crowded room and difficulty understanding soft or whispered speech    In the past 6 months, have you been bothered by leaking of urine? Yes    In general, how would you rate your overall mental or emotional health?  Good      PHQ-2 Total Score: 0    Additional concerns today:  Yes    Do you feel safe in your environment? No    Do you have a Health Care Directive? Yes: Advance Directive has been received and scanned.      Fall risk  Fallen 2 or more times in the past year?: No  Any fall with injury in the past year?: No      Cognitive Screening   1) Repeat 3 items (Leader, Season, Table)    2) Clock draw: NORMAL  3) 3 item recall: Recalls 3 objects  Results: 3 items recalled: COGNITIVE IMPAIRMENT LESS LIKELY    Mini-CogTM Copyright S Carlos. Licensed by the author for use in [facility]; reprinted with permission (santosh@.[facility]). All rights reserved.      Do you have sleep apnea, excessive snoring or daytime drowsiness?: no    Reviewed and updated as needed this visit by clinical staff  Tobacco  Allergies  Meds  Problems  Med Hx  Surg Hx  Fam Hx  Soc Hx          Reviewed and updated as needed this visit by Provider  Allergies  Meds  Problems      "   Social History     Tobacco Use     Smoking status: Never Smoker     Smokeless tobacco: Never Used   Substance Use Topics     Alcohol use: Yes     Comment: occasionally         Alcohol Use 9/20/2019   Prescreen: >3 drinks/day or >7 drinks/week? No   Prescreen: >3 drinks/day or >7 drinks/week? -           Hyperlipidemia Follow-Up      Are you having any of the following symptoms? (Select all that apply)  No complaints of shortness of breath, chest pain or pressure.  No increased sweating or nausea with activity.  No left-sided neck or arm pain.  No complaints of pain in calves when walking 1-2 blocks.    Are you regularly taking any medication or supplement to lower your cholesterol?   No    Are you having muscle aches or other side effects that you think could be caused by your cholesterol lowering medication?  No      Hypertension Follow-up      Do you check your blood pressure regularly outside of the clinic? No     Are you following a low salt diet? Yes    Are your blood pressures ever more than 140 on the top number (systolic) OR more   than 90 on the bottom number (diastolic), for example 140/90? No  Chronic Pain Follow-Up       Type / Location of Pain: Hands, back, knees. Managed by rheumatologist and having flare of hand symptoms   Analgesia/pain control:       Recent changes:  worse      Overall control: Tolerable with discomfort  Activity level/function:      Daily activities:  Able to do light housework, cooking    Work:  not applicable  Adverse effects:  No  Adherance    Taking medication as directed?  Yes    Participating in other treatments: yes  Risk Factors:    Sleep:  Fair    Mood/anxiety:  controlled    Recent family or social stressors:  none noted    Other aggravating factors: none  PHQ-9 SCORE 9/9/2016 12/10/2018 10/1/2019   PHQ-9 Total Score 0 0 0     LORNE-7 SCORE 5/11/2017 8/11/2017 10/1/2019   Total Score 0 0 0     Encounter-Level CSA:    There are no encounter-level csa.     Patient-Level  CSA:    There are no patient-level csa.         Current providers sharing in care for this patient include:   Patient Care Team:  Liudmila Bowie MD as PCP - General (Family Practice)  Liudmila Bowie MD as Assigned PCP    The following health maintenance items are reviewed in Epic and correct as of today:  Health Maintenance   Topic Date Due     DTAP/TDAP/TD IMMUNIZATION (3 - Td) 03/13/2018     PHQ-9  06/10/2019     INFLUENZA VACCINE (1) 09/01/2019     MEDICARE ANNUAL WELLNESS VISIT  12/10/2019     FALL RISK ASSESSMENT  12/10/2019     ADVANCE CARE PLANNING  03/23/2023     DEXA  Completed     DEPRESSION ACTION PLAN  Completed     PNEUMOCOCCAL IMMUNIZATION 65+ LOW/MEDIUM RISK  Completed     ZOSTER IMMUNIZATION  Completed     IPV IMMUNIZATION  Aged Out     MENINGITIS IMMUNIZATION  Aged Out     Lab work is in process  Labs reviewed in EPIC  BP Readings from Last 3 Encounters:   10/01/19 (!) 172/74   06/03/19 152/77   05/22/19 118/70    Wt Readings from Last 3 Encounters:   10/01/19 78 kg (172 lb)   05/22/19 75.8 kg (167 lb)   03/08/19 75.7 kg (166 lb 12.8 oz)                  Patient Active Problem List   Diagnosis     Benign neoplasm of cranial nerve (H)     Hemorrhoids     Aortic valve disorder     Cervicalgia     Rheumatoid arthritis (H)     SNHL (sensorineural hearing loss)     Status post total left knee replacement     Obesity     High risk medication use     Gastroesophageal reflux disease with esophagitis     Hypertension, goal below 140/90     Hyperlipidemia LDL goal <130     Rapid heart rate     Resting tremor     ACP (advance care planning)     Status post total replacement of left hip     Anemia in other chronic diseases classified elsewhere     Acoustic neuroma (H)     Unspecified blepharoconjunctivitis, bilateral     Past Surgical History:   Procedure Laterality Date     ARTHROPLASTY HIP Left 6/27/2018    Procedure: ARTHROPLASTY HIP;  Left Total Hip Arthroplasty  ;  Surgeon: Keo Priest,  MD;  Location: [facility] OR     BACK SURGERY  2011    Fusion L3-4 & decompression; [facility]     BIOPSY  2013    Thyroid - normal result     C DEXA, BONE DENSITY, AXIAL SKEL  7/03 7/03 L1-4 1.7, FemNkL-0.8,R-1.2, FArm Px 0.2,Dist-0.3     C REMOVAL OF OVARY(S)  1980    2nd ovary removed - endometriosis     C TOTAL ABDOM HYSTERECTOMY  1972    uterus, ovary removed - fibroid     EYE SURGERY  2010 & 11    Cataract surgery     HC COLONOSCOPY THRU STOMA W BIOPSY/CAUTERY TUMOR/POLYP/LESION  1993    adenomatous polyp     HC COLONOSCOPY THRU STOMA, DIAGNOSTIC  1998, 10/03    normal, '03 rec repeat in 5 yrs due to +hx polyp and + fam hx     HC FLEX SIGMOIDOSCOPY W/WO KARYNA SPEC BY BRUSH/WASH  12/2001     HC REMOVAL OF TONSILS,<11 Y/O  1942     ORTHOPEDIC SURGERY  2008    Left knee replacement     SURGICAL HISTORY OF -   1989    basal cell Ca scalp excised     SURGICAL HISTORY OF -   1998    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       Social History     Tobacco Use     Smoking status: Never Smoker     Smokeless tobacco: Never Used   Substance Use Topics     Alcohol use: Yes     Comment: occasionally     Family History   Problem Relation Age of Onset     Gastrointestinal Disease Father         bleeding ulcer     Cancer Mother         thyroid cancer     Other Cancer Mother         thyroid & throat     Osteoporosis Mother      Thyroid Disease Mother      Diabetes Paternal Grandmother      Cerebrovascular Disease Paternal Grandmother      Colon Cancer Maternal Half-Brother      Osteoporosis Maternal Grandmother          Current Outpatient Medications   Medication Sig Dispense Refill     Acetaminophen (TYLENOL PO) Take 650 mg by mouth every 6 hours as needed for mild pain or fever       acyclovir (ZOVIRAX) 400 MG tablet Take 1 tablet (400 mg) by mouth daily 90 tablet 3     CENTRUM SILVER OR TABS 1 TABLET DAILY       Cetirizine HCl (ZYRTEC ALLERGY PO) Take 1 tablet by mouth daily       Cholecalciferol (VITAMIN  D-3) 1000 UNITS CAPS Take 2,000 Int'l Units by mouth daily        ferrous gluconate (FERGON) 324 (38 Fe) MG tablet TAKE 1 TABLET BY MOUTH EVERY OTHER  tablet 0     FISH OIL 1000 MG OR CAPS 2 CAPSULES DAILY  (? DOSE) OTC --     FOLIC ACID 1 MG OR TABS 4 mg TABS DAILY       hydrochlorothiazide (MICROZIDE) 12.5 MG capsule TAKE 1 CAPSULE(12.5 MG) BY MOUTH DAILY 90 capsule 0     methotrexate 2.5 MG tablet CHEMO Take 6 tablets (15 mg) by mouth every 7 days       metoprolol succinate ER (TOPROL-XL) 50 MG 24 hr tablet Take 1 tablet (50 mg) by mouth daily 90 tablet 3     omeprazole (PRILOSEC) 20 MG DR capsule TAKE 1 CAPSULE(20 MG) BY MOUTH DAILY 90 capsule 2     polyvinyl alcohol (LIQUID TEARS) 1.4 % ophthalmic solution 1 drop as needed.       senna-docusate (SENOKOT-S;PERICOLACE) 8.6-50 MG per tablet Take 1-2 tablets by mouth 2 times daily 100 tablet      simvastatin (ZOCOR) 20 MG tablet Take 1 tablet (20 mg) by mouth At Bedtime 90 tablet 3     COMPOUNDED NON-CONTROLLED SUBSTANCE (CMPD RX) - PHARMACY TO MIX COMPOUNDED MEDICATION Estriol 1mg/gram. Place 1 gram vaginally daily for two weeks. Then vaginally twice weekly. (Patient not taking: Reported on 10/1/2019) 30 g 6     Allergies   Allergen Reactions     Diatrizoate Hives and Shortness Of Breath     Ciprofloxacin      nausea     Contrast Dye      Hives,anaphylaxix     Oxycodone Other (See Comments)     hallucinations     Sulfasalazine      Other reaction(s): Rash     Recent Labs   Lab Test 09/25/19  0903 12/10/18  1012 06/30/18  0641  06/11/18 12/19/17  1223   * 112*  --   --   --  77   HDL 56 76  --   --   --  64   TRIG 155* 193*  --   --   --  131   ALT 31  --   --   --   --   --    CR 0.63  --  0.63   < >  --  0.68   GFRESTIMATED 84  --  >90   < >  --  83   GFRESTBLACK >90  --  >90   < >  --  >90   POTASSIUM 3.8  --  4.0   < >  --  4.4   TSH 1.08  --   --   --  1.05  --     < > = values in this interval not displayed.      Pneumonia Vaccine:Adults age 65+  "who received Pneumovax (PPSV23) at 65 years or older: Should be given PCV13 > 1 year after their most recent PPSV23  Mammogram Screening: Patient over age 75, has elected to stop mammography screening.    Review of Systems   Constitutional: Negative for chills and fever.   HENT: Negative for congestion, ear pain, hearing loss and sore throat.    Eyes: Negative for pain and visual disturbance.   Respiratory: Negative for cough and shortness of breath.    Cardiovascular: Negative for chest pain, palpitations and peripheral edema.   Gastrointestinal: Negative for abdominal pain, constipation, diarrhea, heartburn, hematochezia and nausea.   Breasts:  Negative for tenderness, breast mass and discharge.   Genitourinary: Positive for frequency and urgency. Negative for dysuria, genital sores, hematuria, pelvic pain, vaginal bleeding and vaginal discharge.   Musculoskeletal: Positive for arthralgias and joint swelling. Negative for myalgias.   Skin: Negative for rash.   Neurological: Negative for dizziness, weakness, headaches and paresthesias.   Psychiatric/Behavioral: Negative for mood changes. The patient is not nervous/anxious.      Constitutional, HEENT, cardiovascular, pulmonary, gi and gu systems are negative, except as otherwise noted.    OBJECTIVE:   BP (!) 172/74 (BP Location: Right arm, Patient Position: Sitting, Cuff Size: Adult Large)   Pulse 69   Temp 97.6  F (36.4  C) (Oral)   Resp 16   Ht 1.575 m (5' 2\")   Wt 78 kg (172 lb)   LMP 05/17/1972   SpO2 99%   BMI 31.46 kg/m   Estimated body mass index is 31.46 kg/m  as calculated from the following:    Height as of this encounter: 1.575 m (5' 2\").    Weight as of this encounter: 78 kg (172 lb).  Physical Exam  GENERAL: elderly, alert, well nourished, well hydrated, no distress  HENT: ear canals- normal; TMs- normal; Nose- normal; Mouth- no ulcers, no lesions, no missing dentition  NECK: no tenderness, no adenopathy, no asymmetry, no masses, no stiffness; " thyroid- normal to palpation  RESP: lungs clear to auscultation - no rales, no rhonchi, no wheezes  CV: regular rates and rhythm, normal S1 S2, no S3 or S4 and no murmur, no click or rub, normal pulses  ABDOMEN: soft, no tenderness, no  hepatosplenomegaly, no masses, normal bowel sounds  MS: extremities- no gross deformities noted, no edema  SKIN: no suspicious lesions, no rashes, age related skin changes with seborrheic keratosis and no actinic keratosis.    NEURO: strength and tone- decreased, sensory exam- grossly normal, reflexes- symmetric  BACK: no CVA tenderness, no paralumbar tenderness  MENTAL STATUS EXAM:  Appearance/Behavior: no apparent distress, neatly groomed, dressed appropriately for weather, appears stated age and is not frail-appearing  Speech: normal  Mood/Affect: normal affect  Insight: Good     Diagnostic Test Results:  Labs reviewed in Epic  Results for orders placed or performed in visit on 09/25/19   Lipid panel reflex to direct LDL Fasting   Result Value Ref Range    Cholesterol 208 (H) <200 mg/dL    Triglycerides 155 (H) <150 mg/dL    HDL Cholesterol 56 >49 mg/dL    LDL Cholesterol Calculated 121 (H) <100 mg/dL    Non HDL Cholesterol 152 (H) <130 mg/dL   **TSH with free T4 reflex FUTURE anytime   Result Value Ref Range    TSH 1.08 0.40 - 4.00 mU/L   **Vitamin D Deficiency FUTURE anytime   Result Value Ref Range    Vitamin D Deficiency screening 68 20 - 75 ug/L   **CBC with platelets FUTURE anytime   Result Value Ref Range    WBC 4.0 4.0 - 11.0 10e9/L    RBC Count 3.96 3.8 - 5.2 10e12/L    Hemoglobin 13.1 11.7 - 15.7 g/dL    Hematocrit 39.0 35.0 - 47.0 %    MCV 99 78 - 100 fl    MCH 33.1 (H) 26.5 - 33.0 pg    MCHC 33.6 31.5 - 36.5 g/dL    RDW 13.5 10.0 - 15.0 %    Platelet Count 262 150 - 450 10e9/L   **Comprehensive metabolic panel FUTURE anytime   Result Value Ref Range    Sodium 141 133 - 144 mmol/L    Potassium 3.8 3.4 - 5.3 mmol/L    Chloride 106 94 - 109 mmol/L    Carbon Dioxide 27 20  "- 32 mmol/L    Anion Gap 8 3 - 14 mmol/L    Glucose 101 (H) 70 - 99 mg/dL    Urea Nitrogen 13 7 - 30 mg/dL    Creatinine 0.63 0.52 - 1.04 mg/dL    GFR Estimate 84 >60 mL/min/[1.73_m2]    GFR Estimate If Black >90 >60 mL/min/[1.73_m2]    Calcium 10.3 (H) 8.5 - 10.1 mg/dL    Bilirubin Total 0.4 0.2 - 1.3 mg/dL    Albumin 4.1 3.4 - 5.0 g/dL    Protein Total 7.3 6.8 - 8.8 g/dL    Alkaline Phosphatase 66 40 - 150 U/L    ALT 31 0 - 50 U/L    AST 19 0 - 45 U/L       ASSESSMENT / PLAN:       ICD-10-CM    1. Encounter for Medicare annual wellness exam Z00.00 Doing well overall with some increased arthritis pain.    2. Need for prophylactic vaccination and inoculation against influenza Z23 INFLUENZA (HIGH DOSE) 3 VALENT VACCINE [14548]- recommended and patient will have this done in pharmacy     Vaccine Administration, Initial [55356]        3. Hypertension, goal below 140/90 I10 Well controlled on medications    4. Hyperlipidemia LDL goal <130 E78.5 Stable    5. Benign neoplasm of cranial nerve (H) D33.3 Stable    6. Rheumatoid arthritis involving multiple sites with positive rheumatoid factor (H) M05.79 Worse pain in hands and scheduled for follow up with rheumatology.    7. Resting tremor R25.9 Also has head tremor. Discussed Mehrdad-tox injections as a treatment option.    8. High risk medication use Z79.899 Managed by rheumatology.        End of Life Planning:  Patient currently has an advanced directive: Yes.  Practitioner is supportive of decision.    COUNSELING:  Reviewed preventive health counseling, as reflected in patient instructions       Regular exercise       Healthy diet/nutrition       Dental care       Bladder control       Osteoporosis Prevention/Bone Health    Estimated body mass index is 31.46 kg/m  as calculated from the following:    Height as of this encounter: 1.575 m (5' 2\").    Weight as of this encounter: 78 kg (172 lb).         reports that she has never smoked. She has never used smokeless " tobacco.      Appropriate preventive services were discussed with this patient, including applicable screening as appropriate for cardiovascular disease, diabetes, osteopenia/osteoporosis, and glaucoma.  As appropriate for age/gender, discussed screening for colorectal cancer, prostate cancer, breast cancer, and cervical cancer. Checklist reviewing preventive services available has been given to the patient.    Reviewed patients plan of care and provided an AVS. The Basic Care Plan (routine screening as documented in Health Maintenance) for Brenda meets the Care Plan requirement. This Care Plan has been established and reviewed with the Patient.    Counseling Resources:  ATP IV Guidelines  Pooled Cohorts Equation Calculator  Breast Cancer Risk Calculator  FRAX Risk Assessment  ICSI Preventive Guidelines  Dietary Guidelines for Americans, 2010  LFS ([employer]s MyPlate  ASA Prophylaxis  Lung CA Screening    Liudmila Bowie MD  [facility]    Identified Health Risks:    She is at risk for lack of exercise and has been provided with information to increase physical activity for the benefit of her well-being.  The patient was counseled and encouraged to consider modifying their diet and eating habits. She was provided with information on recommended healthy diet options.  The patient reports that she has difficulty with activities of daily living. I have asked that the patient make a follow up appointment in 8 weeks where this issue will be further evaluated and addressed.  The patient was provided with written information regarding signs of hearing loss.  Information on urinary incontinence and treatment options given to patient.

## 2019-10-02 ASSESSMENT — ANXIETY QUESTIONNAIRES: GAD7 TOTAL SCORE: 0

## 2019-10-03 ENCOUNTER — HEALTH MAINTENANCE LETTER (OUTPATIENT)
Age: 84
End: 2019-10-03

## 2019-10-10 DIAGNOSIS — R00.0 RAPID HEART RATE: ICD-10-CM

## 2019-10-10 DIAGNOSIS — B00.9 HERPES SIMPLEX VIRUS INFECTION: ICD-10-CM

## 2019-10-10 DIAGNOSIS — I10 HYPERTENSION, GOAL BELOW 140/90: ICD-10-CM

## 2019-10-10 DIAGNOSIS — E78.5 HYPERLIPIDEMIA LDL GOAL <130: ICD-10-CM

## 2019-10-10 NOTE — TELEPHONE ENCOUNTER
"Requested Prescriptions   Pending Prescriptions Disp Refills     metoprolol succinate ER (TOPROL-XL) 50 MG 24 hr tablet [Pharmacy Med Name: METOPROLOL ER SUCCINATE 50MG TABS]  Last Written Prescription Date:  12/10/18  Last Fill Quantity: 90,  # refills: 3   Last Office Visit with [facility], [facility] or [facility] prescribing provider: [facility]    Future Office Visit:    90 tablet 0     Sig: TAKE 1 TABLET(50 MG) BY MOUTH DAILY       Beta-Blockers Protocol Failed - 10/10/2019  5:09 AM        Failed - Blood pressure under 140/90 in past 12 months     BP Readings from Last 3 Encounters:   10/01/19 (!) 172/74   06/03/19 152/77   05/22/19 118/70                 Passed - Patient is age 6 or older        Passed - Recent (12 mo) or future (30 days) visit within the authorizing provider's specialty     Patient has had an office visit with the authorizing provider or a provider within the authorizing providers department within the previous 12 mos or has a future within next 30 days. See \"Patient Info\" tab in inbasket, or \"Choose Columns\" in Meds & Orders section of the refill encounter.              Passed - Medication is active on med list        acyclovir (ZOVIRAX) 400 MG tablet [Pharmacy Med Name: ACYCLOVIR 400MG TABLETS]  Last Written Prescription Date:  12/10/18  Last Fill Quantity: 90,  # refills: 3   Last Office Visit with [facility], [facility] or [facility] prescribing provider:  10/01/19CathiJamir   Future Office Visit:    90 tablet 0     Sig: TAKE 1 TABLET(400 MG) BY MOUTH DAILY       Antivirals for Herpes Protocol Passed - 10/10/2019  5:09 AM        Passed - Patient is age 12 or older        Passed - Recent (12 mo) or future (30 days) visit within the authorizing provider's specialty     Patient has had an office visit with the authorizing provider or a provider within the authorizing providers department within the previous 12 mos or has a future within next 30 days. See \"Patient Info\" tab in inbasket, or \"Choose Columns\" in Meds & " Orders section of the refill encounter.              Passed - Medication is active on med list        Passed - Normal serum creatinine on file in past 12 months     Recent Labs   Lab Test 09/25/19  0903   CR 0.63

## 2019-10-10 NOTE — TELEPHONE ENCOUNTER
"Requested Prescriptions   Pending Prescriptions Disp Refills     hydrochlorothiazide (MICROZIDE) 12.5 MG capsule [Pharmacy Med Name: HYDROCHLOROTHIAZIDE 12.5MG CAPSULES]  Last Written Prescription Date:  08/30/19  Last Fill Quantity: 90,  # refills: 0   Last Office Visit with G, P or [facility] prescribing provider:  10/01/19Juan   Future Office Visit:    90 capsule 0     Sig: TAKE 1 CAPSULE(12.5 MG) BY MOUTH DAILY       Diuretics (Including Combos) Protocol Failed - 10/10/2019  5:09 AM        Failed - Blood pressure under 140/90 in past 12 months     BP Readings from Last 3 Encounters:   10/01/19 (!) 172/74   06/03/19 152/77   05/22/19 118/70                 Passed - Recent (12 mo) or future (30 days) visit within the authorizing provider's specialty     Patient has had an office visit with the authorizing provider or a provider within the authorizing providers department within the previous 12 mos or has a future within next 30 days. See \"Patient Info\" tab in inbasket, or \"Choose Columns\" in Meds & Orders section of the refill encounter.              Passed - Medication is active on med list        Passed - Patient is age 18 or older        Passed - No active pregancy on record        Passed - Normal serum creatinine on file in past 12 months     Recent Labs   Lab Test 09/25/19  0903   CR 0.63              Passed - Normal serum potassium on file in past 12 months     Recent Labs   Lab Test 09/25/19  0903   POTASSIUM 3.8                    Passed - Normal serum sodium on file in past 12 months     Recent Labs   Lab Test 09/25/19  0903                 Passed - No positive pregnancy test in past 12 months          "

## 2019-10-14 RX ORDER — ACYCLOVIR 400 MG/1
TABLET ORAL
Qty: 90 TABLET | Refills: 1 | Status: SHIPPED | OUTPATIENT
Start: 2019-10-14 | End: 2020-04-08

## 2019-10-14 RX ORDER — SIMVASTATIN 20 MG
20 TABLET ORAL AT BEDTIME
Qty: 90 TABLET | Refills: 3 | Status: SHIPPED | OUTPATIENT
Start: 2019-10-14 | End: 2020-10-07

## 2019-10-14 RX ORDER — HYDROCHLOROTHIAZIDE 12.5 MG/1
CAPSULE ORAL
Qty: 90 CAPSULE | Refills: 3 | Status: SHIPPED | OUTPATIENT
Start: 2019-10-14 | End: 2020-12-14

## 2019-10-14 RX ORDER — METOPROLOL SUCCINATE 50 MG/1
TABLET, EXTENDED RELEASE ORAL
Qty: 90 TABLET | Refills: 1 | Status: SHIPPED | OUTPATIENT
Start: 2019-10-14 | End: 2020-04-08

## 2019-10-14 NOTE — TELEPHONE ENCOUNTER
Routing refill request to provider for review/approval because:  Last BP in family practice failed protocol.    Halina Jacome RN, [facility]

## 2019-10-14 NOTE — TELEPHONE ENCOUNTER
Prescription approved per [facility] Refill Protocol.      Checo Navarrete RN, BSN, PHN

## 2019-11-07 ENCOUNTER — MYC MEDICAL ADVICE (OUTPATIENT)
Dept: ORTHOPEDICS | Facility: CLINIC | Age: 84
End: 2019-11-07

## 2019-11-07 DIAGNOSIS — Z96.642 H/O TOTAL HIP ARTHROPLASTY, LEFT: Primary | ICD-10-CM

## 2019-11-07 RX ORDER — AMOXICILLIN 500 MG/1
CAPSULE ORAL
Qty: 4 CAPSULE | Refills: 0 | Status: SHIPPED | OUTPATIENT
Start: 2019-11-07 | End: 2019-12-17

## 2019-11-07 NOTE — TELEPHONE ENCOUNTER
[facility] 6/27/18- patient has dental appointment and needing abx.  Approved per protocol.  Mehnaz Denton RN

## 2019-12-05 DIAGNOSIS — K21.00 GASTROESOPHAGEAL REFLUX DISEASE WITH ESOPHAGITIS: ICD-10-CM

## 2019-12-05 NOTE — TELEPHONE ENCOUNTER
"Requested Prescriptions   Pending Prescriptions Disp Refills     omeprazole (PRILOSEC) 20 MG DR capsule [Pharmacy Med Name: OMEPRAZOLE 20MG CAPSULES]  Last Written Prescription Date:  03/06/19  Last Fill Quantity: 90,  # refills: 2   Last Office Visit with FMNAYANA, DARNELL or [facility] prescribing provider:  10/01/19Juan   Future Office Visit:    90 capsule 0     Sig: TAKE 1 CAPSULE(20 MG) BY MOUTH DAILY       PPI Protocol Passed - 12/5/2019 11:43 AM        Passed - Not on Clopidogrel (unless Pantoprazole ordered)        Passed - No diagnosis of osteoporosis on record        Passed - Recent (12 mo) or future (30 days) visit within the authorizing provider's specialty     Patient has had an office visit with the authorizing provider or a provider within the authorizing providers department within the previous 12 mos or has a future within next 30 days. See \"Patient Info\" tab in inbasket, or \"Choose Columns\" in Meds & Orders section of the refill encounter.              Passed - Medication is active on med list        Passed - Patient is age 18 or older        Passed - No active pregnacy on record        Passed - No positive pregnancy test in past 12 months          "

## 2019-12-06 NOTE — TELEPHONE ENCOUNTER
Prescription approved per [facility] Refill Protocol.  Mirella Way RN  [facility]

## 2019-12-09 ENCOUNTER — MYC MEDICAL ADVICE (OUTPATIENT)
Dept: FAMILY MEDICINE | Facility: CLINIC | Age: 84
End: 2019-12-09

## 2019-12-09 DIAGNOSIS — L72.0 EPIDERMAL CYST OF FACE: Primary | ICD-10-CM

## 2019-12-09 NOTE — TELEPHONE ENCOUNTER
Referral information is sent to patient via my chart.  Sundeep Martínez,  For 1st Floor Primary [facility] ([facility])

## 2019-12-09 NOTE — TELEPHONE ENCOUNTER
Please advise on patient's request for referral.  Sundeep Martínez,  For 1st Floor Primary Care ([facility])

## 2019-12-09 NOTE — TELEPHONE ENCOUNTER
Referral completed for either [place] or [place] [facility]. Please give patient numbers to call.  Liudmila Bowie MD

## 2019-12-17 ENCOUNTER — OFFICE VISIT (OUTPATIENT)
Dept: FAMILY MEDICINE | Facility: CLINIC | Age: 84
End: 2019-12-17
Payer: MEDICARE

## 2019-12-17 VITALS
WEIGHT: 173.8 LBS | HEART RATE: 76 BPM | TEMPERATURE: 98.4 F | HEIGHT: 62 IN | RESPIRATION RATE: 18 BRPM | SYSTOLIC BLOOD PRESSURE: 137 MMHG | OXYGEN SATURATION: 99 % | BODY MASS INDEX: 31.98 KG/M2 | DIASTOLIC BLOOD PRESSURE: 66 MMHG

## 2019-12-17 DIAGNOSIS — L72.3 SEBACEOUS CYST: Primary | ICD-10-CM

## 2019-12-17 PROCEDURE — 99213 OFFICE O/P EST LOW 20 MIN: CPT | Mod: 25 | Performed by: PHYSICIAN ASSISTANT

## 2019-12-17 PROCEDURE — 90471 IMMUNIZATION ADMIN: CPT | Performed by: PHYSICIAN ASSISTANT

## 2019-12-17 PROCEDURE — 90715 TDAP VACCINE 7 YRS/> IM: CPT | Performed by: PHYSICIAN ASSISTANT

## 2019-12-17 RX ORDER — CEPHALEXIN 500 MG/1
500 CAPSULE ORAL 3 TIMES DAILY
Qty: 30 CAPSULE | Refills: 0 | Status: SHIPPED | OUTPATIENT
Start: 2019-12-17 | End: 2019-12-27

## 2019-12-17 ASSESSMENT — PAIN SCALES - GENERAL: PAINLEVEL: MILD PAIN (2)

## 2019-12-17 ASSESSMENT — MIFFLIN-ST. JEOR: SCORE: 1201.6

## 2019-12-17 NOTE — PROGRESS NOTES
Subjective     Brenda Barker is a 84 year old female who presents to clinic today for the following health issues:    HPI   Concern - Possible cyst   Onset: For months     Description:   Underneath left side of chin. Patient reports no pain     Intensity: mild    Progression of Symptoms:  same    Therapies Tried and outcome: NONE  Patient is traveling to [place] and is concerned that cyst may inflamed in warm weather since it happened I the past.   Patient reports that cyst is small and not bothersome at this time.   Patient has appointment with dermatology but in 6 weeks.      Patient Active Problem List   Diagnosis     Benign neoplasm of cranial nerve (H)     Hemorrhoids     Aortic valve disorder     Cervicalgia     Rheumatoid arthritis (H)     SNHL (sensorineural hearing loss)     Status post total left knee replacement     Obesity     High risk medication use     Gastroesophageal reflux disease with esophagitis     Hypertension, goal below 140/90     Hyperlipidemia LDL goal <130     Rapid heart rate     Resting tremor     ACP (advance care planning)     Status post total replacement of left hip     Anemia in other chronic diseases classified elsewhere     Acoustic neuroma (H)     Unspecified blepharoconjunctivitis, bilateral     Past Surgical History:   Procedure Laterality Date     ARTHROPLASTY HIP Left 6/27/2018    Procedure: ARTHROPLASTY HIP;  Left Total Hip Arthroplasty  ;  Surgeon: Keo Priest MD;  Location: [facility]     BACK SURGERY  2011    Fusion L3-4 & decompression; [facility]     BIOPSY  2013    Thyroid - normal result     C DEXA, BONE DENSITY, AXIAL SKEL  7/03 7/03 L1-4 1.7, FemNkL-0.8,R-1.2, FArm Px 0.2,Dist-0.3     C REMOVAL OF OVARY(S)  1980    2nd ovary removed - endometriosis     C TOTAL ABDOM HYSTERECTOMY  1972    uterus, ovary removed - fibroid     EYE SURGERY  2010 & 11    Cataract surgery     HC COLONOSCOPY THRU STOMA W BIOPSY/CAUTERY TUMOR/POLYP/LESION  1993    adenomatous polyp      HC COLONOSCOPY THRU STOMA, DIAGNOSTIC  1998, 10/03    normal, '03 rec repeat in 5 yrs due to +hx polyp and + fam hx     HC FLEX SIGMOIDOSCOPY W/WO KARYNA SPEC BY BRUSH/WASH  12/2001     HC REMOVAL OF TONSILS,<13 Y/O  1942     ORTHOPEDIC SURGERY  2008    Left knee replacement     SURGICAL HISTORY OF -   1989    basal cell Ca scalp excised     SURGICAL HISTORY OF -   1998    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       Social History     Tobacco Use     Smoking status: Never Smoker     Smokeless tobacco: Never Used   Substance Use Topics     Alcohol use: Yes     Comment: occasionally     Family History   Problem Relation Age of Onset     Gastrointestinal Disease Father         bleeding ulcer     Cancer Mother         thyroid cancer     Other Cancer Mother         thyroid & throat     Osteoporosis Mother      Thyroid Disease Mother      Diabetes Paternal Grandmother      Cerebrovascular Disease Paternal Grandmother      Colon Cancer Maternal Half-Brother      Osteoporosis Maternal Grandmother          Current Outpatient Medications   Medication Sig Dispense Refill     Acetaminophen (TYLENOL PO) Take 650 mg by mouth every 6 hours as needed for mild pain or fever       acyclovir (ZOVIRAX) 400 MG tablet TAKE 1 TABLET(400 MG) BY MOUTH DAILY 90 tablet 1     CENTRUM SILVER OR TABS 1 TABLET DAILY       cephALEXin (KEFLEX) 500 MG capsule Take 1 capsule (500 mg) by mouth 3 times daily for 10 days 30 capsule 0     Cetirizine HCl (ZYRTEC ALLERGY PO) Take 1 tablet by mouth daily       Cholecalciferol (VITAMIN D-3) 1000 UNITS CAPS Take 2,000 Int'l Units by mouth daily        ferrous gluconate (FERGON) 324 (38 Fe) MG tablet TAKE 1 TABLET BY MOUTH EVERY OTHER  tablet 0     FISH OIL 1000 MG OR CAPS 2 CAPSULES DAILY  (? DOSE) OTC --     FOLIC ACID 1 MG OR TABS 4 mg TABS DAILY       hydrochlorothiazide (MICROZIDE) 12.5 MG capsule TAKE 1 CAPSULE(12.5 MG) BY MOUTH DAILY 90 capsule 3     methotrexate 2.5 MG tablet  "CHEMO Take 6 tablets (15 mg) by mouth every 7 days       metoprolol succinate ER (TOPROL-XL) 50 MG 24 hr tablet TAKE 1 TABLET(50 MG) BY MOUTH DAILY 90 tablet 1     omeprazole (PRILOSEC) 20 MG DR capsule TAKE 1 CAPSULE(20 MG) BY MOUTH DAILY 90 capsule 2     polyvinyl alcohol (LIQUID TEARS) 1.4 % ophthalmic solution 1 drop as needed.       senna-docusate (SENOKOT-S;PERICOLACE) 8.6-50 MG per tablet Take 1-2 tablets by mouth 2 times daily 100 tablet      simvastatin (ZOCOR) 20 MG tablet Take 1 tablet (20 mg) by mouth At Bedtime 90 tablet 3     COMPOUNDED NON-CONTROLLED SUBSTANCE (CMPD RX) - PHARMACY TO MIX COMPOUNDED MEDICATION Estriol 1mg/gram. Place 1 gram vaginally daily for two weeks. Then vaginally twice weekly. (Patient not taking: Reported on 10/1/2019) 30 g 6     Allergies   Allergen Reactions     Diatrizoate Hives and Shortness Of Breath     Ciprofloxacin      nausea     Contrast Dye      Hives,anaphylaxix     Oxycodone Other (See Comments)     hallucinations     Sulfasalazine      Other reaction(s): Rash         Reviewed and updated as needed this visit by Provider  Tobacco  Allergies  Meds  Problems  Med Hx  Surg Hx  Fam Hx       Review of Systems   ROS COMP: Constitutional, HEENT, cardiovascular, pulmonary, gi and gu systems are negative, except as otherwise noted.      Objective    /66   Pulse 76   Temp 98.4  F (36.9  C) (Oral)   Resp 18   Ht 1.575 m (5' 2\")   Wt 78.8 kg (173 lb 12.8 oz)   LMP 05/17/1972   SpO2 99%   Breastfeeding No   BMI 31.79 kg/m    Body mass index is 31.79 kg/m .  Physical Exam   GENERAL: healthy, alert and no distress  SKIN: 5 mm nodule with black central pore on the left side of the chin    Diagnostic Test Results:  Labs reviewed in Epic        Assessment & Plan       ICD-10-CM    1. Sebaceous cyst L72.3 cephALEXin (KEFLEX) 500 MG capsule     If cyst gets inflamed start Cephalexin 500 mg three times a day for 10 days.  Schedule appointment with dermatology for " excision when you come back from [place].        Return in about 2 months (around 2/17/2020), or if symptoms worsen or fail to improve, for derm.    Corin Craig PA-C  [facility]

## 2019-12-17 NOTE — PATIENT INSTRUCTIONS
If cyst gets inflamed start Cephalexin 500 mg three times a day for 10 days.  Schedule appointment with dermatology for excision when you come back from [place].

## 2019-12-17 NOTE — NURSING NOTE
Prior to immunization administration, verified patients identity using patient s name and date of birth. Please see Immunization Activity for additional information.     Screening Questionnaire for Adult Immunization    Are you sick today?   No   Do you have allergies to medications, food, a vaccine component or latex?   No   Have you ever had a serious reaction after receiving a vaccination?   No   Do you have a long-term health problem with heart, lung, kidney, or metabolic disease (e.g., diabetes), asthma, a blood disorder, no spleen, complement component deficiency, a cochlear implant, or a spinal fluid leak?  Are you on long-term aspirin therapy?   No   Do you have cancer, leukemia, HIV/AIDS, or any other immune system problem?   No   Do you have a parent, brother, or sister with an immune system problem?   No   In the past 3 months, have you taken medications that affect  your immune system, such as prednisone, other steroids, or anticancer drugs; drugs for the treatment of rheumatoid arthritis, Crohn s disease, or psoriasis; or have you had radiation treatments?   No   Have you had a seizure, or a brain or other nervous system problem?   No   During the past year, have you received a transfusion of blood or blood    products, or been given immune (gamma) globulin or antiviral drug?   No   For women: Are you pregnant or is there a chance you could become       pregnant during the next month?   No   Have you received any vaccinations in the past 4 weeks?   No     Immunization questionnaire answers were all negative.        Per orders of Jamee, injection of Tdap given by Maxwell Villareal. Patient instructed to remain in clinic for 15 minutes afterwards, and to report any adverse reaction to me immediately.       Screening performed by Maxwell Villareal on 12/17/2019

## 2020-04-04 DIAGNOSIS — Z87.440 H/O RECURRENT URINARY TRACT INFECTION: ICD-10-CM

## 2020-04-04 NOTE — TELEPHONE ENCOUNTER
Requested Prescriptions   Pending Prescriptions Disp Refills     nitroFURantoin macrocrystal (MACRODANTIN) 100 MG capsule [Pharmacy Med Name: NITROFURANTOIN MACRO 100MG CAPSULES] 90 capsule 0     Sig: TAKE 1 CAPSULE(100 MG) BY MOUTH DAILY       There is no refill protocol information for this order        Last Written Prescription Date: 1/9/20  Last Fill Quantity: 90,  # refills: 0   Last office visit: 12/17/2019 with prescribing provider: Kiara    Future Office Visit:

## 2020-04-06 RX ORDER — NITROFURANTOIN MACROCRYSTAL 100 MG
CAPSULE ORAL
Qty: 90 CAPSULE | Refills: 0 | Status: SHIPPED | OUTPATIENT
Start: 2020-04-06 | End: 2020-07-06

## 2020-04-06 NOTE — TELEPHONE ENCOUNTER
Routing refill request to provider for review/approval because:  Drug not on the [facility] refill protocol     Checo Navarrete RN, BSN, PHN

## 2020-04-07 DIAGNOSIS — B00.9 HERPES SIMPLEX VIRUS INFECTION: ICD-10-CM

## 2020-04-07 DIAGNOSIS — R00.0 RAPID HEART RATE: ICD-10-CM

## 2020-04-07 NOTE — TELEPHONE ENCOUNTER
"Requested Prescriptions   Pending Prescriptions Disp Refills     acyclovir (ZOVIRAX) 400 MG tablet [Pharmacy Med Name: ACYCLOVIR 400MG TABLETS]  Last Written Prescription Date:  10/14/19  Last Fill Quantity: 90,  # refills: 1   Last Office Visit with [facility], [facility] or [facility] prescribing provider:  12/17/19-[facility]   Future Office Visit:    90 tablet 1     Sig: TAKE 1 TABLET(400 MG) BY MOUTH DAILY       Antivirals for Herpes Protocol Passed - 4/7/2020  5:04 AM        Passed - Patient is age 12 or older        Passed - Recent (12 mo) or future (30 days) visit within the authorizing provider's specialty     Patient has had an office visit with the authorizing provider or a provider within the authorizing providers department within the previous 12 mos or has a future within next 30 days. See \"Patient Info\" tab in inbasket, or \"Choose Columns\" in Meds & Orders section of the refill encounter.              Passed - Medication is active on med list        Passed - Normal serum creatinine on file in past 12 months     Recent Labs   Lab Test 09/25/19  0903   CR 0.63       Ok to refill medication if creatinine is low             metoprolol succinate ER (TOPROL-XL) 50 MG 24 hr tablet [Pharmacy Med Name: METOPROLOL ER SUCCINATE 50MG TABS]  Last Written Prescription Date:  10/14/19  Last Fill Quantity: 90,  # refills: 1   Last Office Visit with [facility], [facility] or [facility] prescribing provider:  12/17/19-[facility]   Future Office Visit:    90 tablet 1     Sig: TAKE 1 TABLET(50 MG) BY MOUTH DAILY       Beta-Blockers Protocol Passed - 4/7/2020  5:04 AM        Passed - Blood pressure under 140/90 in past 12 months     BP Readings from Last 3 Encounters:   12/17/19 137/66   10/01/19 (!) 172/74   06/03/19 152/77                 Passed - Patient is age 6 or older        Passed - Recent (12 mo) or future (30 days) visit within the authorizing provider's specialty     Patient has had an office visit with the authorizing provider or a provider " "within the authorizing providers department within the previous 12 mos or has a future within next 30 days. See \"Patient Info\" tab in inbasket, or \"Choose Columns\" in Meds & Orders section of the refill encounter.              Passed - Medication is active on med list             "

## 2020-04-08 RX ORDER — METOPROLOL SUCCINATE 50 MG/1
TABLET, EXTENDED RELEASE ORAL
Qty: 90 TABLET | Refills: 1 | Status: SHIPPED | OUTPATIENT
Start: 2020-04-08 | End: 2020-11-10

## 2020-04-08 RX ORDER — ACYCLOVIR 400 MG/1
TABLET ORAL
Qty: 90 TABLET | Refills: 1 | Status: SHIPPED | OUTPATIENT
Start: 2020-04-08 | End: 2021-05-17

## 2020-04-08 NOTE — TELEPHONE ENCOUNTER
Prescription approved per [facility] Refill Protocol.    Halina Jacome RN, [facility] Triage

## 2020-07-04 DIAGNOSIS — Z87.440 H/O RECURRENT URINARY TRACT INFECTION: ICD-10-CM

## 2020-07-04 DIAGNOSIS — Z96.642 H/O TOTAL HIP ARTHROPLASTY, LEFT: ICD-10-CM

## 2020-07-06 RX ORDER — NITROFURANTOIN MACROCRYSTAL 100 MG
CAPSULE ORAL
Qty: 90 CAPSULE | Refills: 0 | Status: SHIPPED | OUTPATIENT
Start: 2020-07-06 | End: 2020-10-06

## 2020-07-06 NOTE — TELEPHONE ENCOUNTER
Requested Prescriptions   Pending Prescriptions Disp Refills     nitroFURantoin macrocrystal (MACRODANTIN) 100 MG capsule [Pharmacy Med Name: NITROFURANTOIN MACRO 100MG CAPSULES] 90 capsule 0     Sig: TAKE 1 CAPSULE(100 MG) BY MOUTH DAILY       There is no refill protocol information for this order        Routing refill request to provider for review/approval because:  Drug not on the [facility] refill protocol     Checo Navarrete RN, BSN, PHN

## 2020-07-07 RX ORDER — AMOXICILLIN 500 MG/1
CAPSULE ORAL
Qty: 4 CAPSULE | Refills: 0 | Status: SHIPPED | OUTPATIENT
Start: 2020-07-07 | End: 2021-01-28

## 2020-07-10 ENCOUNTER — VIRTUAL VISIT (OUTPATIENT)
Dept: FAMILY MEDICINE | Facility: OTHER | Age: 85
End: 2020-07-10

## 2020-07-10 NOTE — PROGRESS NOTES
"Date: 07/10/2020 16:56:01  Clinician: Rianna Tong  Clinician NPI: [id number]  Patient: Brenda Barker  Patient : 1935  Patient Address: [address] [place] [id number]  Patient Phone: [phone]  Visit Protocol: URI  Patient Summary:  Brenda is a 84 year old ( : 1935 ) female who initiated a Visit for COVID-19 (Coronavirus) evaluation and screening. When asked the question \"Please sign me up to receive news, health information and promotions from [employer].\", Brenda responded \"Yes\".    Brenda states her symptoms started today.   Her symptoms consist of a headache and chills. Brenda also feels feverish.   Symptom details     Temperature: Her current temperature is 100.76 degrees Fahrenheit. Brenda has had a temperature over 100 degrees Fahrenheit for 1-2 days.     Headache: She states the headache is mild (1-3 on a 10 point pain scale).      Brenda denies having wheezing, nausea, teeth pain, ageusia, diarrhea, vomiting, rhinitis, malaise, ear pain, sore throat, myalgias, anosmia, facial pain or pressure, cough, and nasal congestion. She also denies having recent facial or sinus surgery in the past 60 days and having a sinus infection within the past year. She is not experiencing dyspnea.   Precipitating events  She has not recently been exposed to someone with influenza. Brenda has been in close contact with the following high risk individuals: children under the age of 5 and adults 65 or older.   Pertinent COVID-19 (Coronavirus) information  In the past 14 days, Brenda has not worked in a congregate living setting.   She does not work or volunteer as healthcare worker or a  and does not work or volunteer in a healthcare facility.   Brenda also has not lived in a congregate living setting in the past 14 days. She does not live with a healthcare worker.   Brenda has not had a close contact with a laboratory-confirmed COVID-19 patient within 14 days of symptom onset.   Pertinent medical history  Brenda has " taken an antibiotic medication in the past month. Antibiotic details as reported by the patient (free text): Amoxicillin  2000 mg; dental visit 7/13/2020   Brenda typically gets a yeast infection when she takes antibiotics. She is not sure if she has used fluconazole (Diflucan) to treat previous yeast infections.   Brenda does not need a return to work/school note.   Weight: 170 lbs   Brenda does not smoke or use smokeless tobacco.   Additional information as reported by the patient (free text): Rheumatoid arthritis   Weight: 170 lbs    MEDICATIONS: simvastatin oral, acyclovir oral, folic acid oral, metoprolol succinate oral, methotrexate sodium oral, amiloride-hydrochlorothiazide oral, nitrofurantoin macrocrystal oral, ALLERGIES: Zyrtec  Clinician Response:  Dear Brenda,   Your symptoms show that you may have coronavirus (COVID-19). This illness can cause fever, cough and trouble breathing. Many people get a mild case and get better on their own. Some people can get very sick.  What should I do?  We would like to test you for this virus.   1. Please call [phone] to schedule your visit. Explain that you were referred by [facility] to have a COVID-19 test. Be ready to share your [facility] visit ID number.  The following will serve as your written order for this COVID Test, ordered by me, for the indication of suspected COVID [Z20.828]: The test will be ordered in [employer], our electronic health record, after you are scheduled. It will show as ordered and authorized by Brandon Sanchez MD.  Order: COVID-19 (Coronavirus) PCR for SYMPTOMATIC testing from [facility].      2. When it's time for your COVID test:  Stay at least 6 feet away from others. (If someone will drive you to your test, stay in the backseat, as far away from the  as you can.)   Cover your mouth and nose with a mask, tissue or washcloth.  Go straight to the testing site. Don't make any stops on the way there or back.      3.Starting now: Stay home and away from others  "(self-isolate) until:   You've had no fever---and no medicine that reduces fever---for 3 full days (72 hours). And...   Your other symptoms have gotten better. For example, your cough or breathing has improved. And...   At least 10 days have passed since your symptoms started.       During this time, don't leave the house except for testing or medical care.   Stay in your own room, even for meals. Use your own bathroom if you can.   Stay away from others in your home. No hugging, kissing or shaking hands. No visitors.  Don't go to work, school or anywhere else.    Clean \"high touch\" surfaces often (doorknobs, counters, handles, etc.). Use a household cleaning spray or wipes. You'll find a full list of  on the EPA website: www.epa.gov/pesticide-registration/list-n-disinfectants-use-against-sars-cov-2.   Cover your mouth and nose with a mask, tissue or washcloth to avoid spreading germs.  Wash your hands and face often. Use soap and water.  Caregivers in these groups are at risk for severe illness due to COVID-19:  o People 65 years and older  o People who live in a nursing home or long-term care facility  o People with chronic disease (lung, heart, cancer, diabetes, kidney, liver, immunologic)  o People who have a weakened immune system, including those who:   Are in cancer treatment  Take medicine that weakens the immune system, such as corticosteroids  Had a bone marrow or organ transplant  Have an immune deficiency  Have poorly controlled HIV or AIDS  Are obese (body mass index of 40 or higher)  Smoke regularly   o Caregivers should wear gloves while washing dishes, handling laundry and cleaning bedrooms and bathrooms.  o Use caution when washing and drying laundry: Don't shake dirty laundry, and use the warmest water setting that you can.  o For more tips, go to www.cdc.gov/coronavirus/2019-ncov/downloads/10Things.pdf.       How can I take care of myself?   Get lots of rest. Drink extra fluids (unless a " doctor has told you not to).   Take Tylenol (acetaminophen) for fever or pain. If you have liver or kidney problems, ask your family doctor if it's okay to take Tylenol.   Adults can take either:    650 mg (two 325 mg pills) every 4 to 6 hours, or...   1,000 mg (two 500 mg pills) every 8 hours as needed.    Note: Don't take more than 3,000 mg in one day. Acetaminophen is found in many medicines (both prescribed and over-the-counter medicines). Read all labels to be sure you don't take too much.   For children, check the Tylenol bottle for the right dose. The dose is based on the child's age or weight.    If you have other health problems (like cancer, heart failure, an organ transplant or severe kidney disease): Call your specialty clinic if you don't feel better in the next 2 days.       Know when to call 911. Emergency warning signs include:    Trouble breathing or shortness of breath Pain or pressure in the chest that doesn't go away Feeling confused like you haven't felt before, or not being able to wake up Bluish-colored lips or face.  Where can I get more information?   [facility] -- About COVID-19: [email or web address]   CDC -- What to Do If You're Sick: www.cdc.gov/coronavirus/2019-ncov/about/steps-when-sick.html   CDC -- Ending Home Isolation: www.cdc.gov/coronavirus/2019-ncov/hcp/disposition-in-home-patients.html   CDC -- Caring for Someone: www.cdc.gov/coronavirus/2019-ncov/if-you-are-sick/care-for-someone.html   [facility] -- Interim Guidance for Hospital Discharge to Home: www.health.[facility].[place]/diseases/coronavirus/hcp/hospdischarge.pdf   [facility] clinical trials (COVID-19 research studies): [email or web address].[facility]/umn-clinical-trials    Below are the COVID-19 hotlines at the [place] Department of Health ([facility]). Interpreters are available.    For health questions: Call [phone] or 7-502-517-3142 (7 a.m. to 7 p.m.) For questions about schools and childcare: Call  [phone] or [phone] (7 a.m. to 7 p.m.)    Diagnosis: Other malaise  Diagnosis ICD: R53.81

## 2020-07-11 DIAGNOSIS — Z20.822 SUSPECTED COVID-19 VIRUS INFECTION: Primary | ICD-10-CM

## 2020-07-11 PROCEDURE — U0003 INFECTIOUS AGENT DETECTION BY NUCLEIC ACID (DNA OR RNA); SEVERE ACUTE RESPIRATORY SYNDROME CORONAVIRUS 2 (SARS-COV-2) (CORONAVIRUS DISEASE [COVID-19]), AMPLIFIED PROBE TECHNIQUE, MAKING USE OF HIGH THROUGHPUT TECHNOLOGIES AS DESCRIBED BY CMS-2020-01-R: HCPCS | Performed by: FAMILY MEDICINE

## 2020-07-11 NOTE — LETTER
July 17, 2020        Brenda Barker  [address] [facility] [address]    This letter provides a written record that you were tested for COVID-19 on 07/11/2020.       Your result was negative. This means that we didn t find the virus that causes COVID-19 in your sample. A test may show negative when you do actually have the virus. This can happen when the virus is in the early stages of infection, before you feel illness symptoms.    If you have symptoms   Stay home and away from others (self-isolate) until you meet ALL of the guidelines below:    You ve had no fever--and no medicine that reduces fever--for 3 full days (72 hours). And      Your other symptoms have gotten better. For example, your cough or breathing has improved. And     At least 10 days have passed since your symptoms started.    During this time:    Stay home. Don t go to work, school or anywhere else.     Stay in your own room, including for meals. Use your own bathroom if you can.    Stay away from others in your home. No hugging, kissing or shaking hands. No visitors.    Clean  high touch  surfaces often (doorknobs, counters, handles, etc.). Use a household cleaning spray or wipes. You can find a full list on the EPA website at www.epa.gov/pesticide-registration/list-n-disinfectants-use-against-sars-cov-2.    Cover your mouth and nose with a mask, tissue or washcloth to avoid spreading germs.    Wash your hands and face often with soap and water.    Going back to work  Check with your employer for any guidelines to follow for going back to work.    Employers: This document serves as formal notice that your employee tested negative for COVID-19, as of the testing date shown above.

## 2020-07-13 LAB
SARS-COV-2 RNA SPEC QL NAA+PROBE: NOT DETECTED
SPECIMEN SOURCE: NORMAL

## 2020-07-17 PROBLEM — H53.2 DIPLOPIA: Status: ACTIVE | Noted: 2017-03-23

## 2020-07-17 PROBLEM — H50.00 ESOTROPIA: Status: ACTIVE | Noted: 2017-03-23

## 2020-07-17 PROBLEM — H35.379 EPIRETINAL MEMBRANE: Status: ACTIVE | Noted: 2017-03-23

## 2020-08-15 DIAGNOSIS — D50.8 OTHER IRON DEFICIENCY ANEMIA: ICD-10-CM

## 2020-08-17 ENCOUNTER — MYC MEDICAL ADVICE (OUTPATIENT)
Dept: FAMILY MEDICINE | Facility: CLINIC | Age: 85
End: 2020-08-17

## 2020-08-17 NOTE — TELEPHONE ENCOUNTER
See [employer] messages below.  Recommendations given for virtual visit, to further discuss symptoms and questions or concerns.    Caro Carrington RN  [facility]

## 2020-08-18 RX ORDER — FERROUS GLUCONATE 324(38)MG
TABLET ORAL
Qty: 100 TABLET | Refills: 0 | Status: SHIPPED | OUTPATIENT
Start: 2020-08-18 | End: 2020-12-14

## 2020-08-24 ENCOUNTER — OFFICE VISIT (OUTPATIENT)
Dept: FAMILY MEDICINE | Facility: CLINIC | Age: 85
End: 2020-08-24
Payer: MEDICARE

## 2020-08-24 DIAGNOSIS — M05.79 RHEUMATOID ARTHRITIS INVOLVING MULTIPLE SITES WITH POSITIVE RHEUMATOID FACTOR (H): ICD-10-CM

## 2020-08-24 DIAGNOSIS — I10 HYPERTENSION, GOAL BELOW 140/90: ICD-10-CM

## 2020-08-24 DIAGNOSIS — R19.7 INTERMITTENT DIARRHEA: Primary | ICD-10-CM

## 2020-08-24 DIAGNOSIS — D33.3 BENIGN NEOPLASM OF CRANIAL NERVE (H): ICD-10-CM

## 2020-08-24 DIAGNOSIS — E78.5 HYPERLIPIDEMIA LDL GOAL <130: ICD-10-CM

## 2020-08-24 PROCEDURE — 99214 OFFICE O/P EST MOD 30 MIN: CPT | Performed by: FAMILY MEDICINE

## 2020-08-24 ASSESSMENT — MIFFLIN-ST. JEOR: SCORE: 1187.99

## 2020-08-24 ASSESSMENT — PAIN SCALES - GENERAL: PAINLEVEL: EXTREME PAIN (8)

## 2020-08-24 NOTE — PROGRESS NOTES
Subjective     Brenda Barker is a 84 year old female who presents to clinic today for the following health issues:    HPI       Diarrhea  Onset/Duration: since last fall- has had several episodes. Once the diarrhea is gone then it takes a while to get back to normal - 1 week. Goes back to normal for several months. Recent episode only lasted 2 hours, then better.   Description:       Consistency of stool: watery and loose       Blood in stool: no       Number of loose stools past 24 hours: 0  Progression of Symptoms: intermittent  Accompanying signs and symptoms:       Fever: no       Nausea/Vomiting: no       Abdominal pain: cramps when a bowel movement comes about       Weight loss: no       Episodes of constipation: no  History   Ill contacts: no  Recent use of antibiotics: no  Recent travels: no  Recent medication-new or changes(Rx or OTC): no  Precipitating or alleviating factors: None  Therapies tried and outcome: nothing    Hyperlipidemia Follow-Up      Are you regularly taking any medication or supplement to lower your cholesterol?   Yes- simvastatin    Are you having muscle aches or other side effects that you think could be caused by your cholesterol lowering medication?  No    Hypertension Follow-up      Do you check your blood pressure regularly outside of the clinic? Yes     Are you following a low salt diet? Yes    Are your blood pressures ever more than 140 on the top number (systolic) OR more   than 90 on the bottom number (diastolic), for example 140/90? No    Chronic Pain Follow-Up    Where in your body do you have pain? Multiple joints especially hands due to rheumatoid arthritis and osteoarthritis. Mostly controlled with medication   How has your pain affected your ability to work? Not applicable  Which of these pain treatments have you tried since your last clinic visit? none  How well are you sleeping? Fair  How has your mood been since your last visit? About the same  Have you had a  "significant life event? Health Concerns  Other aggravating factors: none  Taking medication as directed? Yes    PHQ-9 SCORE 9/9/2016 12/10/2018 10/1/2019   PHQ-9 Total Score 0 0 0     LORNE-7 SCORE 5/11/2017 8/11/2017 10/1/2019   Total Score 0 0 0     No flowsheet data found.  Encounter-Level CSA:    There are no encounter-level csa.     Patient-Level CSA:    There are no patient-level csa.             Review of Systems   Constitutional, HEENT, cardiovascular, pulmonary, gi and gu systems are negative, except as otherwise noted.      Objective    BP (!) 160/70 (BP Location: Left arm, Patient Position: Sitting, Cuff Size: Adult Regular)   Pulse 71   Temp 97.7  F (36.5  C) (Oral)   Resp 18   Ht 1.575 m (5' 2\")   Wt 77.5 kg (170 lb 12.8 oz)   LMP 05/17/1972   SpO2 97%   BMI 31.24 kg/m    Body mass index is 31.24 kg/m .  Physical Exam   GENERAL: elderly, alert, well nourished, well hydrated, no distress  HENT: ear canals- normal; TMs- normal; Nose- normal; Mouth- no ulcers, no lesions, missing dentition  NECK: no tenderness, no adenopathy, no asymmetry, no masses, no stiffness; thyroid- normal to palpation  RESP: lungs clear to auscultation - no rales, no rhonchi, no wheezes  CV: regular rates and rhythm, normal S1 S2, no S3 or S4 and no murmur, no click or rub, normal pulses  ABDOMEN: soft, no tenderness, no  hepatosplenomegaly, no masses, normal bowel sounds  MS: extremities- no gross deformities noted, no edema  SKIN: no suspicious lesions, no rashes, age related skin changes with seborrheic keratosis and no actinic keratosis.    NEURO: strength and tone- normal, sensory exam- grossly normal, reflexes- symmetric  BACK: no CVA tenderness, no paralumbar tenderness  MENTAL STATUS EXAM:  Appearance/Behavior: no apparent distress, neatly groomed, dressed appropriately for weather, appears stated age and is not frail-appearing  Speech: normal  Mood/Affect: normal affect  Insight: Good     No results found for any " visits on 08/24/20.      Orders Only on 07/11/2020   Component Date Value Ref Range Status     COVID-19 Virus PCR to [facility] - So* 07/11/2020 Nasopharyngeal   Final     COVID-19 Virus PCR to [facility] - Re* 07/11/2020 Not Detected   Final    Comment: Collection of multiple specimens from the same patient may be necessary to   detect the virus. The possibility of a false negative should be considered if   the patient's recent exposure or clinical presentation suggests 2019 nCOV   infection and diagnostic tests for other causes of illness are negative.   Repeat testing may be considered in this setting.  Viral RNA was extracted via a validated method and subsequently underwent   single step reverse transcriptase-real time polymerase chain reaction using   primers to the CDC specified N1,N2 gene targets of CoV2 and human RNP as an   internal control.  A negative result does not rule out the presence of real-time PCR inhibitors   in the specimen or COVID-19 RNA in concentrations below the limit of detection   of the assay. The possibility of a false negative should be considered if the   patients recent exposure or clinical presentation suggests COVID-19.   Additional testing or repeat testing requires consultation with the   laborator                           y.  Nasopharyngeal specimen is the preferred choice for swab-based SARS CoV2   testing. When collection of a nasopharyngeal swab is not possible the   following are acceptable alternatives:  an oropharyngeal (OP) specimen collected by a healthcare professional, or a   nasal mid-turbinate (NMT) swab collected by a healthcare professional or by   onsite self-collection (using a flocked tapered swab), or an anterior nares   specimen collected by a healthcare professional or by onsite self-collection   (using a round foam swab). (Centers for Disease Control)  Testing performed by [facility], Room [address], [address] [place] [address]  "[id number]. This test was developed and its   performance characteristics determined by the [facility]. It has not been cleared or approved by the FDA.  The laboratory is regulated under the Clinical Laboratory Improvement   Amendments of 1988 (CLIA-88) as qualified to perform high-complexity testin                           g.   This test is used for clinical purposes. It should not be regarded as   investigational or for research.            Assessment & Plan     Intermittent diarrhea  No current symptoms and does not seem to be related to any specific dietary or medications. One episode happened with antibiotics. May be related to chronic use of macrodantin to suppress urinary tract infection and omeprazole use. She will come in for stool sample if diarrhea recurs and should be careful with fruits and vegetables, eating out.     Rheumatoid arthritis involving multiple sites with positive rheumatoid factor (H)  Stable and well controlled on medications     Benign neoplasm of cranial nerve (H)  No evidence of recurrence     Hyperlipidemia LDL goal <130  Stable     Hypertension, goal below 140/90  Not well controlled on medications today. Will recheck in 1 month and increase or add medication if still elevated.        BMI:   Estimated body mass index is 31.24 kg/m  as calculated from the following:    Height as of this encounter: 1.575 m (5' 2\").    Weight as of this encounter: 77.5 kg (170 lb 12.8 oz).   Weight management plan: Discussed healthy diet and exercise guidelines        FUTURE LABS:       - Schedule fasting labs in 6 months  FUTURE APPOINTMENTS:       - Make appointment with nurse to check blood pressure in 4 weeks  Work on weight loss  Regular exercise  See Patient Instructions    Return in about 3 months (around 11/24/2020) for Lab Work, medication follow up, BP Recheck.    Liudmila Bowie MD  [facility]    "

## 2020-08-25 VITALS
BODY MASS INDEX: 31.43 KG/M2 | RESPIRATION RATE: 18 BRPM | SYSTOLIC BLOOD PRESSURE: 150 MMHG | WEIGHT: 170.8 LBS | HEART RATE: 71 BPM | TEMPERATURE: 97.7 F | HEIGHT: 62 IN | DIASTOLIC BLOOD PRESSURE: 70 MMHG | OXYGEN SATURATION: 97 %

## 2020-09-04 ENCOUNTER — ALLIED HEALTH/NURSE VISIT (OUTPATIENT)
Dept: NURSING | Facility: CLINIC | Age: 85
End: 2020-09-04
Payer: MEDICARE

## 2020-09-04 DIAGNOSIS — Z23 NEED FOR PROPHYLACTIC VACCINATION AND INOCULATION AGAINST INFLUENZA: Primary | ICD-10-CM

## 2020-09-04 PROCEDURE — G0008 ADMIN INFLUENZA VIRUS VAC: HCPCS

## 2020-09-04 PROCEDURE — 99207 ZZC NO CHARGE NURSE ONLY: CPT

## 2020-09-04 PROCEDURE — 90662 IIV NO PRSV INCREASED AG IM: CPT

## 2020-09-11 DIAGNOSIS — K21.00 GASTROESOPHAGEAL REFLUX DISEASE WITH ESOPHAGITIS: ICD-10-CM

## 2020-09-11 NOTE — TELEPHONE ENCOUNTER
Prescription approved per [facility] Refill Protocol.  Mirella Way RN  [facility]

## 2020-09-21 ENCOUNTER — TELEPHONE (OUTPATIENT)
Dept: DERMATOLOGY | Facility: CLINIC | Age: 85
End: 2020-09-21

## 2020-09-21 ENCOUNTER — MYC MEDICAL ADVICE (OUTPATIENT)
Dept: FAMILY MEDICINE | Facility: CLINIC | Age: 85
End: 2020-09-21

## 2020-09-21 NOTE — TELEPHONE ENCOUNTER
Patient had an appointment scheduled with Dr. Ferrell for this Wednesday, 09.23.20. She called to cancel that appointment this morning because she was exposed to COVID-19. When trying to reschedule there were no in person appointments available. I offered a virtual visit but patient states it needs to be in person because he will be removing the cyst on her face. Please call patient to schedule.

## 2020-09-21 NOTE — TELEPHONE ENCOUNTER
Pt called back and pt states that she needs to reschedule her appt this Wednesday for a cyst removal. RN offered some dates to reschedule but RN noted no previous appt with any Gen derm. Pt states that she has not seen any dermatologists yet but wanted to see  but  spoke to a nurse at [facility] who advised she schedule with  since he would be the one possibly removing the cyst. RN rescheduled appointment but notified pt that there is a possibility appt may need to be changed. RN will review appointment with 's team and will follow up with pt as needed. Pt verbalized understanding and agrees with this plan...Thao Burleson RN

## 2020-09-21 NOTE — TELEPHONE ENCOUNTER
Pt called, No answer.  does not identify pt. Left general message for pt to call the [facility] clinic back at [phone]....Thao Burleson RN

## 2020-09-21 NOTE — TELEPHONE ENCOUNTER
See [employer] messages below.    Caro Carrington RN  [facility]

## 2020-09-22 NOTE — TELEPHONE ENCOUNTER
Parish reviewed with Madeleine who agreed that this appointment is scheduled appropriately.....Thao Burleson RN

## 2020-10-04 DIAGNOSIS — Z87.440 H/O RECURRENT URINARY TRACT INFECTION: ICD-10-CM

## 2020-10-04 DIAGNOSIS — E78.5 HYPERLIPIDEMIA LDL GOAL <130: ICD-10-CM

## 2020-10-04 RX ORDER — NITROFURANTOIN MACROCRYSTAL 100 MG
CAPSULE ORAL
Qty: 90 CAPSULE | Refills: 0 | Status: CANCELLED | OUTPATIENT
Start: 2020-10-04

## 2020-10-06 RX ORDER — NITROFURANTOIN MACROCRYSTAL 100 MG
CAPSULE ORAL
Qty: 90 CAPSULE | Refills: 0 | Status: SHIPPED | OUTPATIENT
Start: 2020-10-06 | End: 2021-01-04

## 2020-10-06 NOTE — TELEPHONE ENCOUNTER
Requested Prescriptions   Pending Prescriptions Disp Refills     nitroFURantoin macrocrystal (MACRODANTIN) 100 MG capsule [Pharmacy Med Name: NITROFURANTOIN MACRO 100MG CAPSULES] 90 capsule 0     Sig: TAKE 1 CAPSULE BY MOUTH DAILY       There is no refill protocol information for this order        Routing refill request to provider for review/approval because:  Drug not on the [facility] refill protocol       Checo Navarrete RN, BSN, PHN

## 2020-10-07 RX ORDER — SIMVASTATIN 20 MG
TABLET ORAL
Qty: 90 TABLET | Refills: 3 | Status: SHIPPED | OUTPATIENT
Start: 2020-10-07 | End: 2021-07-12

## 2020-10-28 ENCOUNTER — OFFICE VISIT (OUTPATIENT)
Dept: DERMATOLOGY | Facility: CLINIC | Age: 85
End: 2020-10-28
Payer: MEDICARE

## 2020-10-28 VITALS — DIASTOLIC BLOOD PRESSURE: 82 MMHG | SYSTOLIC BLOOD PRESSURE: 170 MMHG

## 2020-10-28 DIAGNOSIS — L72.9 CYST OF SKIN: Primary | ICD-10-CM

## 2020-10-28 PROCEDURE — 88304 TISSUE EXAM BY PATHOLOGIST: CPT | Performed by: DERMATOLOGY

## 2020-10-28 PROCEDURE — 11441 EXC FACE-MM B9+MARG 0.6-1 CM: CPT | Performed by: DERMATOLOGY

## 2020-10-28 PROCEDURE — 12051 INTMD RPR FACE/MM 2.5 CM/<: CPT | Performed by: DERMATOLOGY

## 2020-10-28 ASSESSMENT — PAIN SCALES - GENERAL: PAINLEVEL: NO PAIN (0)

## 2020-10-28 NOTE — NURSING NOTE
The following medication was given:     MEDICATION:  Lidocaine with epinephrine 1% 1:[id number]  ROUTE: SQ  SITE: see procedure note  DOSE: 3cc  LOT #: -[id number]  : Hospira  EXPIRATION DATE: 02/2021  NDC#: 9862-3411-18   Was there drug waste? 0cc  Multi-dose vial: Yes    Magui Petty LPN  October 28, 2020    Vaseline and pressure dressing applied to excision site on left chin.  Wound care instructions reviewed with patient and AVS provided.  Patient verbalized understanding. No further questions or concerns at this time.

## 2020-10-28 NOTE — NURSING NOTE
Excision Intake                                                    LMP 05/17/1972     Do you take the following medications:  Coumadin, Eliquis, Pradaxa, Xarelto:   NO  -If on Coumadin, INR should be checked within 7 days of surgery.  Range is 3.5 or less or within therapeutic range.  Antibiotic Prophylaxis:  YES- Amoxicillin hip and knee replacements 2008 and 7/2018    Do you have an iodine allergy:  NO    Past Medical History                                                    Do you currently or have you previously had any of the following conditions:       HIV/AIDS:  NO    Hepatitis:  NO    Suppressed Immune System:  NO    Autoimmune disorder (eg RA, Lupus):  YES    Fever blisters/herpes, cold sores:  YES    Kidney disease:  NO  Creatinine   Date Value Ref Range Status   09/25/2019 0.63 0.52 - 1.04 mg/dL Final   ]    Pacemaker or Defibrillator:  NO.      History of artificial or heart valve replacement:  NO.      Endocarditis: NO    Organ transplant: NO.      Joint replacement within past 2 years: NO 2008 and 7/2018    Previous prosthetic joint infection: NO    Diabetes: NO    Pregnant:: NO    Tobacco use:  NO.    History   Smoking Status     Never Smoker   Smokeless Tobacco     Never Used     Reviewed by:  Madeleine Hager LPN

## 2020-10-28 NOTE — LETTER
10/28/2020         RE: Brenda Barker  [address] [facility] [address]        Dear Colleague,    Thank you for referring your patient, Brenda Barker, to the [facility]. Please see a copy of my visit note below.    DERMATOLOGY EXCISION PROCEDURE NOTE    Dermatology Problem List:  1. Cyst - L chin, sp excision 10/28/20    NAME OF PROCEDURE: Excision intermediate layered linear closure  Staff surgeon: Dr. Ferrell  Scrub Nurse: Madeleine BILLINGSLEY LPN    PRE-OPERATIVE DIAGNOSIS:  cyst  POST-OPERATIVE DIAGNOSIS: Same   LOCATION: left chin   FINAL EXCISION SIZE(DEFECT SIZE): 0.6x0.6 cm  FINAL REPAIR LENGTH: 2.2 cm   ANESTHESIA: 3cc 1% lidocaine with 1:100,000 epinephrine       INDICATIONS: This patient presented with a 0.6x0.6cm cyst. Excision was indicated. We discussed the principles of treatment and most likely complications including scarring, bleeding, infection, incomplete excision, wound dehiscence, pain, nerve damage, and recurrence. Informed consent was obtained and the patient underwent the procedure as follows:    PROCEDURE: The patient was taken to the operative suite. Time-out was performed.  The treatment area was anesthetized with 1% lidocaine with epinephrine. The area was prepped with Chlorhexidine and rinsed with sterile saline and draped with sterile towels. The lesion was delineated and excised down to subcutaneous fat in a elliptical manner. Hemostasis was obtained by electrocoagulation.     REPAIR: An intermediate layered linear closure was selected as the procedure which would maximally preserve both function and cosmesis.    After the excision of the tumor, the area was undermined. Hemostasis was obtained with bipolar electrocoagulation.  Closure was oriented so that the wound was in the patient's natural skin tension lines. The subcutaneous and dermal layers were then closed with 5-0 monocryl sutures. The epidermis was then carefully approximated along the  length of the wound using 5-0 Fast absorbing gut simple running sutures.     Estimated blood loss was less than 10 ml for all surgical sites. A sterile pressure dressing was applied and wound care instructions, with a written handout, were given. The patient was discharged from the [facility] alert and ambulatory.      Anatomic Pathology Results: pending    Clinical Follow-Up: PRN    Staff Involved:  Staff Only    I personally performed the procedures today.    Enio Ferrell DO    Department of Dermatology  [facility]: Phone: [phone], Fax:[phone]  [facility]: Phone: [phone], Fax: [phone]          Again, thank you for allowing me to participate in the care of your patient.        Sincerely,        Enio Ferrell MD

## 2020-10-28 NOTE — PATIENT INSTRUCTIONS

## 2020-10-28 NOTE — PROGRESS NOTES
DERMATOLOGY EXCISION PROCEDURE NOTE    Dermatology Problem List:  1. Cyst - L chin, sp excision 10/28/20    NAME OF PROCEDURE: Excision intermediate layered linear closure  Staff surgeon: Dr. Ferrell  Scrub Nurse: Madeleine BILLINGSLEY LPN    PRE-OPERATIVE DIAGNOSIS:  cyst  POST-OPERATIVE DIAGNOSIS: Same   LOCATION: left chin   FINAL EXCISION SIZE(DEFECT SIZE): 0.6x0.6 cm  FINAL REPAIR LENGTH: 2.2 cm   ANESTHESIA: 3cc 1% lidocaine with 1:100,000 epinephrine       INDICATIONS: This patient presented with a 0.6x0.6cm cyst. Excision was indicated. We discussed the principles of treatment and most likely complications including scarring, bleeding, infection, incomplete excision, wound dehiscence, pain, nerve damage, and recurrence. Informed consent was obtained and the patient underwent the procedure as follows:    PROCEDURE: The patient was taken to the operative suite. Time-out was performed.  The treatment area was anesthetized with 1% lidocaine with epinephrine. The area was prepped with Chlorhexidine and rinsed with sterile saline and draped with sterile towels. The lesion was delineated and excised down to subcutaneous fat in a elliptical manner. Hemostasis was obtained by electrocoagulation.     REPAIR: An intermediate layered linear closure was selected as the procedure which would maximally preserve both function and cosmesis.    After the excision of the tumor, the area was undermined. Hemostasis was obtained with bipolar electrocoagulation.  Closure was oriented so that the wound was in the patient's natural skin tension lines. The subcutaneous and dermal layers were then closed with 5-0 monocryl sutures. The epidermis was then carefully approximated along the length of the wound using 5-0 Fast absorbing gut simple running sutures.     Estimated blood loss was less than 10 ml for all surgical sites. A sterile pressure dressing was applied and wound care instructions, with a written handout, were given. The patient was  discharged from the [facility] alert and ambulatory.      Anatomic Pathology Results: pending    Clinical Follow-Up: PRN    Staff Involved:  Staff Only    I personally performed the procedures today.    Enio Ferrell DO    Department of Dermatology  [facility]: Phone: [phone], Fax:[phone]  [facility]: Phone: [phone], Fax: [phone]

## 2020-11-01 LAB — COPATH REPORT: NORMAL

## 2020-11-02 ENCOUNTER — TELEPHONE (OUTPATIENT)
Dept: DERMATOLOGY | Facility: CLINIC | Age: 85
End: 2020-11-02

## 2020-11-02 NOTE — TELEPHONE ENCOUNTER
Flakne, Juana, LPN   11/2/2020 10:17 AM      Phone call to patient to discuss benign pathology results. Patient appreciated call and had no further questions.        SURAJ Crowder Adam R, MD   11/2/2020  9:57 AM      Please call the patient with pathology results.     Pathology confirmed a benign cyst was excised.   As long as the wound is healing well, no additional surgery is necessary.  Thank you.

## 2020-11-02 NOTE — TELEPHONE ENCOUNTER
----- Message from Enio Ferrell MD sent at 11/2/2020  9:57 AM CST -----  Please call the patient with pathology results.    Pathology confirmed a benign cyst was excised.   As long as the wound is healing well, no additional surgery is necessary.  Thank you.

## 2020-11-08 DIAGNOSIS — R00.0 RAPID HEART RATE: ICD-10-CM

## 2020-11-10 RX ORDER — METOPROLOL SUCCINATE 50 MG/1
TABLET, EXTENDED RELEASE ORAL
Qty: 90 TABLET | Refills: 1 | Status: SHIPPED | OUTPATIENT
Start: 2020-11-10 | End: 2021-05-05

## 2020-11-10 NOTE — TELEPHONE ENCOUNTER
Routing refill request to provider for review/approval because:      Blood pressure not under 140/90 in past 12 months      Adia Goldman RN

## 2020-12-08 ENCOUNTER — DOCUMENTATION ONLY (OUTPATIENT)
Dept: FAMILY MEDICINE | Facility: CLINIC | Age: 85
End: 2020-12-08

## 2020-12-08 DIAGNOSIS — E78.5 HYPERLIPIDEMIA LDL GOAL <130: ICD-10-CM

## 2020-12-08 DIAGNOSIS — E83.52 HYPERCALCEMIA: ICD-10-CM

## 2020-12-08 DIAGNOSIS — I10 HYPERTENSION, GOAL BELOW 140/90: Primary | ICD-10-CM

## 2020-12-08 DIAGNOSIS — M05.79 RHEUMATOID ARTHRITIS INVOLVING MULTIPLE SITES WITH POSITIVE RHEUMATOID FACTOR (H): ICD-10-CM

## 2020-12-09 ENCOUNTER — MYC MEDICAL ADVICE (OUTPATIENT)
Dept: FAMILY MEDICINE | Facility: CLINIC | Age: 85
End: 2020-12-09

## 2020-12-10 DIAGNOSIS — E78.5 HYPERLIPIDEMIA LDL GOAL <130: ICD-10-CM

## 2020-12-10 DIAGNOSIS — I10 HYPERTENSION, GOAL BELOW 140/90: ICD-10-CM

## 2020-12-10 LAB
ANION GAP SERPL CALCULATED.3IONS-SCNC: 4 MMOL/L (ref 3–14)
BUN SERPL-MCNC: 17 MG/DL (ref 7–30)
CALCIUM SERPL-MCNC: 11 MG/DL (ref 8.5–10.1)
CHLORIDE SERPL-SCNC: 107 MMOL/L (ref 94–109)
CHOLEST SERPL-MCNC: 218 MG/DL
CO2 SERPL-SCNC: 28 MMOL/L (ref 20–32)
CREAT SERPL-MCNC: 0.7 MG/DL (ref 0.52–1.04)
CREAT UR-MCNC: 301 MG/DL
GFR SERPL CREATININE-BSD FRML MDRD: 80 ML/MIN/{1.73_M2}
GLUCOSE SERPL-MCNC: 101 MG/DL (ref 70–99)
HDLC SERPL-MCNC: 66 MG/DL
LDLC SERPL CALC-MCNC: 114 MG/DL
MICROALBUMIN UR-MCNC: 34 MG/L
MICROALBUMIN/CREAT UR: 11.33 MG/G CR (ref 0–25)
NONHDLC SERPL-MCNC: 152 MG/DL
POTASSIUM SERPL-SCNC: 3.9 MMOL/L (ref 3.4–5.3)
SODIUM SERPL-SCNC: 139 MMOL/L (ref 133–144)
TRIGL SERPL-MCNC: 191 MG/DL

## 2020-12-10 PROCEDURE — 80048 BASIC METABOLIC PNL TOTAL CA: CPT | Performed by: FAMILY MEDICINE

## 2020-12-10 PROCEDURE — 36415 COLL VENOUS BLD VENIPUNCTURE: CPT | Performed by: FAMILY MEDICINE

## 2020-12-10 PROCEDURE — 82043 UR ALBUMIN QUANTITATIVE: CPT | Performed by: FAMILY MEDICINE

## 2020-12-10 PROCEDURE — 80061 LIPID PANEL: CPT | Performed by: FAMILY MEDICINE

## 2020-12-12 ASSESSMENT — ENCOUNTER SYMPTOMS
SORE THROAT: 0
NERVOUS/ANXIOUS: 0
ABDOMINAL PAIN: 0
COUGH: 0
FREQUENCY: 0
HEMATOCHEZIA: 0
JOINT SWELLING: 1
ARTHRALGIAS: 1
BREAST MASS: 0
SHORTNESS OF BREATH: 0
MYALGIAS: 0
HEADACHES: 0
HEARTBURN: 0
HEMATURIA: 0
DIARRHEA: 0
FEVER: 0
PALPITATIONS: 0
DIZZINESS: 0
WEAKNESS: 0
PARESTHESIAS: 0
NAUSEA: 0
EYE PAIN: 0
CHILLS: 0
DYSURIA: 0
CONSTIPATION: 0

## 2020-12-12 ASSESSMENT — ACTIVITIES OF DAILY LIVING (ADL): CURRENT_FUNCTION: NO ASSISTANCE NEEDED

## 2020-12-12 NOTE — RESULT ENCOUNTER NOTE
Dear Brenda    Your test results are attached.     The blood sugar is normal and you do not have diabetes. The kidneys are healthy.     The calcium level is a bit high, so I am checking a parathyroid hormone level to make sure there is not a problem with this level. This can cause high calcium levels. Everything else looks good. Most of the time, when we repeat the test, it comes back normal again. We can discuss this at your appointment.     Please contact me by [employer] if you have any questions about your labs or management. You may also call my office number [phone] for any questions.     Liudmila Bowie MD

## 2020-12-13 ENCOUNTER — MYC MEDICAL ADVICE (OUTPATIENT)
Dept: FAMILY MEDICINE | Facility: CLINIC | Age: 85
End: 2020-12-13

## 2020-12-14 ENCOUNTER — OFFICE VISIT (OUTPATIENT)
Dept: FAMILY MEDICINE | Facility: CLINIC | Age: 85
End: 2020-12-14
Payer: MEDICARE

## 2020-12-14 VITALS
HEART RATE: 70 BPM | DIASTOLIC BLOOD PRESSURE: 78 MMHG | RESPIRATION RATE: 20 BRPM | WEIGHT: 171 LBS | SYSTOLIC BLOOD PRESSURE: 138 MMHG | OXYGEN SATURATION: 97 % | TEMPERATURE: 97.9 F | BODY MASS INDEX: 31.47 KG/M2 | HEIGHT: 62 IN

## 2020-12-14 DIAGNOSIS — Z00.00 MEDICARE ANNUAL WELLNESS VISIT, SUBSEQUENT: Primary | ICD-10-CM

## 2020-12-14 DIAGNOSIS — I10 HYPERTENSION, GOAL BELOW 140/90: ICD-10-CM

## 2020-12-14 DIAGNOSIS — E83.52 HYPERCALCEMIA: ICD-10-CM

## 2020-12-14 DIAGNOSIS — D33.3 ACOUSTIC NEUROMA (H): ICD-10-CM

## 2020-12-14 DIAGNOSIS — M05.79 RHEUMATOID ARTHRITIS INVOLVING MULTIPLE SITES WITH POSITIVE RHEUMATOID FACTOR (H): ICD-10-CM

## 2020-12-14 DIAGNOSIS — D50.8 OTHER IRON DEFICIENCY ANEMIA: ICD-10-CM

## 2020-12-14 DIAGNOSIS — E78.5 HYPERLIPIDEMIA LDL GOAL <130: ICD-10-CM

## 2020-12-14 LAB
CALCIUM SERPL-MCNC: 11.1 MG/DL (ref 8.5–10.1)
ERYTHROCYTE [DISTWIDTH] IN BLOOD BY AUTOMATED COUNT: 13.8 % (ref 10–15)
FERRITIN SERPL-MCNC: 82 NG/ML (ref 8–252)
HCT VFR BLD AUTO: 38.4 % (ref 35–47)
HGB BLD-MCNC: 12.2 G/DL (ref 11.7–15.7)
MCH RBC QN AUTO: 30.3 PG (ref 26.5–33)
MCHC RBC AUTO-ENTMCNC: 31.8 G/DL (ref 31.5–36.5)
MCV RBC AUTO: 96 FL (ref 78–100)
PLATELET # BLD AUTO: 269 10E9/L (ref 150–450)
PTH-INTACT SERPL-MCNC: 95 PG/ML (ref 18–80)
RBC # BLD AUTO: 4.02 10E12/L (ref 3.8–5.2)
WBC # BLD AUTO: 8.1 10E9/L (ref 4–11)

## 2020-12-14 PROCEDURE — 82728 ASSAY OF FERRITIN: CPT | Performed by: FAMILY MEDICINE

## 2020-12-14 PROCEDURE — 82310 ASSAY OF CALCIUM: CPT | Performed by: FAMILY MEDICINE

## 2020-12-14 PROCEDURE — 82306 VITAMIN D 25 HYDROXY: CPT | Performed by: FAMILY MEDICINE

## 2020-12-14 PROCEDURE — G0439 PPPS, SUBSEQ VISIT: HCPCS | Performed by: FAMILY MEDICINE

## 2020-12-14 PROCEDURE — 85027 COMPLETE CBC AUTOMATED: CPT | Performed by: FAMILY MEDICINE

## 2020-12-14 PROCEDURE — 36415 COLL VENOUS BLD VENIPUNCTURE: CPT | Performed by: FAMILY MEDICINE

## 2020-12-14 PROCEDURE — 83970 ASSAY OF PARATHORMONE: CPT | Performed by: FAMILY MEDICINE

## 2020-12-14 RX ORDER — FERROUS GLUCONATE 324(38)MG
TABLET ORAL
Qty: 100 TABLET | Refills: 0 | Status: SHIPPED | OUTPATIENT
Start: 2020-12-14 | End: 2021-09-29

## 2020-12-14 RX ORDER — ACYCLOVIR 400 MG/1
TABLET ORAL
Qty: 90 TABLET | Refills: 1 | Status: CANCELLED | OUTPATIENT
Start: 2020-12-14

## 2020-12-14 RX ORDER — HYDROCHLOROTHIAZIDE 12.5 MG/1
CAPSULE ORAL
Qty: 90 CAPSULE | Refills: 3 | Status: SHIPPED | OUTPATIENT
Start: 2020-12-14 | End: 2021-12-03

## 2020-12-14 ASSESSMENT — ENCOUNTER SYMPTOMS
CONSTIPATION: 0
JOINT SWELLING: 1
PALPITATIONS: 0
COUGH: 0
HEARTBURN: 0
NERVOUS/ANXIOUS: 0
WEAKNESS: 0
SHORTNESS OF BREATH: 0
BREAST MASS: 0
FREQUENCY: 0
HEMATURIA: 0
ARTHRALGIAS: 1
DIZZINESS: 0
NAUSEA: 0
DIARRHEA: 0
FEVER: 0
DYSURIA: 0
HEMATOCHEZIA: 0
ABDOMINAL PAIN: 0
PARESTHESIAS: 0
EYE PAIN: 0
CHILLS: 0
MYALGIAS: 0
SORE THROAT: 0
HEADACHES: 0

## 2020-12-14 ASSESSMENT — ACTIVITIES OF DAILY LIVING (ADL): CURRENT_FUNCTION: NO ASSISTANCE NEEDED

## 2020-12-14 ASSESSMENT — MIFFLIN-ST. JEOR: SCORE: 1183.9

## 2020-12-14 ASSESSMENT — PAIN SCALES - GENERAL: PAINLEVEL: NO PAIN (0)

## 2020-12-14 NOTE — TELEPHONE ENCOUNTER
See MyC messages below.  Message sent to patient this morning to check on current status and if having any symptoms. May need further triage and/or evaluation. Patient has OV scheduled today for wellness exam, however, if having illness symptoms, visit today may need to be rescheduled until feeling better or symptoms have resolved. If COVID-like symptoms, may need to consider changing apt to virtual today, to have provider evaluate and place orders for testing.  Will wait to hear back from patient before moving forward with additional recommendations.    Caro Carrington RN  [facility]

## 2020-12-14 NOTE — TELEPHONE ENCOUNTER
Noted. Agreed. MyC message sent to patient advising okay to proceed with scheduled visit today.    Caro Carrington RN  [facility]

## 2020-12-14 NOTE — TELEPHONE ENCOUNTER
OK for visit today. She should have the usual screening questions and if these are positive, would be appropriate for immediate rooming like anyone else.  Liudmila Bowie MD

## 2020-12-14 NOTE — PROGRESS NOTES
"SUBJECTIVE:   Brenda Barker is a 85 year old female who presents for Preventive Visit.        Patient has been advised of split billing requirements and indicates understanding: Yes     Are you in the first 12 months of your Medicare coverage?  No    Healthy Habits:     In general, how would you rate your overall health?  Good    Frequency of exercise:  1 day/week    Duration of exercise:  15-30 minutes    Do you usually eat at least 4 servings of fruit and vegetables a day, include whole grains    & fiber and avoid regularly eating high fat or \"junk\" foods?  No    Taking medications regularly:  Yes    Medication side effects:  Not applicable    Ability to successfully perform activities of daily living:  No assistance needed    Home Safety:  No safety concerns identified    Hearing Impairment:  Difficulty following a conversation in a noisy restaurant or crowded room    In the past 6 months, have you been bothered by leaking of urine? Yes    In general, how would you rate your overall mental or emotional health?  Good      PHQ-2 Total Score: 0    Additional concerns today:  Yes    Do you feel safe in your environment? Yes    Have you ever done Advance Care Planning? (For example, a Health Directive, POLST, or a discussion with a medical provider or your loved ones about your wishes): Yes, patient states has an Advance Care Planning document and will bring a copy to the clinic.         Fall risk  Fallen 2 or more times in the past year?: No  Any fall with injury in the past year?: No      Cognitive Screening   1) Repeat 3 items (Leader, Season, Table)    2) Clock draw: NORMAL  3) 3 item recall: Recalls 3 objects  Results: 3 items recalled: COGNITIVE IMPAIRMENT LESS LIKELY    Mini-CogTM Copyright ERYN Gonzalez. Licensed by the author for use in [facility]; reprinted with permission (santosh@.[facility]). All rights reserved.      Do you have sleep apnea, excessive snoring or daytime drowsiness?: no    Reviewed and " updated as needed this visit by clinical staff  Tobacco  Allergies  Meds   Med Hx  Surg Hx  Fam Hx  Soc Hx        Reviewed and updated as needed this visit by Provider                Social History     Tobacco Use     Smoking status: Never Smoker     Smokeless tobacco: Never Used   Substance Use Topics     Alcohol use: Yes     Comment: occasionally       Alcohol Use 12/12/2020   Prescreen: >3 drinks/day or >7 drinks/week? No   Prescreen: >3 drinks/day or >7 drinks/week? -         Hyperlipidemia Follow-Up      Are you regularly taking any medication or supplement to lower your cholesterol?   Yes- simvastatin    Are you having muscle aches or other side effects that you think could be caused by your cholesterol lowering medication?  No    Hypertension Follow-up      Do you check your blood pressure regularly outside of the clinic? Yes     Are you following a low salt diet? Yes    Are your blood pressures ever more than 140 on the top number (systolic) OR more   than 90 on the bottom number (diastolic), for example 140/90? No    Chronic Pain Follow-Up    Where in your body do you have pain? Multiple joints mostly controlled with medications for rheumatoid arthritis   How has your pain affected your ability to work? Not applicable  Which of these pain treatments have you tried since your last clinic visit? Rest  How well are you sleeping? Good  How has your mood been since your last visit? About the same  Have you had a significant life event? No  Other aggravating factors: none  Taking medication as directed? Yes    PHQ-9 SCORE 9/9/2016 12/10/2018 10/1/2019   PHQ-9 Total Score 0 0 0     LORNE-7 SCORE 5/11/2017 8/11/2017 10/1/2019   Total Score 0 0 0     No flowsheet data found.  Encounter-Level CSA:    There are no encounter-level csa.     Patient-Level CSA:    There are no patient-level csa.         Current providers sharing in care for this patient include:   Patient Care Team:  Liudmila Bowie MD as PCP -  General (Family Practice)  Liudmila Bowie MD as Assigned PCP  Enio Ferrell MD as Assigned Surgical Provider    The following health maintenance items are reviewed in Epic and correct as of today:  Health Maintenance   Topic Date Due     FALL RISK ASSESSMENT  10/01/2020     MEDICARE ANNUAL WELLNESS VISIT  12/14/2021     ADVANCE CARE PLANNING  03/23/2023     DTAP/TDAP/TD IMMUNIZATION (4 - Td) 12/17/2029     DEXA  05/05/2031     PHQ-2  Completed     INFLUENZA VACCINE  Completed     Pneumococcal Vaccine: 65+ Years  Completed     ZOSTER IMMUNIZATION  Completed     Pneumococcal Vaccine: Pediatrics (0 to 5 Years) and At-Risk Patients (6 to 64 Years)  Aged Out     IPV IMMUNIZATION  Aged Out     MENINGITIS IMMUNIZATION  Aged Out     HEPATITIS B IMMUNIZATION  Aged Out     Lab work is in process  Labs reviewed in EPIC  BP Readings from Last 3 Encounters:   12/14/20 138/78   10/28/20 (!) 170/82   08/24/20 (!) 150/70    Wt Readings from Last 3 Encounters:   12/14/20 77.6 kg (171 lb)   08/24/20 77.5 kg (170 lb 12.8 oz)   12/17/19 78.8 kg (173 lb 12.8 oz)                  Patient Active Problem List   Diagnosis     Hemorrhoids     Aortic valve disorder     Cervicalgia     Rheumatoid arthritis (H)     SNHL (sensorineural hearing loss)     Status post total left knee replacement     Obesity     High risk medication use     Gastroesophageal reflux disease with esophagitis     Hypertension, goal below 140/90     Hyperlipidemia LDL goal <130     Rapid heart rate     Resting tremor     ACP (advance care planning)     Status post total replacement of left hip     Anemia in other chronic diseases classified elsewhere     Acoustic neuroma (H)     Unspecified blepharoconjunctivitis, bilateral     Diplopia     Epiretinal membrane     Esotropia     Past Surgical History:   Procedure Laterality Date     ARTHROPLASTY HIP Left 6/27/2018    Procedure: ARTHROPLASTY HIP;  Left Total Hip Arthroplasty  ;  Surgeon: Keo Priest MD;   Location: [facility] OR     BACK SURGERY  2011    Fusion L3-4 & decompression; [facility]     BIOPSY  2013    Thyroid - normal result     C DEXA, BONE DENSITY, AXIAL SKEL  7/03 7/03 L1-4 1.7, FemNkL-0.8,R-1.2, FArm Px 0.2,Dist-0.3     C REMOVAL OF OVARY(S)  1980    2nd ovary removed - endometriosis     C TOTAL ABDOM HYSTERECTOMY  1972    uterus, ovary removed - fibroid     EYE SURGERY  2010 & 11    Cataract surgery     HC COLONOSCOPY THRU STOMA W BIOPSY/CAUTERY TUMOR/POLYP/LESION  1993    adenomatous polyp     HC COLONOSCOPY THRU STOMA, DIAGNOSTIC  1998, 10/03    normal, '03 rec repeat in 5 yrs due to +hx polyp and + fam hx     HC FLEX SIGMOIDOSCOPY W/WO KARYNA SPEC BY BRUSH/WASH  12/2001     HC REMOVAL OF TONSILS,<13 Y/O  1942     ORTHOPEDIC SURGERY  2008    Left knee replacement     SURGICAL HISTORY OF -   1989    basal cell Ca scalp excised     SURGICAL HISTORY OF -   1998    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       Social History     Tobacco Use     Smoking status: Never Smoker     Smokeless tobacco: Never Used   Substance Use Topics     Alcohol use: Yes     Comment: occasionally     Family History   Problem Relation Age of Onset     Gastrointestinal Disease Father         bleeding ulcer     Cancer Mother         thyroid cancer     Other Cancer Mother         thyroid & throat     Osteoporosis Mother      Thyroid Disease Mother      Diabetes Paternal Grandmother      Cerebrovascular Disease Paternal Grandmother      Colon Cancer Maternal Half-Brother      Osteoporosis Maternal Grandmother          Current Outpatient Medications   Medication Sig Dispense Refill     Acetaminophen (TYLENOL PO) Take 650 mg by mouth every 6 hours as needed for mild pain or fever       acyclovir (ZOVIRAX) 400 MG tablet TAKE 1 TABLET(400 MG) BY MOUTH DAILY (Patient taking differently: as needed ) 90 tablet 1     amoxicillin (AMOXIL) 500 MG capsule TAKE 4 CAPSULES(2000 MG) BY MOUTH 30 TO 60 MINUTES BEFORE DENTAL  PROCEDURE 4 capsule 0     CENTRUM SILVER OR TABS 1 TABLET DAILY       Cetirizine HCl (ZYRTEC ALLERGY PO) Take 1 tablet by mouth daily       Cholecalciferol (VITAMIN D-3) 1000 UNITS CAPS Take 2,000 Int'l Units by mouth daily        ferrous gluconate (FERGON) 324 (38 Fe) MG tablet TAKE 1 TABLET BY MOUTH EVERY OTHER  tablet 0     FISH OIL 1000 MG OR CAPS 2 CAPSULES DAILY  (? DOSE) OTC --     FOLIC ACID 1 MG OR TABS 4 mg TABS DAILY       hydrochlorothiazide (MICROZIDE) 12.5 MG capsule TAKE 1 CAPSULE(12.5 MG) BY MOUTH DAILY 90 capsule 3     methotrexate 2.5 MG tablet CHEMO Take 6 tablets (15 mg) by mouth every 7 days       metoprolol succinate ER (TOPROL-XL) 50 MG 24 hr tablet TAKE 1 TABLET(50 MG) BY MOUTH DAILY 90 tablet 1     nitroFURantoin macrocrystal (MACRODANTIN) 100 MG capsule TAKE 1 CAPSULE BY MOUTH DAILY 90 capsule 0     omeprazole (PRILOSEC) 20 MG DR capsule TAKE 1 CAPSULE(20 MG) BY MOUTH DAILY 90 capsule 2     polyvinyl alcohol (LIQUID TEARS) 1.4 % ophthalmic solution 1 drop as needed.       simvastatin (ZOCOR) 20 MG tablet TAKE 1 TABLET(20 MG) BY MOUTH AT BEDTIME 90 tablet 3     Allergies   Allergen Reactions     Diatrizoate Hives and Shortness Of Breath     Ciprofloxacin      nausea     Contrast Dye      Hives,anaphylaxix     Oxycodone Other (See Comments)     hallucinations     Sulfasalazine      Other reaction(s): Rash     Recent Labs   Lab Test 12/10/20  1101 09/25/19  0903 12/10/18  1012 06/11/18  0000 06/11/18   * 121* 112*  --   --    HDL 66 56 76  --   --    TRIG 191* 155* 193*  --   --    ALT  --  31  --   --   --    CR 0.70 0.63  --    < >  --    GFRESTIMATED 80 84  --    < >  --    GFRESTBLACK >90 >90  --    < >  --    POTASSIUM 3.9 3.8  --    < >  --    TSH  --  1.08  --   --  1.05    < > = values in this interval not displayed.      Pneumonia Vaccine:Adults age 65+ who received Pneumovax (PPSV23) at 65 years or older: Should be given PCV13 > 1 year after their most recent  "PPSV23  Mammogram Screening: Mammo discussed, not appropriate for or declined by this patient.    Review of Systems   Constitutional: Negative for chills and fever.   HENT: Positive for hearing loss. Negative for congestion, ear pain and sore throat.    Eyes: Negative for pain and visual disturbance.   Respiratory: Negative for cough and shortness of breath.    Cardiovascular: Negative for chest pain, palpitations and peripheral edema.   Gastrointestinal: Negative for abdominal pain, constipation, diarrhea, heartburn, hematochezia and nausea.   Breasts:  Negative for tenderness, breast mass and discharge.   Genitourinary: Negative for dysuria, frequency, genital sores, hematuria, pelvic pain, urgency, vaginal bleeding and vaginal discharge.   Musculoskeletal: Positive for arthralgias and joint swelling. Negative for myalgias.   Skin: Negative for rash.   Neurological: Negative for dizziness, weakness, headaches and paresthesias.   Psychiatric/Behavioral: Negative for mood changes. The patient is not nervous/anxious.      Constitutional, HEENT, cardiovascular, pulmonary, gi and gu systems are negative, except as otherwise noted.    OBJECTIVE:   /78 (BP Location: Left arm, Patient Position: Chair, Cuff Size: Adult Large)   Pulse 70   Temp 97.9  F (36.6  C) (Oral)   Resp 20   Ht 1.575 m (5' 2\")   Wt 77.6 kg (171 lb)   LMP 05/17/1972 (Exact Date)   SpO2 97%   Breastfeeding No   BMI 31.28 kg/m   Estimated body mass index is 31.28 kg/m  as calculated from the following:    Height as of this encounter: 1.575 m (5' 2\").    Weight as of this encounter: 77.6 kg (171 lb).  Physical Exam  GENERAL: healthy, alert, well nourished, well hydrated, no distress, obese  HENT: ear canals- normal; TMs- normal; Nose- normal; Mouth- no ulcers, no lesions, throat is clear with no erythema or exudate.   NECK: no tenderness, no adenopathy, no asymmetry, no masses, no stiffness; thyroid- normal to palpation  RESP: lungs clear " to auscultation - no rales, no rhonchi, no wheezes  CV: regular rates and rhythm, normal S1 S2, no S3 or S4 and no murmur, no click or rub -  ABDOMEN: soft, no tenderness, no  hepatosplenomegaly, no masses, normal bowel sounds  MS: extremities- no gross deformities noted, no edema  SKIN: no suspicious lesions, no rashes  NEURO: strength and tone- normal, sensory exam- grossly normal, mentation- intact, speech- normal, reflexes- symmetric  BACK: no CVA tenderness, no paralumbar tenderness  PSYCH: Alert and oriented times 3; speech- coherent , normal rate and volume; able to articulate logical thoughts, able to abstract reason, no tangential thoughts, no hallucinations or delusions, affect- normal     Diagnostic Test Results:  Labs reviewed in Epic  Results for orders placed or performed in visit on 12/14/20 (from the past 24 hour(s))   CBC with platelets   Result Value Ref Range    WBC 8.1 4.0 - 11.0 10e9/L    RBC Count 4.02 3.8 - 5.2 10e12/L    Hemoglobin 12.2 11.7 - 15.7 g/dL    Hematocrit 38.4 35.0 - 47.0 %    MCV 96 78 - 100 fl    MCH 30.3 26.5 - 33.0 pg    MCHC 31.8 31.5 - 36.5 g/dL    RDW 13.8 10.0 - 15.0 %    Platelet Count 269 150 - 450 10e9/L       ASSESSMENT / PLAN:       ICD-10-CM    1. Medicare annual wellness visit, subsequent  Z00.00    2. Other iron deficiency anemia - iron cut back to every other day, recheck D50.8 ferrous gluconate (FERGON) 324 (38 Fe) MG tablet     CBC with platelets     Ferritin   3. Hypertension, goal below 140/90 - well controlled on medications  I10 hydrochlorothiazide (MICROZIDE) 12.5 MG capsule   4. Hyperlipidemia LDL goal <130  E78.5 Stable on medication    5. Rheumatoid arthritis involving multiple sites with positive rheumatoid factor (H)  M05.79 Managed by rheumatologist and is on medications    6. Acoustic neuroma (H)  D33.3 No recent issues   7. Hypercalcemia - elevated calcium and will follow up with blood levels.  E83.52 Calcium     Parathyroid Hormone Intact  "      Patient has been advised of split billing requirements and indicates understanding: No  COUNSELING:  Reviewed preventive health counseling, as reflected in patient instructions       Regular exercise       Healthy diet/nutrition       Hearing screening       Dental care       Osteoporosis Prevention/Bone Health    Estimated body mass index is 31.28 kg/m  as calculated from the following:    Height as of this encounter: 1.575 m (5' 2\").    Weight as of this encounter: 77.6 kg (171 lb).    Weight management plan: Discussed healthy diet and exercise guidelines    She reports that she has never smoked. She has never used smokeless tobacco.      Appropriate preventive services were discussed with this patient, including applicable screening as appropriate for cardiovascular disease, diabetes, osteopenia/osteoporosis, and glaucoma.  As appropriate for age/gender, discussed screening for colorectal cancer, prostate cancer, breast cancer, and cervical cancer. Checklist reviewing preventive services available has been given to the patient.    Reviewed patients plan of care and provided an AVS. The Basic Care Plan (routine screening as documented in Health Maintenance) for Brenda meets the Care Plan requirement. This Care Plan has been established and reviewed with the Patient.    Counseling Resources:  ATP IV Guidelines  Pooled Cohorts Equation Calculator  Breast Cancer Risk Calculator  Breast Cancer: Medication to Reduce Risk  FRAX Risk Assessment  ICSI Preventive Guidelines  Dietary Guidelines for Americans, 2010  USDA's MyPlate  ASA Prophylaxis  Lung CA Screening    Liudmila Bowie MD  [facility]    Identified Health Risks:  "

## 2020-12-15 NOTE — RESULT ENCOUNTER NOTE
Dear Brenda    Your test results are attached. I am happy to let you know that they are stable.    The calcium level is still high and your parathyroid hormone level is high also. I am now checking a vitamin D level and will let you know how this turns out. The hemoglobin is normal and your iron level is normal also.     Please contact me by [email or web address] if you have any questions about your labs or management. You may also call my office number [phone] for any questions.     Liudmila Bowie MD

## 2020-12-17 LAB — DEPRECATED CALCIDIOL+CALCIFEROL SERPL-MC: 104 UG/L (ref 20–75)

## 2020-12-20 NOTE — RESULT ENCOUNTER NOTE
Dear Brenda    Your test results are attached. I am happy to let you know that they are stable.    Your vitamin D level is a little too high. You should cut back on your supplements. Please do not take any for the next 2 weeks, then cut this in half. We can recheck the levels later this spring. Do not take any extra calcium supplements for a while either.     Please contact me by [email or web address] if you have any questions about your labs or management. You may also call my office number [phone] for any questions.     Liudmila Bowie MD

## 2021-01-03 ENCOUNTER — MYC MEDICAL ADVICE (OUTPATIENT)
Dept: FAMILY MEDICINE | Facility: CLINIC | Age: 86
End: 2021-01-03

## 2021-01-03 DIAGNOSIS — Z87.440 H/O RECURRENT URINARY TRACT INFECTION: ICD-10-CM

## 2021-01-04 ENCOUNTER — DOCUMENTATION ONLY (OUTPATIENT)
Dept: OTHER | Facility: CLINIC | Age: 86
End: 2021-01-04

## 2021-01-04 PROBLEM — Z71.89 ACP (ADVANCE CARE PLANNING): Status: RESOLVED | Noted: 2018-03-23 | Resolved: 2021-01-04

## 2021-01-04 RX ORDER — NITROFURANTOIN MACROCRYSTAL 100 MG
CAPSULE ORAL
Qty: 90 CAPSULE | Refills: 0 | Status: SHIPPED | OUTPATIENT
Start: 2021-01-04 | End: 2021-04-02

## 2021-01-04 RX ORDER — NITROFURANTOIN MACROCRYSTAL 100 MG
CAPSULE ORAL
Qty: 90 CAPSULE | Refills: 0 | OUTPATIENT
Start: 2021-01-04

## 2021-01-05 ENCOUNTER — MYC MEDICAL ADVICE (OUTPATIENT)
Dept: FAMILY MEDICINE | Facility: CLINIC | Age: 86
End: 2021-01-05

## 2021-01-23 ENCOUNTER — MYC MEDICAL ADVICE (OUTPATIENT)
Dept: FAMILY MEDICINE | Facility: CLINIC | Age: 86
End: 2021-01-23

## 2021-01-27 DIAGNOSIS — Z96.642 H/O TOTAL HIP ARTHROPLASTY, LEFT: ICD-10-CM

## 2021-01-28 RX ORDER — AMOXICILLIN 500 MG/1
CAPSULE ORAL
Qty: 4 CAPSULE | Refills: 0 | Status: SHIPPED | OUTPATIENT
Start: 2021-01-28 | End: 2021-06-28

## 2021-03-10 ENCOUNTER — IMMUNIZATION (OUTPATIENT)
Dept: PEDIATRICS | Facility: CLINIC | Age: 86
End: 2021-03-10
Payer: MEDICARE

## 2021-03-10 PROCEDURE — 0001A PR COVID VAC PFIZER DIL RECON 30 MCG/0.3 ML IM: CPT

## 2021-03-10 PROCEDURE — 91300 PR COVID VAC PFIZER DIL RECON 30 MCG/0.3 ML IM: CPT

## 2021-03-15 ENCOUNTER — VIRTUAL VISIT (OUTPATIENT)
Dept: FAMILY MEDICINE | Facility: CLINIC | Age: 86
End: 2021-03-15
Payer: MEDICARE

## 2021-03-15 VITALS — HEIGHT: 62 IN | BODY MASS INDEX: 31.1 KG/M2 | WEIGHT: 169 LBS

## 2021-03-15 DIAGNOSIS — R91.8 LEFT PULMONARY INFILTRATE ON CXR: ICD-10-CM

## 2021-03-15 DIAGNOSIS — H90.3 ASYMMETRICAL SENSORINEURAL HEARING LOSS: ICD-10-CM

## 2021-03-15 DIAGNOSIS — M05.79 RHEUMATOID ARTHRITIS INVOLVING MULTIPLE SITES WITH POSITIVE RHEUMATOID FACTOR (H): ICD-10-CM

## 2021-03-15 DIAGNOSIS — E78.5 HYPERLIPIDEMIA LDL GOAL <130: ICD-10-CM

## 2021-03-15 DIAGNOSIS — R93.89 ABNORMAL CXR: Primary | ICD-10-CM

## 2021-03-15 DIAGNOSIS — Z97.4 PRESENCE OF EXTERNAL HEARING-AID: ICD-10-CM

## 2021-03-15 DIAGNOSIS — I10 HYPERTENSION, GOAL BELOW 140/90: ICD-10-CM

## 2021-03-15 DIAGNOSIS — D33.3 ACOUSTIC NEUROMA (H): ICD-10-CM

## 2021-03-15 PROCEDURE — 99214 OFFICE O/P EST MOD 30 MIN: CPT | Mod: 95 | Performed by: FAMILY MEDICINE

## 2021-03-15 ASSESSMENT — MIFFLIN-ST. JEOR: SCORE: 1174.83

## 2021-03-15 NOTE — PROGRESS NOTES
Brenda is a 85 year old who is being evaluated via a billable video visit.      How would you like to obtain your AVS? MyChart  If the video visit is dropped, the invitation should be resent by: Send to e-mail at: [email or web address]  Will anyone else be joining your video visit? No    Video Start Time: 1414    Assessment & Plan     Abnormal CXR  Discussed possible follow up options. I am not able to retrieve the chest xray results via [facility] and recommend a follow up chest xray for now. We can make further plans depending on the results. This will involve less radiation than a CT scan at this time. May need a follow up CT, pulmonary function test and referral, which can be done later. Findings may be related to rheumatoid arthritis also.   - XR Chest 2 Views; Future    Rheumatoid arthritis involving multiple sites with positive rheumatoid factor (H)  Stable and has follow up with her rheumatologist. On methotrexate for this    Acoustic neuroma (H)  History of acoustic neuroma with hearing loss. Due for follow up hearing test.  - AUDIOLOGY ADULT REFERRAL; Future    Asymmetrical sensorineural hearing loss  recheck  - AUDIOLOGY ADULT REFERRAL; Future    Presence of external hearing-aid  Would like her hearing aids checked.     Hypertension, goal below 140/90  Well controlled on medications. Labs done 3 months ago stable     Hyperlipidemia LDL goal <130  Well controlled on medications                FURTHER TESTING:       - chest xray and hearing test  CONSULTATION/REFERRAL to audiology with ENT referral if recommended  FUTURE LABS:       - Schedule non-fasting labs in 3 months  FUTURE APPOINTMENTS:       - Follow-up for annual visit or as needed  Regular exercise  See Patient Instructions    No follow-ups on file.    Liudmila Bowie MD  [facility]    Antonia Gutierrez is a 85 year old who presents for the following health issues     HPI     Fell and cut on her head in [place] this  winter. CXR showed interstitial lung disease. Given prescription for doxycycline. She did not follow up with pulmonary or get a follow up chest xray/CT scan as recommended. Not symptomatic. Requesting follow up recommendations.     Rheumatoid arthritis managed by rheumatology and is doing well with this on medications.     Long history of asymmetrical hearing loss and would like a follow up hearing test. Uses hearing aids.     Hyperlipidemia Follow-Up      Are you regularly taking any medication or supplement to lower your cholesterol?   Yes- simvastatin    Are you having muscle aches or other side effects that you think could be caused by your cholesterol lowering medication?  No    Hypertension Follow-up      Do you check your blood pressure regularly outside of the clinic? Yes     Are you following a low salt diet? Yes    Are your blood pressures ever more than 140 on the top number (systolic) OR more   than 90 on the bottom number (diastolic), for example 140/90? No          Review of Systems   Constitutional, HEENT, cardiovascular, pulmonary, gi and gu systems are negative, except as otherwise noted.      Objective           Vitals:  No vitals were obtained today due to virtual visit.    Physical Exam   GENERAL: Healthy, alert and no distress  EYES: Eyes grossly normal to inspection.  No discharge or erythema, or obvious scleral/conjunctival abnormalities.  RESP: No audible wheeze, cough, or visible cyanosis.  No visible retractions or increased work of breathing.    SKIN: Visible skin clear. No significant rash, abnormal pigmentation or lesions.  NEURO: Cranial nerves grossly intact.  Mentation and speech appropriate for age.  PSYCH: Mentation appears normal, affect normal/bright, judgement and insight intact, normal speech and appearance well-groomed.    Office Visit on 12/14/2020   Component Date Value Ref Range Status     Calcium 12/14/2020 11.1* 8.5 - 10.1 mg/dL Final     WBC 12/14/2020 8.1  4.0 - 11.0  10e9/L Final     RBC Count 12/14/2020 4.02  3.8 - 5.2 10e12/L Final     Hemoglobin 12/14/2020 12.2  11.7 - 15.7 g/dL Final     Hematocrit 12/14/2020 38.4  35.0 - 47.0 % Final     MCV 12/14/2020 96  78 - 100 fl Final     MCH 12/14/2020 30.3  26.5 - 33.0 pg Final     MCHC 12/14/2020 31.8  31.5 - 36.5 g/dL Final     RDW 12/14/2020 13.8  10.0 - 15.0 % Final     Platelet Count 12/14/2020 269  150 - 450 10e9/L Final     Ferritin 12/14/2020 82  8 - 252 ng/mL Final     Parathyroid Hormone Intact 12/14/2020 95* 18 - 80 pg/mL Final     Vitamin D Deficiency screening 12/14/2020 104* 20 - 75 ug/L Final    Comment: Season, race, dietary intake, and treatment affect the concentration of   25-hydroxy-Vitamin D. Values may decrease during winter months and increase   during summer months. Values 20-29 ug/L may indicate Vitamin D insufficiency   and values <20 ug/L may indicate Vitamin D deficiency.  Vitamin D determination is routinely performed by an immunoassay specific for   25 hydroxyvitamin D3.  If an individual is on vitamin D2 (ergocalciferol)   supplementation, please specify 25 OH vitamin D2 and D3 level determination by   LCMSMS test VITD23.                   Video-Visit Details    Type of service:  Video Visit    Video End Time:1428    Originating Location (pt. Location): Home    Distant Location (provider location):  [facility]     Platform used for Video Visit: [email or web address]

## 2021-03-16 NOTE — PATIENT INSTRUCTIONS
Patient Education     Fall Prevention  Falls often occur due to slipping, tripping or losing your balance. Millions of people fall every year and injure themselves. Here are ways to reduce your risk of falling again.     Think about your fall, was there anything that caused your fall that can be fixed, removed, or replaced?    Make your home safe by keeping walkways clear of objects you may trip over, such as electric cords.    Use non-slip pads under rugs. Don't use area rugs or small throw rugs.    Use non-slip mats in bathtubs and showers.    Install handrails and lights on staircases. The handrails should be on both sides of the stairs.    Don't walk in poorly lit areas.    Don't stand on chairs or wobbly ladders.    Use caution when reaching overhead or looking upward. This position can cause a loss of balance.    Be sure your shoes fit properly, have non-slip bottoms and are in good condition.     Wear shoes both inside and out. Don't go barefoot or wear slippers.    Be cautious when going up and down stairs, curbs, and when walking on uneven sidewalks.    If your balance is poor, consider using a cane or walker.    If your fall was related to alcohol use, stop or limit alcohol intake.     If your fall was related to use of sleeping medicines, talk to your healthcare provider about this. You may need to reduce your dosage at bedtime if you awaken during the night to go to the bathroom.      To reduce the need for nighttime bathroom trips:  ? Don't drink fluids for several hours before going to bed  ? Empty your bladder before going to bed  ? Men can keep a urinal at the bedside    Stay as active as you can. Balance, flexibility, strength, and endurance all come from exercise. They all play a role in preventing falls. Ask your healthcare provider which types of activity are right for you.    Get your vision checked on a regular basis.    If you have pets, know where they are before you stand up or walk so you  don't trip over them.    Use night lights.    Go over all your medicines with a pharmacist or other healthcare provider to see if any of them could make you more likely to fall.  Olga last reviewed this educational content on 4/1/2018 2000-2020 [employer]. All rights reserved. This information is not intended as a substitute for professional medical care. Always follow your healthcare professional's instructions.

## 2021-03-23 ENCOUNTER — OFFICE VISIT (OUTPATIENT)
Dept: AUDIOLOGY | Facility: CLINIC | Age: 86
End: 2021-03-23
Payer: MEDICARE

## 2021-03-23 ENCOUNTER — ANCILLARY PROCEDURE (OUTPATIENT)
Dept: GENERAL RADIOLOGY | Facility: CLINIC | Age: 86
End: 2021-03-23
Attending: FAMILY MEDICINE
Payer: MEDICARE

## 2021-03-23 DIAGNOSIS — R93.89 ABNORMAL CXR: ICD-10-CM

## 2021-03-23 DIAGNOSIS — H90.3 SENSORINEURAL HEARING LOSS, BILATERAL: Primary | ICD-10-CM

## 2021-03-23 PROCEDURE — 71046 X-RAY EXAM CHEST 2 VIEWS: CPT | Performed by: RADIOLOGY

## 2021-03-23 PROCEDURE — 92550 TYMPANOMETRY & REFLEX THRESH: CPT | Performed by: AUDIOLOGIST

## 2021-03-23 PROCEDURE — 92557 COMPREHENSIVE HEARING TEST: CPT | Performed by: AUDIOLOGIST

## 2021-03-23 PROCEDURE — 99207 PR NO CHARGE LOS: CPT | Performed by: AUDIOLOGIST

## 2021-03-23 NOTE — PROGRESS NOTES
AUDIOLOGY REPORT    SUBJECTIVE:  Brenda Barker is a 85 year old female who was seen in the Audiology Clinic at the [facility] for audiologic evaluation, referred by Liudmila Bowie M.D. .The patient has been seen previously in this clinic on 8/24/17 for assessment and results indicated bilateral sensorineural hearing loss. The patient reports her hearing seems stable. The patient denies  bilateral otalgia, bilateral drainage and bilateral aural fullness.  The patient notes difficulty with communication in a variety of listening situations. She wears binaural Unitron ALYCE hearing aids which are  around 9 years old.  OBJECTIVE:    Otoscopic exam indicates ears are clear of cerumen bilaterally     Pure Tone Thresholds assessed using conventional audiometry with good  reliability from 250-8000 Hz bilaterally using insert earphones and circumaural headphones     RIGHT:  moderate from 250-1000 Hz sloping to moderate-severe sensorineural hearing loss from 9478-5872 Hz    LEFT:   moderate to severe from 250-2000 Hz sloping to severe sensorineural hearing loss from 2029-2067 Hz    Tympanogram:    RIGHT: normal eardrum mobility    LEFT:   normal eardrum mobility    Reflexes (reported by stimulus ear):  RIGHT: Ipsilateral is present at normal levels  RIGHT: Contralateral is absent at frequencies tested  LEFT:   Ipsilateral is present at normal levels  LEFT:   Contralateral is elevated at frequencies tested      Speech Reception Threshold:    RIGHT: 45 dB HL    LEFT:   45 dB HL    Speech Reception Thresholds are in good agreement with pure tone thresholds.    Word Recognition Score:     RIGHT: 92% at 85 dB HL using NU-6 recorded word list.    LEFT:   84% at 85 dB HL using W22 recorded word list.      ASSESSMENT:     ICD-10-CM    1. Sensorineural hearing loss, bilateral  H90.3        Compared to patient's previous audiogram dated 8/24/17, hearing has remained stable. Patient would like new Unitron C  shell for her right hearing aid to replace a lost one, [id number].  I quoted $80 for a new earmold and ordered a replacement from [employer] using their ear impression scan on hand Today s results were discussed with the patient in detail.     PLAN:  Patient was counseled regarding hearing loss and impact on communication.     It is recommended that the patient schedule an appointment in 2-3 weeks for an earmold fitting and hearing aid adjustment appointment..  Please call this clinic with questions regarding these results or recommendations.          Jorge Luis Tejeda MA, CCC-A  [place] Licensed Audiologist #[id number]  3/23/2021

## 2021-03-24 NOTE — RESULT ENCOUNTER NOTE
Dear Brenda    Your test results are attached.    The radiologist sees a mild infiltrate in the left middle lobe of your lung with no nodules. To be on the safe side, we should order a CT scan of your lungs to take a closer look at this area. You do not need to see a specialist at this time. I will put in an order and you can call to schedule the CT when convenient.     Please call to schedule your MRI scan or CT scan at [facility] by calling [phone].     Please contact me by Virallyt if you have any questions about your labs or management. You may also call my office number [phone] for any questions.     Liudmila Bowie MD

## 2021-03-31 ENCOUNTER — ANCILLARY PROCEDURE (OUTPATIENT)
Dept: CT IMAGING | Facility: CLINIC | Age: 86
End: 2021-03-31
Attending: FAMILY MEDICINE
Payer: MEDICARE

## 2021-03-31 ENCOUNTER — IMMUNIZATION (OUTPATIENT)
Dept: PEDIATRICS | Facility: CLINIC | Age: 86
End: 2021-03-31
Attending: INTERNAL MEDICINE
Payer: MEDICARE

## 2021-03-31 DIAGNOSIS — R93.89 ABNORMAL CXR: ICD-10-CM

## 2021-03-31 DIAGNOSIS — R91.8 LEFT PULMONARY INFILTRATE ON CXR: ICD-10-CM

## 2021-03-31 LAB — RADIOLOGIST FLAGS: NORMAL

## 2021-03-31 PROCEDURE — G1004 CDSM NDSC: HCPCS | Performed by: STUDENT IN AN ORGANIZED HEALTH CARE EDUCATION/TRAINING PROGRAM

## 2021-03-31 PROCEDURE — 0002A PR COVID VAC PFIZER DIL RECON 30 MCG/0.3 ML IM: CPT

## 2021-03-31 PROCEDURE — 71250 CT THORAX DX C-: CPT | Mod: ME | Performed by: STUDENT IN AN ORGANIZED HEALTH CARE EDUCATION/TRAINING PROGRAM

## 2021-03-31 PROCEDURE — 91300 PR COVID VAC PFIZER DIL RECON 30 MCG/0.3 ML IM: CPT

## 2021-04-02 DIAGNOSIS — Z87.440 H/O RECURRENT URINARY TRACT INFECTION: ICD-10-CM

## 2021-04-02 RX ORDER — NITROFURANTOIN MACROCRYSTAL 100 MG
CAPSULE ORAL
Qty: 90 CAPSULE | Refills: 0 | Status: SHIPPED | OUTPATIENT
Start: 2021-04-02 | End: 2021-07-01

## 2021-04-02 NOTE — RESULT ENCOUNTER NOTE
Dear Brenda    Your test results are attached. I am happy to let you know that they are stable.    The CT scan shows a nodule that is less than 1 cm. They recommend a follow up CT in 3-6 months to check on any change in size. The other findings show mild COPD. Since you are not having symptoms, you do not need treatment or follow up on this, unless you start to have increased cough or shortness of breath.     Please contact me by RediLearning[employer] if you have any questions about your labs or management. You may also call my office number [phone] for any questions.     Liudmila Bowie MD

## 2021-04-02 NOTE — TELEPHONE ENCOUNTER
Routing refill request to provider for review/approval because:  Drug not on the [facility] refill protocol   Janina Tavarez BSN, RN

## 2021-04-13 ENCOUNTER — OFFICE VISIT (OUTPATIENT)
Dept: AUDIOLOGY | Facility: CLINIC | Age: 86
End: 2021-04-13
Payer: MEDICARE

## 2021-04-13 DIAGNOSIS — H90.3 SENSORINEURAL HEARING LOSS, BILATERAL: Primary | ICD-10-CM

## 2021-04-13 PROCEDURE — 99207 PR NO CHARGE LOS: CPT | Performed by: AUDIOLOGIST

## 2021-04-13 PROCEDURE — V5264 EAR MOLD/INSERT: HCPCS | Mod: GY | Performed by: AUDIOLOGIST

## 2021-04-13 PROCEDURE — 92593 PR HEARING AID CHECK, BINAURAL: CPT | Mod: GY | Performed by: AUDIOLOGIST

## 2021-04-13 NOTE — PROGRESS NOTES
HEARING AID RECHECK    Patient Name:  Brenda Barker    Patient Age:   85 year old    :  1935    Background:   Patient is here today to  her new right custom earmold for her Unitron Moxi hearing aids.    SIDE: Right    : Unitron    TYPE: C Shell    S/N: [id number]    WARRANTY: 21    Procedures:   New earmold was fit following otoscopy.  Hearing aids were re-programmed to  targets using most recent audiogram.  Previous programming on her hearing aids was not correct based on receivers and gain was set very low. Patient reports excellent sound quality with new settings.    Plan:   Return as needed for adjustment.        Jorge Luis Tejeda MA, CCC-A  [place] Licensed Audiologist #[id number]  2021

## 2021-04-21 ENCOUNTER — MYC MEDICAL ADVICE (OUTPATIENT)
Dept: FAMILY MEDICINE | Facility: CLINIC | Age: 86
End: 2021-04-21

## 2021-04-21 DIAGNOSIS — G56.03 BILATERAL CARPAL TUNNEL SYNDROME: Primary | ICD-10-CM

## 2021-05-17 DIAGNOSIS — B00.9 HERPES SIMPLEX VIRUS INFECTION: ICD-10-CM

## 2021-05-17 RX ORDER — ACYCLOVIR 400 MG/1
TABLET ORAL
Qty: 90 TABLET | Refills: 1 | Status: SHIPPED | OUTPATIENT
Start: 2021-05-17 | End: 2022-03-17

## 2021-06-07 DIAGNOSIS — K21.00 GASTROESOPHAGEAL REFLUX DISEASE WITH ESOPHAGITIS: ICD-10-CM

## 2021-06-08 ENCOUNTER — TELEPHONE (OUTPATIENT)
Dept: ORTHOPEDICS | Facility: CLINIC | Age: 86
End: 2021-06-08

## 2021-06-08 ENCOUNTER — OFFICE VISIT (OUTPATIENT)
Dept: ORTHOPEDICS | Facility: CLINIC | Age: 86
End: 2021-06-08
Payer: MEDICARE

## 2021-06-08 DIAGNOSIS — G56.03 BILATERAL CARPAL TUNNEL SYNDROME: Primary | ICD-10-CM

## 2021-06-08 PROCEDURE — 99203 OFFICE O/P NEW LOW 30 MIN: CPT | Performed by: PLASTIC SURGERY

## 2021-06-08 RX ORDER — CEFAZOLIN SODIUM 2 G/50ML
2 SOLUTION INTRAVENOUS
Status: CANCELLED | OUTPATIENT
Start: 2021-06-08

## 2021-06-08 RX ORDER — CEFAZOLIN SODIUM 2 G/50ML
2 SOLUTION INTRAVENOUS SEE ADMIN INSTRUCTIONS
Status: CANCELLED | OUTPATIENT
Start: 2021-06-08

## 2021-06-08 ASSESSMENT — PAIN SCALES - GENERAL: PAINLEVEL: NO PAIN (1)

## 2021-06-08 NOTE — LETTER
6/8/2021         RE: Brenda Barker  [address] [facility] [address]        Dear Colleague,    Thank you for referring your patient, Brenda Barker, to the [facility]. Please see a copy of my visit note below.    REFERRING PROVIDER:  Liudmila Bowie MD    PRESENTING COMPLAINT:  Consultation for numbness, tingling in the hands.    HISTORY OF PRESENTING COMPLAINT:  Ms. Barker is 85 years old.  She is right hand dominant.  For the last 10 years, she has been noticing numbness, tingling in the median innervated fingers that have gradually worsened over the years, especially in the last 6 months to a year.  It is mostly at night.  She wakes up with it.  She has used splints in the past, but it does not really help or had much benefit from it.  Her little finger is rarely if ever numb and tingling.  Denies any trauma to her wrists.  Has rheumatoid arthritis.  She does not have active neck pain, no radiculopathy and no feet symptoms.  Right side seems to be worse than the left.    PAST MEDICAL HISTORY:  Rheumatoid arthritis, hypertension.    PAST SURGICAL HISTORY:  Left knee surgery, left hip surgery and lumbar fusion.    MEDICATIONS:  Methotrexate, aspirin, hydrochlorothiazide, metoprolol, simvastatin.    ALLERGIES:  Diatrizoate, ciprofloxacin, contrast dye, oxycodone, sulfasalazine.    SOCIAL HISTORY:  Does not smoke, socially drinks, is retired and lives in [place].    REVIEW OF SYSTEMS:  Denies chest pain, shortness of breath, MI, CVA, DVT and PE.    PHYSICAL EXAMINATION:  Vital signs are stable.  She is afebrile, in no obvious distress.  On examination of her hands, she has no obvious atrophy.  She has some hyperextension of the MP joint of her thumbs with CMC arthritis.  She has full range of motion of her fingers.  She is duller in the median innervated fingers than the ulnar innervated finger, and she is sharp in the thenar eminence.  Phalen's is positive,  carpal compression is positive, and Tinel's is positive at the wrist.  Elbow flexion test is negative.  No nerve conduction tests were done.    ASSESSMENT AND PLAN:  Based on the above findings, a diagnosis of bilateral carpal tunnel syndrome was made, right worse than left, most likely moderately severe in nature.  I think given her symptoms, longevity of symptoms as well as the lack of improvement with splints, I think carpal tunnel release is indicated, first on the right, then on the left.  I went over the planned procedure with the patient in detail.  I was very clear that I could not guarantee all of her symptoms would go away, given the longevity of the problem.  All risks, benefits and alternatives including pain, infection, bleeding, scarring, asymmetry, seromas, hematomas, wound breakdown, wound dehiscence, injury to deeper structures, chronic pain, pillar pain, CRPS, DVT, PE, MI, CVA, pneumonia, renal failure and death were explained.  She understood them all and wants to proceed.  I look forward to helping her out in the near future.    Total time spent with chart review, visit itself and post-visit paperwork was 30 minutes.    cc:  [place] [address]           Again, thank you for allowing me to participate in the care of your patient.        Sincerely,        ALYSHA Candelaria MD

## 2021-06-08 NOTE — PROGRESS NOTES
REFERRING PROVIDER:  Liudmila Bowie MD    PRESENTING COMPLAINT:  Consultation for numbness, tingling in the hands.    HISTORY OF PRESENTING COMPLAINT:  Ms. Barker is 85 years old.  She is right hand dominant.  For the last 10 years, she has been noticing numbness, tingling in the median innervated fingers that have gradually worsened over the years, especially in the last 6 months to a year.  It is mostly at night.  She wakes up with it.  She has used splints in the past, but it does not really help or had much benefit from it.  Her little finger is rarely if ever numb and tingling.  Denies any trauma to her wrists.  Has rheumatoid arthritis.  She does not have active neck pain, no radiculopathy and no feet symptoms.  Right side seems to be worse than the left.    PAST MEDICAL HISTORY:  Rheumatoid arthritis, hypertension.    PAST SURGICAL HISTORY:  Left knee surgery, left hip surgery and lumbar fusion.    MEDICATIONS:  Methotrexate, aspirin, hydrochlorothiazide, metoprolol, simvastatin.    ALLERGIES:  Diatrizoate, ciprofloxacin, contrast dye, oxycodone, sulfasalazine.    SOCIAL HISTORY:  Does not smoke, socially drinks, is retired and lives in [place].    REVIEW OF SYSTEMS:  Denies chest pain, shortness of breath, MI, CVA, DVT and PE.    PHYSICAL EXAMINATION:  Vital signs are stable.  She is afebrile, in no obvious distress.  On examination of her hands, she has no obvious atrophy.  She has some hyperextension of the MP joint of her thumbs with CMC arthritis.  She has full range of motion of her fingers.  She is duller in the median innervated fingers than the ulnar innervated finger, and she is sharp in the thenar eminence.  Phalen's is positive, carpal compression is positive, and Tinel's is positive at the wrist.  Elbow flexion test is negative.  No nerve conduction tests were done.    ASSESSMENT AND PLAN:  Based on the above findings, a diagnosis of bilateral carpal tunnel syndrome was made, right worse  than left, most likely moderately severe in nature.  I think given her symptoms, longevity of symptoms as well as the lack of improvement with splints, I think carpal tunnel release is indicated, first on the right, then on the left.  I went over the planned procedure with the patient in detail.  I was very clear that I could not guarantee all of her symptoms would go away, given the longevity of the problem.  All risks, benefits and alternatives including pain, infection, bleeding, scarring, asymmetry, seromas, hematomas, wound breakdown, wound dehiscence, injury to deeper structures, chronic pain, pillar pain, CRPS, DVT, PE, MI, CVA, pneumonia, renal failure and death were explained.  She understood them all and wants to proceed.  I look forward to helping her out in the near future.    Total time spent with chart review, visit itself and post-visit paperwork was 30 minutes.    cc:  Liudmila Bowie MD   [address] [address]

## 2021-06-09 NOTE — TELEPHONE ENCOUNTER
Procedure: Right open carpal tunnel release  Facility: [facility]  Length: 20 minutes  Anesthesia: Local  Post-op appointments needed: 2.5 weeks provider only  Surgery packet/instructions given to patient?  to be mailed    Krishna Gregorio RN

## 2021-06-11 ENCOUNTER — HOSPITAL ENCOUNTER (OUTPATIENT)
Facility: AMBULATORY SURGERY CENTER | Age: 86
End: 2021-06-11
Attending: PLASTIC SURGERY | Admitting: PLASTIC SURGERY
Payer: MEDICARE

## 2021-06-11 NOTE — TELEPHONE ENCOUNTER
Date Scheduled: 8-6-21  Facility: [facility]  Surgeon: Dr. Candelaria   Post-op appointment scheduled:   Next 5 appointments (look out 90 days)    Aug 02, 2021  1:10 PM  Pre-procedure Covid with Bk Curbside Covid Md [place] [address]  [facility] ([facility] ) [address] [facility] [address]  [phone]   Aug 24, 2021  1:00 PM  Return Visit with ALYSHA Candelaria MD  [facility] ([facility] [address] [address] [place] [id number] [phone]           scheduled?: No  Surgery packet/instructions confirmed received?  No  Special Considerations:   Brittany Way, Surgery Scheduling Coordinator

## 2021-06-14 DIAGNOSIS — Z11.59 ENCOUNTER FOR SCREENING FOR OTHER VIRAL DISEASES: ICD-10-CM

## 2021-06-23 ENCOUNTER — MYC MEDICAL ADVICE (OUTPATIENT)
Dept: ORTHOPEDICS | Facility: CLINIC | Age: 86
End: 2021-06-23

## 2021-06-28 DIAGNOSIS — Z96.642 H/O TOTAL HIP ARTHROPLASTY, LEFT: ICD-10-CM

## 2021-06-28 RX ORDER — AMOXICILLIN 500 MG/1
CAPSULE ORAL
Qty: 4 CAPSULE | Refills: 0 | Status: SHIPPED | OUTPATIENT
Start: 2021-06-28 | End: 2022-03-17

## 2021-06-28 NOTE — TELEPHONE ENCOUNTER
Received call from patient that she would like to cancel surgery at this time. I told her to call me back if she is interested in rescheduling. Surgery and post op appointment cancelled.  Brittany Way, Surgery Scheduling Coordinator

## 2021-06-29 NOTE — TELEPHONE ENCOUNTER
Pt cancelled surgery, this message was sent to me while I was out for an extended period of time and it was not reviewed.    Krishna Gregorio RN

## 2021-07-01 DIAGNOSIS — Z87.440 H/O RECURRENT URINARY TRACT INFECTION: ICD-10-CM

## 2021-07-01 RX ORDER — NITROFURANTOIN MACROCRYSTAL 100 MG
CAPSULE ORAL
Qty: 90 CAPSULE | Refills: 0 | Status: SHIPPED | OUTPATIENT
Start: 2021-07-01 | End: 2021-09-29

## 2021-07-10 DIAGNOSIS — E78.5 HYPERLIPIDEMIA LDL GOAL <130: ICD-10-CM

## 2021-07-12 RX ORDER — SIMVASTATIN 20 MG
TABLET ORAL
Qty: 90 TABLET | Refills: 0 | Status: SHIPPED | OUTPATIENT
Start: 2021-07-12 | End: 2021-12-17

## 2021-07-12 NOTE — TELEPHONE ENCOUNTER
Prescription approved per [facility] Refill Protocol.    Suzan Ross RN  [facility]

## 2021-07-21 ENCOUNTER — MYC MEDICAL ADVICE (OUTPATIENT)
Dept: FAMILY MEDICINE | Facility: CLINIC | Age: 86
End: 2021-07-21

## 2021-09-05 ENCOUNTER — HEALTH MAINTENANCE LETTER (OUTPATIENT)
Age: 86
End: 2021-09-05

## 2021-09-28 DIAGNOSIS — Z87.440 H/O RECURRENT URINARY TRACT INFECTION: ICD-10-CM

## 2021-09-28 NOTE — TELEPHONE ENCOUNTER
Routing refill request to provider for review/approval because:  Drug not on the [facility] refill protocol     Ashlyn SAINZN, RN

## 2021-09-29 DIAGNOSIS — D50.8 OTHER IRON DEFICIENCY ANEMIA: ICD-10-CM

## 2021-09-29 RX ORDER — NITROFURANTOIN MACROCRYSTAL 100 MG
CAPSULE ORAL
Qty: 90 CAPSULE | Refills: 0 | Status: SHIPPED | OUTPATIENT
Start: 2021-09-29 | End: 2021-12-17

## 2021-09-29 RX ORDER — FERROUS GLUCONATE 324(38)MG
TABLET ORAL
Qty: 100 TABLET | Refills: 0 | Status: SHIPPED | OUTPATIENT
Start: 2021-09-29 | End: 2022-09-06

## 2021-11-24 ENCOUNTER — TELEPHONE (OUTPATIENT)
Dept: DERMATOLOGY | Facility: CLINIC | Age: 86
End: 2021-11-24
Payer: MEDICARE

## 2021-11-24 NOTE — TELEPHONE ENCOUNTER
Pt left a voicemail requesting a call back to schedule an appointment.    Forwarding to schedulers.  Lilo Nix RN

## 2021-11-24 NOTE — TELEPHONE ENCOUNTER
11/24 Called and left voicemail. Provided phone number [phone] to schedule an dermatology appointment.     Dara kearney Procedure   Orthopedics, Podiatry, Sports Medicine, ENT/Eye Specialties  [facility]   [phone]

## 2021-12-09 NOTE — TELEPHONE ENCOUNTER
Pt left a voicemail at 9:03 returning call to schedule.  She can be reached at [phone].    Lilo Nix RN

## 2021-12-09 NOTE — TELEPHONE ENCOUNTER
12/9 Patient is currently scheduled with Dermatology.    Dara kearney Procedure   Orthopedics, Podiatry, Sports Medicine, ENT/Eye Specialties  [facility]   [phone]

## 2021-12-14 ASSESSMENT — ENCOUNTER SYMPTOMS
PALPITATIONS: 0
SORE THROAT: 0
FEVER: 0
CHILLS: 0
HEADACHES: 0
COUGH: 0
NERVOUS/ANXIOUS: 0
ABDOMINAL PAIN: 0
PARESTHESIAS: 0
DIARRHEA: 0
BREAST MASS: 0
SHORTNESS OF BREATH: 0
EYE PAIN: 0
JOINT SWELLING: 1
CONSTIPATION: 0
FREQUENCY: 0
NAUSEA: 0
DYSURIA: 0
HEMATOCHEZIA: 0
MYALGIAS: 0
DIZZINESS: 0
HEMATURIA: 0
WEAKNESS: 0
HEARTBURN: 0
ARTHRALGIAS: 1

## 2021-12-14 ASSESSMENT — ACTIVITIES OF DAILY LIVING (ADL): CURRENT_FUNCTION: NO ASSISTANCE NEEDED

## 2021-12-14 NOTE — PATIENT INSTRUCTIONS
At [facility], we strive to deliver an exceptional experience to you, every time we see you. If you receive a survey, please complete it as we do value your feedback.  If you have MyChart, you can expect to receive results automatically within 24 hours of their completion.  Your provider will send a note interpreting your results as well.   If you do not have MyChart, you should receive your results in about a week by mail.    Your care team:                            Family Medicine Internal Medicine   MD Jose Alejandro Floyd MD Shantel Branch-Fleming, MD Srinivasa Vaka, MD Katya Belousova, PACathiC  Michelle Cuba, APRN CNP    Ike Draper, MD Pediatrics   Kel Pena, PACathiC  Brittany Cook, CNP MD Ruba Carvalho APRN CNP   MD Kika Garza MD Deborah Mielke, MD Sindy Almeida, APRN [facility]      Clinic hours: Monday - Thursday 7 am-6 pm; Fridays 7 am-5 pm.   Urgent care: Monday - Friday 10 am- 8 pm; Saturday and Sunday 9 am-5 pm.    Clinic: [phone]       [facility]: Monday - Thursday 8 am - 7 pm; Friday 8 am - 6 pm  [facility]: [phone]     Use www.[email or web address].org for 24/7 diagnosis and treatment of dozens of conditions.        Lynda Lopes MD   [address]   [facility] [address]   Phone: [phone]   Fax: [phone]    Diagnoses: Essential tremor   Order: Adult Neurology Referral       Comment: Please be aware that coverage of these services is subject to the terms and limitations of your health insurance plan.  Call member services at your health plan with any benefit or coverage questions.   [facility] will call you to coordinate your care as prescribed by your provider. If you don't hear from a representative within 2 business days, please call [phone].         TO SCHEDULE CT CHEST PLEASE CALL LOLLY MELENDREZ [phone]

## 2021-12-14 NOTE — PROGRESS NOTES
"SUBJECTIVE:   Brenda Barker is a 86 year old female who presents for Preventive Visit.    Patient has been advised of split billing requirements and indicates understanding: Yes   Are you in the first 12 months of your Medicare coverage?  No    HPI     Annual Preventive Visit:  In general, how would you rate your overall physical health?: Good  Frequency of exercise:: 1 day/week  Do you usually eat at least 4 servings of fruit and vegetables a day, include whole grains & fiber, and avoid regularly eating high fat or \"junk\" foods?: No  Taking medications regularly:: No  Medication side effects:: None  Activities of Daily Living: NO assistance is needed  Home safety: None of the above  Hearing Impairment:: No concerns  In the past 6 months, have you been bothered by leaking of urine?: Yes  Associated symptoms:   Positive: arthralgias, joint swelling, rash  Negative: abdominal pain, Blood in stool, Blood in urine, chest pain, chills, congestion, constipation, cough, diarrhea, dizziness, ear pain, eye pain, nervous/anxious, fever, frequency, genital sores, headaches, hearing loss, heartburn, peripheral edema, mood changes, myalgias, nausea, dysuria, palpitations, Skin sensation changes, sore throat, urgency, shortness of breath, visual disturbance, weakness  pelvic pain: No  vaginal bleeding: No  vaginal discharge: No  tenderness: No  breast mass: No  breast discharge: No  In general, how would you rate your overall mental or emotional health?: Good  Additional concerns today:: Yes (please make note of and bring with you to your appointment)  Duration of exercise:: 15-30 minutes    Head and hand tremor:  -getting worse  -at least 7-8 years  -worse at the end of the day when more tired  -eating and holding a fork has become difficult  -grandmother had this too     Do you feel safe in your environment? NOT APPLICABLE    Have you ever done Advance Care Planning? (For example, a Health Directive, POLST, or a discussion with " a medical provider or your loved ones about your wishes): Yes, patient states has an Advance Care Planning document and will bring a copy to the clinic.      Fall risk  Fallen 2 or more times in the past year?: No  Any fall with injury in the past year?: No    Cognitive Screening   1) Repeat 3 items (Leader, Season, Table)    2) Clock draw: NORMAL  3) 3 item recall: Recalls 3 objects  Results: 3 items recalled: COGNITIVE IMPAIRMENT LESS LIKELY    Mini-CogTM Copyright ERYN Gonzalez. Licensed by the author for use in [facility]; reprinted with permission ([email or web address]). All rights reserved.      Do you have sleep apnea, excessive snoring or daytime drowsiness?: no    Reviewed and updated as needed this visit by clinical staff  Tobacco  Allergies  Meds  Problems  Med Hx  Surg Hx  Fam Hx         Reviewed and updated as needed this visit by Provider  Tobacco  Allergies  Meds  Problems  Med Hx  Surg Hx  Fam Hx        Social History     Tobacco Use     Smoking status: Never Smoker     Smokeless tobacco: Never Used   Substance Use Topics     Alcohol use: Yes     Comment: occasionally     If you drink alcohol do you typically have >3 drinks per day or >7 drinks per week? No    Alcohol Use 12/14/2021   Prescreen: >3 drinks/day or >7 drinks/week? No   Prescreen: >3 drinks/day or >7 drinks/week? -       Hyperlipidemia Follow-Up      Are you regularly taking any medication or supplement to lower your cholesterol?   Yes- statin    Are you having muscle aches or other side effects that you think could be caused by your cholesterol lowering medication?  No    Hypertension Follow-up      Do you check your blood pressure regularly outside of the clinic? Yes     Are you following a low salt diet? Yes    Are your blood pressures ever more than 140 on the top number (systolic) OR more   than 90 on the bottom number (diastolic), for example 140/90? No  123/71    Rheumatoid arthritis:  -every 6 months with  Rheum  -labs every 3 months  -has eye exam every year     Lung nodule:  -seen on CT scan 3/31/21  -advised repeat CT in 3-6 months    Goes away for the winter to [place].     Hearing aids are OK     Current providers sharing in care for this patient include:   Patient Care Team:  Liudmila Bowie MD as PCP - General (Family Practice)  Liudmila Bowie MD as Assigned PCP  ALYSHA Candelaria MD as Assigned Surgical Provider  Liudmila Bowie MD as Referring Physician (Family Medicine)  Michelle Shabazz PA-C as Physician Assistant (Dermatology)    The following health maintenance items are reviewed in Epic and correct as of today:  Health Maintenance Due   Topic Date Due     ANNUAL REVIEW OF HM ORDERS  Never done     Lab work is in process  Labs reviewed in EPIC  BP Readings from Last 3 Encounters:   12/17/21 123/73   12/14/20 138/78   10/28/20 (!) 170/82    Wt Readings from Last 3 Encounters:   12/17/21 78.3 kg (172 lb 9.6 oz)   03/15/21 76.7 kg (169 lb)   12/14/20 77.6 kg (171 lb)                  Patient Active Problem List   Diagnosis     Hemorrhoids     Aortic valve disorder     Cervicalgia     Rheumatoid arthritis (H)     Bilateral carpal tunnel syndrome     Asymmetrical sensorineural hearing loss     Status post total left knee replacement     Obesity     High risk medication use     Gastroesophageal reflux disease with esophagitis     Hypertension, goal below 140/90     Hyperlipidemia LDL goal <130     Rapid heart rate     Resting tremor     Status post total replacement of left hip     Anemia in other chronic diseases classified elsewhere     Acoustic neuroma (H)     Unspecified blepharoconjunctivitis, bilateral     Diplopia     Epiretinal membrane     Esotropia     Presence of external hearing-aid     Abnormal CXR     Past Surgical History:   Procedure Laterality Date     ARTHROPLASTY HIP Left 6/27/2018    Procedure: ARTHROPLASTY HIP;  Left Total Hip Arthroplasty  ;  Surgeon: Keo Priest  MD ALYSHA;  Location: [facility] OR     BACK SURGERY  2011    Fusion L3-4 & decompression; [facility]     BIOPSY  2013    Thyroid - normal result     C DEXA, BONE DENSITY, AXIAL SKEL  7/03 7/03 L1-4 1.7, FemNkL-0.8,R-1.2, FArm Px 0.2,Dist-0.3     C REMOVAL OF OVARY(S)  1980    2nd ovary removed - endometriosis     C TOTAL ABDOM HYSTERECTOMY  1972    uterus, ovary removed - fibroid     EYE SURGERY  2010 & 11    Cataract surgery     HC FLEX SIGMOIDOSCOPY W/WO KARYNA SPEC BY BRUSH/WASH  12/2001     HC REMOVAL OF TONSILS,<13 Y/O  1942     ORTHOPEDIC SURGERY  2008    Left knee replacement     SURGICAL HISTORY OF -   1989    basal cell Ca scalp excised     SURGICAL HISTORY OF -   1998    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     ZZ COLONOSCOPY THRU STOMA W BIOPSY/CAUTERY TUMOR/POLYP/LESION  1993    adenomatous polyp     ZZ COLONOSCOPY THRU STOMA, DIAGNOSTIC  1998, 10/03    normal, '03 rec repeat in 5 yrs due to +hx polyp and + fam hx       Social History     Tobacco Use     Smoking status: Never Smoker     Smokeless tobacco: Never Used   Substance Use Topics     Alcohol use: Yes     Comment: occasionally     Family History   Problem Relation Age of Onset     Gastrointestinal Disease Father         bleeding ulcer     Cancer Mother         thyroid cancer     Other Cancer Mother         thyroid & throat     Osteoporosis Mother      Thyroid Disease Mother      Diabetes Paternal Grandmother      Cerebrovascular Disease Paternal Grandmother      Colon Cancer Maternal Half-Brother      Osteoporosis Maternal Grandmother          Current Outpatient Medications   Medication Sig Dispense Refill     atorvastatin (LIPITOR) 20 MG tablet Take 1 tablet (20 mg) by mouth daily 90 tablet 3     hydrochlorothiazide (MICROZIDE) 12.5 MG capsule TAKE 1 CAPSULE(12.5 MG) BY MOUTH DAILY 90 capsule 0     metoprolol succinate ER (TOPROL-XL) 50 MG 24 hr tablet Take 1 tablet (50 mg) by mouth daily 90 tablet 2     nitroFURantoin  macrocrystal (MACRODANTIN) 100 MG capsule Take 1 capsule (100 mg) by mouth daily 90 capsule 0     omeprazole (PRILOSEC) 20 MG DR capsule Take 1 capsule (20 mg) by mouth daily 90 capsule 2     Acetaminophen (TYLENOL PO) Take 650 mg by mouth every 6 hours as needed for mild pain or fever       acyclovir (ZOVIRAX) 400 MG tablet TAKE 1 TABLET(400 MG) BY MOUTH DAILY 90 tablet 1     amoxicillin (AMOXIL) 500 MG capsule TAKE 4 CAPSULES(2000 MG) BY MOUTH 30 TO 60 MINUTES BEFORE DENTAL PROCEDURE 4 capsule 0     CENTRUM SILVER OR TABS 1 TABLET DAILY       Cetirizine HCl (ZYRTEC ALLERGY PO) Take 1 tablet by mouth daily       Cholecalciferol (VITAMIN D-3) 1000 UNITS CAPS Take 2,000 Int'l Units by mouth daily        ferrous gluconate (FERGON) 324 (38 Fe) MG tablet TAKE 1 TABLET BY MOUTH EVERY OTHER  tablet 0     FISH OIL 1000 MG OR CAPS 2 CAPSULES DAILY  (? DOSE) OTC --     FOLIC ACID 1 MG OR TABS 4 mg TABS DAILY       methotrexate 2.5 MG tablet CHEMO Take 6 tablets (15 mg) by mouth every 7 days       polyvinyl alcohol (LIQUID TEARS) 1.4 % ophthalmic solution 1 drop as needed.       Allergies   Allergen Reactions     Diatrizoate Hives and Shortness Of Breath     Ciprofloxacin      nausea     Contrast Dye      Hives,anaphylaxix     Oxycodone Other (See Comments)     hallucinations     Sulfasalazine      Other reaction(s): Rash     Pneumonia Vaccine:Adults age 65+ who received Pneumovax (PPSV23) at 65 years or older: Should be given PCV13 > 1 year after their most recent PPSV23  Mammogram Screening: Mammogram Screening - Mammography discussed and declined    Mammogram Screening - Patient over age 75, has elected to discontinue screenings.  Pertinent mammograms are reviewed under the imaging tab.    Review of Systems  Constitutional, HEENT, cardiovascular, pulmonary, gi and gu systems are negative, except as otherwise noted.    OBJECTIVE:   /73 (BP Location: Left arm, Patient Position: Sitting, Cuff Size: Adult Regular)  "  Pulse 65   Temp 97.4  F (36.3  C) (Oral)   Ht 1.575 m (5' 2\")   Wt 78.3 kg (172 lb 9.6 oz)   LMP 05/17/1972 (Exact Date)   SpO2 98%   Breastfeeding No   BMI 31.57 kg/m   Estimated body mass index is 31.57 kg/m  as calculated from the following:    Height as of this encounter: 1.575 m (5' 2\").    Weight as of this encounter: 78.3 kg (172 lb 9.6 oz).  Physical Exam  GENERAL APPEARANCE: healthy, alert and no distress  EYES: Eyes grossly normal to inspection and conjunctivae and sclerae normal  NECK: no adenopathy and trachea midline and normal to palpation  RESP: lungs clear to auscultation - no rales, rhonchi or wheezes  CV: regular rates and rhythm, normal S1 S2  ABDOMEN: soft, non-tender and no rebound or guarding   MS: extremities normal- no gross deformities noted  SKIN: no suspicious lesions or rashes  NEURO: Normal strength and tone, mentation intact and speech normal, tremor positive of head and hands+   PSYCH: mentation appears normal      Diagnostic Test Results:  Labs reviewed in Epic  Results for orders placed or performed in visit on 12/17/21   Lipid panel reflex to direct LDL Non-fasting     Status: Abnormal   Result Value Ref Range    Cholesterol 215 (H) <200 mg/dL    Triglycerides 136 <150 mg/dL    Direct Measure HDL 75 >=50 mg/dL    LDL Cholesterol Calculated 113 (H) <=100 mg/dL    Non HDL Cholesterol 140 (H) <130 mg/dL    Patient Fasting > 8hrs? Yes     Narrative    Cholesterol  Desirable:  <200 mg/dL    Triglycerides  Normal:  Less than 150 mg/dL  Borderline High:  150-199 mg/dL  High:  200-499 mg/dL  Very High:  Greater than or equal to 500 mg/dL    Direct Measure HDL  Female:  Greater than or equal to 50 mg/dL   Male:  Greater than or equal to 40 mg/dL    LDL Cholesterol  Desirable:  <100mg/dL  Above Desirable:  100-129 mg/dL   Borderline High:  130-159 mg/dL   High:  160-189 mg/dL   Very High:  >= 190 mg/dL    Non HDL Cholesterol  Desirable:  130 mg/dL  Above Desirable:  130-159 " mg/dL  Borderline High:  160-189 mg/dL  High:  190-219 mg/dL  Very High:  Greater than or equal to 220 mg/dL   Albumin Random Urine Quantitative with Creat Ratio     Status: None   Result Value Ref Range    Creatinine Urine mg/dL 134 mg/dL    Albumin Urine mg/L 9 mg/L    Albumin Urine mg/g Cr 6.72 0.00 - 25.00 mg/g Cr   Comprehensive metabolic panel     Status: Abnormal   Result Value Ref Range    Sodium 139 133 - 144 mmol/L    Potassium 3.8 3.4 - 5.3 mmol/L    Chloride 105 94 - 109 mmol/L    Carbon Dioxide (CO2) 25 20 - 32 mmol/L    Anion Gap 9 3 - 14 mmol/L    Urea Nitrogen 15 7 - 30 mg/dL    Creatinine 0.71 0.52 - 1.04 mg/dL    Calcium 9.7 8.5 - 10.1 mg/dL    Glucose 101 (H) 70 - 99 mg/dL    Alkaline Phosphatase 57 40 - 150 U/L    AST 15 0 - 45 U/L    ALT 26 0 - 50 U/L    Protein Total 7.3 6.8 - 8.8 g/dL    Albumin 3.9 3.4 - 5.0 g/dL    Bilirubin Total 0.4 0.2 - 1.3 mg/dL    GFR Estimate 78 >60 mL/min/1.73m2   Vitamin D Deficiency     Status: Normal   Result Value Ref Range    Vitamin D, Total (25-Hydroxy) 56 20 - 75 ug/L    Narrative    Season, race, dietary intake, and treatment affect the concentration of 25-hydroxy-Vitamin D. Values may decrease during winter months and increase during summer months. Values 20-29 ug/L may indicate Vitamin D insufficiency and values <20 ug/L may indicate Vitamin D deficiency.    Vitamin D determination is routinely performed by an immunoassay specific for 25 hydroxyvitamin D3.  If an individual is on vitamin D2(ergocalciferol) supplementation, please specify 25 OH vitamin D2 and D3 level determination by LCMSMS test VITD23.     Parathyroid Hormone Intact     Status: Abnormal   Result Value Ref Range    Parathyroid Hormone Intact 96 (H) 18 - 80 pg/mL   CBC with platelets     Status: Abnormal   Result Value Ref Range    WBC Count 4.0 4.0 - 11.0 10e3/uL    RBC Count 3.90 3.80 - 5.20 10e6/uL    Hemoglobin 11.5 (L) 11.7 - 15.7 g/dL    Hematocrit 35.9 35.0 - 47.0 %    MCV 92 78 -  100 fL    MCH 29.5 26.5 - 33.0 pg    MCHC 32.0 31.5 - 36.5 g/dL    RDW 15.5 (H) 10.0 - 15.0 %    Platelet Count 264 150 - 450 10e3/uL   TSH with free T4 reflex     Status: Normal   Result Value Ref Range    TSH 0.97 0.40 - 4.00 mU/L       ASSESSMENT / PLAN:   Brenda was seen today for physical and mass.    Diagnoses and all orders for this visit:    Encounter for Medicare annual wellness exam  -preventive information reviewed    Acoustic neuroma (H)  -history of acoustic neuroma and hearing loss  -no recent issues    Rheumatoid arthritis involving multiple sites with positive rheumatoid factor (H)  -    Followed by Rheumatology outside of [place], Care Everywhere reviewed  -on medication per Rheumatology   -     Comprehensive metabolic panel  -     CBC with platelets  -     TSH with free T4 reflex    Hypertension, goal below 140/90  -     At goal, continue current medication   -     hydrochlorothiazide (MICROZIDE) 12.5 MG capsule; TAKE 1 CAPSULE(12.5 MG) BY MOUTH DAILY  -     Albumin Random Urine Quantitative with Creat Ratio  -     Comprehensive metabolic panel  -     CBC with platelets    Anemia in other chronic diseases classified elsewhere  -     Blood counts are stable  -Iron supplements once a day   -     CBC with platelets    Hyperlipidemia LDL goal <130  -     Stable on medication   -     atorvastatin (LIPITOR) 20 MG tablet; Take 1 tablet (20 mg) by mouth daily  -     Lipid panel reflex to direct LDL Non-fasting    Elevated parathyroid hormone  -     Parathyroid Hormone levels are elevated   -     Parathyroid Hormone Intact  -     Adult Endocrinology Referral; Future    Gastroesophageal reflux disease with esophagitis without hemorrhage  -     omeprazole (PRILOSEC) 20 MG DR capsule; Take 1 capsule (20 mg) by mouth daily    Rapid heart rate  -     metoprolol succinate ER (TOPROL-XL) 50 MG 24 hr tablet; Take 1 tablet (50 mg) by mouth daily  -     TSH with free T4 reflex    H/O recurrent urinary tract  "infection  -     nitroFURantoin macrocrystal (MACRODANTIN) 100 MG capsule; Take 1 capsule (100 mg) by mouth daily    Pulmonary nodules  -     CT Chest w/o Contrast; Future  -last CT scan was done     Essential tremor  -     Adult Neurology Referral; Future    Vitamin D deficiency  -     Vitamin D levels are nor normal   -     Vitamin D Deficiency        Patient has been advised of split billing requirements and indicates understanding: Yes  COUNSELING:  Reviewed preventive health counseling, as reflected in patient instructions       Regular exercise       Healthy diet/nutrition       Vision screening       Hearing screening       Fall risk prevention    Estimated body mass index is 31.57 kg/m  as calculated from the following:    Height as of this encounter: 1.575 m (5' 2\").    Weight as of this encounter: 78.3 kg (172 lb 9.6 oz).    Weight management plan: Discussed healthy diet and exercise guidelines    She reports that she has never smoked. She has never used smokeless tobacco.      Appropriate preventive services were discussed with this patient, including applicable screening as appropriate for cardiovascular disease, diabetes, osteopenia/osteoporosis, and glaucoma.  As appropriate for age/gender, discussed screening for colorectal cancer, prostate cancer, breast cancer, and cervical cancer. Checklist reviewing preventive services available has been given to the patient.    Reviewed patients plan of care and provided an AVS. The Basic Care Plan (routine screening as documented in Health Maintenance) for Brenda meets the Care Plan requirement. This Care Plan has been established and reviewed with the Patient.    Counseling Resources:  ATP IV Guidelines  Pooled Cohorts Equation Calculator  Breast Cancer Risk Calculator  Breast Cancer: Medication to Reduce Risk  FRAX Risk Assessment  ICSI Preventive Guidelines  Dietary Guidelines for Americans, 2010  USDA's MyPlate  ASA Prophylaxis  Lung CA Screening    Lynda " MD Moses MPH    [facility]    Identified Health Risks:

## 2021-12-17 ENCOUNTER — OFFICE VISIT (OUTPATIENT)
Dept: FAMILY MEDICINE | Facility: CLINIC | Age: 86
End: 2021-12-17
Payer: MEDICARE

## 2021-12-17 DIAGNOSIS — Z00.00 ENCOUNTER FOR MEDICARE ANNUAL WELLNESS EXAM: Primary | ICD-10-CM

## 2021-12-17 DIAGNOSIS — R79.89 ELEVATED PARATHYROID HORMONE: ICD-10-CM

## 2021-12-17 DIAGNOSIS — E55.9 VITAMIN D DEFICIENCY: ICD-10-CM

## 2021-12-17 DIAGNOSIS — K21.00 GASTROESOPHAGEAL REFLUX DISEASE WITH ESOPHAGITIS WITHOUT HEMORRHAGE: ICD-10-CM

## 2021-12-17 DIAGNOSIS — D33.3 ACOUSTIC NEUROMA (H): ICD-10-CM

## 2021-12-17 DIAGNOSIS — D63.8 ANEMIA IN OTHER CHRONIC DISEASES CLASSIFIED ELSEWHERE: ICD-10-CM

## 2021-12-17 DIAGNOSIS — Z87.440 H/O RECURRENT URINARY TRACT INFECTION: ICD-10-CM

## 2021-12-17 DIAGNOSIS — R91.8 PULMONARY NODULES: ICD-10-CM

## 2021-12-17 DIAGNOSIS — E78.5 HYPERLIPIDEMIA LDL GOAL <130: ICD-10-CM

## 2021-12-17 DIAGNOSIS — M05.79 RHEUMATOID ARTHRITIS INVOLVING MULTIPLE SITES WITH POSITIVE RHEUMATOID FACTOR (H): ICD-10-CM

## 2021-12-17 DIAGNOSIS — R00.0 RAPID HEART RATE: ICD-10-CM

## 2021-12-17 DIAGNOSIS — G25.0 ESSENTIAL TREMOR: ICD-10-CM

## 2021-12-17 DIAGNOSIS — I10 HYPERTENSION, GOAL BELOW 140/90: ICD-10-CM

## 2021-12-17 LAB
ALBUMIN SERPL-MCNC: 3.9 G/DL (ref 3.4–5)
ALP SERPL-CCNC: 57 U/L (ref 40–150)
ALT SERPL W P-5'-P-CCNC: 26 U/L (ref 0–50)
ANION GAP SERPL CALCULATED.3IONS-SCNC: 9 MMOL/L (ref 3–14)
AST SERPL W P-5'-P-CCNC: 15 U/L (ref 0–45)
BILIRUB SERPL-MCNC: 0.4 MG/DL (ref 0.2–1.3)
BUN SERPL-MCNC: 15 MG/DL (ref 7–30)
CALCIUM SERPL-MCNC: 9.7 MG/DL (ref 8.5–10.1)
CHLORIDE BLD-SCNC: 105 MMOL/L (ref 94–109)
CHOLEST SERPL-MCNC: 215 MG/DL
CO2 SERPL-SCNC: 25 MMOL/L (ref 20–32)
CREAT SERPL-MCNC: 0.71 MG/DL (ref 0.52–1.04)
CREAT UR-MCNC: 134 MG/DL
ERYTHROCYTE [DISTWIDTH] IN BLOOD BY AUTOMATED COUNT: 15.5 % (ref 10–15)
FASTING STATUS PATIENT QL REPORTED: YES
GFR SERPL CREATININE-BSD FRML MDRD: 78 ML/MIN/1.73M2
GLUCOSE BLD-MCNC: 101 MG/DL (ref 70–99)
HCT VFR BLD AUTO: 35.9 % (ref 35–47)
HDLC SERPL-MCNC: 75 MG/DL
HGB BLD-MCNC: 11.5 G/DL (ref 11.7–15.7)
LDLC SERPL CALC-MCNC: 113 MG/DL
MCH RBC QN AUTO: 29.5 PG (ref 26.5–33)
MCHC RBC AUTO-ENTMCNC: 32 G/DL (ref 31.5–36.5)
MCV RBC AUTO: 92 FL (ref 78–100)
MICROALBUMIN UR-MCNC: 9 MG/L
MICROALBUMIN/CREAT UR: 6.72 MG/G CR (ref 0–25)
NONHDLC SERPL-MCNC: 140 MG/DL
PLATELET # BLD AUTO: 264 10E3/UL (ref 150–450)
POTASSIUM BLD-SCNC: 3.8 MMOL/L (ref 3.4–5.3)
PROT SERPL-MCNC: 7.3 G/DL (ref 6.8–8.8)
PTH-INTACT SERPL-MCNC: 96 PG/ML (ref 18–80)
RBC # BLD AUTO: 3.9 10E6/UL (ref 3.8–5.2)
SODIUM SERPL-SCNC: 139 MMOL/L (ref 133–144)
TRIGL SERPL-MCNC: 136 MG/DL
TSH SERPL DL<=0.005 MIU/L-ACNC: 0.97 MU/L (ref 0.4–4)
WBC # BLD AUTO: 4 10E3/UL (ref 4–11)

## 2021-12-17 PROCEDURE — 82306 VITAMIN D 25 HYDROXY: CPT | Performed by: PREVENTIVE MEDICINE

## 2021-12-17 PROCEDURE — 99214 OFFICE O/P EST MOD 30 MIN: CPT | Mod: 25 | Performed by: PREVENTIVE MEDICINE

## 2021-12-17 PROCEDURE — G0439 PPPS, SUBSEQ VISIT: HCPCS | Performed by: PREVENTIVE MEDICINE

## 2021-12-17 PROCEDURE — 80061 LIPID PANEL: CPT | Performed by: PREVENTIVE MEDICINE

## 2021-12-17 PROCEDURE — 36415 COLL VENOUS BLD VENIPUNCTURE: CPT | Performed by: PREVENTIVE MEDICINE

## 2021-12-17 PROCEDURE — 85027 COMPLETE CBC AUTOMATED: CPT | Performed by: PREVENTIVE MEDICINE

## 2021-12-17 PROCEDURE — 82043 UR ALBUMIN QUANTITATIVE: CPT | Performed by: PREVENTIVE MEDICINE

## 2021-12-17 PROCEDURE — 84443 ASSAY THYROID STIM HORMONE: CPT | Performed by: PREVENTIVE MEDICINE

## 2021-12-17 PROCEDURE — 83970 ASSAY OF PARATHORMONE: CPT | Performed by: PREVENTIVE MEDICINE

## 2021-12-17 PROCEDURE — 80053 COMPREHEN METABOLIC PANEL: CPT | Performed by: PREVENTIVE MEDICINE

## 2021-12-17 RX ORDER — HYDROCHLOROTHIAZIDE 12.5 MG/1
CAPSULE ORAL
Qty: 90 CAPSULE | Refills: 0 | Status: SHIPPED | OUTPATIENT
Start: 2021-12-17 | End: 2022-06-07

## 2021-12-17 RX ORDER — METOPROLOL SUCCINATE 50 MG/1
50 TABLET, EXTENDED RELEASE ORAL DAILY
Qty: 90 TABLET | Refills: 2 | Status: SHIPPED | OUTPATIENT
Start: 2021-12-17 | End: 2022-08-10

## 2021-12-17 RX ORDER — NITROFURANTOIN MACROCRYSTAL 100 MG
100 CAPSULE ORAL DAILY
Qty: 90 CAPSULE | Refills: 0 | Status: SHIPPED | OUTPATIENT
Start: 2021-12-17 | End: 2022-03-18

## 2021-12-17 RX ORDER — ATORVASTATIN CALCIUM 20 MG/1
20 TABLET, FILM COATED ORAL DAILY
Qty: 90 TABLET | Refills: 3 | Status: SHIPPED | OUTPATIENT
Start: 2021-12-17 | End: 2022-09-26

## 2021-12-17 RX ORDER — SIMVASTATIN 20 MG
20 TABLET ORAL AT BEDTIME
Qty: 90 TABLET | Refills: 3 | Status: CANCELLED | OUTPATIENT
Start: 2021-12-17

## 2021-12-17 ASSESSMENT — MIFFLIN-ST. JEOR: SCORE: 1186.16

## 2021-12-20 LAB — DEPRECATED CALCIDIOL+CALCIFEROL SERPL-MC: 56 UG/L (ref 20–75)

## 2021-12-21 ENCOUNTER — ANCILLARY PROCEDURE (OUTPATIENT)
Dept: CT IMAGING | Facility: CLINIC | Age: 86
End: 2021-12-21
Attending: PREVENTIVE MEDICINE
Payer: MEDICARE

## 2021-12-21 VITALS
WEIGHT: 172.6 LBS | HEART RATE: 65 BPM | HEIGHT: 62 IN | SYSTOLIC BLOOD PRESSURE: 123 MMHG | TEMPERATURE: 97.4 F | DIASTOLIC BLOOD PRESSURE: 73 MMHG | OXYGEN SATURATION: 98 % | BODY MASS INDEX: 31.76 KG/M2

## 2021-12-21 DIAGNOSIS — R91.8 PULMONARY NODULES: ICD-10-CM

## 2021-12-21 PROCEDURE — 71250 CT THORAX DX C-: CPT | Mod: MG | Performed by: RADIOLOGY

## 2021-12-21 PROCEDURE — G1004 CDSM NDSC: HCPCS | Performed by: RADIOLOGY

## 2021-12-21 NOTE — RESULT ENCOUNTER NOTE
Brenda,    Vitamin D levels and thyroid function are normal.  Parathyroid hormone levels are high, they were high last year as well. The hormone comes from the parathyroid glands that are located in the neck. This helps regulate calcium levels. Your calcium levels are normal (were high last time). I would recommend speaking to an ENDOCRINE specialist for this, I have placed a referral and they will contact you to schedule:    [facility] will call you to coordinate your care as prescribed by your provider. If you don't hear from a representative within 2 business days, please call [phone].    Electrolytes, glucose, kidney function and liver function tests are normal.  Cholesterol is at goal for you.  Urine sample is not showing any abnormal protein.  Blood counts are stable.       Please do not hesitate to call us at [phone] if you have any questions or concerns.    Thank you,    Lynda Lopes MD MPH

## 2021-12-24 ENCOUNTER — MYC MEDICAL ADVICE (OUTPATIENT)
Dept: FAMILY MEDICINE | Facility: CLINIC | Age: 86
End: 2021-12-24
Payer: MEDICARE

## 2021-12-24 DIAGNOSIS — E28.1 ANDROGEN EXCESS: Primary | ICD-10-CM

## 2021-12-28 ENCOUNTER — LAB (OUTPATIENT)
Dept: LAB | Facility: CLINIC | Age: 86
End: 2021-12-28
Payer: MEDICARE

## 2021-12-28 DIAGNOSIS — E28.1 ANDROGEN EXCESS: ICD-10-CM

## 2021-12-28 PROCEDURE — 84270 ASSAY OF SEX HORMONE GLOBUL: CPT

## 2021-12-28 PROCEDURE — 36415 COLL VENOUS BLD VENIPUNCTURE: CPT

## 2021-12-28 PROCEDURE — 84403 ASSAY OF TOTAL TESTOSTERONE: CPT

## 2021-12-28 NOTE — TELEPHONE ENCOUNTER
Pt calling about this and hoping to receive a call back rather than messaging back and forth.    Pt has been scheduled for lab appointment today.    Chiquis Harris,   [facility]

## 2021-12-29 LAB — SHBG SERPL-SCNC: 57 NMOL/L (ref 30–135)

## 2021-12-30 DIAGNOSIS — L30.9 FACIAL DERMATITIS: Primary | ICD-10-CM

## 2021-12-30 LAB
TESTOST FREE SERPL-MCNC: 0.17 NG/DL
TESTOST SERPL-MCNC: 14 NG/DL (ref 8–60)

## 2021-12-30 NOTE — RESULT ENCOUNTER NOTE
Dear Brenda    Your test results are attached. I am happy to let you know that they are stable.    The hormone levels are normal. I am thinking that the lesions are in the area of a mask and may be a reaction to mask wearing. I have seen more of these happen lately. I will send in a topical antibiotic to see if this helps with the rash. I will send it to the pharmacy in [place]. Let me know if this is correct. If it gets worse, you may need to see your provider in [place] or go to urgent care.     Please contact me by [employer] if you have any questions about your labs or management. You may also call my office number [phone] for any questions.     Liumdila Bowie MD

## 2022-03-15 ENCOUNTER — MYC MEDICAL ADVICE (OUTPATIENT)
Dept: FAMILY MEDICINE | Facility: CLINIC | Age: 87
End: 2022-03-15
Payer: MEDICARE

## 2022-03-17 DIAGNOSIS — Z96.642 H/O TOTAL HIP ARTHROPLASTY, LEFT: ICD-10-CM

## 2022-03-17 DIAGNOSIS — B00.9 HERPES SIMPLEX VIRUS INFECTION: ICD-10-CM

## 2022-03-17 RX ORDER — AMOXICILLIN 500 MG/1
CAPSULE ORAL
Qty: 4 CAPSULE | Refills: 0 | Status: SHIPPED | OUTPATIENT
Start: 2022-03-17

## 2022-03-17 RX ORDER — ACYCLOVIR 400 MG/1
TABLET ORAL
Qty: 90 TABLET | Refills: 1 | Status: SHIPPED | OUTPATIENT
Start: 2022-03-17 | End: 2022-12-06

## 2022-03-17 ASSESSMENT — ENCOUNTER SYMPTOMS
EYE PAIN: 0
HEARTBURN: 1
PALPITATIONS: 0
LIGHT-HEADEDNESS: 0
EYE REDNESS: 0
BOWEL INCONTINENCE: 0
POOR WOUND HEALING: 0
SKIN CHANGES: 0
LEG PAIN: 0
HYPERTENSION: 0
EXERCISE INTOLERANCE: 0
JAUNDICE: 0
BLOATING: 0
ABDOMINAL PAIN: 1
CONSTIPATION: 0
RECTAL PAIN: 0
ORTHOPNEA: 0
BLOOD IN STOOL: 0
NAUSEA: 0
EYE WATERING: 0
DIARRHEA: 1
VOMITING: 0
EYE IRRITATION: 1
SLEEP DISTURBANCES DUE TO BREATHING: 0
DOUBLE VISION: 1
HYPOTENSION: 0
SYNCOPE: 0

## 2022-03-18 DIAGNOSIS — Z87.440 H/O RECURRENT URINARY TRACT INFECTION: ICD-10-CM

## 2022-03-18 RX ORDER — NITROFURANTOIN MACROCRYSTAL 100 MG
CAPSULE ORAL
Qty: 90 CAPSULE | Refills: 0 | Status: SHIPPED | OUTPATIENT
Start: 2022-03-18 | End: 2022-06-20

## 2022-03-20 ENCOUNTER — MYC MEDICAL ADVICE (OUTPATIENT)
Dept: FAMILY MEDICINE | Facility: CLINIC | Age: 87
End: 2022-03-20
Payer: MEDICARE

## 2022-03-22 ENCOUNTER — OFFICE VISIT (OUTPATIENT)
Dept: ENDOCRINOLOGY | Facility: CLINIC | Age: 87
End: 2022-03-22
Payer: MEDICARE

## 2022-03-22 VITALS
BODY MASS INDEX: 30.54 KG/M2 | DIASTOLIC BLOOD PRESSURE: 70 MMHG | WEIGHT: 167 LBS | HEART RATE: 60 BPM | OXYGEN SATURATION: 100 % | SYSTOLIC BLOOD PRESSURE: 133 MMHG

## 2022-03-22 DIAGNOSIS — E66.811 CLASS 1 OBESITY WITHOUT SERIOUS COMORBIDITY IN ADULT, UNSPECIFIED BMI, UNSPECIFIED OBESITY TYPE: ICD-10-CM

## 2022-03-22 DIAGNOSIS — E21.3 HYPERPARATHYROIDISM (H): Primary | ICD-10-CM

## 2022-03-22 DIAGNOSIS — R79.89 ELEVATED PARATHYROID HORMONE: ICD-10-CM

## 2022-03-22 PROCEDURE — 99204 OFFICE O/P NEW MOD 45 MIN: CPT | Performed by: INTERNAL MEDICINE

## 2022-03-22 NOTE — LETTER
3/22/2022         RE: Brenda Barker  [id number] [facility] [id number]        Dear Colleague,    Thank you for referring your patient, Brenda Barker, to the [facility]. Please see a copy of my visit note below.    Endocrinology Clinic Visit    Chief Complaint: New Patient and Consult (Elevated thyroid hormone)     Information obtained from:Patient      Assessment/Treatment Plan:      Hyperparathyroidism  History of hypercalcemia    The overall clinical picture suggestive of primary hyperparathyroidism.  As the next step will check calcium, vitamin D, parathyroid hormone with a 24-hour urine collection for calcium.  Once we confirm our primary hyperparathyroidism-the next step is to assess an indication for surgery-  no history of kidney stones and previously done CT scan did not show any kidney stones,  GFR stable, currently asymptomatic and calcium level is within the normal limits.  For additional assessment we will check a bone density-DEXA scan which was ordered today.    History of prediabetes: check follow up A1c.     Return to clinic in 12  months.    Test and/or medications prescribed today:  Orders Placed This Encounter   Procedures     Dexa hip/pelvis/spine     Dexa Wrist/Heel     Albumin level     Phosphorus     Parathyroid Hormone Intact     Calcium     Urea nitrogen     Creatinine     Calcium timed urine with Creat Ratio     Creatinine timed urine     25 Hydroxyvitamin D2 and D3         Oskar Su MD  Staff Endocrinologist    Division of Endocrinology and Diabetes      Subjective:         HPI: Brenda Barker is a 86 year old female with history of with multiple medical issues discussed below who is seen in consultation at for elevated parathyroid hormone.    Has had hypercalcemia for the last 5+ years intermittently.  She was also noted to have high parathyroid hormone along with that.  No history of kidney stones.  No history of fractures.  No  family history of hypercalcemia.  Not currently taking high vitamin D or high calcium supplement.  Takes multivitamin.  Detailed review of system was documented below.  Planning to see dermatology for skin disorders.     Answers for HPI/ROS submitted by the patient on 3/17/2022  General Symptoms: No  Skin Symptoms: Yes  HENT Symptoms: No  EYE SYMPTOMS: Yes  HEART SYMPTOMS: Yes  LUNG SYMPTOMS: No  INTESTINAL SYMPTOMS: Yes  URINARY SYMPTOMS: No  GYNECOLOGIC SYMPTOMS: No  BREAST SYMPTOMS: No  SKELETAL SYMPTOMS: No  BLOOD SYMPTOMS: No  NERVOUS SYSTEM SYMPTOMS: No  MENTAL HEALTH SYMPTOMS: No  Changes in hair: No  Changes in moles/birth marks: No  Itching: No  Rashes: No  Acne: Yes  Hair in places you don't want it: No  Change in facial hair: Yes  Warts: No  Non-healing sores: No  Scarring: No  Flaking of skin: No  Color changes of hands/feet in cold : No  Sun sensitivity: No  Skin thickening: No  Eye pain: No  Vision loss: No  Dry eyes: Yes  Watery eyes: No  Eye bulging: No  Double vision: Yes  Flashing of lights: No  Spots: No  Floaters: No  Redness: No  Crossed eyes: No  Tunnel Vision: No  Yellowing of eyes: No  Eye irritation: Yes  Chest pain or pressure: No  Fast or irregular heartbeat: No  Pain in legs with walking: No  Trouble breathing while lying down: No  Fingers or toes appear blue: No  High blood pressure: No  Low blood pressure: No  Fainting: No  Murmurs: No  Pacemaker: No  Varicose veins: No  Edema or swelling: Yes  Wake up at night with shortness of breath: No  Light-headedness: No  Exercise intolerance: No  Heart burn or indigestion: Yes  Nausea: No  Vomiting: No  Abdominal pain: Yes  Bloating: No  Constipation: No  Diarrhea: Yes  Blood in stool: No  Black stools: No  Rectal or Anal pain: No  Fecal incontinence: No  Yellowing of skin or eyes: No  Vomit with blood: No  Change in stools: No      Allergies   Allergen Reactions     Diatrizoate Hives and Shortness Of Breath     Ciprofloxacin      nausea      Contrast Dye      Hives,anaphylaxix     Oxycodone Other (See Comments)     hallucinations     Sulfasalazine      Other reaction(s): Rash       Current Outpatient Medications   Medication Sig Dispense Refill     Acetaminophen (TYLENOL PO) Take 650 mg by mouth every 6 hours as needed for mild pain or fever       acyclovir (ZOVIRAX) 400 MG tablet TAKE 1 TABLET(400 MG) BY MOUTH DAILY 90 tablet 1     amoxicillin (AMOXIL) 500 MG capsule TAKE 4 CAPSULES(2000 MG) BY MOUTH 30 TO 60 MINUTES BEFORE DENTAL PROCEDURE 4 capsule 0     Ascorbic Acid (VITAMIN C PO)        atorvastatin (LIPITOR) 20 MG tablet Take 1 tablet (20 mg) by mouth daily 90 tablet 3     CENTRUM SILVER OR TABS 1 TABLET DAILY       Cetirizine HCl (ZYRTEC ALLERGY PO) Take 1 tablet by mouth daily       Cholecalciferol (VITAMIN D-3) 1000 UNITS CAPS Take 2,000 Int'l Units by mouth daily        ferrous gluconate (FERGON) 324 (38 Fe) MG tablet TAKE 1 TABLET BY MOUTH EVERY OTHER  tablet 0     FISH OIL 1000 MG OR CAPS 2 CAPSULES DAILY  (? DOSE) OTC --     FOLIC ACID 1 MG OR TABS 4 mg TABS DAILY       hydrochlorothiazide (MICROZIDE) 12.5 MG capsule TAKE 1 CAPSULE(12.5 MG) BY MOUTH DAILY 90 capsule 0     methotrexate 2.5 MG tablet CHEMO Take 6 tablets (15 mg) by mouth every 7 days       metoprolol succinate ER (TOPROL-XL) 50 MG 24 hr tablet Take 1 tablet (50 mg) by mouth daily 90 tablet 2     metroNIDAZOLE (METROCREAM) 0.75 % external cream Apply topically 2 times daily 45 g 0     Multiple Vitamins-Minerals (ZINC PO)        nitroFURantoin macrocrystal (MACRODANTIN) 100 MG capsule TAKE 1 CAPSULE(100 MG) BY MOUTH DAILY 90 capsule 0     omeprazole (PRILOSEC) 20 MG DR capsule Take 1 capsule (20 mg) by mouth daily 90 capsule 2     polyvinyl alcohol (LIQUID TEARS) 1.4 % ophthalmic solution 1 drop as needed.         Review of Systems     11 point review system (Constitutional, HENT, Eyes, Respiratory, Cardiovascular, Gastrointestinal, Genitourinary,  Musculoskeletal,Neurological, Psychiatric/Behavioural, Endocrine) is negative or is as per HPI above        Objective:   /70 (BP Location: Left arm, Patient Position: Chair, Cuff Size: Adult Regular)   Pulse 60   Wt 75.8 kg (167 lb)   LMP 05/17/1972 (Exact Date)   SpO2 100%   BMI 30.54 kg/m    Constitutional: Pleasant no acute cardiopulmonary distress.   EYES: anicteric, normal extra-ocular movements, no lid lag or retraction.   HEENT: Mouth/Throat: Mucous membrane is moist. Oropharynx is clear.     Cardiovascular: RRR, S1, S2 normal.   Pulmonary/Chest: CTAB. No wheezing or rales.   Abdominal: +BS. Non tender to palpation.    Neurological: Alert and oriented.  No tremor and reflexes are symmetrical bilaterally and within the normal limits. Muscle strength 5/5.   Extremities: No edema.  Psychological: appropriate mood and affect   In House Labs:     Lab Results   Component Value Date    A1C 6.3 04/12/2007    A1C 5.8 08/16/2006    A1C 6.1 03/28/2006    A1C 5.8 05/19/2005    A1C 6.1 04/28/2004       TSH   Date Value Ref Range Status   12/17/2021 0.97 0.40 - 4.00 mU/L Final   09/25/2019 1.08 0.40 - 4.00 mU/L Final   06/11/2018 1.05 0.30 - 4.50 uIU/mL Final   04/12/2007 1.71 0.4 - 5.0 mU/L Final   05/19/2005 1.22 0.4 - 5.0 mU/L Final       Creatinine   Date Value Ref Range Status   12/17/2021 0.71 0.52 - 1.04 mg/dL Final   12/10/2020 0.70 0.52 - 1.04 mg/dL Final   ]  This note has been dictated using voice recognition software.  As a result, there may be errors in the documentation that have gone undetected.  Please consider this when interpreting information in this documentation.          Again, thank you for allowing me to participate in the care of your patient.        Sincerely,        Oskar Su MD

## 2022-03-22 NOTE — PROGRESS NOTES
Endocrinology Clinic Visit    Chief Complaint: New Patient and Consult (Elevated thyroid hormone)     Information obtained from:Patient      Assessment/Treatment Plan:      Hyperparathyroidism  History of hypercalcemia    The overall clinical picture suggestive of primary hyperparathyroidism.  As the next step will check calcium, vitamin D, parathyroid hormone with a 24-hour urine collection for calcium.  Once we confirm our primary hyperparathyroidism-the next step is to assess an indication for surgery-  no history of kidney stones and previously done CT scan did not show any kidney stones,  GFR stable, currently asymptomatic and calcium level is within the normal limits.  For additional assessment we will check a bone density-DEXA scan which was ordered today.    History of prediabetes: check follow up A1c.     Return to clinic in 12  months.    Test and/or medications prescribed today:  Orders Placed This Encounter   Procedures     Dexa hip/pelvis/spine     Dexa Wrist/Heel     Albumin level     Phosphorus     Parathyroid Hormone Intact     Calcium     Urea nitrogen     Creatinine     Calcium timed urine with Creat Ratio     Creatinine timed urine     25 Hydroxyvitamin D2 and D3         Oskar Su MD  Staff Endocrinologist    Division of Endocrinology and Diabetes      Subjective:         HPI: Brenda Barker is a 86 year old female with history of with multiple medical issues discussed below who is seen in consultation at for elevated parathyroid hormone.    Has had hypercalcemia for the last 5+ years intermittently.  She was also noted to have high parathyroid hormone along with that.  No history of kidney stones.  No history of fractures.  No family history of hypercalcemia.  Not currently taking high vitamin D or high calcium supplement.  Takes multivitamin.  Detailed review of system was documented below.  Planning to see dermatology for skin disorders.     Answers for HPI/ROS submitted by the patient  on 3/17/2022  General Symptoms: No  Skin Symptoms: Yes  HENT Symptoms: No  EYE SYMPTOMS: Yes  HEART SYMPTOMS: Yes  LUNG SYMPTOMS: No  INTESTINAL SYMPTOMS: Yes  URINARY SYMPTOMS: No  GYNECOLOGIC SYMPTOMS: No  BREAST SYMPTOMS: No  SKELETAL SYMPTOMS: No  BLOOD SYMPTOMS: No  NERVOUS SYSTEM SYMPTOMS: No  MENTAL HEALTH SYMPTOMS: No  Changes in hair: No  Changes in moles/birth marks: No  Itching: No  Rashes: No  Acne: Yes  Hair in places you don't want it: No  Change in facial hair: Yes  Warts: No  Non-healing sores: No  Scarring: No  Flaking of skin: No  Color changes of hands/feet in cold : No  Sun sensitivity: No  Skin thickening: No  Eye pain: No  Vision loss: No  Dry eyes: Yes  Watery eyes: No  Eye bulging: No  Double vision: Yes  Flashing of lights: No  Spots: No  Floaters: No  Redness: No  Crossed eyes: No  Tunnel Vision: No  Yellowing of eyes: No  Eye irritation: Yes  Chest pain or pressure: No  Fast or irregular heartbeat: No  Pain in legs with walking: No  Trouble breathing while lying down: No  Fingers or toes appear blue: No  High blood pressure: No  Low blood pressure: No  Fainting: No  Murmurs: No  Pacemaker: No  Varicose veins: No  Edema or swelling: Yes  Wake up at night with shortness of breath: No  Light-headedness: No  Exercise intolerance: No  Heart burn or indigestion: Yes  Nausea: No  Vomiting: No  Abdominal pain: Yes  Bloating: No  Constipation: No  Diarrhea: Yes  Blood in stool: No  Black stools: No  Rectal or Anal pain: No  Fecal incontinence: No  Yellowing of skin or eyes: No  Vomit with blood: No  Change in stools: No      Allergies   Allergen Reactions     Diatrizoate Hives and Shortness Of Breath     Ciprofloxacin      nausea     Contrast Dye      Hives,anaphylaxix     Oxycodone Other (See Comments)     hallucinations     Sulfasalazine      Other reaction(s): Rash       Current Outpatient Medications   Medication Sig Dispense Refill     Acetaminophen (TYLENOL PO) Take 650 mg by mouth every 6  hours as needed for mild pain or fever       acyclovir (ZOVIRAX) 400 MG tablet TAKE 1 TABLET(400 MG) BY MOUTH DAILY 90 tablet 1     amoxicillin (AMOXIL) 500 MG capsule TAKE 4 CAPSULES(2000 MG) BY MOUTH 30 TO 60 MINUTES BEFORE DENTAL PROCEDURE 4 capsule 0     Ascorbic Acid (VITAMIN C PO)        atorvastatin (LIPITOR) 20 MG tablet Take 1 tablet (20 mg) by mouth daily 90 tablet 3     CENTRUM SILVER OR TABS 1 TABLET DAILY       Cetirizine HCl (ZYRTEC ALLERGY PO) Take 1 tablet by mouth daily       Cholecalciferol (VITAMIN D-3) 1000 UNITS CAPS Take 2,000 Int'l Units by mouth daily        ferrous gluconate (FERGON) 324 (38 Fe) MG tablet TAKE 1 TABLET BY MOUTH EVERY OTHER  tablet 0     FISH OIL 1000 MG OR CAPS 2 CAPSULES DAILY  (? DOSE) OTC --     FOLIC ACID 1 MG OR TABS 4 mg TABS DAILY       hydrochlorothiazide (MICROZIDE) 12.5 MG capsule TAKE 1 CAPSULE(12.5 MG) BY MOUTH DAILY 90 capsule 0     methotrexate 2.5 MG tablet CHEMO Take 6 tablets (15 mg) by mouth every 7 days       metoprolol succinate ER (TOPROL-XL) 50 MG 24 hr tablet Take 1 tablet (50 mg) by mouth daily 90 tablet 2     metroNIDAZOLE (METROCREAM) 0.75 % external cream Apply topically 2 times daily 45 g 0     Multiple Vitamins-Minerals (ZINC PO)        nitroFURantoin macrocrystal (MACRODANTIN) 100 MG capsule TAKE 1 CAPSULE(100 MG) BY MOUTH DAILY 90 capsule 0     omeprazole (PRILOSEC) 20 MG DR capsule Take 1 capsule (20 mg) by mouth daily 90 capsule 2     polyvinyl alcohol (LIQUID TEARS) 1.4 % ophthalmic solution 1 drop as needed.         Review of Systems     11 point review system (Constitutional, HENT, Eyes, Respiratory, Cardiovascular, Gastrointestinal, Genitourinary, Musculoskeletal,Neurological, Psychiatric/Behavioural, Endocrine) is negative or is as per HPI above        Objective:   /70 (BP Location: Left arm, Patient Position: Chair, Cuff Size: Adult Regular)   Pulse 60   Wt 75.8 kg (167 lb)   LMP 05/17/1972 (Exact Date)   SpO2 100%    BMI 30.54 kg/m    Constitutional: Pleasant no acute cardiopulmonary distress.   EYES: anicteric, normal extra-ocular movements, no lid lag or retraction.   HEENT: Mouth/Throat: Mucous membrane is moist. Oropharynx is clear.     Cardiovascular: RRR, S1, S2 normal.   Pulmonary/Chest: CTAB. No wheezing or rales.   Abdominal: +BS. Non tender to palpation.    Neurological: Alert and oriented.  No tremor and reflexes are symmetrical bilaterally and within the normal limits. Muscle strength 5/5.   Extremities: No edema.  Psychological: appropriate mood and affect   In House Labs:     Lab Results   Component Value Date    A1C 6.3 04/12/2007    A1C 5.8 08/16/2006    A1C 6.1 03/28/2006    A1C 5.8 05/19/2005    A1C 6.1 04/28/2004       TSH   Date Value Ref Range Status   12/17/2021 0.97 0.40 - 4.00 mU/L Final   09/25/2019 1.08 0.40 - 4.00 mU/L Final   06/11/2018 1.05 0.30 - 4.50 uIU/mL Final   04/12/2007 1.71 0.4 - 5.0 mU/L Final   05/19/2005 1.22 0.4 - 5.0 mU/L Final       Creatinine   Date Value Ref Range Status   12/17/2021 0.71 0.52 - 1.04 mg/dL Final   12/10/2020 0.70 0.52 - 1.04 mg/dL Final   ]  This note has been dictated using voice recognition software.  As a result, there may be errors in the documentation that have gone undetected.  Please consider this when interpreting information in this documentation.

## 2022-03-22 NOTE — TELEPHONE ENCOUNTER
Patient scheduled appointment. No further action needed. Writer will close this encounter.     Stephany Foley RN, BSN  [facility]

## 2022-03-22 NOTE — PATIENT INSTRUCTIONS
"    Patient Education     Understanding Primary Hyperparathyroidism  The parathyroid glands are 4 tiny glands in the neck. They make parathyroid hormone (PTH). PTH controls the amount of calcium and phosphorus in your blood. Primary hyperparathyroidism is when there is too much PTH in your blood. It occurs when 1 or more of the glands are too active.   The job of PTH is to tell the body how to control calcium. Too much PTH means the body raises the amount of calcium in the blood. This leads to a problem where there is too much calcium in the blood (hypercalcemia). This can cause serious health problems.     Causes  Hyperparathyroidism can occur when a parathyroid gland gets bigger. It can also occur as a complication of other health conditions. These include kidney failure or rickets. In these conditions, calcium is often not high. This is called secondary hyperparathyroidism.   Symptoms  Symptoms can include:    Muscle weakness    Depression    Tiredness    Confusion and memory loss    Poor memory    Nausea and vomiting    Belly pain    Hard stools (constipation)    Stomach ulcers    Need to urinate often    Kidney stones    Joint or bone pain    Bone disease (osteopenia or osteoporosis) or having more bone fractures    High blood pressure    Feeling very thirsty  [employer] last reviewed this educational content on 8/1/2019 2000-2021 [employer]. All rights reserved. This information is not intended as a substitute for professional medical care. Always follow your healthcare professional's instructions.    24 hour urine collection   Brief description of what you need to do:  Do the test on a day off, so you can stay home.  Wake up and flush the first urine.  Then collect all the urine for that day, evening and overnight if you wake to urinate. Plus the first urine of the next morning.    For women,  the lab should also give you a \"hat\" which is a device to help collect the urine and to pour it into " the collection jug.   The urine must be kept cool, not frozen.  So, do not put it out on the porch.  Keep it in the fridge.  Blood work and urine test 3 months from now.   Orders Placed This Encounter   Procedures     Dexa hip/pelvis/spine     Dexa Wrist/Heel     Albumin level     Phosphorus     Parathyroid Hormone Intact     Calcium     Urea nitrogen     Creatinine     Calcium timed urine with Creat Ratio     Creatinine timed urine     25 Hydroxyvitamin D2 and D3     CALCIUM - 1000 mg daily to from all sources.     Dietary sources: These also contain vitamin D  Milk                            8 oz            300 mg  Yogurt                          1 cup           400 mg  Hard cheese                     1.5 oz          300 mg  Cottage cheese                  2 cup           300 mg  Orange juice with Calcium       8 oz            300 mg  Low fat dairy sources are recommended

## 2022-03-29 ENCOUNTER — MYC MEDICAL ADVICE (OUTPATIENT)
Dept: ENDOCRINOLOGY | Facility: CLINIC | Age: 87
End: 2022-03-29
Payer: MEDICARE

## 2022-04-11 ENCOUNTER — ANCILLARY PROCEDURE (OUTPATIENT)
Dept: BONE DENSITY | Facility: CLINIC | Age: 87
End: 2022-04-11
Attending: INTERNAL MEDICINE
Payer: MEDICARE

## 2022-04-11 DIAGNOSIS — E21.3 HYPERPARATHYROIDISM (H): ICD-10-CM

## 2022-04-11 PROCEDURE — 77081 DXA BONE DENSITY APPENDICULR: CPT | Mod: 59 | Performed by: RADIOLOGY

## 2022-04-11 PROCEDURE — 77080 DXA BONE DENSITY AXIAL: CPT | Performed by: RADIOLOGY

## 2022-04-18 ENCOUNTER — OFFICE VISIT (OUTPATIENT)
Dept: FAMILY MEDICINE | Facility: CLINIC | Age: 87
End: 2022-04-18
Payer: MEDICARE

## 2022-04-18 VITALS
BODY MASS INDEX: 30.69 KG/M2 | DIASTOLIC BLOOD PRESSURE: 65 MMHG | TEMPERATURE: 97.5 F | SYSTOLIC BLOOD PRESSURE: 144 MMHG | WEIGHT: 167.8 LBS | OXYGEN SATURATION: 99 % | HEART RATE: 59 BPM

## 2022-04-18 DIAGNOSIS — K64.4 EXTERNAL HEMORRHOIDS: ICD-10-CM

## 2022-04-18 DIAGNOSIS — D33.3 ACOUSTIC NEUROMA (H): ICD-10-CM

## 2022-04-18 DIAGNOSIS — K62.5 RECTAL BLEEDING: Primary | ICD-10-CM

## 2022-04-18 DIAGNOSIS — K52.9 CHRONIC DIARRHEA: ICD-10-CM

## 2022-04-18 DIAGNOSIS — M05.79 RHEUMATOID ARTHRITIS INVOLVING MULTIPLE SITES WITH POSITIVE RHEUMATOID FACTOR (H): ICD-10-CM

## 2022-04-18 PROCEDURE — 99214 OFFICE O/P EST MOD 30 MIN: CPT | Performed by: PHYSICIAN ASSISTANT

## 2022-04-18 PROCEDURE — 0054A COVID-19,PF,PFIZER (12+ YRS): CPT | Performed by: PHYSICIAN ASSISTANT

## 2022-04-18 PROCEDURE — 91305 COVID-19,PF,PFIZER (12+ YRS): CPT | Performed by: PHYSICIAN ASSISTANT

## 2022-04-18 RX ORDER — HYDROCORTISONE 25 MG/G
CREAM TOPICAL 2 TIMES DAILY PRN
Qty: 30 G | Refills: 2 | Status: SHIPPED | OUTPATIENT
Start: 2022-04-18

## 2022-04-18 ASSESSMENT — ENCOUNTER SYMPTOMS: DIARRHEA: 1

## 2022-04-18 NOTE — PROGRESS NOTES
Assessment & Plan   Problem List Items Addressed This Visit        Nervous and Auditory    Acoustic neuroma (H)       Digestive    External hemorrhoids    Relevant Medications    hydrocortisone, Perianal, (HYDROCORTISONE) 2.5 % cream       Immune    Rheumatoid arthritis (H)      Other Visit Diagnoses     Rectal bleeding    -  Primary    Relevant Orders    Adult Gastro Ref - Procedure Only    Adult Gastro Ref - Consult Only    Chronic diarrhea        Relevant Orders    Adult Gastro Ref - Procedure Only    Adult Gastro Ref - Consult Only       rectal bleeding possibly from internal hemorrhoids, but its unclear and was discussed that the best option to screen for colon cancer is colonoscopy. Patient does not feel she can do it at this time, but willing to try if its necessary  Start taking Metamucil daily 1 tablespoon daily     drink lots of water to prevent constipation bouts   HC Anusol cream appointment twice a day to the hemorrhoid      Acoustic neuroma is stable, patient sees neurology   RA is stable, patient sees rheumatology     20 minutes spent on the date of the encounter doing chart review, history and exam, documentation and further activities per the note       Return in about 2 weeks (around 5/2/2022) for colonoscopy and GI.    VINITA Gutierrez [facility]    Antonia Gutierrez is a 86 year old who presents for the following health issues        Diarrhea  Onset/Duration: 2 years of intermittent diarrhea   Description:       Consistency of stool: watery and runny       Blood in stool: YES- has seen last month once in the stool in the toilet        Number of loose stools past 24 hours: none  Progression of Symptoms: same  Accompanying signs and symptoms: patient has external hemorrhoids after last bout of diarrhea a month ago        Fever: no       Nausea/Vomiting: no       Abdominal pain: YES- cramps before the diarrhea comes. No chronic abdominal pain       Weight  loss: no       Episodes of constipation: YES  History   Ill contacts: no  Recent use of antibiotics: no  Recent travels: no  Recent medication-new or changes(Rx or OTC): no  Precipitating or alleviating factors: patient feels that stress brings on the diarrhea   Therapies tried and outcome: Imodium right ear helps   Prep H wipes for hemorrhoids     Patient gets occasional intermittent bouts of diarrhea usually one on the day it happens then it goes back to normal BMs. Last month patient saw dark red blood in the toilet after she had diarrhea bouts. Patient also developed an external hemorrhoid after amanda diarrhea bout    Review of Systems   Gastrointestinal: Positive for diarrhea.      Constitutional, HEENT, cardiovascular, pulmonary, gi and gu systems are negative, except as otherwise noted.      Objective    BP (!) 144/65 (BP Location: Right arm, Patient Position: Sitting, Cuff Size: Adult Regular)   Pulse 59   Temp 97.5  F (36.4  C) (Oral)   Wt 76.1 kg (167 lb 12.8 oz)   LMP 05/17/1972 (Exact Date)   SpO2 99%   BMI 30.69 kg/m    Body mass index is 30.69 kg/m .  Physical Exam   GENERAL: healthy, alert and no distress  NECK: no adenopathy, no asymmetry, masses, or scars and thyroid normal to palpation  RESP: lungs clear to auscultation - no rales, rhonchi or wheezes  CV: regular rate and rhythm, normal S1 S2, no S3 or S4, no murmur, click or rub, no peripheral edema and peripheral pulses strong  ABDOMEN: soft, nontender, no hepatosplenomegaly, no masses and bowel sounds normal  RECTAL (female): 2 cm in diameter hemorrhoid at 2 oclock  and anal fissure at 6 oclock   MS: no gross musculoskeletal defects noted, no edema                Answers for HPI/ROS submitted by the patient on 4/15/2022  On average, how many sweetened beverages do you drink each day (Examples: soda, juice, sweet tea, etc.  Do NOT count diet or artificially sweetened beverages)?: 0  How many minutes a day do you exercise enough to make your  heart beat faster?: 9 or less  How many days a week do you exercise enough to make your heart beat faster?: 3 or less  How many days per week do you miss taking your medication?: 0  What is the reason for your visit today?: Intermitant diarrhea  When did your symptoms begin?: 3-4 weeks ago  What are your symptoms?: Diarrhea & one instance of rectal bleeding  How would you describe these symptoms?: Moderate  Are your symptoms:: Improving  Have you had these symptoms before?: Yes  Have you tried or received treatment for these symptoms before?: Yes  Did that treatment work? : No  Is there anything that makes you feel worse?: Diet & emotional health  Is there anything that makes you feel better?: Immodium somewhat

## 2022-04-18 NOTE — PATIENT INSTRUCTIONS
the best option to screen for colon cancer is colonoscopy.   Start taking Metamucil daily 1 tablespoon daily     drink lots of water to prevent constipation bouts   Continue Imodium as needed   Follow up with GI specialist     HC Anusol cream appointment twice a day to the hemorrhoids

## 2022-04-18 NOTE — RESULT ENCOUNTER NOTE
Team: please call and schedule Ms. Brenda Barker with me for a follow up on May 3 at 10:30 am (it will be an add-on/schedule full).     Brenda HANEY Barker    The bone density scan showed osteoporosis. We need to discuss treatment plan regarding this condition. Our clinic team will reach out to you to schedule a follow up appointment.       Thank you,     Oskar Su MD

## 2022-04-26 ASSESSMENT — ENCOUNTER SYMPTOMS
DIARRHEA: 1
DIARRHEA: 1
EYE IRRITATION: 1
EYE IRRITATION: 1

## 2022-04-27 ENCOUNTER — VIRTUAL VISIT (OUTPATIENT)
Dept: GASTROENTEROLOGY | Facility: CLINIC | Age: 87
End: 2022-04-27
Attending: PHYSICIAN ASSISTANT
Payer: MEDICARE

## 2022-04-27 DIAGNOSIS — K62.5 RECTAL BLEEDING: ICD-10-CM

## 2022-04-27 DIAGNOSIS — K52.9 CHRONIC DIARRHEA: ICD-10-CM

## 2022-04-27 PROCEDURE — 99204 OFFICE O/P NEW MOD 45 MIN: CPT | Mod: 95 | Performed by: INTERNAL MEDICINE

## 2022-04-27 NOTE — PROGRESS NOTES
Brenda is a 86 year old who is being evaluated via a billable video visit.      Patient confirms medications and allergies are accurate via patients echeck in completion, and or denies any changes since last reviewed/verified.     Halina Patel, Virtual Facilitator    How would you like to obtain your AVS? MyChart  If the video visit is dropped, the invitation should be resent by: Text to cell phone: [phone]  Will anyone else be joining your video visit? No

## 2022-04-27 NOTE — PATIENT INSTRUCTIONS
Please let me know if you see blood in the stool again.  I will try to get the records from your previous colonoscopy.

## 2022-04-27 NOTE — PROGRESS NOTES
HPI:    Brenda presents today for a video visit to discuss an episode of diarrhea followed by a single episode of rectal bleeding that occurred a few weeks ago.  Diarrhea was accompanied by some light cramping but otherwise felt well.  Has had previous rare episodes of diarrhea but has never seen blood in the stool before.  Does have some external hemorrhoids which she has been managing with topical creams.    Believes her last colonoscopy was around 2683-7787 done in [place] (no report available) but believes it was normal. Did struggle with the prep (had incontinence) and is hesitant to do it again. Is on iron supplements and has been for some time - takes 3 tabs a week.  Has never had an EGD      Past Medical History:   Diagnosis Date     AORTIC VALVE SCLEROSIS 1/2002    ECHO AV sclerosis, mild TR, trace MR     ASYMPTOMATIC PVCS      Back pain      Basal cell carcinoma of scalp      BORDERLINE ELEVATED HBA1C- 6.1%  4/04 6/12/2005     CARPAL TUNNEL SYNDROME, R>L 8/2001     CERV. 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 CSF leak     Neoplasm of uncertain behavior of skin      OBESITY -BMI 32      OSTEOPENIA 7/03    L1-4 1.7,FemNck L-0.8,R-1.2,F'arm dist-0.3     Pain in joint, pelvic region and thigh      Rheumatoid arthritis(714.0) 6/28/2004     Spinal stenosis, unspecified region other than cervical      Unspecified hearing loss      URIN TRACT INFECTION, RECURRENT        Past Surgical History:   Procedure Laterality Date     ARTHROPLASTY HIP Left 6/27/2018    Procedure: ARTHROPLASTY HIP;  Left Total Hip Arthroplasty  ;  Surgeon: Cem  Keo ENCARNACION MD;  Location: [facility] OR     BACK SURGERY  2011    Fusion L3-4 & decompression; [facility]     BIOPSY  2013    Thyroid - normal result     C DEXA, BONE DENSITY, AXIAL SKEL  7/03 7/03 L1-4 1.7, FemNkL-0.8,R-1.2, FArm Px 0.2,Dist-0.3     EYE SURGERY  2010 & 11    Cataract surgery     HC FLEX SIGMOIDOSCOPY W/WO KARYNA SPEC BY BRUSH/WASH  12/2001     HC REMOVAL OF TONSILS,<13 Y/O  1942     ORTHOPEDIC SURGERY  2008    Left knee replacement     SURGICAL HISTORY OF -   1989    basal cell Ca scalp excised     SURGICAL HISTORY OF -   1998    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     ZZC REMOVAL OF OVARY(S)  1980    2nd ovary removed - endometriosis     ZZC TOTAL ABDOM HYSTERECTOMY  1972    uterus, ovary removed - fibroid     ZZ COLONOSCOPY THRU STOMA W BIOPSY/CAUTERY TUMOR/POLYP/LESION  1993    adenomatous polyp     ZZ COLONOSCOPY THRU STOMA, DIAGNOSTIC  1998, 10/03    normal, '03 rec repeat in 5 yrs due to +hx polyp and + fam hx       Family History   Problem Relation Age of Onset     Gastrointestinal Disease Father         bleeding ulcer     Cancer Mother         thyroid cancer     Other Cancer Mother         thyroid & throat     Osteoporosis Mother      Thyroid Disease Mother      Diabetes Paternal Grandmother      Cerebrovascular Disease Paternal Grandmother      Colon Cancer Maternal Half-Brother      Osteoporosis Maternal Grandmother        Social History     Tobacco Use     Smoking status: Never Smoker     Smokeless tobacco: Never Used   Substance Use Topics     Alcohol use: Yes     Comment: occasionally        O:    Gen: no acute distress  HEENT: NCAT  Neck: normal ROM  Resp: nonlabored breathing  Neuro: no gross deficits  Psych: appropriate mood and affect    Assessment and Plan:    # single episode of diarrhea with BRBPR  - symptoms have since resolved.  Bleeding likely related to ano-rectal source given known external hemorrhoids although other etiologies a possibility as well.   Patient is on iron supplements and has been for some time (iron studies available to me have been normal) - hemoglobin currently stable at 12.1 (in care everywhere).  Did report significant difficulty with colon prep in the past and would like to avoid if possible.  Will request prior report.  If recurrent bleeding or drop in hemoglobin/increase in need for iron supplementation, will revisit need for Colonoscopy and/or EGD.    RTC as needed    Gracia Best, DO     Video-Visit Details     Type of service:  Video Visit     Video Start Time: 2:00 PM  Video End Time (time video stopped): 2:10PM    Originating Location (pt. Location): home   Distant Location (provider location):  [facility]      Mode of Communication:  Video Conference via [employer]    Answers for HPI/ROS submitted by the patient on 4/26/2022  General Symptoms: No  Skin Symptoms: Yes  HENT Symptoms: No  EYE SYMPTOMS: Yes  HEART SYMPTOMS: No  LUNG SYMPTOMS: No  INTESTINAL SYMPTOMS: Yes  URINARY SYMPTOMS: No  GYNECOLOGIC SYMPTOMS: No  BREAST SYMPTOMS: No  SKELETAL SYMPTOMS: No  BLOOD SYMPTOMS: No  NERVOUS SYSTEM SYMPTOMS: No  MENTAL HEALTH SYMPTOMS: No  Acne: Yes  Dry eyes: Yes  Eye irritation: Yes  Diarrhea: Yes

## 2022-04-27 NOTE — Clinical Note
Let me know if you have other thoughts - I think its reasonable to monitor for now but if anemia worsens or recurrent bleeding will need to reconsider endoscopy. Thanks! Gracia

## 2022-05-03 ENCOUNTER — VIRTUAL VISIT (OUTPATIENT)
Dept: ENDOCRINOLOGY | Facility: CLINIC | Age: 87
End: 2022-05-03
Payer: MEDICARE

## 2022-05-03 DIAGNOSIS — E21.0 PRIMARY HYPERPARATHYROIDISM (H): ICD-10-CM

## 2022-05-03 DIAGNOSIS — M81.0 AGE-RELATED OSTEOPOROSIS WITHOUT CURRENT PATHOLOGICAL FRACTURE: Primary | ICD-10-CM

## 2022-05-03 PROCEDURE — 99443 PR PHYSICIAN TELEPHONE EVALUATION 21-30 MIN: CPT | Mod: 95 | Performed by: INTERNAL MEDICINE

## 2022-05-03 RX ORDER — ZOLEDRONIC ACID 5 MG/100ML
5 INJECTION, SOLUTION INTRAVENOUS ONCE
Status: CANCELLED
Start: 2022-05-03 | End: 2022-05-03

## 2022-05-03 RX ORDER — METHYLPREDNISOLONE SODIUM SUCCINATE 125 MG/2ML
125 INJECTION, POWDER, LYOPHILIZED, FOR SOLUTION INTRAMUSCULAR; INTRAVENOUS
Status: CANCELLED
Start: 2022-05-03

## 2022-05-03 RX ORDER — HEPARIN SODIUM,PORCINE 10 UNIT/ML
5 VIAL (ML) INTRAVENOUS
Status: CANCELLED | OUTPATIENT
Start: 2022-05-03

## 2022-05-03 RX ORDER — HEPARIN SODIUM (PORCINE) LOCK FLUSH IV SOLN 100 UNIT/ML 100 UNIT/ML
5 SOLUTION INTRAVENOUS
Status: CANCELLED | OUTPATIENT
Start: 2022-05-03

## 2022-05-03 RX ORDER — NALOXONE HYDROCHLORIDE 0.4 MG/ML
0.2 INJECTION, SOLUTION INTRAMUSCULAR; INTRAVENOUS; SUBCUTANEOUS
Status: CANCELLED | OUTPATIENT
Start: 2022-05-03

## 2022-05-03 RX ORDER — DIPHENHYDRAMINE HYDROCHLORIDE 50 MG/ML
50 INJECTION INTRAMUSCULAR; INTRAVENOUS
Status: CANCELLED
Start: 2022-05-03

## 2022-05-03 RX ORDER — EPINEPHRINE 1 MG/ML
0.3 INJECTION, SOLUTION INTRAMUSCULAR; SUBCUTANEOUS EVERY 5 MIN PRN
Status: CANCELLED | OUTPATIENT
Start: 2022-05-03

## 2022-05-03 RX ORDER — ALBUTEROL SULFATE 0.83 MG/ML
2.5 SOLUTION RESPIRATORY (INHALATION)
Status: CANCELLED | OUTPATIENT
Start: 2022-05-03

## 2022-05-03 RX ORDER — ALBUTEROL SULFATE 90 UG/1
1-2 AEROSOL, METERED RESPIRATORY (INHALATION)
Status: CANCELLED
Start: 2022-05-03

## 2022-05-03 NOTE — PROGRESS NOTES
Brenda is a 86 year old who is being evaluated via a billable telephone visit.      What phone number would you like to be contacted at? [phone]   How would you like to obtain your AVS? Urmila LOGAN

## 2022-05-03 NOTE — PROGRESS NOTES
"Endocrinology Clinic Visit    Chief Complaint: Follow Up (Discuss DEXA and treatment options)     Information obtained from:Patient      Assessment/Treatment Plan:    Osteoporosis-age-related and secondary to primary hyperparathyroidism.  I have personally reviewed bone density which was done on 4/11/2022.  Agree with interpretation as documented below.  \"Lumbar spine T-score in region of L4 = -2.5    HIPS:  Mean total hip T-score: -2.6  Left femoral neck T-score = -1.8   Radius 33% T-score = -3.0\"    Plan-  #1 complete work-up for primary hyperparathyroidism and treat underlying cause.  Urine test and blood work still pending.  2.  Calcium 1200 mg daily from all sources.  Preferably from diet source.  Discussed in detail and written information documented below provided.  3.  Vitamin D 800 international units daily through multivitamin.  Check vitamin D and assess if additional supplement needed.  4.  Bone specific therapy indicated Reclast infusion ordered.  Risk-benefit discussed and written information below provided.  5.  Fall prevention and strengthening exercises.    Hyperparathyroidism  History of hypercalcemia    The overall clinical picture suggestive of primary hyperparathyroidism.  As the next step will check calcium, vitamin D, parathyroid hormone with a 24-hour urine collection for calcium.  Once we confirm primary hyperparathyroidism-surgical intervention is recommended due to osteoporosis documented above.    History of prediabetes: check follow up A1c.     Return to clinic in 3 weeks.      Patient Instructions   Brenda,      Please complete blood work and 24-hour urine collection.  This was previously ordered.    Bone density results indicated osteoporosis.  Treatment for this #1 calcium through the diet-a glass of milk-8 ounce, a serving of yogurt-1 cup and continue cheese products you are currently doing.  These food items would meet your daily calcium requirement.  Given your history of high " calcium, it is recommended if calcium comes through the diet instead of supplement.  #2 vitamin D through the multivitamin you are currently taking.  The blood work we are doing; will help us to assess if you need more.  #3 fall prevention and strengthening exercises.  Walking is a great exercise.  #4 bone specific therapy to prevent fractures.  We discussed about once a year infusion-Reclast infusion.  Please see additional information below regarding Reclast infusion.    Zoledronic acid: Brand Names: [place]     Reclast;   What is this drug used for?         It is used to prevent or treat soft, brittle bones (osteoporosis).     What are some side effects that I need to call my doctor about right away?     Signs of an allergic reaction, like rash; hives; itching; red, swollen, blistered, or peeling skin with or without fever; wheezing; tightness in the chest or throat; trouble breathing, swallowing, or talking; unusual hoarseness; or swelling of the mouth, face, lips, tongue, or throat.    Signs of low calcium levels like muscle cramps or spasms, numbness and tingling, or seizures.     This drug may cause jawbone problems. The risk may be higher the longer you take this drug. The risk may be higher if you have cancer, dental problems, dentures that do not fit well, anemia, blood clotting problems, or an infection. The risk may also be higher if you are having dental work, get chemo or radiation, or take other drugs that may cause jawbone problems (like some steroid drugs).  Notify your provider; if you have jaw swelling or pain.     How is this drug best taken?     It is given as an infusion into a vein over a period of time.    Drink lots of noncaffeine liquids due to sedation   take Tylenol 500 mg every 8 hours for three days after the infusion.    Drink at least 2 glasses of liquids a few hours before you get this drug.     #5 we will schedule the Reclast infusion and an office visit on the same day.  Please complete  "blood work and 24-hour urine collection prior to the office visit.      Please let me know if any questions.    MD Oskar Jiménez MD  Staff Endocrinologist    Division of Endocrinology and Diabetes      Subjective:         HPI: Brenda Barker is a 86 year old female with history of with multiple medical issues discussed below.     Has had hypercalcemia for the last 5+ years intermittently.  She was also noted to have high parathyroid hormone along with that.  No history of kidney stones.  No history of fractures.  No family history of hypercalcemia.  Not currently taking high vitamin D or high calcium supplement.  Takes multivitamin.  Detailed review of system was documented below.  Planning to see dermatology for skin disorders.  Recently done bone density showed osteoporosis.  \"Lumbar spine T-score in region of L4 = -2.5      HIPS:  Mean total hip T-score: -2.6  Left femoral neck T-score = -1.8     Radius 33% T-score = -3.0\"  Vitamin d 20 mcg via multivitamin.   Cottage cheese, yogurt every other day.   Answers for HPI/ROS submitted by the patient on 4/26/2022  General Symptoms: No  Skin Symptoms: Yes  HENT Symptoms: No  EYE SYMPTOMS: Yes  HEART SYMPTOMS: No  LUNG SYMPTOMS: No  INTESTINAL SYMPTOMS: Yes  URINARY SYMPTOMS: No  GYNECOLOGIC SYMPTOMS: No  BREAST SYMPTOMS: No  SKELETAL SYMPTOMS: No  BLOOD SYMPTOMS: No  NERVOUS SYSTEM SYMPTOMS: No  MENTAL HEALTH SYMPTOMS: No  Acne: Yes  Dry eyes: Yes  Eye irritation: Yes  Diarrhea: Yes      Allergies   Allergen Reactions     Diatrizoate Hives and Shortness Of Breath     Ciprofloxacin      nausea     Contrast Dye      Hives,anaphylaxix     Oxycodone Other (See Comments)     hallucinations     Sulfasalazine      Other reaction(s): Rash       Current Outpatient Medications   Medication Sig Dispense Refill     Acetaminophen (TYLENOL PO) Take 650 mg by mouth every 6 hours as needed for mild pain or fever       acyclovir (ZOVIRAX) 400 MG " tablet TAKE 1 TABLET(400 MG) BY MOUTH DAILY 90 tablet 1     amoxicillin (AMOXIL) 500 MG capsule TAKE 4 CAPSULES(2000 MG) BY MOUTH 30 TO 60 MINUTES BEFORE DENTAL PROCEDURE 4 capsule 0     Ascorbic Acid (VITAMIN C PO)        atorvastatin (LIPITOR) 20 MG tablet Take 1 tablet (20 mg) by mouth daily 90 tablet 3     CENTRUM SILVER OR TABS 1 TABLET DAILY       Cetirizine HCl (ZYRTEC ALLERGY PO) Take 1 tablet by mouth daily       ferrous gluconate (FERGON) 324 (38 Fe) MG tablet TAKE 1 TABLET BY MOUTH EVERY OTHER  tablet 0     FISH OIL 1000 MG OR CAPS 2 CAPSULES DAILY  (? DOSE) OTC --     FOLIC ACID 1 MG OR TABS 4 mg TABS DAILY       hydrochlorothiazide (MICROZIDE) 12.5 MG capsule TAKE 1 CAPSULE(12.5 MG) BY MOUTH DAILY 90 capsule 0     hydrocortisone, Perianal, (HYDROCORTISONE) 2.5 % cream Place rectally 2 times daily as needed for hemorrhoids 30 g 2     methotrexate 2.5 MG tablet CHEMO Take 6 tablets (15 mg) by mouth every 7 days       metoprolol succinate ER (TOPROL-XL) 50 MG 24 hr tablet Take 1 tablet (50 mg) by mouth daily 90 tablet 2     metroNIDAZOLE (METROCREAM) 0.75 % external cream Apply topically 2 times daily 45 g 0     Multiple Vitamins-Minerals (ZINC PO)        nitroFURantoin macrocrystal (MACRODANTIN) 100 MG capsule TAKE 1 CAPSULE(100 MG) BY MOUTH DAILY 90 capsule 0     omeprazole (PRILOSEC) 20 MG DR capsule Take 1 capsule (20 mg) by mouth daily 90 capsule 2     polyvinyl alcohol (LIQUIFILM TEARS) 1.4 % ophthalmic solution 1 drop as needed.       Cholecalciferol (VITAMIN D-3) 1000 UNITS CAPS Take 2,000 Int'l Units by mouth daily  (Patient not taking: No sig reported)         Review of Systems     11 point review system (Constitutional, HENT, Eyes, Respiratory, Cardiovascular, Gastrointestinal, Genitourinary, Musculoskeletal,Neurological, Psychiatric/Behavioural, Endocrine) is negative or is as per HPI above        Objective:   previously documented below  Constitutional: Pleasant no acute cardiopulmonary  distress.   EYES: anicteric, normal extra-ocular movements, no lid lag or retraction.   HEENT: Mouth/Throat: Mucous membrane is moist. Oropharynx is clear.     Cardiovascular: RRR, S1, S2 normal.   Pulmonary/Chest: CTAB. No wheezing or rales.   Abdominal: +BS. Non tender to palpation.    Neurological: Alert and oriented.  No tremor and reflexes are symmetrical bilaterally and within the normal limits. Muscle strength 5/5.   Extremities: No edema.  Psychological: appropriate mood and affect   In House Labs:     Lab Results   Component Value Date    A1C 6.3 04/12/2007    A1C 5.8 08/16/2006    A1C 6.1 03/28/2006    A1C 5.8 05/19/2005    A1C 6.1 04/28/2004       TSH   Date Value Ref Range Status   12/17/2021 0.97 0.40 - 4.00 mU/L Final   09/25/2019 1.08 0.40 - 4.00 mU/L Final   06/11/2018 1.05 0.30 - 4.50 uIU/mL Final   04/12/2007 1.71 0.4 - 5.0 mU/L Final   05/19/2005 1.22 0.4 - 5.0 mU/L Final       Creatinine   Date Value Ref Range Status   12/17/2021 0.71 0.52 - 1.04 mg/dL Final   12/10/2020 0.70 0.52 - 1.04 mg/dL Final   ]  This note has been dictated using voice recognition software.  As a result, there may be errors in the documentation that have gone undetected.  Please consider this when interpreting information in this documentation.      Total telephone time=12 minutes.   40 minutes spent on the date of the encounter doing chart review, and exam, documentation and further activities per the note.

## 2022-05-03 NOTE — PATIENT INSTRUCTIONS
Brenda,    Please complete blood work and 24-hour urine collection.  This was previously ordered.  Bone density results indicated osteoporosis.  Treatment for this #1 calcium through the diet-a glass of milk-8 ounce, a serving of yogurt-1 cup and continue cheese products you are currently doing.  These food items would meet your daily calcium requirement.  Given your history of high calcium, it is recommended if calcium comes through the diet instead of supplement.  #2 vitamin D through the multivitamin you are currently taking.  The blood work we are doing; will help us to assess if you need more.  #3 fall prevention and strengthening exercises.  Walking is a great exercise.  #4 bone specific therapy to prevent fractures.  We discussed about once a year infusion-Reclast infusion.  Please see additional information below regarding Reclast infusion.  Zoledronic acid: Brand Names: [place]     Reclast;   What is this drug used for?         It is used to prevent or treat soft, brittle bones (osteoporosis).     What are some side effects that I need to call my doctor about right away?     Signs of an allergic reaction, like rash; hives; itching; red, swollen, blistered, or peeling skin with or without fever; wheezing; tightness in the chest or throat; trouble breathing, swallowing, or talking; unusual hoarseness; or swelling of the mouth, face, lips, tongue, or throat.    Signs of low calcium levels like muscle cramps or spasms, numbness and tingling, or seizures.     This drug may cause jawbone problems. The risk may be higher the longer you take this drug. The risk may be higher if you have cancer, dental problems, dentures that do not fit well, anemia, blood clotting problems, or an infection. The risk may also be higher if you are having dental work, get chemo or radiation, or take other drugs that may cause jawbone problems (like some steroid drugs).  Notify your provider; if you have jaw swelling or pain.     How is this  drug best taken?     It is given as an infusion into a vein over a period of time.    Drink lots of noncaffeine liquids due to sedation   take Tylenol 500 mg every 8 hours for three days after the infusion.    Drink at least 2 glasses of liquids a few hours before you get this drug.     #5 we will schedule the Reclast infusion and an office visit on the same day.  Please complete blood work and 24-hour urine collection prior to the office visit.      Please let me know if any questions.    Oskar Su MD

## 2022-05-03 NOTE — LETTER
"    5/3/2022         RE: Brenda Barker  [address] [facility] [id number]        Dear Colleague,    Thank you for referring your patient, Brenda Barker, to the [facility]. Please see a copy of my visit note below.    Brenda is a 86 year old who is being evaluated via a billable telephone visit.      What phone number would you like to be contacted at? [phone]   How would you like to obtain your AVS? Urmila Yoon     Endocrinology Clinic Visit    Chief Complaint: Follow Up (Discuss DEXA and treatment options)     Information obtained from:Patient      Assessment/Treatment Plan:    Osteoporosis-age-related and secondary to primary hyperparathyroidism.  I have personally reviewed bone density which was done on 4/11/2022.  Agree with interpretation as documented below.  \"Lumbar spine T-score in region of L4 = -2.5    HIPS:  Mean total hip T-score: -2.6  Left femoral neck T-score = -1.8   Radius 33% T-score = -3.0\"    Plan-  #1 complete work-up for primary hyperparathyroidism and treat underlying cause.  Urine test and blood work still pending.  2.  Calcium 1200 mg daily from all sources.  Preferably from diet source.  Discussed in detail and written information documented below provided.  3.  Vitamin D 800 international units daily through multivitamin.  Check vitamin D and assess if additional supplement needed.  4.  Bone specific therapy indicated Reclast infusion ordered.  Risk-benefit discussed and written information below provided.  5.  Fall prevention and strengthening exercises.    Hyperparathyroidism  History of hypercalcemia    The overall clinical picture suggestive of primary hyperparathyroidism.  As the next step will check calcium, vitamin D, parathyroid hormone with a 24-hour urine collection for calcium.  Once we confirm primary hyperparathyroidism-surgical intervention is recommended due to osteoporosis documented above.    History of " prediabetes: check follow up A1c.     Return to clinic in 3 weeks.      Patient Instructions   Brenda,      Please complete blood work and 24-hour urine collection.  This was previously ordered.    Bone density results indicated osteoporosis.  Treatment for this #1 calcium through the diet-a glass of milk-8 ounce, a serving of yogurt-1 cup and continue cheese products you are currently doing.  These food items would meet your daily calcium requirement.  Given your history of high calcium, it is recommended if calcium comes through the diet instead of supplement.  #2 vitamin D through the multivitamin you are currently taking.  The blood work we are doing; will help us to assess if you need more.  #3 fall prevention and strengthening exercises.  Walking is a great exercise.  #4 bone specific therapy to prevent fractures.  We discussed about once a year infusion-Reclast infusion.  Please see additional information below regarding Reclast infusion.    Zoledronic acid: Brand Names: [place]     Reclast;   What is this drug used for?         It is used to prevent or treat soft, brittle bones (osteoporosis).     What are some side effects that I need to call my doctor about right away?     Signs of an allergic reaction, like rash; hives; itching; red, swollen, blistered, or peeling skin with or without fever; wheezing; tightness in the chest or throat; trouble breathing, swallowing, or talking; unusual hoarseness; or swelling of the mouth, face, lips, tongue, or throat.    Signs of low calcium levels like muscle cramps or spasms, numbness and tingling, or seizures.     This drug may cause jawbone problems. The risk may be higher the longer you take this drug. The risk may be higher if you have cancer, dental problems, dentures that do not fit well, anemia, blood clotting problems, or an infection. The risk may also be higher if you are having dental work, get chemo or radiation, or take other drugs that may cause jawbone problems  "(like some steroid drugs).  Notify your provider; if you have jaw swelling or pain.     How is this drug best taken?     It is given as an infusion into a vein over a period of time.    Drink lots of noncaffeine liquids due to sedation   take Tylenol 500 mg every 8 hours for three days after the infusion.    Drink at least 2 glasses of liquids a few hours before you get this drug.     #5 we will schedule the Reclast infusion and an office visit on the same day.  Please complete blood work and 24-hour urine collection prior to the office visit.      Please let me know if any questions.    MD Oskar Jiménez MD  Staff Endocrinologist    Division of Endocrinology and Diabetes      Subjective:         HPI: Brenda Barker is a 86 year old female with history of with multiple medical issues discussed below.     Has had hypercalcemia for the last 5+ years intermittently.  She was also noted to have high parathyroid hormone along with that.  No history of kidney stones.  No history of fractures.  No family history of hypercalcemia.  Not currently taking high vitamin D or high calcium supplement.  Takes multivitamin.  Detailed review of system was documented below.  Planning to see dermatology for skin disorders.  Recently done bone density showed osteoporosis.  \"Lumbar spine T-score in region of L4 = -2.5      HIPS:  Mean total hip T-score: -2.6  Left femoral neck T-score = -1.8     Radius 33% T-score = -3.0\"  Vitamin d 20 mcg via multivitamin.   Cottage cheese, yogurt every other day.   Answers for HPI/ROS submitted by the patient on 4/26/2022  General Symptoms: No  Skin Symptoms: Yes  HENT Symptoms: No  EYE SYMPTOMS: Yes  HEART SYMPTOMS: No  LUNG SYMPTOMS: No  INTESTINAL SYMPTOMS: Yes  URINARY SYMPTOMS: No  GYNECOLOGIC SYMPTOMS: No  BREAST SYMPTOMS: No  SKELETAL SYMPTOMS: No  BLOOD SYMPTOMS: No  NERVOUS SYSTEM SYMPTOMS: No  MENTAL HEALTH SYMPTOMS: No  Acne: Yes  Dry eyes: Yes  Eye " irritation: Yes  Diarrhea: Yes      Allergies   Allergen Reactions     Diatrizoate Hives and Shortness Of Breath     Ciprofloxacin      nausea     Contrast Dye      Hives,anaphylaxix     Oxycodone Other (See Comments)     hallucinations     Sulfasalazine      Other reaction(s): Rash       Current Outpatient Medications   Medication Sig Dispense Refill     Acetaminophen (TYLENOL PO) Take 650 mg by mouth every 6 hours as needed for mild pain or fever       acyclovir (ZOVIRAX) 400 MG tablet TAKE 1 TABLET(400 MG) BY MOUTH DAILY 90 tablet 1     amoxicillin (AMOXIL) 500 MG capsule TAKE 4 CAPSULES(2000 MG) BY MOUTH 30 TO 60 MINUTES BEFORE DENTAL PROCEDURE 4 capsule 0     Ascorbic Acid (VITAMIN C PO)        atorvastatin (LIPITOR) 20 MG tablet Take 1 tablet (20 mg) by mouth daily 90 tablet 3     CENTRUM SILVER OR TABS 1 TABLET DAILY       Cetirizine HCl (ZYRTEC ALLERGY PO) Take 1 tablet by mouth daily       ferrous gluconate (FERGON) 324 (38 Fe) MG tablet TAKE 1 TABLET BY MOUTH EVERY OTHER  tablet 0     FISH OIL 1000 MG OR CAPS 2 CAPSULES DAILY  (? DOSE) OTC --     FOLIC ACID 1 MG OR TABS 4 mg TABS DAILY       hydrochlorothiazide (MICROZIDE) 12.5 MG capsule TAKE 1 CAPSULE(12.5 MG) BY MOUTH DAILY 90 capsule 0     hydrocortisone, Perianal, (HYDROCORTISONE) 2.5 % cream Place rectally 2 times daily as needed for hemorrhoids 30 g 2     methotrexate 2.5 MG tablet CHEMO Take 6 tablets (15 mg) by mouth every 7 days       metoprolol succinate ER (TOPROL-XL) 50 MG 24 hr tablet Take 1 tablet (50 mg) by mouth daily 90 tablet 2     metroNIDAZOLE (METROCREAM) 0.75 % external cream Apply topically 2 times daily 45 g 0     Multiple Vitamins-Minerals (ZINC PO)        nitroFURantoin macrocrystal (MACRODANTIN) 100 MG capsule TAKE 1 CAPSULE(100 MG) BY MOUTH DAILY 90 capsule 0     omeprazole (PRILOSEC) 20 MG DR capsule Take 1 capsule (20 mg) by mouth daily 90 capsule 2     polyvinyl alcohol (LIQUIFILM TEARS) 1.4 % ophthalmic solution 1  drop as needed.       Cholecalciferol (VITAMIN D-3) 1000 UNITS CAPS Take 2,000 Int'l Units by mouth daily  (Patient not taking: No sig reported)         Review of Systems     11 point review system (Constitutional, HENT, Eyes, Respiratory, Cardiovascular, Gastrointestinal, Genitourinary, Musculoskeletal,Neurological, Psychiatric/Behavioural, Endocrine) is negative or is as per HPI above        Objective:   previously documented below  Constitutional: Pleasant no acute cardiopulmonary distress.   EYES: anicteric, normal extra-ocular movements, no lid lag or retraction.   HEENT: Mouth/Throat: Mucous membrane is moist. Oropharynx is clear.     Cardiovascular: RRR, S1, S2 normal.   Pulmonary/Chest: CTAB. No wheezing or rales.   Abdominal: +BS. Non tender to palpation.    Neurological: Alert and oriented.  No tremor and reflexes are symmetrical bilaterally and within the normal limits. Muscle strength 5/5.   Extremities: No edema.  Psychological: appropriate mood and affect   In House Labs:     Lab Results   Component Value Date    A1C 6.3 04/12/2007    A1C 5.8 08/16/2006    A1C 6.1 03/28/2006    A1C 5.8 05/19/2005    A1C 6.1 04/28/2004       TSH   Date Value Ref Range Status   12/17/2021 0.97 0.40 - 4.00 mU/L Final   09/25/2019 1.08 0.40 - 4.00 mU/L Final   06/11/2018 1.05 0.30 - 4.50 uIU/mL Final   04/12/2007 1.71 0.4 - 5.0 mU/L Final   05/19/2005 1.22 0.4 - 5.0 mU/L Final       Creatinine   Date Value Ref Range Status   12/17/2021 0.71 0.52 - 1.04 mg/dL Final   12/10/2020 0.70 0.52 - 1.04 mg/dL Final   ]  This note has been dictated using voice recognition software.  As a result, there may be errors in the documentation that have gone undetected.  Please consider this when interpreting information in this documentation.      Total telephone time=12 minutes.   40 minutes spent on the date of the encounter doing chart review, and exam, documentation and further activities per the note.      Again, thank you for allowing  me to participate in the care of your patient.        Sincerely,        Oskar Su MD

## 2022-05-06 ENCOUNTER — LAB (OUTPATIENT)
Dept: LAB | Facility: CLINIC | Age: 87
End: 2022-05-06
Payer: MEDICARE

## 2022-05-06 DIAGNOSIS — E66.811 CLASS 1 OBESITY WITHOUT SERIOUS COMORBIDITY IN ADULT, UNSPECIFIED BMI, UNSPECIFIED OBESITY TYPE: ICD-10-CM

## 2022-05-06 DIAGNOSIS — E21.3 HYPERPARATHYROIDISM (H): ICD-10-CM

## 2022-05-06 LAB
ALBUMIN SERPL-MCNC: 3.9 G/DL (ref 3.4–5)
BUN SERPL-MCNC: 19 MG/DL (ref 7–30)
CALCIUM SERPL-MCNC: 10.1 MG/DL (ref 8.5–10.1)
CREAT SERPL-MCNC: 0.78 MG/DL (ref 0.52–1.04)
GFR SERPL CREATININE-BSD FRML MDRD: 74 ML/MIN/1.73M2
HBA1C MFR BLD: 5.4 % (ref 0–5.6)
PHOSPHATE SERPL-MCNC: 3.2 MG/DL (ref 2.5–4.5)
PTH-INTACT SERPL-MCNC: 101 PG/ML (ref 18–80)

## 2022-05-06 PROCEDURE — 82310 ASSAY OF CALCIUM: CPT

## 2022-05-06 PROCEDURE — 84520 ASSAY OF UREA NITROGEN: CPT

## 2022-05-06 PROCEDURE — 82306 VITAMIN D 25 HYDROXY: CPT

## 2022-05-06 PROCEDURE — 82565 ASSAY OF CREATININE: CPT

## 2022-05-06 PROCEDURE — 83970 ASSAY OF PARATHORMONE: CPT

## 2022-05-06 PROCEDURE — 84100 ASSAY OF PHOSPHORUS: CPT

## 2022-05-06 PROCEDURE — 83036 HEMOGLOBIN GLYCOSYLATED A1C: CPT | Mod: GA

## 2022-05-06 PROCEDURE — 82040 ASSAY OF SERUM ALBUMIN: CPT

## 2022-05-06 PROCEDURE — 36415 COLL VENOUS BLD VENIPUNCTURE: CPT

## 2022-05-12 LAB
DEPRECATED CALCIDIOL+CALCIFEROL SERPL-MC: <69 UG/L (ref 20–75)
VITAMIN D2 SERPL-MCNC: <5 UG/L
VITAMIN D3 SERPL-MCNC: 64 UG/L

## 2022-05-13 LAB
CALCIUM 24H UR-MRATE: ABNORMAL MG/(24.H)
CALCIUM UR-MCNC: <5 MG/DL
CALCIUM/CREAT UR: ABNORMAL MG/G{CREAT}
COLLECT DURATION TIME UR: 24 H
COLLECT DURATION TIME UR: 24 H
CREAT 24H UR-MRATE: 0.39 G/SPEC (ref 0.8–1.8)
CREAT 24H UR-MRATE: 0.41 G/SPEC (ref 0.8–1.8)
CREAT UR-MCNC: 56 MG/DL
CREAT UR-MCNC: 59 MG/DL
SPECIMEN VOL UR: 700 ML
SPECIMEN VOL UR: 700 ML

## 2022-05-13 PROCEDURE — 81050 URINALYSIS VOLUME MEASURE: CPT

## 2022-05-13 PROCEDURE — 82570 ASSAY OF URINE CREATININE: CPT

## 2022-05-13 PROCEDURE — 82340 ASSAY OF CALCIUM IN URINE: CPT

## 2022-05-13 NOTE — RESULT ENCOUNTER NOTE
Please schedule pt for next available to discus results.     Brenda,     These results are normal except for high parathyroid hormone. I would like to discuss next steps regarding this issue at a follow up appointment. We will schedule a follow up appointment.   Your vitamin D level is high normal; I recommend to reduce your total vitamin d supplement by 20%. We will further discuss at follow up. Please let me know if any questions in the meantime,     Oskar Su MD

## 2022-05-19 ENCOUNTER — TELEPHONE (OUTPATIENT)
Dept: ENDOCRINOLOGY | Facility: CLINIC | Age: 87
End: 2022-05-19
Payer: MEDICARE

## 2022-05-19 NOTE — TELEPHONE ENCOUNTER
----- Message -----  From: Oskar Su MD  Sent: 5/13/2022   1:47 PM CDT  To: [facility] [place]    Please schedule pt for next available to discus results.     Brenda,     These results are normal except for high parathyroid hormone. I would like to discuss next steps regarding this issue at a follow up appointment. We will schedule a follow up appointment.  Your vitamin D level is high normal; I recommend to reduce your total vitamin d supplement by 20%. We will further discuss at follow up. Please let me know if any questions in the meantime,     Oskar Su MD          ----- Message -----  From: Aniya La RN  Sent: 5/16/2022   2:24 PM CDT  To: Oskar Su MD    Before I call patient I wanted to make sure still ok to proceed with Reclast on 5/24/22?              ----- Message from Oskar Su MD sent at 5/19/2022 10:41 AM CDT -----  Yes, that is the plan.  I was hoping to see her as an add-on just before her scheduled reclast to address last minute question but 5/24/22 has already 3 add-ons. We can still offer to see her before reclast infusion if she is interested to address questions she might have.     Thank you,   Oskar Su MD              Patient advised of results and recommendations from Dr. Su. Patient verbalizes understanding. Patient would like to meet with Dr. Su before she has Relcast. If we need to reschedule Reclast to another date we can do that. Patient also notes that she only has vitamin d in her multivitamin and does not take extra supplementation. Patient uncertain on how to cut down her vitamin d unless there is a multivitamin without vitamin d.      Writer will send to Dr. Su to review and writer will call back to patient with plan. Patient verbalizes understanding and agrees to plan.       Neisha La, RN  Endocrine Care Coordinator  [facility]

## 2022-05-20 NOTE — TELEPHONE ENCOUNTER
Patient was asked to come at 12:45pm and is aware she is being worked into the schedule. Patient agreed with this plan and did not have any other questions at this time.    Leatha Bautista CMA  Adult Endocrinology  [facility] [place]

## 2022-05-24 ENCOUNTER — MYC MEDICAL ADVICE (OUTPATIENT)
Dept: ENDOCRINOLOGY | Facility: CLINIC | Age: 87
End: 2022-05-24

## 2022-05-24 ENCOUNTER — OFFICE VISIT (OUTPATIENT)
Dept: ENDOCRINOLOGY | Facility: CLINIC | Age: 87
End: 2022-05-24

## 2022-05-24 ENCOUNTER — INFUSION THERAPY VISIT (OUTPATIENT)
Dept: INFUSION THERAPY | Facility: CLINIC | Age: 87
End: 2022-05-24
Attending: INTERNAL MEDICINE
Payer: MEDICARE

## 2022-05-24 VITALS
DIASTOLIC BLOOD PRESSURE: 69 MMHG | WEIGHT: 168 LBS | BODY MASS INDEX: 30.73 KG/M2 | OXYGEN SATURATION: 97 % | HEART RATE: 55 BPM | SYSTOLIC BLOOD PRESSURE: 121 MMHG

## 2022-05-24 VITALS
SYSTOLIC BLOOD PRESSURE: 176 MMHG | OXYGEN SATURATION: 100 % | DIASTOLIC BLOOD PRESSURE: 72 MMHG | TEMPERATURE: 96.7 F | HEART RATE: 63 BPM

## 2022-05-24 DIAGNOSIS — E21.0 PRIMARY HYPERPARATHYROIDISM (H): Primary | ICD-10-CM

## 2022-05-24 DIAGNOSIS — E04.1 THYROID NODULE: ICD-10-CM

## 2022-05-24 DIAGNOSIS — E21.3 HYPERPARATHYROIDISM (H): ICD-10-CM

## 2022-05-24 DIAGNOSIS — M81.0 AGE-RELATED OSTEOPOROSIS WITHOUT CURRENT PATHOLOGICAL FRACTURE: Primary | ICD-10-CM

## 2022-05-24 DIAGNOSIS — M81.0 AGE-RELATED OSTEOPOROSIS WITHOUT CURRENT PATHOLOGICAL FRACTURE: ICD-10-CM

## 2022-05-24 PROCEDURE — 99207 PR NO CHARGE LOS: CPT

## 2022-05-24 PROCEDURE — 99214 OFFICE O/P EST MOD 30 MIN: CPT | Performed by: INTERNAL MEDICINE

## 2022-05-24 PROCEDURE — 96365 THER/PROPH/DIAG IV INF INIT: CPT | Performed by: NURSE PRACTITIONER

## 2022-05-24 RX ORDER — ALBUTEROL SULFATE 0.83 MG/ML
2.5 SOLUTION RESPIRATORY (INHALATION)
Status: CANCELLED | OUTPATIENT
Start: 2022-05-24

## 2022-05-24 RX ORDER — NALOXONE HYDROCHLORIDE 0.4 MG/ML
0.2 INJECTION, SOLUTION INTRAMUSCULAR; INTRAVENOUS; SUBCUTANEOUS
Status: CANCELLED | OUTPATIENT
Start: 2022-05-24

## 2022-05-24 RX ORDER — HEPARIN SODIUM,PORCINE 10 UNIT/ML
5 VIAL (ML) INTRAVENOUS
Status: CANCELLED | OUTPATIENT
Start: 2022-05-24

## 2022-05-24 RX ORDER — ZOLEDRONIC ACID 5 MG/100ML
5 INJECTION, SOLUTION INTRAVENOUS ONCE
Status: CANCELLED
Start: 2022-05-24 | End: 2022-05-24

## 2022-05-24 RX ORDER — METHYLPREDNISOLONE SODIUM SUCCINATE 125 MG/2ML
125 INJECTION, POWDER, LYOPHILIZED, FOR SOLUTION INTRAMUSCULAR; INTRAVENOUS
Status: CANCELLED
Start: 2022-05-24

## 2022-05-24 RX ORDER — EPINEPHRINE 1 MG/ML
0.3 INJECTION, SOLUTION INTRAMUSCULAR; SUBCUTANEOUS EVERY 5 MIN PRN
Status: CANCELLED | OUTPATIENT
Start: 2022-05-24

## 2022-05-24 RX ORDER — ALBUTEROL SULFATE 90 UG/1
1-2 AEROSOL, METERED RESPIRATORY (INHALATION)
Status: CANCELLED
Start: 2022-05-24

## 2022-05-24 RX ORDER — ZOLEDRONIC ACID 5 MG/100ML
5 INJECTION, SOLUTION INTRAVENOUS ONCE
Status: COMPLETED | OUTPATIENT
Start: 2022-05-24 | End: 2022-05-24

## 2022-05-24 RX ORDER — HEPARIN SODIUM (PORCINE) LOCK FLUSH IV SOLN 100 UNIT/ML 100 UNIT/ML
5 SOLUTION INTRAVENOUS
Status: CANCELLED | OUTPATIENT
Start: 2022-05-24

## 2022-05-24 RX ORDER — DIPHENHYDRAMINE HYDROCHLORIDE 50 MG/ML
50 INJECTION INTRAMUSCULAR; INTRAVENOUS
Status: CANCELLED
Start: 2022-05-24

## 2022-05-24 RX ADMIN — Medication 250 ML: at 14:04

## 2022-05-24 RX ADMIN — ZOLEDRONIC ACID 5 MG: 0.05 INJECTION, SOLUTION INTRAVENOUS at 14:05

## 2022-05-24 ASSESSMENT — PAIN SCALES - GENERAL: PAINLEVEL: NO PAIN (0)

## 2022-05-24 NOTE — PROGRESS NOTES
"Endocrinology Clinic Visit    Chief Complaint: RECHECK (Follow up osteoporosis, questions)     Information obtained from:Patient      Assessment/Treatment Plan:    Osteoporosis-age-related and secondary to primary hyperparathyroidism.  I have personally reviewed bone density which was done on 4/11/2022.  Agree with interpretation as documented below.  \"Lumbar spine T-score in region of L4 = -2.5    HIPS:  Mean total hip T-score: -2.6  Left femoral neck T-score = -1.8   Radius 33% T-score = -3.0\"    Plan-  #1 complete work-up for primary hyperparathyroidism and treat underlying cause.    2.  Calcium 1200 mg daily from all sources.  Preferably from diet source.  Discussed in detail and written information documented below provided.  3.  Vitamin D 800 international units daily through multivitamin.  Check vitamin D and assess if additional supplement needed.  4.  Bone specific therapy indicated Reclast infusion ordered.  Risk-benefit discussed and written information below provided. Pt scheduled today.   5.  Fall prevention and strengthening exercises.    Hyperparathyroidism  History of hypercalcemia    The overall clinical picture suggestive of primary hyperparathyroidism.  As the next step will check calcium, vitamin D, parathyroid hormone with a 24-hour urine collection for calcium after correcting oral calcium.  Once we confirm primary hyperparathyroidism-surgical intervention is recommended due to osteoporosis documented above.    Schedule thyroid and parathyroid ultrasound prior to next visit - incidental finding of thyroid nodule on CT scan.     Return to clinic in 5-6 months.     Patient Instructions   Brenda      Bone density results indicated osteoporosis.  Treatment for this #1 calcium through the diet-a glass of milk-8 ounce, a serving of yogurt-1 cup and continue cheese products you are currently doing.  These food items would meet your daily calcium requirement.  Given your history of high calcium, it is " "recommended if calcium comes through the diet instead of supplement.  #2 vitamin D through the multivitamin you are currently taking.  #4 bone specific therapy to prevent fractures.      RECLAST infusion today.          Oskar Su MD  Staff Endocrinologist    Division of Endocrinology and Diabetes      Subjective:         HPI: Brenda Barker is a 86 year old female with history of with multiple medical issues discussed below.     Has had hypercalcemia for the last 5+ years intermittently.  She was also noted to have high parathyroid hormone along with that.  No history of kidney stones.  No history of fractures.  No family history of hypercalcemia.  Not currently taking high vitamin D or high calcium supplement.  Takes multivitamin.  Detailed review of system was documented below.  Planning to see dermatology for skin disorders.  Recently done bone density showed osteoporosis.  \"Lumbar spine T-score in region of L4 = -2.5      HIPS:  Mean total hip T-score: -2.6  Left femoral neck T-score = -1.8     Radius 33% T-score = -3.0\"  Vitamin d 20 mcg via multivitamin.   Cottage cheese, yogurt every other day.       Answers for HPI/ROS submitted by the patient on 5/21/2022  General Symptoms: No  Skin Symptoms: No  HENT Symptoms: No  EYE SYMPTOMS: No  HEART SYMPTOMS: No  LUNG SYMPTOMS: No  INTESTINAL SYMPTOMS: No  URINARY SYMPTOMS: No  GYNECOLOGIC SYMPTOMS: No  BREAST SYMPTOMS: No  SKELETAL SYMPTOMS: No  BLOOD SYMPTOMS: No  NERVOUS SYSTEM SYMPTOMS: No  MENTAL HEALTH SYMPTOMS: No        Allergies   Allergen Reactions     Diatrizoate Hives and Shortness Of Breath     Ciprofloxacin      nausea     Contrast Dye      Hives,anaphylaxix     Oxycodone Other (See Comments)     hallucinations     Sulfasalazine      Other reaction(s): Rash       Current Outpatient Medications   Medication Sig Dispense Refill     Acetaminophen (TYLENOL PO) Take 650 mg by mouth every 6 hours as needed for mild pain or fever       acyclovir " (ZOVIRAX) 400 MG tablet TAKE 1 TABLET(400 MG) BY MOUTH DAILY 90 tablet 1     amoxicillin (AMOXIL) 500 MG capsule TAKE 4 CAPSULES(2000 MG) BY MOUTH 30 TO 60 MINUTES BEFORE DENTAL PROCEDURE 4 capsule 0     Ascorbic Acid (VITAMIN C PO)        atorvastatin (LIPITOR) 20 MG tablet Take 1 tablet (20 mg) by mouth daily 90 tablet 3     CENTRUM SILVER OR TABS 1 TABLET DAILY       Cetirizine HCl (ZYRTEC ALLERGY PO) Take 1 tablet by mouth daily       ferrous gluconate (FERGON) 324 (38 Fe) MG tablet TAKE 1 TABLET BY MOUTH EVERY OTHER  tablet 0     FISH OIL 1000 MG OR CAPS 2 CAPSULES DAILY  (? DOSE) OTC --     FOLIC ACID 1 MG OR TABS 4 mg TABS DAILY       hydrochlorothiazide (MICROZIDE) 12.5 MG capsule TAKE 1 CAPSULE(12.5 MG) BY MOUTH DAILY 90 capsule 0     hydrocortisone, Perianal, (HYDROCORTISONE) 2.5 % cream Place rectally 2 times daily as needed for hemorrhoids 30 g 2     methotrexate 2.5 MG tablet CHEMO Take 6 tablets (15 mg) by mouth every 7 days       metoprolol succinate ER (TOPROL-XL) 50 MG 24 hr tablet Take 1 tablet (50 mg) by mouth daily 90 tablet 2     metroNIDAZOLE (METROCREAM) 0.75 % external cream Apply topically 2 times daily 45 g 0     Multiple Vitamins-Minerals (ZINC PO)        nitroFURantoin macrocrystal (MACRODANTIN) 100 MG capsule TAKE 1 CAPSULE(100 MG) BY MOUTH DAILY 90 capsule 0     omeprazole (PRILOSEC) 20 MG DR capsule Take 1 capsule (20 mg) by mouth daily 90 capsule 2     polyvinyl alcohol (LIQUIFILM TEARS) 1.4 % ophthalmic solution 1 drop as needed.         Review of Systems     11 point review system (Constitutional, HENT, Eyes, Respiratory, Cardiovascular, Gastrointestinal, Genitourinary, Musculoskeletal,Neurological, Psychiatric/Behavioural, Endocrine) is negative or is as per HPI above        Objective:   /69 (BP Location: Left arm, Patient Position: Sitting, Cuff Size: Adult Large)   Pulse 55   Wt 76.2 kg (168 lb)   LMP 05/17/1972 (Exact Date)   SpO2 97%   BMI 30.73 kg/m     Constitutional: Pleasant no acute cardiopulmonary distress.   EYES: anicteric, normal extra-ocular movements, no lid lag or retraction.   HEENT: Mouth/Throat: Mucous membrane is moist. Oropharynx is clear.     Cardiovascular: RRR, S1, S2 normal.   Pulmonary/Chest: CTAB. No wheezing or rales.   Abdominal: +BS. Non tender to palpation.    Neurological: Alert and oriented.  No tremor and reflexes are symmetrical bilaterally and within the normal limits. Muscle strength 5/5.   Extremities: No edema.  Psychological: appropriate mood and affect   In House Labs:     Lab Results   Component Value Date    A1C 6.3 04/12/2007    A1C 5.8 08/16/2006    A1C 6.1 03/28/2006    A1C 5.8 05/19/2005    A1C 6.1 04/28/2004       TSH   Date Value Ref Range Status   12/17/2021 0.97 0.40 - 4.00 mU/L Final   09/25/2019 1.08 0.40 - 4.00 mU/L Final   06/11/2018 1.05 0.30 - 4.50 uIU/mL Final   04/12/2007 1.71 0.4 - 5.0 mU/L Final   05/19/2005 1.22 0.4 - 5.0 mU/L Final       Creatinine   Date Value Ref Range Status   05/06/2022 0.78 0.52 - 1.04 mg/dL Final   12/10/2020 0.70 0.52 - 1.04 mg/dL Final   ]  This note has been dictated using voice recognition software.  As a result, there may be errors in the documentation that have gone undetected.  Please consider this when interpreting information in this documentation.

## 2022-05-24 NOTE — NURSING NOTE
Brenda Barker's goals for this visit include:   Chief Complaint   Patient presents with     RECHECK     Follow up osteoporosis, questions     She requests these members of her care team be copied on today's visit information: Yes    PCP: Lynda Lopes    Referring Provider:  No referring provider defined for this encounter.    /69 (BP Location: Left arm, Patient Position: Sitting, Cuff Size: Adult Large)   Pulse 55   Wt 76.2 kg (168 lb)   LMP 05/17/1972 (Exact Date)   SpO2 97%   BMI 30.73 kg/m      Do you need any medication refills at today's visit? Kylah Villareal [facility]  Adult Endocrinology  [facility]

## 2022-05-24 NOTE — PROGRESS NOTES
Infusion Nursing Note:  Brenda Barker presents today for Reclast.    Patient seen by provider today: Yes: Dr Su   present during visit today: Not Applicable.    Note: Pt new to infusion room, oriented to call light and discussed new medication, questions answered.  See flow sheet for assessment      Intravenous Access:  Peripheral IV placed.    Treatment Conditions:  Lab Results   Component Value Date     12/17/2021    POTASSIUM 3.8 12/17/2021    MAG 1.9 06/28/2018    CR 0.78 05/06/2022    SUZIE 10.1 05/06/2022    BILITOTAL 0.4 12/17/2021    ALBUMIN 3.9 05/06/2022    ALT 26 12/17/2021    AST 15 12/17/2021     Results reviewed, labs MET treatment parameters, ok to proceed with treatment.      Post Infusion Assessment:  Patient tolerated infusion without incident.  Blood return noted pre and post infusion.  Site patent and intact, free from redness, edema or discomfort.  No evidence of extravasations.  Access discontinued per protocol.       Discharge Plan:   Patient discharged in stable condition accompanied by: self.  Departure Mode: Ambulatory.  Pt will RTC in one year for reclast.      Omar Roman RN

## 2022-05-24 NOTE — PATIENT INSTRUCTIONS
Brenda,    Bone density results indicated osteoporosis.  Treatment for this #1 calcium through the diet-a glass of milk-8 ounce, a serving of yogurt-1 cup and continue cheese products you are currently doing.  These food items would meet your daily calcium requirement.  Given your history of high calcium, it is recommended if calcium comes through the diet instead of supplement.  #2 vitamin D through the multivitamin you are currently taking.  #4 bone specific therapy to prevent fractures.    RECLAST infusion today.

## 2022-05-24 NOTE — LETTER
"    5/24/2022         RE: Brenda Barker  [address] [facility] [address]        Dear Colleague,    Thank you for referring your patient, Brenda Barker, to the [facility]. Please see a copy of my visit note below.    Endocrinology Clinic Visit    Chief Complaint: RECHECK (Follow up osteoporosis, questions)     Information obtained from:Patient      Assessment/Treatment Plan:    Osteoporosis-age-related and secondary to primary hyperparathyroidism.  I have personally reviewed bone density which was done on 4/11/2022.  Agree with interpretation as documented below.  \"Lumbar spine T-score in region of L4 = -2.5    HIPS:  Mean total hip T-score: -2.6  Left femoral neck T-score = -1.8   Radius 33% T-score = -3.0\"    Plan-  #1 complete work-up for primary hyperparathyroidism and treat underlying cause.    2.  Calcium 1200 mg daily from all sources.  Preferably from diet source.  Discussed in detail and written information documented below provided.  3.  Vitamin D 800 international units daily through multivitamin.  Check vitamin D and assess if additional supplement needed.  4.  Bone specific therapy indicated Reclast infusion ordered.  Risk-benefit discussed and written information below provided. Pt scheduled today.   5.  Fall prevention and strengthening exercises.    Hyperparathyroidism  History of hypercalcemia    The overall clinical picture suggestive of primary hyperparathyroidism.  As the next step will check calcium, vitamin D, parathyroid hormone with a 24-hour urine collection for calcium after correcting oral calcium.  Once we confirm primary hyperparathyroidism-surgical intervention is recommended due to osteoporosis documented above.    Schedule thyroid and parathyroid ultrasound prior to next visit - incidental finding of thyroid nodule on CT scan.     Return to clinic in 5-6 months.     Patient Instructions   Brenda      Bone density results indicated " "osteoporosis.  Treatment for this #1 calcium through the diet-a glass of milk-8 ounce, a serving of yogurt-1 cup and continue cheese products you are currently doing.  These food items would meet your daily calcium requirement.  Given your history of high calcium, it is recommended if calcium comes through the diet instead of supplement.  #2 vitamin D through the multivitamin you are currently taking.  #4 bone specific therapy to prevent fractures.      RECLAST infusion today.          Oskar Su MD  Staff Endocrinologist    Division of Endocrinology and Diabetes      Subjective:         HPI: Brenda Barker is a 86 year old female with history of with multiple medical issues discussed below.     Has had hypercalcemia for the last 5+ years intermittently.  She was also noted to have high parathyroid hormone along with that.  No history of kidney stones.  No history of fractures.  No family history of hypercalcemia.  Not currently taking high vitamin D or high calcium supplement.  Takes multivitamin.  Detailed review of system was documented below.  Planning to see dermatology for skin disorders.  Recently done bone density showed osteoporosis.  \"Lumbar spine T-score in region of L4 = -2.5      HIPS:  Mean total hip T-score: -2.6  Left femoral neck T-score = -1.8     Radius 33% T-score = -3.0\"  Vitamin d 20 mcg via multivitamin.   Cottage cheese, yogurt every other day.       Answers for HPI/ROS submitted by the patient on 5/21/2022  General Symptoms: No  Skin Symptoms: No  HENT Symptoms: No  EYE SYMPTOMS: No  HEART SYMPTOMS: No  LUNG SYMPTOMS: No  INTESTINAL SYMPTOMS: No  URINARY SYMPTOMS: No  GYNECOLOGIC SYMPTOMS: No  BREAST SYMPTOMS: No  SKELETAL SYMPTOMS: No  BLOOD SYMPTOMS: No  NERVOUS SYSTEM SYMPTOMS: No  MENTAL HEALTH SYMPTOMS: No        Allergies   Allergen Reactions     Diatrizoate Hives and Shortness Of Breath     Ciprofloxacin      nausea     Contrast Dye      Hives,anaphylaxix     Oxycodone Other " (See Comments)     hallucinations     Sulfasalazine      Other reaction(s): Rash       Current Outpatient Medications   Medication Sig Dispense Refill     Acetaminophen (TYLENOL PO) Take 650 mg by mouth every 6 hours as needed for mild pain or fever       acyclovir (ZOVIRAX) 400 MG tablet TAKE 1 TABLET(400 MG) BY MOUTH DAILY 90 tablet 1     amoxicillin (AMOXIL) 500 MG capsule TAKE 4 CAPSULES(2000 MG) BY MOUTH 30 TO 60 MINUTES BEFORE DENTAL PROCEDURE 4 capsule 0     Ascorbic Acid (VITAMIN C PO)        atorvastatin (LIPITOR) 20 MG tablet Take 1 tablet (20 mg) by mouth daily 90 tablet 3     CENTRUM SILVER OR TABS 1 TABLET DAILY       Cetirizine HCl (ZYRTEC ALLERGY PO) Take 1 tablet by mouth daily       ferrous gluconate (FERGON) 324 (38 Fe) MG tablet TAKE 1 TABLET BY MOUTH EVERY OTHER  tablet 0     FISH OIL 1000 MG OR CAPS 2 CAPSULES DAILY  (? DOSE) OTC --     FOLIC ACID 1 MG OR TABS 4 mg TABS DAILY       hydrochlorothiazide (MICROZIDE) 12.5 MG capsule TAKE 1 CAPSULE(12.5 MG) BY MOUTH DAILY 90 capsule 0     hydrocortisone, Perianal, (HYDROCORTISONE) 2.5 % cream Place rectally 2 times daily as needed for hemorrhoids 30 g 2     methotrexate 2.5 MG tablet CHEMO Take 6 tablets (15 mg) by mouth every 7 days       metoprolol succinate ER (TOPROL-XL) 50 MG 24 hr tablet Take 1 tablet (50 mg) by mouth daily 90 tablet 2     metroNIDAZOLE (METROCREAM) 0.75 % external cream Apply topically 2 times daily 45 g 0     Multiple Vitamins-Minerals (ZINC PO)        nitroFURantoin macrocrystal (MACRODANTIN) 100 MG capsule TAKE 1 CAPSULE(100 MG) BY MOUTH DAILY 90 capsule 0     omeprazole (PRILOSEC) 20 MG DR capsule Take 1 capsule (20 mg) by mouth daily 90 capsule 2     polyvinyl alcohol (LIQUIFILM TEARS) 1.4 % ophthalmic solution 1 drop as needed.         Review of Systems     11 point review system (Constitutional, HENT, Eyes, Respiratory, Cardiovascular, Gastrointestinal, Genitourinary, Musculoskeletal,Neurological,  Psychiatric/Behavioural, Endocrine) is negative or is as per HPI above        Objective:   /69 (BP Location: Left arm, Patient Position: Sitting, Cuff Size: Adult Large)   Pulse 55   Wt 76.2 kg (168 lb)   LMP 05/17/1972 (Exact Date)   SpO2 97%   BMI 30.73 kg/m    Constitutional: Pleasant no acute cardiopulmonary distress.   EYES: anicteric, normal extra-ocular movements, no lid lag or retraction.   HEENT: Mouth/Throat: Mucous membrane is moist. Oropharynx is clear.     Cardiovascular: RRR, S1, S2 normal.   Pulmonary/Chest: CTAB. No wheezing or rales.   Abdominal: +BS. Non tender to palpation.    Neurological: Alert and oriented.  No tremor and reflexes are symmetrical bilaterally and within the normal limits. Muscle strength 5/5.   Extremities: No edema.  Psychological: appropriate mood and affect   In House Labs:     Lab Results   Component Value Date    A1C 6.3 04/12/2007    A1C 5.8 08/16/2006    A1C 6.1 03/28/2006    A1C 5.8 05/19/2005    A1C 6.1 04/28/2004       TSH   Date Value Ref Range Status   12/17/2021 0.97 0.40 - 4.00 mU/L Final   09/25/2019 1.08 0.40 - 4.00 mU/L Final   06/11/2018 1.05 0.30 - 4.50 uIU/mL Final   04/12/2007 1.71 0.4 - 5.0 mU/L Final   05/19/2005 1.22 0.4 - 5.0 mU/L Final       Creatinine   Date Value Ref Range Status   05/06/2022 0.78 0.52 - 1.04 mg/dL Final   12/10/2020 0.70 0.52 - 1.04 mg/dL Final   ]  This note has been dictated using voice recognition software.  As a result, there may be errors in the documentation that have gone undetected.  Please consider this when interpreting information in this documentation.          Again, thank you for allowing me to participate in the care of your patient.        Sincerely,        Oskar Su MD

## 2022-06-02 ENCOUNTER — ANCILLARY PROCEDURE (OUTPATIENT)
Dept: ULTRASOUND IMAGING | Facility: CLINIC | Age: 87
End: 2022-06-02
Attending: INTERNAL MEDICINE
Payer: MEDICARE

## 2022-06-02 DIAGNOSIS — E04.1 THYROID NODULE: ICD-10-CM

## 2022-06-02 DIAGNOSIS — E21.3 HYPERPARATHYROIDISM (H): ICD-10-CM

## 2022-06-02 PROCEDURE — 76536 US EXAM OF HEAD AND NECK: CPT

## 2022-06-05 DIAGNOSIS — I10 HYPERTENSION, GOAL BELOW 140/90: ICD-10-CM

## 2022-06-07 RX ORDER — HYDROCHLOROTHIAZIDE 12.5 MG/1
CAPSULE ORAL
Qty: 90 CAPSULE | Refills: 0 | Status: SHIPPED | OUTPATIENT
Start: 2022-06-07 | End: 2022-09-12

## 2022-06-07 NOTE — TELEPHONE ENCOUNTER
Routing refill request to provider for review/approval because:  BP out of range.    Ronda Giraldo RN  [facility]

## 2022-06-16 NOTE — RESULT ENCOUNTER NOTE
Brenda,     The thyroid ultrasound findings overall are stable compared to previous results from 2013.  I understand that you have had a thyroid biopsy in 2013.  I would like to further discuss with you thyroid ultrasound findings and next steps at your upcoming appointment.  The ultrasound did not show any parathyroid enlargement or parathyroid adenoma.    Please let me know if any questions.    Oskar Su MD

## 2022-06-18 DIAGNOSIS — Z87.440 H/O RECURRENT URINARY TRACT INFECTION: ICD-10-CM

## 2022-06-18 NOTE — TELEPHONE ENCOUNTER
Routing refill request to provider for review/approval because:  Drug not on the [facility], [facility] or [facility] refill protocol or controlled substance

## 2022-06-20 RX ORDER — NITROFURANTOIN MACROCRYSTAL 100 MG
CAPSULE ORAL
Qty: 90 CAPSULE | Refills: 0 | Status: SHIPPED | OUTPATIENT
Start: 2022-06-20 | End: 2022-09-20

## 2022-06-24 ENCOUNTER — E-VISIT (OUTPATIENT)
Dept: FAMILY MEDICINE | Facility: CLINIC | Age: 87
End: 2022-06-24
Payer: MEDICARE

## 2022-06-24 DIAGNOSIS — N39.0 ACUTE UTI (URINARY TRACT INFECTION): Primary | ICD-10-CM

## 2022-06-24 PROCEDURE — 99421 OL DIG E/M SVC 5-10 MIN: CPT | Performed by: PREVENTIVE MEDICINE

## 2022-06-24 RX ORDER — PHENAZOPYRIDINE HYDROCHLORIDE 100 MG/1
100 TABLET, FILM COATED ORAL 3 TIMES DAILY PRN
Qty: 6 TABLET | Refills: 0 | Status: SHIPPED | OUTPATIENT
Start: 2022-06-24 | End: 2023-11-09

## 2022-06-24 RX ORDER — NITROFURANTOIN 25; 75 MG/1; MG/1
100 CAPSULE ORAL 2 TIMES DAILY
Qty: 10 CAPSULE | Refills: 0 | Status: SHIPPED | OUTPATIENT
Start: 2022-06-24 | End: 2022-06-29

## 2022-06-24 NOTE — PATIENT INSTRUCTIONS
Dear Brenda Barker    After reviewing your responses, I've been able to diagnose you with a urinary tract infection, which is a common infection of the bladder with bacteria.  This is not a sexually transmitted infection, though urinating immediately after intercourse can help prevent infections.  Drinking lots of fluids is also helpful to clear your current infection and prevent the next one.      I have sent a prescription for antibiotics to your pharmacy to treat this infection.    It is important that you take all of your prescribed medication even if your symptoms are improving after a few doses.  Taking all of your medicine helps prevent the symptoms from returning.     If your symptoms worsen, you develop pain in your back or stomach, develop fevers, or are not improving in 5 days, please contact your primary care provider for an appointment or visit any of our convenient Walk-in or Urgent Care Centers to be seen, which can be found on our website here.    Thanks again for choosing us as your health care partner,    Lynda Lopes MD

## 2022-06-24 NOTE — TELEPHONE ENCOUNTER
RECORDS RECEIVED FROM: Internal   REASON FOR VISIT: Essential Tremor   Date of Appt: 07/07/2022   NOTES (FOR ALL VISITS) STATUS DETAILS   OFFICE NOTE from referring provider Internal 12/17/2021 Dr Lopes [facility]    OFFICE NOTE from other specialist N/A    DISCHARGE SUMMARY from hospital N/A    DISCHARGE REPORT from the ER N/A    OPERATIVE REPORT N/A    MEDICATION LIST N/A    IMAGING  (FOR ALL VISITS)     EMG N/A    EEG N/A    LUMBAR PUNCTURE N/A    MARKOS SCAN N/A    ULTRASOUND (CAROTID BILAT) *VASCULAR* N/A    MRI (HEAD, NECK, SPINE) N/A    CT (HEAD, NECK, SPINE) N/A

## 2022-07-06 NOTE — PROGRESS NOTES
"[facility]  Section of General Neurology  New Patient Visit      Brenda Barker MRN# [id number]   Age: 86 year old YOB: 1935     Requesting physician: Lynda Betancourt     Reason for Consultation: Tremor        History of Presenting Symptoms:   Brenda Barker is a 86 year old female who presents today for evaluation of termor.  She has a PMH of RA, previous CTS among other PMH as below.     Where on body (including head/neck/speaking?) is tremor: Head \"no-no\", and bilateral hands  Things that make tremor better (food/drink, situations e.g.)--alcohol doesn't clearly help it.    Thing that make it worse (caffeine e.g etc typically more true of enhanced physiologic tremor):  Farther along in day she gets, doesn't worsen with caffiene  Age it started: Going on for at least 15 years.   Family history of PD, essential tremor or enhanced physiologic tremor?--Paternal grandmother with similar tremor but worse.    Which hand is dominant hand? RH--handwriting at times effected  Any parkinsons type of symptoms:  Bradykinesia: none  Falls/postural instability: none  Constipation: yes for years  Acting out dreams: none  Rigidity: none      Medications notable for metoprolol 50 mg daily (for tachycardia--unclear reasons why, had low K+), methotrexate among others as below    She lives in [place]  She is retired, they had a [employer] business.          Past Medical History:     Patient Active Problem List   Diagnosis     External hemorrhoids     Aortic valve disorder     Cervicalgia     Rheumatoid arthritis (H)     Bilateral carpal tunnel syndrome     Asymmetrical sensorineural hearing loss     Status post total left knee replacement     Obesity     High risk medication use     Gastroesophageal reflux disease with esophagitis     Hypertension, goal below 140/90     Hyperlipidemia LDL goal <130     Rapid heart rate     Resting tremor     Status post total replacement of left hip "     Anemia in other chronic diseases classified elsewhere     Acoustic neuroma (H)     Unspecified blepharoconjunctivitis, bilateral     Diplopia     Epiretinal membrane     Esotropia     Presence of external hearing-aid     Abnormal CXR     Age-related osteoporosis without current pathological fracture     Primary hyperparathyroidism (H)     Past Medical History:   Diagnosis Date     AORTIC VALVE SCLEROSIS 1/2002    ECHO AV sclerosis, mild TR, trace MR     ASYMPTOMATIC PVCS      Back pain      Basal cell carcinoma of scalp      BORDERLINE ELEVATED HBA1C- 6.1%  4/04 6/12/2005     CARPAL TUNNEL SYNDROME, R>L 8/2001     CERV. 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-6 BILAT UNCINATE SPURS 8/2001     DEPRESSION 2/13/2005     Depressive disorder     1982 suicide of son     STARLAD (degenerative joint disease)      Endometriosis      Essential and other specified forms of tremor      HEMORRHOIDS      HERPES SIMPLEX  I AND II      HX ADENOMATOUS COLON POLYP 1993     HYPERLIPIDEMIA      HYPERTENSION 2/13/2005     L ACOUSTIC NEUROMA     post op 7 th nerve palsy and CSF leak     Neoplasm of uncertain behavior of skin      OBESITY -BMI 32      OSTEOPENIA 7/03    L1-4 1.7,FemNck L-0.8,R-1.2,F'arm dist-0.3     Pain in joint, pelvic region and thigh      Rheumatoid arthritis(714.0) 6/28/2004     Spinal stenosis, unspecified region other than cervical      Unspecified hearing loss      URIN TRACT INFECTION, RECURRENT         Past Surgical History:     Past Surgical History:   Procedure Laterality Date     ARTHROPLASTY HIP Left 6/27/2018    Procedure: ARTHROPLASTY HIP;  Left Total Hip Arthroplasty  ;  Surgeon: Keo Priest MD;  Location: [facility] OR     BACK SURGERY  2011    Fusion L3-4 & decompression; [facility]     BIOPSY  2013    Thyroid - normal result     C DEXA, BONE DENSITY, AXIAL SKEL  7/03 7/03 L1-4 1.7, FemNkL-0.8,R-1.2, FArm Px 0.2,Dist-0.3     EYE SURGERY  2010 & 11    Cataract surgery     HC FLEX SIGMOIDOSCOPY W/WO KARYNA SPEC BY  BRUSH/WASH  12/2001     HC REMOVAL OF TONSILS,<11 Y/O  1942     ORTHOPEDIC SURGERY  2008    Left knee replacement     SURGICAL HISTORY OF -   1989    basal cell Ca scalp excised     SURGICAL HISTORY OF -   1998    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     Z REMOVAL OF OVARY(S)  1980    2nd ovary removed - endometriosis     ZC TOTAL ABDOM HYSTERECTOMY  1972    uterus, ovary removed - fibroid     ZZ COLONOSCOPY THRU STOMA W BIOPSY/CAUTERY TUMOR/POLYP/LESION  1993    adenomatous polyp     [facility]  1998, 10/03    normal, '03 rec repeat in 5 yrs due to +hx polyp and + fam hx        Social History:     Social History     Tobacco Use     Smoking status: Never Smoker     Smokeless tobacco: Never Used   Substance Use Topics     Alcohol use: Yes     Comment: occasionally     Drug use: No        Family History:     Family History   Problem Relation Age of Onset     Gastrointestinal Disease Father         bleeding ulcer     Cancer Mother         thyroid cancer     Other Cancer Mother         thyroid & throat     Osteoporosis Mother      Thyroid Disease Mother      Diabetes Paternal Grandmother      Cerebrovascular Disease Paternal Grandmother      Colon Cancer Maternal Half-Brother      Osteoporosis Maternal Grandmother         Medications:     Current Outpatient Medications   Medication Sig     Acetaminophen (TYLENOL PO) Take 650 mg by mouth every 6 hours as needed for mild pain or fever     acyclovir (ZOVIRAX) 400 MG tablet TAKE 1 TABLET(400 MG) BY MOUTH DAILY     amoxicillin (AMOXIL) 500 MG capsule TAKE 4 CAPSULES(2000 MG) BY MOUTH 30 TO 60 MINUTES BEFORE DENTAL PROCEDURE     Ascorbic Acid (VITAMIN C PO)      atorvastatin (LIPITOR) 20 MG tablet Take 1 tablet (20 mg) by mouth daily     CENTRUM SILVER OR TABS 1 TABLET DAILY     Cetirizine HCl (ZYRTEC ALLERGY PO) Take 1 tablet by mouth daily     ferrous gluconate (FERGON) 324 (38 Fe) MG tablet TAKE 1 TABLET BY MOUTH  EVERY OTHER DAY     FISH OIL 1000 MG OR CAPS 2 CAPSULES DAILY  (? DOSE)     FOLIC ACID 1 MG OR TABS 4 mg TABS DAILY     hydrochlorothiazide (MICROZIDE) 12.5 MG capsule TAKE 1 CAPSULE(12.5 MG) BY MOUTH DAILY     hydrocortisone, Perianal, (HYDROCORTISONE) 2.5 % cream Place rectally 2 times daily as needed for hemorrhoids     methotrexate 2.5 MG tablet CHEMO Take 6 tablets (15 mg) by mouth every 7 days     metoprolol succinate ER (TOPROL-XL) 50 MG 24 hr tablet Take 1 tablet (50 mg) by mouth daily     metroNIDAZOLE (METROCREAM) 0.75 % external cream Apply topically 2 times daily     Multiple Vitamins-Minerals (ZINC PO)      nitroFURantoin macrocrystal (MACRODANTIN) 100 MG capsule TAKE 1 CAPSULE(100 MG) BY MOUTH DAILY     omeprazole (PRILOSEC) 20 MG DR capsule Take 1 capsule (20 mg) by mouth daily     phenazopyridine (PYRIDIUM) 100 MG tablet Take 1 tablet (100 mg) by mouth 3 times daily as needed for urinary tract discomfort     polyvinyl alcohol (LIQUIFILM TEARS) 1.4 % ophthalmic solution 1 drop as needed.     No current facility-administered medications for this visit.        Allergies:     Allergies   Allergen Reactions     Diatrizoate Hives and Shortness Of Breath     Ciprofloxacin      nausea     Contrast Dye      Hives,anaphylaxix     Oxycodone Other (See Comments)     hallucinations     Sulfasalazine      Other reaction(s): Rash        Review of Systems:   As noted above     Physical Exam:   Vitals: /62 (BP Location: Right arm, Patient Position: Sitting, Cuff Size: Adult Regular)   Pulse 68   Wt 73.5 kg (162 lb)   LMP 05/17/1972 (Exact Date)   SpO2 100%   BMI 29.63 kg/m     CV: peripheral pulse appreciated  Lungs: breathing comfortably  Extremities: no edema    Neuro:   General Appearance: No apparent distress, well-nourished, well-groomed, pleasant     Mental Status: Alert and oriented to person, place, and time. Speech fluent and comprehension intact. No dysarthria.     Cranial Nerves:   II: Visual  fields: normal  III: Pupils: 3 mm, equal, round, reactive to light   III,IV,VI: Extraocular Movements: intact   V: Facial sensation: intact to light touch  VII: Facial strength: L facial droop --acoustic neuroma surgery previously  VIII: Hearing: intact grossly  IX: Palate: intact   XI: Shoulder shrug: intact  XII: Tongue movement: normal     Motor Exam:   5/5 Diffusely  Intermittent action tremor in b/l UE, fine, high frequency, no-no head tremor more apparent Tone is normal throughout.    Sensory: intact to light touch    Coordination: no dysmetria with finger-to-nose bilaterally    Reflexes: biceps, triceps, brachioradialis, patellar, and ankle jerks 1+ and symmetric. Toes are downgoing bilaterally           Assessment and Plan:   Brenda Barker is a very pleasant 86 year old female who presents in evaluation for tremor.  To history and exam I think that this represents a familial essential tremor.  I see no evidence of Parkinsonism to history or exam.  We discussed treatment options in this regard:      --Heavy cutlery heavy pen are good options. Showed her these online.      Options First line for Brenda:   Increasing metoprolol (beta blocker)  Gabapentin usually I try to build up to 300 mg /600 mg --900  mg total to start  Discussed possible side effects of above--gabapentin handout given.    Second line options for Brenda:  Primidone (very efficacious--considered first line but hard to tolerate for some, would have to investigate drug interactions re methotrexate too)  Topiramate (similar efficacy to gabapentin, variable toleratbility)  Klonopin--least amount of evidence, dont like to use in octogenerians    DBS:Know is an option but risk likely outweighs benefit at this time     Discussed that medications typically help hand tremor more than head tremor but this is variable.    She overall wants to think about her options and will reach out to me if she wants to trial a medication.    All questions answered.          Tio Mayorga MD   of Neurology   [facility]      The total time of this encounter today amounted to 42 minutes. This time included time spent with the patient, prep work, ordering tests, and performing post visit documentation.

## 2022-07-07 ENCOUNTER — PRE VISIT (OUTPATIENT)
Dept: NEUROLOGY | Facility: CLINIC | Age: 87
End: 2022-07-07

## 2022-07-07 ENCOUNTER — OFFICE VISIT (OUTPATIENT)
Dept: NEUROLOGY | Facility: CLINIC | Age: 87
End: 2022-07-07
Attending: PREVENTIVE MEDICINE
Payer: MEDICARE

## 2022-07-07 VITALS
OXYGEN SATURATION: 100 % | WEIGHT: 162 LBS | BODY MASS INDEX: 29.63 KG/M2 | SYSTOLIC BLOOD PRESSURE: 130 MMHG | HEART RATE: 68 BPM | DIASTOLIC BLOOD PRESSURE: 62 MMHG

## 2022-07-07 DIAGNOSIS — G25.0 ESSENTIAL TREMOR: ICD-10-CM

## 2022-07-07 PROCEDURE — 99203 OFFICE O/P NEW LOW 30 MIN: CPT | Performed by: STUDENT IN AN ORGANIZED HEALTH CARE EDUCATION/TRAINING PROGRAM

## 2022-07-07 ASSESSMENT — PAIN SCALES - GENERAL: PAINLEVEL: NO PAIN (0)

## 2022-07-07 NOTE — PATIENT INSTRUCTIONS
Heavy cutlery heavy pen are good options.     Options First line for you:   Increasing metoprolol (beta blocker)  Gabapentin usually I try to build up to 300 mg /600 mg --900  mg total to start    Second line:  Primidone (very efficacious, hard to tolerate for some, would have to investigate drug interactions)  Topiramate (similar efficacy to gabapentin, variable toleratbility)  Klonopin--least amount of evidence, dont like to use in octogenerians    DBS: I would not recommend this for you.

## 2022-07-07 NOTE — NURSING NOTE
"Chief Complaint   Patient presents with     Consult     New patient         Initial /62 (BP Location: Right arm, Patient Position: Sitting, Cuff Size: Adult Regular)   Pulse 68   Wt 73.5 kg (162 lb)   LMP 05/17/1972 (Exact Date)   SpO2 100%   BMI 29.63 kg/m   Estimated body mass index is 29.63 kg/m  as calculated from the following:    Height as of 12/17/21: 1.575 m (5' 2\").    Weight as of this encounter: 73.5 kg (162 lb)..  She requests these members of her care team be copied on today's visit information:     PCP: Lynda Lopes    Referring Provider:  Lynda Lopes MD  [address] [place] [address]    Do you need any medication refills at today's visit? No     Nayeli Sanchez MA   Email: [email or web address]  [facility] - Rheumatology  Phone: [phone]  Fax: [phone]      "

## 2022-07-07 NOTE — LETTER
"    7/7/2022         RE: Brenda Barker  [address] [facility] [id number]        Dear Colleague,    Thank you for referring your patient, Brenda Barker, to the [facility]. Please see a copy of my visit note below.    [facility]  Section of General Neurology  New Patient Visit      Brenda Barker MRN# [id number]   Age: 86 year old YOB: 1935     Requesting physician: Lynda Betancourt     Reason for Consultation: Tremor        History of Presenting Symptoms:   Brenda Barker is a 86 year old female who presents today for evaluation of termor.  She has a PMH of RA, previous CTS among other PMH as below.     Where on body (including head/neck/speaking?) is tremor: Head \"no-no\", and bilateral hands  Things that make tremor better (food/drink, situations e.g.)--alcohol doesn't clearly help it.    Thing that make it worse (caffeine e.g etc typically more true of enhanced physiologic tremor):  Farther along in day she gets, doesn't worsen with caffiene  Age it started: Going on for at least 15 years.   Family history of PD, essential tremor or enhanced physiologic tremor?--Paternal grandmother with similar tremor but worse.    Which hand is dominant hand? RH--handwriting at times effected  Any parkinsons type of symptoms:  Bradykinesia: none  Falls/postural instability: none  Constipation: yes for years  Acting out dreams: none  Rigidity: none      Medications notable for metoprolol 50 mg daily (for tachycardia--unclear reasons why, had low K+), methotrexate among others as below    She lives in [place]  She is retired, they had a [employer] business.          Past Medical History:     Patient Active Problem List   Diagnosis     External hemorrhoids     Aortic valve disorder     Cervicalgia     Rheumatoid arthritis (H)     Bilateral carpal tunnel syndrome     Asymmetrical sensorineural hearing loss     Status post " total left knee replacement     Obesity     High risk medication use     Gastroesophageal reflux disease with esophagitis     Hypertension, goal below 140/90     Hyperlipidemia LDL goal <130     Rapid heart rate     Resting tremor     Status post total replacement of left hip     Anemia in other chronic diseases classified elsewhere     Acoustic neuroma (H)     Unspecified blepharoconjunctivitis, bilateral     Diplopia     Epiretinal membrane     Esotropia     Presence of external hearing-aid     Abnormal CXR     Age-related osteoporosis without current pathological fracture     Primary hyperparathyroidism (H)     Past Medical History:   Diagnosis Date     AORTIC VALVE SCLEROSIS 1/2002    ECHO AV sclerosis, mild TR, trace MR     ASYMPTOMATIC PVCS      Back pain      Basal cell carcinoma of scalp      BORDERLINE ELEVATED HBA1C- 6.1%  4/04 6/12/2005     CARPAL TUNNEL SYNDROME, R>L 8/2001     CERV. 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-6 BILAT UNCINATE SPURS 8/2001     DEPRESSION 2/13/2005     Depressive disorder     1982 suicide of son     STARLAD (degenerative joint disease)      Endometriosis      Essential and other specified forms of tremor      HEMORRHOIDS      HERPES SIMPLEX  I AND II      HX ADENOMATOUS COLON POLYP 1993     HYPERLIPIDEMIA      HYPERTENSION 2/13/2005     L ACOUSTIC NEUROMA     post op 7 th nerve palsy and CSF leak     Neoplasm of uncertain behavior of skin      OBESITY -BMI 32      OSTEOPENIA 7/03    L1-4 1.7,FemNck L-0.8,R-1.2,F'arm dist-0.3     Pain in joint, pelvic region and thigh      Rheumatoid arthritis(714.0) 6/28/2004     Spinal stenosis, unspecified region other than cervical      Unspecified hearing loss      URIN TRACT INFECTION, RECURRENT         Past Surgical History:     Past Surgical History:   Procedure Laterality Date     ARTHROPLASTY HIP Left 6/27/2018    Procedure: ARTHROPLASTY HIP;  Left Total Hip Arthroplasty  ;  Surgeon: Keo Priest MD;  Location: [facility] OR     BACK SURGERY  2011    Fusion  L3-4 & decompression; [facility]     BIOPSY  2013    Thyroid - normal result     C DEXA, BONE DENSITY, AXIAL SKEL  7/03 7/03 L1-4 1.7, FemNkL-0.8,R-1.2, FArm Px 0.2,Dist-0.3     EYE SURGERY  2010 & 11    Cataract surgery     HC FLEX SIGMOIDOSCOPY W/WO KARYNA SPEC BY BRUSH/WASH  12/2001     HC REMOVAL OF TONSILS,<11 Y/O  1942     ORTHOPEDIC SURGERY  2008    Left knee replacement     SURGICAL HISTORY OF -   1989    basal cell Ca scalp excised     SURGICAL HISTORY OF -   1998    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     ZZ REMOVAL OF OVARY(S)  1980    2nd ovary removed - endometriosis     ZZC TOTAL ABDOM HYSTERECTOMY  1972    uterus, ovary removed - fibroid     ZZ COLONOSCOPY THRU STOMA W BIOPSY/CAUTERY TUMOR/POLYP/LESION  1993    adenomatous polyp     ZZ COLONOSCOPY THRU STOMA, DIAGNOSTIC  1998, 10/03    normal, '03 rec repeat in 5 yrs due to +hx polyp and + fam hx        Social History:     Social History     Tobacco Use     Smoking status: Never Smoker     Smokeless tobacco: Never Used   Substance Use Topics     Alcohol use: Yes     Comment: occasionally     Drug use: No        Family History:     Family History   Problem Relation Age of Onset     Gastrointestinal Disease Father         bleeding ulcer     Cancer Mother         thyroid cancer     Other Cancer Mother         thyroid & throat     Osteoporosis Mother      Thyroid Disease Mother      Diabetes Paternal Grandmother      Cerebrovascular Disease Paternal Grandmother      Colon Cancer Maternal Half-Brother      Osteoporosis Maternal Grandmother         Medications:     Current Outpatient Medications   Medication Sig     Acetaminophen (TYLENOL PO) Take 650 mg by mouth every 6 hours as needed for mild pain or fever     acyclovir (ZOVIRAX) 400 MG tablet TAKE 1 TABLET(400 MG) BY MOUTH DAILY     amoxicillin (AMOXIL) 500 MG capsule TAKE 4 CAPSULES(2000 MG) BY MOUTH 30 TO 60 MINUTES BEFORE DENTAL PROCEDURE     Ascorbic Acid (VITAMIN C  PO)      atorvastatin (LIPITOR) 20 MG tablet Take 1 tablet (20 mg) by mouth daily     CENTRUM SILVER OR TABS 1 TABLET DAILY     Cetirizine HCl (ZYRTEC ALLERGY PO) Take 1 tablet by mouth daily     ferrous gluconate (FERGON) 324 (38 Fe) MG tablet TAKE 1 TABLET BY MOUTH EVERY OTHER DAY     FISH OIL 1000 MG OR CAPS 2 CAPSULES DAILY  (? DOSE)     FOLIC ACID 1 MG OR TABS 4 mg TABS DAILY     hydrochlorothiazide (MICROZIDE) 12.5 MG capsule TAKE 1 CAPSULE(12.5 MG) BY MOUTH DAILY     hydrocortisone, Perianal, (HYDROCORTISONE) 2.5 % cream Place rectally 2 times daily as needed for hemorrhoids     methotrexate 2.5 MG tablet CHEMO Take 6 tablets (15 mg) by mouth every 7 days     metoprolol succinate ER (TOPROL-XL) 50 MG 24 hr tablet Take 1 tablet (50 mg) by mouth daily     metroNIDAZOLE (METROCREAM) 0.75 % external cream Apply topically 2 times daily     Multiple Vitamins-Minerals (ZINC PO)      nitroFURantoin macrocrystal (MACRODANTIN) 100 MG capsule TAKE 1 CAPSULE(100 MG) BY MOUTH DAILY     omeprazole (PRILOSEC) 20 MG DR capsule Take 1 capsule (20 mg) by mouth daily     phenazopyridine (PYRIDIUM) 100 MG tablet Take 1 tablet (100 mg) by mouth 3 times daily as needed for urinary tract discomfort     polyvinyl alcohol (LIQUIFILM TEARS) 1.4 % ophthalmic solution 1 drop as needed.     No current facility-administered medications for this visit.        Allergies:     Allergies   Allergen Reactions     Diatrizoate Hives and Shortness Of Breath     Ciprofloxacin      nausea     Contrast Dye      Hives,anaphylaxix     Oxycodone Other (See Comments)     hallucinations     Sulfasalazine      Other reaction(s): Rash        Review of Systems:   As noted above     Physical Exam:   Vitals: /62 (BP Location: Right arm, Patient Position: Sitting, Cuff Size: Adult Regular)   Pulse 68   Wt 73.5 kg (162 lb)   LMP 05/17/1972 (Exact Date)   SpO2 100%   BMI 29.63 kg/m     CV: peripheral pulse appreciated  Lungs: breathing  comfortably  Extremities: no edema    Neuro:   General Appearance: No apparent distress, well-nourished, well-groomed, pleasant     Mental Status: Alert and oriented to person, place, and time. Speech fluent and comprehension intact. No dysarthria.     Cranial Nerves:   II: Visual fields: normal  III: Pupils: 3 mm, equal, round, reactive to light   III,IV,VI: Extraocular Movements: intact   V: Facial sensation: intact to light touch  VII: Facial strength: L facial droop --acoustic neuroma surgery previously  VIII: Hearing: intact grossly  IX: Palate: intact   XI: Shoulder shrug: intact  XII: Tongue movement: normal     Motor Exam:   5/5 Diffusely  Intermittent action tremor in b/l UE, fine, high frequency, no-no head tremor more apparent Tone is normal throughout.    Sensory: intact to light touch    Coordination: no dysmetria with finger-to-nose bilaterally    Reflexes: biceps, triceps, brachioradialis, patellar, and ankle jerks 1+ and symmetric. Toes are downgoing bilaterally           Assessment and Plan:   Brenda Barker is a very pleasant 86 year old female who presents in evaluation for tremor.  To history and exam I think that this represents a familial essential tremor.  I see no evidence of Parkinsonism to history or exam.  We discussed treatment options in this regard:      --Heavy cutlery heavy pen are good options. Showed her these online.      Options First line for Brenda:   Increasing metoprolol (beta blocker)  Gabapentin usually I try to build up to 300 mg /600 mg --900  mg total to start  Discussed possible side effects of above--gabapentin handout given.    Second line options for Brenda:  Primidone (very efficacious--considered first line but hard to tolerate for some, would have to investigate drug interactions re methotrexate too)  Topiramate (similar efficacy to gabapentin, variable toleratbility)  Klonopin--least amount of evidence, dont like to use in octogenerians    DBS:Know is an option but risk  likely outweighs benefit at this time     Discussed that medications typically help hand tremor more than head tremor but this is variable.    She overall wants to think about her options and will reach out to me if she wants to trial a medication.    All questions answered.         Tio Mayorga MD   of Neurology   [facility]      The total time of this encounter today amounted to 42 minutes. This time included time spent with the patient, prep work, ordering tests, and performing post visit documentation.        Again, thank you for allowing me to participate in the care of your patient.        Sincerely,        Carlitos Mayorga MD

## 2022-07-11 ENCOUNTER — MYC MEDICAL ADVICE (OUTPATIENT)
Dept: FAMILY MEDICINE | Facility: CLINIC | Age: 87
End: 2022-07-11

## 2022-07-11 NOTE — TELEPHONE ENCOUNTER
Pended walker order for provider, please fill in additional information depending which type of walker patient is using.   Juana Billings RN  [facility]

## 2022-07-12 NOTE — TELEPHONE ENCOUNTER
Please schedule a Virtual visit for this request.  Patient was last seen by me in 12/2021 and recent E visit for a UTI, which is unrelated to the this concern.     Thank you.  Lynda Lopes MD MPH

## 2022-07-14 ENCOUNTER — VIRTUAL VISIT (OUTPATIENT)
Dept: FAMILY MEDICINE | Facility: CLINIC | Age: 87
End: 2022-07-14
Payer: MEDICARE

## 2022-07-14 DIAGNOSIS — M48.062 SPINAL STENOSIS OF LUMBAR REGION WITH NEUROGENIC CLAUDICATION: ICD-10-CM

## 2022-07-14 DIAGNOSIS — M81.0 AGE-RELATED OSTEOPOROSIS WITHOUT CURRENT PATHOLOGICAL FRACTURE: ICD-10-CM

## 2022-07-14 DIAGNOSIS — Z96.652 STATUS POST TOTAL LEFT KNEE REPLACEMENT: ICD-10-CM

## 2022-07-14 DIAGNOSIS — M05.79 RHEUMATOID ARTHRITIS INVOLVING MULTIPLE SITES WITH POSITIVE RHEUMATOID FACTOR (H): ICD-10-CM

## 2022-07-14 DIAGNOSIS — Z96.642 STATUS POST TOTAL REPLACEMENT OF LEFT HIP: Primary | ICD-10-CM

## 2022-07-14 PROCEDURE — 99213 OFFICE O/P EST LOW 20 MIN: CPT | Mod: 95 | Performed by: PHYSICIAN ASSISTANT

## 2022-07-14 NOTE — PATIENT INSTRUCTIONS
At [facility], we strive to deliver an exceptional experience to you, every time we see you. If you receive a survey, please complete it as we do value your feedback.  If you have MyChart, you can expect to receive results automatically within 24 hours of their completion.  Your provider will send a note interpreting your results as well.   If you do not have MyChart, you should receive your results in about a week by mail.    Your care team:                            Family Medicine Internal Medicine   MD Jose Alejandro Floyd MD Shantel Branch-Fleming, MD Srinivasa Vaka, MD Katya Belousova, ELIZABETH Zavaleta CNP, MD ([facility]   Kel Pena, MD Beverly Leonard MD Amelia Massimini APRN CNP Kim Thein, APRN CNP Bethany Templen, MD             Clinic hours: Monday - Thursday 7 am-6 pm; Fridays 7 am-5 pm.   Urgent care: Monday - Friday 10 am- 8 pm; Saturday and Sunday 9 am-5 pm.    Clinic: [phone]       [facility]: Monday - Thursday 8 am - 7 pm; Friday 8 am - 6 pm  [facility]: [phone]

## 2022-07-14 NOTE — PROGRESS NOTES
"Brenda is a 86 year old who is being evaluated via a billable telephone visit.      What phone number would you like to be contacted at? [phone]  How would you like to obtain your [place]? [place]    Assessment & Plan   Problem List Items Addressed This Visit        Nervous and Auditory    Spinal stenosis of lumbar region with neurogenic claudication    Relevant Orders    Walker Order for DME - ONLY FOR DME       Musculoskeletal and Integumentary    Age-related osteoporosis without current pathological fracture    Relevant Orders    Walker Order for DME - ONLY FOR DME       Immune    Rheumatoid arthritis (H)    Relevant Orders    Walker Order for DME - ONLY FOR DME       Other    Status post total left knee replacement    Relevant Orders    Walker Order for DME - ONLY FOR DME    Status post total replacement of left hip - Primary    Relevant Orders    Walker Order for DME - ONLY FOR DME         Due to multiple chronic degenerative conditions, patient requires a 4-wheeled walker to assist in mobility    Patient declined any medication refills today     12 minutes spent on the date of the encounter doing chart review, history and exam, documentation and further activities per the note       BMI:   Estimated body mass index is 29.63 kg/m  as calculated from the following:    Height as of 12/17/21: 1.575 m (5' 2\").    Weight as of 7/7/22: 73.5 kg (162 lb).           No follow-ups on file.    Vannesa Tejada PA-C  [facility]    Antonia Gutierrez is a 86 year old, presenting for the following health issues:  Back Pain (Requesting rolling walker)      History of Present Illness       Reason for visit:  Prescription for rollator walker due to back condition    She eats 2-3 servings of fruits and vegetables daily.She consumes 0 sweetened beverage(s) daily.She exercises with enough effort to increase her heart rate 9 or less minutes per day.  She exercises with enough effort to increase her " heart rate 3 or less days per week.   She is taking medications regularly.       Pain History:  When did you first notice your pain? - Chronic Pain   Have you seen this provider for your pain in the past?   Yes   Where in your body do you have pain? Back Pain   Are you seeing anyone else for your pain? No                Chronic Pain Follow Up:    Location of pain: low back due to spinal stenosis and s/p lumbar fusion, joints due to RA and OA  Analgesia/pain control:    - Recent changes:  Old walked got damaged    - Overall control: Tolerable with discomfort    - Current treatments:    Adherence:     - Do you ever take more pain medicine than prescribed? No    - When did you take your last dose of pain medicine?     Adverse effects: No   PDMP Review     None        Last CSA Agreement:   CSA -- Patient Level:    CSA: None found at the patient level.       Last UDS:             Review of Systems   Constitutional, HEENT, cardiovascular, pulmonary, gi and gu systems are negative, except as otherwise noted.      Objective           Vitals:  No vitals were obtained today due to virtual visit.    Physical Exam   healthy, alert and no distress  PSYCH: Alert and oriented times 3; coherent speech, normal   rate and volume, able to articulate logical thoughts, able   to abstract reason, no tangential thoughts, no hallucinations   or delusions  Her affect is normal  RESP: No cough, no audible wheezing, able to talk in full sentences  Remainder of exam unable to be completed due to telephone visits                Phone call duration: 12 minutes    .  ..

## 2022-07-15 NOTE — TELEPHONE ENCOUNTER
Received signed DME for the 4 wheeled walker. Called and spoke to the patient and she states she would like to pick this order up. Bringing to the  by 11:30 am today, 7/15/2022. Patient understands.  Serena Ortiz MA  [facility]  2nd Floor  Primary Care

## 2022-08-10 DIAGNOSIS — R00.0 RAPID HEART RATE: ICD-10-CM

## 2022-08-10 RX ORDER — METOPROLOL SUCCINATE 50 MG/1
TABLET, EXTENDED RELEASE ORAL
Qty: 90 TABLET | Refills: 0 | Status: SHIPPED | OUTPATIENT
Start: 2022-08-10 | End: 2022-11-17

## 2022-08-10 NOTE — TELEPHONE ENCOUNTER
Prescription approved per [facility] Refill Protocol.    Cristiana Harding, RN, BSN

## 2022-09-06 DIAGNOSIS — N39.0 ACUTE UTI (URINARY TRACT INFECTION): ICD-10-CM

## 2022-09-06 DIAGNOSIS — D50.8 OTHER IRON DEFICIENCY ANEMIA: ICD-10-CM

## 2022-09-06 RX ORDER — NITROFURANTOIN 25; 75 MG/1; MG/1
CAPSULE ORAL
Qty: 10 CAPSULE | Refills: 0 | OUTPATIENT
Start: 2022-09-06

## 2022-09-06 RX ORDER — PHENAZOPYRIDINE HYDROCHLORIDE 100 MG/1
TABLET, FILM COATED ORAL
Qty: 6 TABLET | Refills: 0 | OUTPATIENT
Start: 2022-09-06

## 2022-09-06 RX ORDER — FERROUS GLUCONATE 324(38)MG
TABLET ORAL
Qty: 100 TABLET | Refills: 0 | Status: SHIPPED | OUTPATIENT
Start: 2022-09-06 | End: 2022-12-22

## 2022-09-08 ENCOUNTER — MYC MEDICAL ADVICE (OUTPATIENT)
Dept: FAMILY MEDICINE | Facility: CLINIC | Age: 87
End: 2022-09-08

## 2022-09-10 DIAGNOSIS — I10 HYPERTENSION, GOAL BELOW 140/90: ICD-10-CM

## 2022-09-12 RX ORDER — HYDROCHLOROTHIAZIDE 12.5 MG/1
CAPSULE ORAL
Qty: 90 CAPSULE | Refills: 0 | Status: SHIPPED | OUTPATIENT
Start: 2022-09-12 | End: 2022-12-09

## 2022-09-19 DIAGNOSIS — Z87.440 H/O RECURRENT URINARY TRACT INFECTION: ICD-10-CM

## 2022-09-20 RX ORDER — NITROFURANTOIN MACROCRYSTAL 100 MG
CAPSULE ORAL
Qty: 90 CAPSULE | Refills: 0 | Status: SHIPPED | OUTPATIENT
Start: 2022-09-20 | End: 2022-12-20

## 2022-11-17 ENCOUNTER — MYC MEDICAL ADVICE (OUTPATIENT)
Dept: FAMILY MEDICINE | Facility: CLINIC | Age: 87
End: 2022-11-17

## 2022-11-17 NOTE — TELEPHONE ENCOUNTER
Would be ok to shower with a catheter in place. This message is NOT in the patient's chart so I have no idea what else is happening and if there are any other contraindications. The patient needs to ensure the catheter is secure and not getting pulled on when bathing or showering.    Thank you,  Lynda Lopes MD MPH

## 2022-11-22 ASSESSMENT — ENCOUNTER SYMPTOMS
NAIL CHANGES: 0
SKIN CHANGES: 0
POOR WOUND HEALING: 0

## 2022-11-29 ENCOUNTER — OFFICE VISIT (OUTPATIENT)
Dept: ENDOCRINOLOGY | Facility: CLINIC | Age: 87
End: 2022-11-29
Payer: MEDICARE

## 2022-11-29 VITALS
HEART RATE: 59 BPM | OXYGEN SATURATION: 100 % | HEIGHT: 62 IN | WEIGHT: 168.2 LBS | DIASTOLIC BLOOD PRESSURE: 73 MMHG | SYSTOLIC BLOOD PRESSURE: 128 MMHG | BODY MASS INDEX: 30.95 KG/M2

## 2022-11-29 DIAGNOSIS — E04.1 THYROID NODULE: ICD-10-CM

## 2022-11-29 DIAGNOSIS — E21.0 PRIMARY HYPERPARATHYROIDISM (H): ICD-10-CM

## 2022-11-29 DIAGNOSIS — M81.0 AGE-RELATED OSTEOPOROSIS WITHOUT CURRENT PATHOLOGICAL FRACTURE: Primary | ICD-10-CM

## 2022-11-29 LAB
ALBUMIN SERPL-MCNC: 4 G/DL (ref 3.4–5)
BUN SERPL-MCNC: 25 MG/DL (ref 7–30)
CALCIUM SERPL-MCNC: 9.6 MG/DL (ref 8.5–10.1)
CREAT SERPL-MCNC: 0.72 MG/DL (ref 0.52–1.04)
GFR SERPL CREATININE-BSD FRML MDRD: 81 ML/MIN/1.73M2
PHOSPHATE SERPL-MCNC: 2.5 MG/DL (ref 2.5–4.5)

## 2022-11-29 PROCEDURE — 82306 VITAMIN D 25 HYDROXY: CPT | Performed by: INTERNAL MEDICINE

## 2022-11-29 PROCEDURE — 99215 OFFICE O/P EST HI 40 MIN: CPT | Performed by: INTERNAL MEDICINE

## 2022-11-29 PROCEDURE — 36415 COLL VENOUS BLD VENIPUNCTURE: CPT | Performed by: INTERNAL MEDICINE

## 2022-11-29 PROCEDURE — 82310 ASSAY OF CALCIUM: CPT | Performed by: INTERNAL MEDICINE

## 2022-11-29 PROCEDURE — 84100 ASSAY OF PHOSPHORUS: CPT | Performed by: INTERNAL MEDICINE

## 2022-11-29 PROCEDURE — 82040 ASSAY OF SERUM ALBUMIN: CPT | Performed by: INTERNAL MEDICINE

## 2022-11-29 PROCEDURE — 82565 ASSAY OF CREATININE: CPT | Performed by: INTERNAL MEDICINE

## 2022-11-29 PROCEDURE — 84520 ASSAY OF UREA NITROGEN: CPT | Performed by: INTERNAL MEDICINE

## 2022-11-29 RX ORDER — ZOLEDRONIC ACID 5 MG/100ML
5 INJECTION, SOLUTION INTRAVENOUS ONCE
Status: CANCELLED
Start: 2023-06-05

## 2022-11-29 RX ORDER — DIPHENHYDRAMINE HYDROCHLORIDE 50 MG/ML
50 INJECTION INTRAMUSCULAR; INTRAVENOUS
Status: CANCELLED
Start: 2023-06-05

## 2022-11-29 RX ORDER — ALBUTEROL SULFATE 90 UG/1
1-2 AEROSOL, METERED RESPIRATORY (INHALATION)
Status: CANCELLED
Start: 2023-06-05

## 2022-11-29 RX ORDER — HEPARIN SODIUM,PORCINE 10 UNIT/ML
5 VIAL (ML) INTRAVENOUS
Status: CANCELLED | OUTPATIENT
Start: 2023-06-05

## 2022-11-29 RX ORDER — ACETAMINOPHEN 325 MG/1
650 TABLET ORAL ONCE
Status: CANCELLED | OUTPATIENT
Start: 2023-06-05

## 2022-11-29 RX ORDER — MEPERIDINE HYDROCHLORIDE 25 MG/ML
25 INJECTION INTRAMUSCULAR; INTRAVENOUS; SUBCUTANEOUS EVERY 30 MIN PRN
Status: CANCELLED | OUTPATIENT
Start: 2023-06-05

## 2022-11-29 RX ORDER — ALBUTEROL SULFATE 0.83 MG/ML
2.5 SOLUTION RESPIRATORY (INHALATION)
Status: CANCELLED | OUTPATIENT
Start: 2023-06-05

## 2022-11-29 RX ORDER — EPINEPHRINE 1 MG/ML
0.3 INJECTION, SOLUTION INTRAMUSCULAR; SUBCUTANEOUS EVERY 5 MIN PRN
Status: CANCELLED | OUTPATIENT
Start: 2023-06-05

## 2022-11-29 RX ORDER — VITAMIN B COMPLEX
1 TABLET ORAL DAILY
Qty: 90 TABLET | Refills: 3 | Status: SHIPPED | OUTPATIENT
Start: 2022-11-29 | End: 2022-11-29

## 2022-11-29 RX ORDER — METHYLPREDNISOLONE SODIUM SUCCINATE 125 MG/2ML
125 INJECTION, POWDER, LYOPHILIZED, FOR SOLUTION INTRAMUSCULAR; INTRAVENOUS
Status: CANCELLED
Start: 2023-06-05

## 2022-11-29 RX ORDER — HEPARIN SODIUM (PORCINE) LOCK FLUSH IV SOLN 100 UNIT/ML 100 UNIT/ML
5 SOLUTION INTRAVENOUS
Status: CANCELLED | OUTPATIENT
Start: 2023-06-05

## 2022-11-29 NOTE — LETTER
"    11/29/2022         RE: Brenda Barker  [address] [facility] [id number]        Dear Colleague,    Thank you for referring your patient, Brenda Barker, to the [facility]. Please see a copy of my visit note below.    Endocrinology Clinic Visit    Chief Complaint: Osteoporosis     Information obtained from:Patient      Assessment/Treatment Plan:    Osteoporosis-age-related and secondary to primary hyperparathyroidism.     Plan-  #1 complete work-up for primary hyperparathyroidism and treat underlying cause/patient not interested to pursue surgery at this time.    2.  Calcium 1200 mg daily from all sources.  Preferably from diet source.  Discussed in detail and written information documented below provided.  3.  Vitamin D 800 international units daily through multivitamin.  Check vitamin D and assess if additional supplement needed.  4.  Bone specific therapy indicated Reclast infusion x1 given in May 2022.  Tolerated treatment well.  Scheduled to follow-up Reclast infusion for May 2023..  Risk-benefit discussed and written information below provided.    5.  Fall prevention and strengthening exercises.    Hyperparathyroidism  History of Hypercalcemia    The overall clinical picture suggestive of primary hyperparathyroidism.  hyperparathyroidism-surgical intervention is recommended due to osteoporosis documented above.  Patient not interested to pursue surgery at this time. Calcium checked today and stable.     Thyroid nodule-follow-up thyroid ultrasound ordered.  Patient has had a thyroid biopsy done in 2013 for a left thyroid nodule which is a dominant nodule and result was benign.  \"FNA-thyroid,Left:   Negative for Malignancy   Consistent with a benign nodule (includes adenomatoid nodule, colloid   nodule, etc.) \"    Return to clinic in 8 months.      Component      Latest Ref Rng & Units 11/29/2022   Creatinine      0.52 - 1.04 mg/dL 0.72   GFR Estimate      >60 " "mL/min/1.73m2 81   Albumin      3.4 - 5.0 g/dL 4.0   Phosphorus      2.5 - 4.5 mg/dL 2.5   Calcium      8.5 - 10.1 mg/dL 9.6   Urea Nitrogen      7 - 30 mg/dL 25       Oskar Su MD  Staff Endocrinologist    Division of Endocrinology and Diabetes      Subjective:         HPI: Brenda Barker is a 87 year old female with history of with multiple medical issues discussed below.     Has had hypercalcemia for the last 5+ years intermittently.  She was also noted to have high parathyroid hormone along with that.  No history of kidney stones.  No history of fractures.  No family history of hypercalcemia.  Not currently taking high vitamin D or high calcium supplement.  Takes multivitamin.  Detailed review of system was documented below.  Planning to see dermatology for skin disorders.  Recently done bone density showed osteoporosis.  \"Lumbar spine T-score in region of L4 = -2.5      HIPS:  Mean total hip T-score: -2.6  Left femoral neck T-score = -1.8     Radius 33% T-score = -3.0\"  Vitamin d 20 mcg via multivitamin.   Cottage cheese, yogurt every other day.  Drinks milk.    Answers for HPI/ROS submitted by the patient on 11/22/2022  General Symptoms: No  Skin Symptoms: Yes  HENT Symptoms: No  EYE SYMPTOMS: No  HEART SYMPTOMS: No  LUNG SYMPTOMS: No  INTESTINAL SYMPTOMS: No  URINARY SYMPTOMS: No  GYNECOLOGIC SYMPTOMS: No  BREAST SYMPTOMS: No  SKELETAL SYMPTOMS: No  BLOOD SYMPTOMS: No  NERVOUS SYSTEM SYMPTOMS: No  MENTAL HEALTH SYMPTOMS: No  Changes in hair: No  Changes in moles/birth marks: No  Itching: No  Rashes: Yes  Changes in nails: No  Acne: No  Hair in places you don't want it: No  Change in facial hair: No  Warts: No  Non-healing sores: No  Scarring: No  Flaking of skin: No  Color changes of hands/feet in cold : No  Sun sensitivity: No  Skin thickening: No        Allergies   Allergen Reactions     Diatrizoate Hives and Shortness Of Breath     Ciprofloxacin      nausea     Contrast Dye      Hives,anaphylaxix "     Oxycodone Other (See Comments)     hallucinations     Sulfasalazine      Other reaction(s): Rash       Current Outpatient Medications   Medication Sig Dispense Refill     Acetaminophen (TYLENOL PO) Take 650 mg by mouth every 6 hours as needed for mild pain or fever       acyclovir (ZOVIRAX) 400 MG tablet TAKE 1 TABLET(400 MG) BY MOUTH DAILY 90 tablet 1     Ascorbic Acid (VITAMIN C PO)        atorvastatin (LIPITOR) 20 MG tablet TAKE 1 TABLET(20 MG) BY MOUTH DAILY 90 tablet 0     CENTRUM SILVER OR TABS 1 TABLET DAILY       Cetirizine HCl (ZYRTEC ALLERGY PO) Take 1 tablet by mouth daily       ferrous gluconate (FERGON) 324 (38 Fe) MG tablet TAKE 1 TABLET BY MOUTH EVERY OTHER  tablet 0     FISH OIL 1000 MG OR CAPS 2 CAPSULES DAILY  (? DOSE) OTC --     FOLIC ACID 1 MG OR TABS 4 mg TABS DAILY       hydrochlorothiazide (MICROZIDE) 12.5 MG capsule TAKE 1 CAPSULE(12.5 MG) BY MOUTH DAILY 90 capsule 0     hydrocortisone, Perianal, (HYDROCORTISONE) 2.5 % cream Place rectally 2 times daily as needed for hemorrhoids 30 g 2     methotrexate 2.5 MG tablet CHEMO Take 6 tablets (15 mg) by mouth every 7 days       metoprolol succinate ER (TOPROL XL) 50 MG 24 hr tablet TAKE 1 TABLET(50 MG) BY MOUTH DAILY 90 tablet 1     metroNIDAZOLE (METROCREAM) 0.75 % external cream Apply topically 2 times daily 45 g 0     Multiple Vitamins-Minerals (ZINC PO)        nitroFURantoin macrocrystal (MACRODANTIN) 100 MG capsule TAKE 1 CAPSULE(100 MG) BY MOUTH DAILY 90 capsule 0     omeprazole (PRILOSEC) 20 MG DR capsule TAKE 1 CAPSULE(20 MG) BY MOUTH DAILY 90 capsule 0     polyvinyl alcohol (LIQUIFILM TEARS) 1.4 % ophthalmic solution 1 drop as needed.       amoxicillin (AMOXIL) 500 MG capsule TAKE 4 CAPSULES(2000 MG) BY MOUTH 30 TO 60 MINUTES BEFORE DENTAL PROCEDURE (Patient not taking: Reported on 11/29/2022) 4 capsule 0     phenazopyridine (PYRIDIUM) 100 MG tablet Take 1 tablet (100 mg) by mouth 3 times daily as needed for urinary tract  "discomfort (Patient not taking: Reported on 11/29/2022) 6 tablet 0       Review of Systems     11 point review system (Constitutional, HENT, Eyes, Respiratory, Cardiovascular, Gastrointestinal, Genitourinary, Musculoskeletal,Neurological, Psychiatric/Behavioural, Endocrine) is negative or is as per HPI above        Objective:   /73 (BP Location: Left arm, Patient Position: Sitting, Cuff Size: Adult Large)   Pulse 59   Ht 1.575 m (5' 2\")   Wt 76.3 kg (168 lb 3.2 oz)   LMP 05/17/1972 (Exact Date)   SpO2 100%   BMI 30.76 kg/m    Constitutional: Pleasant no acute cardiopulmonary distress.   EYES: anicteric, normal extra-ocular movements, no lid lag or retraction.   HEENT: Mouth/Throat: Mucous membrane is moist. Oropharynx is clear.     Cardiovascular: RRR, S1, S2 normal.   Pulmonary/Chest: CTAB. No wheezing or rales.   Abdominal: +BS. Non tender to palpation.    Neurological: Alert and oriented.  No tremor and reflexes are symmetrical bilaterally and within the normal limits. Muscle strength 5/5.   Extremities: No edema.  Psychological: appropriate mood and affect   In House Labs:   Hemoglobin A1C   Date Value Ref Range Status   05/06/2022 5.4 0.0 - 5.6 % Final     Comment:     Normal <5.7%   Prediabetes 5.7-6.4%    Diabetes 6.5% or higher     Note: Adopted from ADA consensus guidelines.   04/12/2007 6.3 (H) 4.3 - 6.0 % Final   08/16/2006 5.8 4.3 - 6.0 % Final   03/28/2006 6.1 (H) 4.3 - 6.0 % Final       TSH   Date Value Ref Range Status   12/17/2021 0.97 0.40 - 4.00 mU/L Final   09/25/2019 1.08 0.40 - 4.00 mU/L Final   06/11/2018 1.05 0.30 - 4.50 uIU/mL Final   04/12/2007 1.71 0.4 - 5.0 mU/L Final   05/19/2005 1.22 0.4 - 5.0 mU/L Final       Creatinine   Date Value Ref Range Status   11/29/2022 0.72 0.52 - 1.04 mg/dL Final   12/10/2020 0.70 0.52 - 1.04 mg/dL Final   ]  This note has been dictated using voice recognition software.  As a result, there may be errors in the documentation that have gone " undetected.  Please consider this when interpreting information in this documentation.    40 minutes spent on the date of the encounter doing chart review, history and exam, documentation and further activities per the note.      Again, thank you for allowing me to participate in the care of your patient.        Sincerely,        Oskar Su MD

## 2022-11-29 NOTE — PROGRESS NOTES
"Endocrinology Clinic Visit    Chief Complaint: Osteoporosis     Information obtained from:Patient      Assessment/Treatment Plan:    Osteoporosis-age-related and secondary to primary hyperparathyroidism.     Plan-  #1 complete work-up for primary hyperparathyroidism and treat underlying cause/patient not interested to pursue surgery at this time.    2.  Calcium 1200 mg daily from all sources.  Preferably from diet source.  Discussed in detail and written information documented below provided.  3.  Vitamin D 800 international units daily through multivitamin.  Check vitamin D and assess if additional supplement needed.  4.  Bone specific therapy indicated Reclast infusion x1 given in May 2022.  Tolerated treatment well.  Scheduled to follow-up Reclast infusion for May 2023..  Risk-benefit discussed and written information below provided.    5.  Fall prevention and strengthening exercises.    Hyperparathyroidism  History of Hypercalcemia    The overall clinical picture suggestive of primary hyperparathyroidism.  hyperparathyroidism-surgical intervention is recommended due to osteoporosis documented above.  Patient not interested to pursue surgery at this time. Calcium checked today and stable.     Thyroid nodule-follow-up thyroid ultrasound ordered.  Patient has had a thyroid biopsy done in 2013 for a left thyroid nodule which is a dominant nodule and result was benign.  \"FNA-thyroid,Left:   Negative for Malignancy   Consistent with a benign nodule (includes adenomatoid nodule, colloid   nodule, etc.) \"    Return to clinic in 8 months.      Component      Latest Ref Rng & Units 11/29/2022   Creatinine      0.52 - 1.04 mg/dL 0.72   GFR Estimate      >60 mL/min/1.73m2 81   Albumin      3.4 - 5.0 g/dL 4.0   Phosphorus      2.5 - 4.5 mg/dL 2.5   Calcium      8.5 - 10.1 mg/dL 9.6   Urea Nitrogen      7 - 30 mg/dL 25       Oskar Su MD  Staff Endocrinologist    Division of Endocrinology and " "Diabetes      Subjective:         HPI: Brenda Barker is a 87 year old female with history of with multiple medical issues discussed below.     Has had hypercalcemia for the last 5+ years intermittently.  She was also noted to have high parathyroid hormone along with that.  No history of kidney stones.  No history of fractures.  No family history of hypercalcemia.  Not currently taking high vitamin D or high calcium supplement.  Takes multivitamin.  Detailed review of system was documented below.  Planning to see dermatology for skin disorders.  Recently done bone density showed osteoporosis.  \"Lumbar spine T-score in region of L4 = -2.5      HIPS:  Mean total hip T-score: -2.6  Left femoral neck T-score = -1.8     Radius 33% T-score = -3.0\"  Vitamin d 20 mcg via multivitamin.   Cottage cheese, yogurt every other day.  Drinks milk.    Answers for HPI/ROS submitted by the patient on 11/22/2022  General Symptoms: No  Skin Symptoms: Yes  HENT Symptoms: No  EYE SYMPTOMS: No  HEART SYMPTOMS: No  LUNG SYMPTOMS: No  INTESTINAL SYMPTOMS: No  URINARY SYMPTOMS: No  GYNECOLOGIC SYMPTOMS: No  BREAST SYMPTOMS: No  SKELETAL SYMPTOMS: No  BLOOD SYMPTOMS: No  NERVOUS SYSTEM SYMPTOMS: No  MENTAL HEALTH SYMPTOMS: No  Changes in hair: No  Changes in moles/birth marks: No  Itching: No  Rashes: Yes  Changes in nails: No  Acne: No  Hair in places you don't want it: No  Change in facial hair: No  Warts: No  Non-healing sores: No  Scarring: No  Flaking of skin: No  Color changes of hands/feet in cold : No  Sun sensitivity: No  Skin thickening: No        Allergies   Allergen Reactions     Diatrizoate Hives and Shortness Of Breath     Ciprofloxacin      nausea     Contrast Dye      Hives,anaphylaxix     Oxycodone Other (See Comments)     hallucinations     Sulfasalazine      Other reaction(s): Rash       Current Outpatient Medications   Medication Sig Dispense Refill     Acetaminophen (TYLENOL PO) Take 650 mg by mouth every 6 hours as needed " for mild pain or fever       acyclovir (ZOVIRAX) 400 MG tablet TAKE 1 TABLET(400 MG) BY MOUTH DAILY 90 tablet 1     Ascorbic Acid (VITAMIN C PO)        atorvastatin (LIPITOR) 20 MG tablet TAKE 1 TABLET(20 MG) BY MOUTH DAILY 90 tablet 0     CENTRUM SILVER OR TABS 1 TABLET DAILY       Cetirizine HCl (ZYRTEC ALLERGY PO) Take 1 tablet by mouth daily       ferrous gluconate (FERGON) 324 (38 Fe) MG tablet TAKE 1 TABLET BY MOUTH EVERY OTHER  tablet 0     FISH OIL 1000 MG OR CAPS 2 CAPSULES DAILY  (? DOSE) OTC --     FOLIC ACID 1 MG OR TABS 4 mg TABS DAILY       hydrochlorothiazide (MICROZIDE) 12.5 MG capsule TAKE 1 CAPSULE(12.5 MG) BY MOUTH DAILY 90 capsule 0     hydrocortisone, Perianal, (HYDROCORTISONE) 2.5 % cream Place rectally 2 times daily as needed for hemorrhoids 30 g 2     methotrexate 2.5 MG tablet CHEMO Take 6 tablets (15 mg) by mouth every 7 days       metoprolol succinate ER (TOPROL XL) 50 MG 24 hr tablet TAKE 1 TABLET(50 MG) BY MOUTH DAILY 90 tablet 1     metroNIDAZOLE (METROCREAM) 0.75 % external cream Apply topically 2 times daily 45 g 0     Multiple Vitamins-Minerals (ZINC PO)        nitroFURantoin macrocrystal (MACRODANTIN) 100 MG capsule TAKE 1 CAPSULE(100 MG) BY MOUTH DAILY 90 capsule 0     omeprazole (PRILOSEC) 20 MG DR capsule TAKE 1 CAPSULE(20 MG) BY MOUTH DAILY 90 capsule 0     polyvinyl alcohol (LIQUIFILM TEARS) 1.4 % ophthalmic solution 1 drop as needed.       amoxicillin (AMOXIL) 500 MG capsule TAKE 4 CAPSULES(2000 MG) BY MOUTH 30 TO 60 MINUTES BEFORE DENTAL PROCEDURE (Patient not taking: Reported on 11/29/2022) 4 capsule 0     phenazopyridine (PYRIDIUM) 100 MG tablet Take 1 tablet (100 mg) by mouth 3 times daily as needed for urinary tract discomfort (Patient not taking: Reported on 11/29/2022) 6 tablet 0       Review of Systems     11 point review system (Constitutional, HENT, Eyes, Respiratory, Cardiovascular, Gastrointestinal, Genitourinary, Musculoskeletal,Neurological,  "Psychiatric/Behavioural, Endocrine) is negative or is as per HPI above        Objective:   /73 (BP Location: Left arm, Patient Position: Sitting, Cuff Size: Adult Large)   Pulse 59   Ht 1.575 m (5' 2\")   Wt 76.3 kg (168 lb 3.2 oz)   LMP 05/17/1972 (Exact Date)   SpO2 100%   BMI 30.76 kg/m    Constitutional: Pleasant no acute cardiopulmonary distress.   EYES: anicteric, normal extra-ocular movements, no lid lag or retraction.   HEENT: Mouth/Throat: Mucous membrane is moist. Oropharynx is clear.     Cardiovascular: RRR, S1, S2 normal.   Pulmonary/Chest: CTAB. No wheezing or rales.   Abdominal: +BS. Non tender to palpation.    Neurological: Alert and oriented.  No tremor and reflexes are symmetrical bilaterally and within the normal limits. Muscle strength 5/5.   Extremities: No edema.  Psychological: appropriate mood and affect   In House Labs:   Hemoglobin A1C   Date Value Ref Range Status   05/06/2022 5.4 0.0 - 5.6 % Final     Comment:     Normal <5.7%   Prediabetes 5.7-6.4%    Diabetes 6.5% or higher     Note: Adopted from ADA consensus guidelines.   04/12/2007 6.3 (H) 4.3 - 6.0 % Final   08/16/2006 5.8 4.3 - 6.0 % Final   03/28/2006 6.1 (H) 4.3 - 6.0 % Final       TSH   Date Value Ref Range Status   12/17/2021 0.97 0.40 - 4.00 mU/L Final   09/25/2019 1.08 0.40 - 4.00 mU/L Final   06/11/2018 1.05 0.30 - 4.50 uIU/mL Final   04/12/2007 1.71 0.4 - 5.0 mU/L Final   05/19/2005 1.22 0.4 - 5.0 mU/L Final       Creatinine   Date Value Ref Range Status   11/29/2022 0.72 0.52 - 1.04 mg/dL Final   12/10/2020 0.70 0.52 - 1.04 mg/dL Final   ]  This note has been dictated using voice recognition software.  As a result, there may be errors in the documentation that have gone undetected.  Please consider this when interpreting information in this documentation.    40 minutes spent on the date of the encounter doing chart review, history and exam, documentation and further activities per the note.  "

## 2022-11-29 NOTE — NURSING NOTE
"Brenda Barker's goals for this visit include:   Chief Complaint   Patient presents with     Osteoporosis     She requests these members of her care team be copied on today's visit information: No    PCP: Lynda Lopes    Referring Provider:  No referring provider defined for this encounter.    /73 (BP Location: Left arm, Patient Position: Sitting, Cuff Size: Adult Large)   Pulse 59   Ht 1.575 m (5' 2\")   Wt 76.3 kg (168 lb 3.2 oz)   LMP 05/17/1972 (Exact Date)   SpO2 100%   BMI 30.76 kg/m      Do you need any medication refills at today's visit? No    "

## 2022-11-30 LAB — DEPRECATED CALCIDIOL+CALCIFEROL SERPL-MC: 56 UG/L (ref 20–75)

## 2022-11-30 NOTE — RESULT ENCOUNTER NOTE
Dear Brenda,     Here are your recent results which are within the expected range. Please continue with your current vitamin D and calcium plan as discussed at time of your appointment.  Please let us know if you have any questions or concerns.    Regards,  Oskar Su MD

## 2022-12-21 ASSESSMENT — ENCOUNTER SYMPTOMS
FREQUENCY: 0
DIARRHEA: 1
FEVER: 0
CONSTIPATION: 1
NAUSEA: 0
JOINT SWELLING: 0
HEADACHES: 0
CHILLS: 0
SHORTNESS OF BREATH: 0
HEMATOCHEZIA: 0
ARTHRALGIAS: 0
DYSURIA: 0
SORE THROAT: 0
PARESTHESIAS: 0
WEAKNESS: 0
DIZZINESS: 0
BREAST MASS: 0
ABDOMINAL PAIN: 0
NERVOUS/ANXIOUS: 0
EYE PAIN: 0
PALPITATIONS: 0
MYALGIAS: 0
COUGH: 0
HEARTBURN: 0
HEMATURIA: 0

## 2022-12-21 ASSESSMENT — ACTIVITIES OF DAILY LIVING (ADL): CURRENT_FUNCTION: NO ASSISTANCE NEEDED

## 2022-12-22 ENCOUNTER — OFFICE VISIT (OUTPATIENT)
Dept: FAMILY MEDICINE | Facility: CLINIC | Age: 87
End: 2022-12-22
Payer: MEDICARE

## 2022-12-22 ENCOUNTER — TELEPHONE (OUTPATIENT)
Dept: ORTHOPEDICS | Facility: CLINIC | Age: 87
End: 2022-12-22

## 2022-12-22 VITALS
WEIGHT: 166 LBS | HEART RATE: 70 BPM | BODY MASS INDEX: 30.55 KG/M2 | HEIGHT: 62 IN | OXYGEN SATURATION: 98 % | RESPIRATION RATE: 14 BRPM | DIASTOLIC BLOOD PRESSURE: 78 MMHG | SYSTOLIC BLOOD PRESSURE: 138 MMHG

## 2022-12-22 DIAGNOSIS — Z87.440 H/O RECURRENT URINARY TRACT INFECTION: ICD-10-CM

## 2022-12-22 DIAGNOSIS — Z00.00 ENCOUNTER FOR MEDICARE ANNUAL WELLNESS EXAM: Primary | ICD-10-CM

## 2022-12-22 DIAGNOSIS — D50.8 OTHER IRON DEFICIENCY ANEMIA: ICD-10-CM

## 2022-12-22 DIAGNOSIS — L30.9 FACIAL DERMATITIS: ICD-10-CM

## 2022-12-22 DIAGNOSIS — I10 HYPERTENSION, GOAL BELOW 140/90: ICD-10-CM

## 2022-12-22 DIAGNOSIS — E78.5 HYPERLIPIDEMIA LDL GOAL <130: ICD-10-CM

## 2022-12-22 DIAGNOSIS — R00.0 RAPID HEART RATE: ICD-10-CM

## 2022-12-22 DIAGNOSIS — K21.00 GASTROESOPHAGEAL REFLUX DISEASE WITH ESOPHAGITIS WITHOUT HEMORRHAGE: ICD-10-CM

## 2022-12-22 PROCEDURE — 80048 BASIC METABOLIC PNL TOTAL CA: CPT | Performed by: PHYSICIAN ASSISTANT

## 2022-12-22 PROCEDURE — G0439 PPPS, SUBSEQ VISIT: HCPCS | Performed by: PHYSICIAN ASSISTANT

## 2022-12-22 PROCEDURE — 80061 LIPID PANEL: CPT | Performed by: PHYSICIAN ASSISTANT

## 2022-12-22 PROCEDURE — 36415 COLL VENOUS BLD VENIPUNCTURE: CPT | Performed by: PHYSICIAN ASSISTANT

## 2022-12-22 PROCEDURE — 99214 OFFICE O/P EST MOD 30 MIN: CPT | Mod: 25 | Performed by: PHYSICIAN ASSISTANT

## 2022-12-22 RX ORDER — AMOXICILLIN 500 MG/1
CAPSULE ORAL
Qty: 4 CAPSULE | Refills: 0 | Status: CANCELLED | OUTPATIENT
Start: 2022-12-22

## 2022-12-22 RX ORDER — NITROFURANTOIN MACROCRYSTAL 100 MG
100 CAPSULE ORAL DAILY
Qty: 90 CAPSULE | Refills: 3 | Status: SHIPPED | OUTPATIENT
Start: 2022-12-22 | End: 2023-12-26

## 2022-12-22 RX ORDER — HYDROCHLOROTHIAZIDE 12.5 MG/1
1 CAPSULE ORAL DAILY
Qty: 90 CAPSULE | Refills: 3 | Status: SHIPPED | OUTPATIENT
Start: 2022-12-22 | End: 2023-09-13

## 2022-12-22 RX ORDER — ATORVASTATIN CALCIUM 20 MG/1
20 TABLET, FILM COATED ORAL DAILY
Qty: 90 TABLET | Refills: 3 | Status: SHIPPED | OUTPATIENT
Start: 2022-12-22 | End: 2023-12-26

## 2022-12-22 RX ORDER — FERROUS GLUCONATE 324(38)MG
TABLET ORAL
Qty: 100 TABLET | Refills: 0 | Status: SHIPPED | OUTPATIENT
Start: 2022-12-22 | End: 2024-01-23

## 2022-12-22 RX ORDER — METOPROLOL SUCCINATE 50 MG/1
50 TABLET, EXTENDED RELEASE ORAL DAILY
Qty: 90 TABLET | Refills: 3 | Status: SHIPPED | OUTPATIENT
Start: 2022-12-22 | End: 2023-12-26

## 2022-12-22 ASSESSMENT — PAIN SCALES - GENERAL: PAINLEVEL: NO PAIN (0)

## 2022-12-22 ASSESSMENT — ENCOUNTER SYMPTOMS
SHORTNESS OF BREATH: 0
NERVOUS/ANXIOUS: 0
COUGH: 0
HEMATURIA: 0
WEAKNESS: 0
HEADACHES: 0
DYSURIA: 0
MYALGIAS: 0
NAUSEA: 0
BREAST MASS: 0
ABDOMINAL PAIN: 0
HEARTBURN: 0
ARTHRALGIAS: 0
EYE PAIN: 0
SORE THROAT: 0
DIARRHEA: 1
PARESTHESIAS: 0
PALPITATIONS: 0
CONSTIPATION: 1
FEVER: 0
FREQUENCY: 0
DIZZINESS: 0
JOINT SWELLING: 0
CHILLS: 0
HEMATOCHEZIA: 0

## 2022-12-22 ASSESSMENT — ACTIVITIES OF DAILY LIVING (ADL): CURRENT_FUNCTION: NO ASSISTANCE NEEDED

## 2022-12-22 NOTE — PROGRESS NOTES
"SUBJECTIVE:   Brenda is a 87 year old who presents for Preventive Visit.  Patient has been advised of split billing requirements and indicates understanding: Yes  Are you in the first 12 months of your Medicare coverage?  No    Healthy Habits:     In general, how would you rate your overall health?  Good    Frequency of exercise:  None    Do you usually eat at least 4 servings of fruit and vegetables a day, include whole grains    & fiber and avoid regularly eating high fat or \"junk\" foods?  No    Taking medications regularly:  Yes    Ability to successfully perform activities of daily living:  No assistance needed    Home Safety:  No safety concerns identified    Hearing Impairment:  No hearing concerns    In the past 6 months, have you been bothered by leaking of urine? Yes    In general, how would you rate your overall mental or emotional health?  Good      PHQ-2 Total Score: 0    Additional concerns today:  No      Have you ever done Advance Care Planning? (For example, a Health Directive, POLST, or a discussion with a medical provider or your loved ones about your wishes): Yes, patient states has an Advance Care Planning document and will bring a copy to the clinic.       Fall risk  Fallen 2 or more times in the past year?: No  Any fall with injury in the past year?: No    Cognitive Screening   1) Repeat 3 items Yes   2) Clock draw: NORMAL  3) 3 item recall: Recalls 3 objects  Results: 3 items recalled: COGNITIVE IMPAIRMENT LESS LIKELY    Mini-CogTM Copyright ERYN Gonzalez. Licensed by the author for use in [facility]; reprinted with permission (santosh@.[facility]). All rights reserved.      Do you have sleep apnea, excessive snoring or daytime drowsiness?: no    Reviewed and updated as needed this visit by clinical staff   Tobacco  Allergies  Meds              Reviewed and updated as needed this visit by Provider                 Social History     Tobacco Use     Smoking status: Never     Smokeless tobacco: " Never   Substance Use Topics     Alcohol use: Yes     Comment: occasionally     If you drink alcohol do you typically have >3 drinks per day or >7 drinks per week? No    Alcohol Use 12/22/2022   Prescreen: >3 drinks/day or >7 drinks/week? -   Prescreen: >3 drinks/day or >7 drinks/week? No           Hyperlipidemia Follow-Up      Are you regularly taking any medication or supplement to lower your cholesterol?   Yes- lipitor    Are you having muscle aches or other side effects that you think could be caused by your cholesterol lowering medication?  No    Hypertension Follow-up      Do you check your blood pressure regularly outside of the clinic? Yes     Are you following a low salt diet? Yes    Are your blood pressures ever more than 140 on the top number (systolic) OR more   than 90 on the bottom number (diastolic), for example 140/90? No     Medication Followup of Metronidazole for facial dermititis    Taking Medication as prescribed: yes    Side Effects:  None    Medication Helping Symptoms:  yes    Medication Followup of Nitrofurantoin for UTI prevention    Taking Medication as prescribed: yes    Side Effects:  None    Medication Helping Symptoms:  yes      Current providers sharing in care for this patient include:   Patient Care Team:  Lynda Lopes MD as PCP - General (Family Medicine)  ALYSHA Candelaria MD as Assigned Surgical Provider  Liudmila Bowie MD (Inactive) as Referring Physician (Family Medicine)  Michelle Shabazz PA-C as Physician Assistant (Dermatology)  Oskar Su MD as MD (Endocrinology, Diabetes, and Metabolism)  Lynda Lopes MD as Assigned PCP  Oskar Su MD as Assigned Endocrinology Provider  Gracia Bets DO as MD (Gastroenterology)  Vannesa Tejada PA-C as Physician Assistant (Family Medicine)  Gracia Best DO as Assigned Gastroenterology Provider  Carlitos Mayorga MD as Assigned Neuroscience Provider    The following health maintenance  items are reviewed in Epic and correct as of today:  Health Maintenance   Topic Date Due     ANNUAL REVIEW OF HM ORDERS  04/18/2023     MEDICARE ANNUAL WELLNESS VISIT  12/22/2023     FALL RISK ASSESSMENT  12/22/2023     ADVANCE CARE PLANNING  12/22/2027     DTAP/TDAP/TD IMMUNIZATION (4 - Td or Tdap) 12/17/2029     PHQ-2 (once per calendar year)  Completed     INFLUENZA VACCINE  Completed     Pneumococcal Vaccine: 65+ Years  Completed     ZOSTER IMMUNIZATION  Completed     COVID-19 Vaccine  Completed     IPV IMMUNIZATION  Aged Out     MENINGITIS IMMUNIZATION  Aged Out     Patient Active Problem List   Diagnosis     External hemorrhoids     Aortic valve disorder     Cervicalgia     Rheumatoid arthritis (H)     Bilateral carpal tunnel syndrome     Asymmetrical sensorineural hearing loss     Status post total left knee replacement     Obesity     High risk medication use     Gastroesophageal reflux disease with esophagitis     Hypertension, goal below 140/90     Hyperlipidemia LDL goal <130     Rapid heart rate     Resting tremor     Status post total replacement of left hip     Anemia in other chronic diseases classified elsewhere     Acoustic neuroma (H)     Unspecified blepharoconjunctivitis, bilateral     Diplopia     Epiretinal membrane     Esotropia     Presence of external hearing-aid     Abnormal CXR     Age-related osteoporosis without current pathological fracture     Primary hyperparathyroidism (H)     Spinal stenosis of lumbar region with neurogenic claudication     Past Surgical History:   Procedure Laterality Date     ARTHROPLASTY HIP Left 6/27/2018    Procedure: ARTHROPLASTY HIP;  Left Total Hip Arthroplasty  ;  Surgeon: Keo Priest MD;  Location: [facility] OR     BACK SURGERY  2011    Fusion L3-4 & decompression; [facility]     BIOPSY  2013    Thyroid - normal result     C DEXA, BONE DENSITY, AXIAL SKEL  7/03 7/03 L1-4 1.7, FemNkL-0.8,R-1.2, FArm Px 0.2,Dist-0.3     EYE SURGERY  2010 & 11     Cataract surgery     HC FLEX SIGMOIDOSCOPY W/WO KARYNA SPEC BY BRUSH/WASH  12/2001     HC REMOVAL OF TONSILS,<13 Y/O  1942     ORTHOPEDIC SURGERY  2008    Left knee replacement     SURGICAL HISTORY OF -   1989    basal cell Ca scalp excised     SURGICAL HISTORY OF -   1998    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     ZZ REMOVAL OF OVARY(S)  1980    2nd ovary removed - endometriosis     ZC TOTAL ABDOM HYSTERECTOMY  1972    uterus, ovary removed - fibroid     [facility] BI[facility]SY/CAUTERY TUMOR/POLYP/LESION  1993    adenomatous polyp     [facility], DIAGNOSTIC  1998, 10/03    normal, '03 rec repeat in 5 yrs due to +hx polyp and + fam hx       Social History     Tobacco Use     Smoking status: Never     Smokeless tobacco: Never   Substance Use Topics     Alcohol use: Yes     Comment: occasionally     Family History   Problem Relation Age of Onset     Gastrointestinal Disease Father         bleeding ulcer     Cancer Mother         thyroid cancer     Other Cancer Mother         thyroid & throat     Osteoporosis Mother      Thyroid Disease Mother      Diabetes Paternal Grandmother      Cerebrovascular Disease Paternal Grandmother      Colon Cancer Maternal Half-Brother      Osteoporosis Maternal Grandmother          Current Outpatient Medications   Medication Sig Dispense Refill     Acetaminophen (TYLENOL PO) Take 650 mg by mouth every 6 hours as needed for mild pain or fever       acyclovir (ZOVIRAX) 400 MG tablet TAKE 1 TABLET(400 MG) BY MOUTH DAILY 90 tablet 1     amoxicillin (AMOXIL) 500 MG capsule TAKE 4 CAPSULES(2000 MG) BY MOUTH 30 TO 60 MINUTES BEFORE DENTAL PROCEDURE 4 capsule 0     Ascorbic Acid (VITAMIN C PO)        atorvastatin (LIPITOR) 20 MG tablet Take 1 tablet (20 mg) by mouth daily 90 tablet 3     CENTRUM SILVER OR TABS 1 TABLET DAILY       Cetirizine HCl (ZYRTEC ALLERGY PO) Take 1 tablet by mouth daily       ferrous gluconate (FERGON) 324 (38 Fe) MG  tablet TAKE 1 TABLET BY MOUTH EVERY OTHER  tablet 0     FISH OIL 1000 MG OR CAPS 2 CAPSULES DAILY  (? DOSE) OTC --     FOLIC ACID 1 MG OR TABS 4 mg TABS DAILY       hydrochlorothiazide (MICROZIDE) 12.5 MG capsule Take 1 capsule (12.5 mg) by mouth daily 90 capsule 3     hydrocortisone, Perianal, (HYDROCORTISONE) 2.5 % cream Place rectally 2 times daily as needed for hemorrhoids 30 g 2     methotrexate 2.5 MG tablet CHEMO Take 6 tablets (15 mg) by mouth every 7 days       metoprolol succinate ER (TOPROL XL) 50 MG 24 hr tablet Take 1 tablet (50 mg) by mouth daily 90 tablet 3     metroNIDAZOLE (METROCREAM) 0.75 % external cream Apply topically 2 times daily 45 g 3     Multiple Vitamins-Minerals (ZINC PO)        nitroFURantoin macrocrystal (MACRODANTIN) 100 MG capsule Take 1 capsule (100 mg) by mouth daily 90 capsule 3     omeprazole (PRILOSEC) 20 MG DR capsule Take 1 capsule (20 mg) by mouth daily 90 capsule 3     phenazopyridine (PYRIDIUM) 100 MG tablet Take 1 tablet (100 mg) by mouth 3 times daily as needed for urinary tract discomfort 6 tablet 0     polyvinyl alcohol (LIQUIFILM TEARS) 1.4 % ophthalmic solution 1 drop as needed.       Allergies   Allergen Reactions     Diatrizoate Hives and Shortness Of Breath     Ciprofloxacin      nausea     Contrast Dye      Hives,anaphylaxix     Oxycodone Other (See Comments)     hallucinations     Sulfasalazine      Other reaction(s): Rash     Mammogram Screening: Mammogram Screening - Patient over age 75, has elected to discontinue screenings.      Pertinent mammograms are reviewed under the imaging tab.    Review of Systems   Constitutional: Negative for chills and fever.   HENT: Negative for congestion, ear pain, hearing loss and sore throat.    Eyes: Negative for pain and visual disturbance.   Respiratory: Negative for cough and shortness of breath.    Cardiovascular: Negative for chest pain, palpitations and peripheral edema.   Gastrointestinal: Positive for  "constipation and diarrhea. Negative for abdominal pain, heartburn, hematochezia and nausea.   Breasts:  Negative for tenderness, breast mass and discharge.   Genitourinary: Positive for genital sores and urgency. Negative for dysuria, frequency, hematuria, pelvic pain, vaginal bleeding and vaginal discharge.   Musculoskeletal: Negative for arthralgias, joint swelling and myalgias.   Skin: Negative for rash.   Neurological: Negative for dizziness, weakness, headaches and paresthesias.   Psychiatric/Behavioral: Negative for mood changes. The patient is not nervous/anxious.      Constitutional, HEENT, cardiovascular, pulmonary, gi and gu systems are negative, except as otherwise noted.    OBJECTIVE:   /78   Pulse 70   Resp 14   Ht 1.568 m (5' 1.73\")   Wt 75.3 kg (166 lb)   LMP 05/17/1972 (Exact Date)   SpO2 98%   BMI 30.63 kg/m   Estimated body mass index is 30.63 kg/m  as calculated from the following:    Height as of this encounter: 1.568 m (5' 1.73\").    Weight as of this encounter: 75.3 kg (166 lb).  Physical Exam  GENERAL APPEARANCE: healthy, alert and no distress  EYES: Eyes grossly normal to inspection, PERRL and conjunctivae and sclerae normal  HENT: ear canals and TM's normal, nose and mouth without ulcers or lesions, oropharynx clear and oral mucous membranes moist  NECK: no adenopathy, no asymmetry, masses, or scars and thyroid normal to palpation  RESP: lungs clear to auscultation - no rales, rhonchi or wheezes  BREAST: normal without masses, tenderness or nipple discharge and no palpable axillary masses or adenopathy  CV: regular rate and rhythm, normal S1 S2, no S3 or S4, no murmur, click or rub, no peripheral edema and peripheral pulses strong  ABDOMEN: soft, nontender, no hepatosplenomegaly, no masses and bowel sounds normal  MS: no musculoskeletal defects are noted and gait is age appropriate without ataxia  SKIN: no suspicious lesions or rashes  NEURO: Normal strength and tone, sensory " "exam grossly normal, mentation intact and speech normal  PSYCH: mentation appears normal and affect normal/bright    Diagnostic Test Results:  Labs reviewed in Epic    ASSESSMENT / PLAN:   Brenda was seen today for physical and medication request.    Diagnoses and all orders for this visit:    Encounter for Medicare annual wellness exam    Hyperlipidemia LDL goal <130  -     atorvastatin (LIPITOR) 20 MG tablet; Take 1 tablet (20 mg) by mouth daily  -     Lipid Profile (Chol, Trig, HDL, LDL calc); Future    Other iron deficiency anemia  -     ferrous gluconate (FERGON) 324 (38 Fe) MG tablet; TAKE 1 TABLET BY MOUTH EVERY OTHER DAY    Facial dermatitis  -     metroNIDAZOLE (METROCREAM) 0.75 % external cream; Apply topically 2 times daily    H/O recurrent urinary tract infection  -     nitroFURantoin macrocrystal (MACRODANTIN) 100 MG capsule; Take 1 capsule (100 mg) by mouth daily    Gastroesophageal reflux disease with esophagitis without hemorrhage  -     omeprazole (PRILOSEC) 20 MG DR capsule; Take 1 capsule (20 mg) by mouth daily    Rapid heart rate  -     metoprolol succinate ER (TOPROL XL) 50 MG 24 hr tablet; Take 1 tablet (50 mg) by mouth daily    Hypertension, goal below 140/90  -     Basic metabolic panel  (Ca, Cl, CO2, Creat, Gluc, K, Na, BUN); Future  -     hydrochlorothiazide (MICROZIDE) 12.5 MG capsule; Take 1 capsule (12.5 mg) by mouth daily    HTN and is table   Continue current medications   Continue statin  Continue Nitrofurantoin for UTI prevention  Continue Metronidazole for perioral dermatitis     Patient has been advised of split billing requirements and indicates understanding: Yes      COUNSELING:  Reviewed preventive health counseling, as reflected in patient instructions       Regular exercise       Healthy diet/nutrition      BMI:   Estimated body mass index is 30.63 kg/m  as calculated from the following:    Height as of this encounter: 1.568 m (5' 1.73\").    Weight as of this encounter: 75.3 kg " (166 lb).   Weight management plan: Discussed healthy diet and exercise guidelines      She reports that she has never smoked. She has never used smokeless tobacco.      Appropriate preventive services were discussed with this patient, including applicable screening as appropriate for cardiovascular disease, diabetes, osteopenia/osteoporosis, and glaucoma.  As appropriate for age/gender, discussed screening for colorectal cancer, prostate cancer, breast cancer, and cervical cancer. Checklist reviewing preventive services available has been given to the patient.    Reviewed patients plan of care and provided an AVS. The Basic Care Plan (routine screening as documented in Health Maintenance) for Brenda meets the Care Plan requirement. This Care Plan has been established and reviewed with the Patient.      Vannesa Tejada PA-C  [facility]    Identified Health Risks:

## 2022-12-22 NOTE — TELEPHONE ENCOUNTER
Called and talked with patient, let her know that antibiotics are not required after 2 years from surgery.   She expressed understanding.  Mehnaz Denton RN

## 2022-12-22 NOTE — PATIENT INSTRUCTIONS
Patient Education   Personalized Prevention Plan  You are due for the preventive services outlined below.  Your care team is available to assist you in scheduling these services.  If you have already completed any of these items, please share that information with your care team to update in your medical record.  Health Maintenance Due   Topic Date Due     Annual Wellness Visit  12/17/2022

## 2022-12-22 NOTE — TELEPHONE ENCOUNTER
[facility]    Phone Message    May a detailed message be left on voicemail: yes     Reason for Call: Other: Patient is wondering if she is still required to take Amoxicillin for dental care? She do not have any upcoming dental appointment but she would like to know for future reference. Please contact patient back.     Action Taken: Message routed to:  Other: [facility]    Travel Screening: Not Applicable

## 2022-12-23 LAB
ANION GAP SERPL CALCULATED.3IONS-SCNC: 5 MMOL/L (ref 3–14)
BUN SERPL-MCNC: 17 MG/DL (ref 7–30)
CALCIUM SERPL-MCNC: 9.7 MG/DL (ref 8.5–10.1)
CHLORIDE BLD-SCNC: 107 MMOL/L (ref 94–109)
CHOLEST SERPL-MCNC: 196 MG/DL
CO2 SERPL-SCNC: 30 MMOL/L (ref 20–32)
CREAT SERPL-MCNC: 0.66 MG/DL (ref 0.52–1.04)
FASTING STATUS PATIENT QL REPORTED: YES
GFR SERPL CREATININE-BSD FRML MDRD: 85 ML/MIN/1.73M2
GLUCOSE BLD-MCNC: 110 MG/DL (ref 70–99)
HDLC SERPL-MCNC: 72 MG/DL
LDLC SERPL CALC-MCNC: 102 MG/DL
NONHDLC SERPL-MCNC: 124 MG/DL
POTASSIUM BLD-SCNC: 3.5 MMOL/L (ref 3.4–5.3)
SODIUM SERPL-SCNC: 142 MMOL/L (ref 133–144)
TRIGL SERPL-MCNC: 109 MG/DL

## 2023-02-06 NOTE — SUMMARY OF CARE
PACU to Inpatient Nursing Handoff    Patient Brenda Barker is a 82 year old female who speaks English.   Procedure Procedure(s):  Left Total Hip Arthroplasty   - Wound Class: I-Clean   Surgeon(s) Primary: Keo Priest MD  Assisting: Lm Olivarez MD  Resident - Assisting: Lm Olivarez MD     Allergies   Allergen Reactions     Diatrizoate Hives and Shortness Of Breath     Ciprofloxacin      nausea     Contrast Dye      Hives,anaphylaxix     Sulfasalazine      Other reaction(s): Rash       Isolation  No active isolations     Past Medical History   has a past medical history of AORTIC VALVE SCLEROSIS (1/2002); ASYMPTOMATIC PVCS; Back pain; Basal cell carcinoma of scalp; BORDERLINE ELEVATED HBA1C- 6.1%  4/04 (6/12/2005); CARPAL TUNNEL SYNDROME, R>L (8/2001); CERV. 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-6 BILAT UNCINATE SPURS (8/2001); DEPRESSION (2/13/2005); Depressive disorder; DJD (degenerative joint disease); Endometriosis; Essential and other specified forms of tremor; HEMORRHOIDS; HERPES SIMPLEX  I AND II; HX ADENOMATOUS COLON POLYP (1993); HYPERLIPIDEMIA; HYPERTENSION (2/13/2005); L ACOUSTIC NEUROMA; Neoplasm of uncertain behavior of skin; OBESITY -BMI 32; OSTEOPENIA (7/03); Pain in joint, pelvic region and thigh; Rheumatoid arthritis(714.0) (6/28/2004); Spinal stenosis, unspecified region other than cervical; Unspecified hearing loss; and URIN TRACT INFECTION, RECURRENT. She also has no past medical history of Cerebral infarction (H); Congestive heart failure (H); COPD (chronic obstructive pulmonary disease) (H); Diabetes (H); History of blood transfusion; Thyroid disease; or Uncomplicated asthma.    Anesthesia Spinal   Dermatome Level Dermatomes Left: S2  Dermatomes Right: S2   Preop Meds acetaminophen (Tylenol) - time given: 0927  gabapentin (Neurontin) - time given: 0941   Nerve block Spinal   Intraop Meds dexamethasone (Decadron)  famotidine (Pepcid): last given at 1333  ondansetron (Zofran): last given at  1333   Local Meds Yes - Local Cocktail (morphine, ropivacaine, epinephrine, Toradol)  NO toradol in cocktail   Antibiotics cefazolin (Ancef) - last given at 1313     Pain Patient Currently in Pain: yes  Comfort: tolerable with discomfort  Pain Control: partially effective   PACU meds  hydromorphone (Dilaudid): .3 mg (total dose) last given at 1427    PCA / epidural No   Capnography  yes   Telemetry ECG Rhythm: Normal sinus rhythm   Inpatient Telemetry Monitor Ordered? No        Labs Glucose Lab Results   Component Value Date    GLC 98 06/15/2018       Hgb Lab Results   Component Value Date    HGB 10.4 06/15/2018       INR No results found for: INR   PACU Imaging Completed     Wound/Incision Incision/Surgical Site 06/27/18 Left Hip (Active)   Incision Assessment UTV 6/27/2018  2:15 PM   Closure Adhesive strip(s);Sutures 6/27/2018  1:39 PM   Incision Care Ice applied 6/27/2018  2:15 PM   Dressing Intervention Clean, dry, intact 6/27/2018  2:15 PM   Number of days:0      CMS Peripheral Neurovascular WDL:  WDL except;sensation (Group) (06/27/18 1415)  LLE Temperature: warm (06/27/18 1415)  LLE Color: no discoloration (06/27/18 1415)  LLE Sensation: numbness present (06/27/18 1415)  RLE Temperature: warm (06/27/18 1415)  RLE Color: no discoloration (06/27/18 1415)  RLE Sensation: numbness present (06/27/18 1415)   Equipment Abductor pillow    Other LDA       IV Access Peripheral IV 06/27/18 Right Hand (Active)   Site Assessment WDL 6/27/2018 10:09 AM   Line Status Saline locked 6/27/2018 10:09 AM   Phlebitis Scale 0-->no symptoms 6/27/2018 10:09 AM   Infiltration Scale 0 6/27/2018 10:09 AM   Extravasation? No 6/27/2018 10:09 AM   Dressing Intervention New dressing  6/27/2018 10:09 AM   Number of days:0      Blood Products Not applicable  mL   Intake/Output Date 06/27/18 0700 - 06/28/18 0659   Shift 9893-3495 3397-0250 9012-5042 24 Hour Total   I  N  T  A  K  E   I.V. 1250   1250    Shift Total  (mL/kg)  1250  (16.64)   1250  (16.64)   O  U  T  P  U  T   Urine  150  150    Blood 330   330    Shift Total  (mL/kg) 330  (4.39) 150  (2)  480  (6.39)   Weight (kg) 75.1 75.1 75.1 75.1        Drains / Zepeda     Time of void PreOp Void Prior to Procedure: 0950 (06/27/18 0949)    PostOp Voided (mL): 150 mL (06/27/18 1519)  Urine Occurrence: 1 (voided in bed) (06/27/18 1519)    Diapered? No   Bladder Scan Bladder Scan Volume (mL): 447 ml (06/27/18 1500)   PO    tolerating sips     Vitals    B/P: 150/70  T: 97.5  F (36.4  C)    Temp src: Oral  P:       Heart Rate: 58 (06/27/18 1500)     R: 16  O2:  SpO2: 99 %    O2 Device: None (Room air) (06/27/18 1445)    Oxygen Delivery: 4 LPM (06/27/18 1415)         Family/support present significant other   Patient belongings Patient Belongings: clothing;glasses;hearing aid(s);plastic bag;shoes;walker;tote  Disposition of Belongings: Locker   Patient transported on bed   DC meds/scripts (obs/outpt) Not applicable   Inpatient Pain Meds Released? Yes       Special needs/considerations None   Tasks needing completion None       TRINIDAD ANDRES RN  [facility] [id number]       spontaneous

## 2023-05-02 ENCOUNTER — OFFICE VISIT (OUTPATIENT)
Dept: FAMILY MEDICINE | Facility: CLINIC | Age: 88
End: 2023-05-02
Payer: MEDICARE

## 2023-05-02 VITALS
DIASTOLIC BLOOD PRESSURE: 80 MMHG | BODY MASS INDEX: 30.33 KG/M2 | SYSTOLIC BLOOD PRESSURE: 130 MMHG | TEMPERATURE: 97.3 F | OXYGEN SATURATION: 99 % | RESPIRATION RATE: 14 BRPM | HEIGHT: 62 IN | WEIGHT: 164.8 LBS | HEART RATE: 59 BPM
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
## 2023-05-02 DIAGNOSIS — R26.89 BALANCE PROBLEMS: Primary | ICD-10-CM

## 2023-05-02 DIAGNOSIS — M06.9 RHEUMATOID ARTHRITIS, INVOLVING UNSPECIFIED SITE, UNSPECIFIED WHETHER RHEUMATOID FACTOR PRESENT (H): ICD-10-CM

## 2023-05-02 DIAGNOSIS — E21.0 PRIMARY HYPERPARATHYROIDISM (H): ICD-10-CM

## 2023-05-02 DIAGNOSIS — D84.9 IMMUNOCOMPROMISED (H): ICD-10-CM

## 2023-05-02 DIAGNOSIS — G25.0 ESSENTIAL TREMOR: ICD-10-CM

## 2023-05-02 DIAGNOSIS — D33.3 ACOUSTIC NEUROMA (H): ICD-10-CM

## 2023-05-02 PROCEDURE — 99214 OFFICE O/P EST MOD 30 MIN: CPT | Performed by: PREVENTIVE MEDICINE

## 2023-05-02 NOTE — PROGRESS NOTES
Assessment & Plan     Balance problems  -refer to Physical therapy   -ED precautions: loss of consciousness, syncope, seizures, chest pain    - CT Head w/o Contrast  - Physical Therapy Referral    Essential tremor  -stable  -has seen neurology in the past     Fall, initial encounter  -fell last week  -no loss of consciousness   - CT Head w/o Contrast    Acoustic neuroma (H)  -removed in 1998    Immunocompromised (H)  -on Methotrexate     Rheumatoid arthritis, involving unspecified site, unspecified whether rheumatoid factor present (H)  -followed by rheumatology     Primary hyperparathyroidism (H)  -per Endocrine       Ordering of each unique test  31 minutes spent by me on the date of the encounter doing chart review, history and exam, documentation and further activities per the note         Lynda Lopes MD MPH    [facility]    Antonia Gutierrez is a 87 year old, presenting for the following health issues:  lightheadedness and off balance        5/2/2023     1:24 PM   Additional Questions   Roomed by zachery   Accompanied by self         5/2/2023     1:24 PM   Patient Reported Additional Medications   Patient reports taking the following new medications none     History of Present Illness       Reason for visit:  Loss of balance and some light headedness  Symptom onset:  1-2 weeks ago  Symptoms include:  See above  Symptom intensity:  Moderate  Symptom progression:  Staying the same  Had these symptoms before:  Yes  Has tried/received treatment for these symptoms:  No  What makes it worse:  Fast movement  What makes it better:  Sitting or laying down    She eats 0-1 servings of fruits and vegetables daily.She consumes 0 sweetened beverage(s) daily.She exercises with enough effort to increase her heart rate 9 or less minutes per day.  She exercises with enough effort to increase her heart rate 3 or less days per week.   She is taking medications regularly.     Fell once last week  Room is  "not spinning  If she turns too quick it throws her off balance  Bruised hip when she fell  No LOC  No focal weakness  No focal numbness  Happened last August  And now since April  In between has days that are ok  Sometimes just hits her  Headaches none  Has double vision and prisms to correct it, but this is worse  Eye doctor follow up in July  No fever  No emesis  No ear pain   No drainage  Stable tinnitus   No chest pain   No palpitations  No shortness of breath     Saw Rheumatology recently   Had spent a year living at [facility] 5 years ago and had PT there  1998 had acoustic neuroma removed    Hypertension Follow-up      Do you check your blood pressure regularly outside of the clinic? Yes     Are you following a low salt diet? Yes    Are your blood pressures ever more than 140 on the top number (systolic) OR more   than 90 on the bottom number (diastolic), for example 140/90? No  120-130 systolic    Seeing Endo also       Review of Systems   Constitutional, HEENT, cardiovascular, pulmonary, gi and gu systems are negative, except as otherwise noted.      Objective    /80   Pulse 59   Temp 97.3  F (36.3  C) (Oral)   Resp 14   Ht 1.575 m (5' 2\")   Wt 74.8 kg (164 lb 12.8 oz)   LMP 05/17/1972 (Exact Date)   SpO2 99%   BMI 30.14 kg/m    Body mass index is 30.14 kg/m .  Physical Exam   GENERAL APPEARANCE: healthy, alert and no distress, has a walker in the exam room, able to ambulate independently tentatively   EYES: Eyes grossly normal to inspection and conjunctivae and sclerae normal  HENT: Intact TMs  NECK: no adenopathy and trachea midline and normal to palpation  RESP: lungs clear to auscultation - no rales, rhonchi or wheezes  CV: regular rates and rhythm, normal S1 S2  ABDOMEN: soft, non-tender and no rebound or guarding   MS: extremities normal- no gross deformities noted and peripheral pulses normal  SKIN: no suspicious lesions or rashes  NEURO: Normal strength and tone, mentation intact and " speech normal, Romberg negative   PSYCH: mentation appears normal

## 2023-05-09 ENCOUNTER — ANCILLARY PROCEDURE (OUTPATIENT)
Dept: CT IMAGING | Facility: CLINIC | Age: 88
End: 2023-05-09
Attending: PREVENTIVE MEDICINE
Payer: MEDICARE
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
## 2023-05-09 DIAGNOSIS — R26.89 BALANCE PROBLEMS: ICD-10-CM

## 2023-05-09 PROCEDURE — 70450 CT HEAD/BRAIN W/O DYE: CPT | Mod: MG | Performed by: RADIOLOGY

## 2023-05-09 PROCEDURE — G1010 CDSM STANSON: HCPCS | Performed by: RADIOLOGY

## 2023-05-09 NOTE — RESULT ENCOUNTER NOTE
Brenda,  Ct scan of the head did not show any acute abnormalities. No bleed in the brain seen.     Please do not hesitate to call us at [phone] if you have any questions or concerns.    Thank you,    Lynda Lopes MD MPH

## 2023-05-12 ENCOUNTER — THERAPY VISIT (OUTPATIENT)
Dept: PHYSICAL THERAPY | Facility: CLINIC | Age: 88
End: 2023-05-12
Payer: MEDICARE

## 2023-05-12 DIAGNOSIS — R26.89 BALANCE PROBLEMS: ICD-10-CM

## 2023-05-12 DIAGNOSIS — R26.89 IMPAIRMENT OF BALANCE: ICD-10-CM

## 2023-05-12 PROCEDURE — 97110 THERAPEUTIC EXERCISES: CPT | Mod: GP | Performed by: PHYSICAL THERAPIST

## 2023-05-12 PROCEDURE — 97161 PT EVAL LOW COMPLEX 20 MIN: CPT | Mod: GP | Performed by: PHYSICAL THERAPIST

## 2023-05-12 PROCEDURE — 97112 NEUROMUSCULAR REEDUCATION: CPT | Mod: GP | Performed by: PHYSICAL THERAPIST

## 2023-05-12 NOTE — PROGRESS NOTES
Physical Therapy Initial Evaluation  Subjective:  The history is provided by the patient. No  was used.   Patient Health History  Brenda Barker being seen for Being off balance and light headed.     Problem began: 5/3/2023.   Problem occurred: Don't know   Pain is reported as 3/10 on pain scale.  General health as reported by patient is good.  Pertinent medical history includes: anemia, concussions/dizziness, depression, high blood pressure, overweight, osteoarthritis, osteoporosis, rheumatoid arthritis and thyroid problems.     Medical allergies: other. Other medical allergies details: contrast dye.   Surgeries include:  Orthopedic surgery.    Current medications:  Anti-inflammatory, high blood pressure medication, other and muscle relaxants. Other medications details: Antibiotic for frequent urinary tract infections.    Current occupation is retired.   Primary job tasks include:  Prolonged sitting.                  Therapist Generated HPI Evaluation  Problem details: The patient is a pleasant 87 year old female who presents to the clinic with complaints of difficulties of balance and L hip pain following a fall. She notes previous episodes of feeling off balance, particularly with fast turns. She also notes she has experienced vertigo in the past but states her current symptoms do not feel like vertigo. The patient's presentation is consistent with the referring medical provider's medical diagnosis. .         Type of problem:  Left hip.    This is a recurrent condition.  Condition occurred with:  A fall/slip.  Where condition occurred: at home.  Patient reports pain:  Lateral.  Pain is described as aching     Since onset symptoms are unchanged.  Associated with: Loss of balance. Exacerbated by: fast turns.  and relieved by nothing.      Barriers include:  None as reported by patient.                        Objective:  Standing Alignment:    Cervical/Thoracic:  Forward head and thoracic kyphosis  increased                Gait:    Assistive Devices:  Walker  Deviations:  General Deviations:  Stride length decr and base of support incr                                                 Hip Evaluation    Hip Strength:    Flexion:   Left: 4/5   Pain:  Right: 4/5   Pain:                    Extension:  Left: 3+/5  Pain:Right: 3+/5    Pain:    Abduction:  Left: 3/5     Pain:Right: 3/5    Pain:  Adduction:  Left: 4/5    Pain:Right: 4/5   Pain:  Internal Rotation:  Left: 4+/5    Pain:Right: 4+/5   Pain:  External Rotation:  Left: 4+/5   Pain:  Right: 4+/5   Pain:  Knee Flexion:  Left: 4/5   Pain:Right: 4/5   Pain:  Knee Extension:  Left: 4/5   Pain:Right: 4/5    Pain:            Functional Testing:  Functional test hip: 30s sit to stand: 7 close stance balance 30s, close stance eyes closed: 11s  TUG: 15.4, Cog tug 17.11.                       General     ROS    Assessment/Plan:    Patient is a 87 year old female with balance impairment complaints.    Patient has the following significant findings with corresponding treatment plan.                Diagnosis 1:  Impairment of balance  Pain -  self management, education and home program  Decreased strength - therapeutic exercise, therapeutic activities and home program  Impaired balance - neuro re-education, therapeutic activities and home program  Impaired gait - gait training and home program  Decreased function - therapeutic activities and home program  Impaired posture - neuro re-education and home program    Therapy Evaluation Codes:   1) History comprised of:   Personal factors that impact the plan of care:      Age.    Comorbidity factors that impact the plan of care are:      Concussion, Depression, High blood pressure, Osteoarthritis, Overweight and Rheumatoid arthritis.     Medications impacting care: Anti-inflammatory, High blood pressure and Muscle relaxant.  2) Examination of Body Systems comprised of:   Body structures and functions that impact the plan of  care:      Hip.   Activity limitations that impact the plan of care are:      Cooking, Dressing, Stairs, Standing and Walking.  3) Clinical presentation characteristics are:   Stable/Uncomplicated.  4) Decision-Making    Low complexity using standardized patient assessment instrument and/or measureable assessment of functional outcome.  Cumulative Therapy Evaluation is: Low complexity.    Previous and current functional limitations:  (See Goal Flow Sheet for this information)    Short term and Long term goals: (See Goal Flow Sheet for this information)     Communication ability:  Patient appears to be able to clearly communicate and understand verbal and written communication and follow directions correctly.  Treatment Explanation - The following has been discussed with the patient:   RX ordered/plan of care  Anticipated outcomes  Possible risks and side effects  This patient would benefit from PT intervention to resume normal activities.   Rehab potential is good.    Frequency:  1 X week, once daily  Duration:  for 8 weeks  Discharge Plan:  Achieve all LTG.  Independent in home treatment program.  Reach maximal therapeutic benefit.    Please refer to the daily flowsheet for treatment today, total treatment time and time spent performing 1:1 timed codes.

## 2023-05-13 NOTE — PROGRESS NOTES
[facility]    OUTPATIENT Physical Therapy ORTHOPEDIC EVALUATION  PLAN OF TREATMENT FOR OUTPATIENT REHABILITATION  (COMPLETE FOR INITIAL CLAIMS ONLY)  Patient's Last Name, First Name, M.I.  YOB: 1935  Brenda Barker    Provider s Name:  [facility]   Medical Record No.  [id number]   Start of Care Date:  05/12/23   Onset Date:   05/03/23   Treatment Diagnosis:  Impairment of balance Medical Diagnosis:  Balance problems       Goals:     05/12/23 0500   Body Part   Goals listed below are for Balance impairment   Goal #1   Goal #1 balance   Previous Functional Level Number of near falls or episodes of unsteadiness per day   Performance level 2-3   Current Functional Level Times a month that patient falls   Performance level 1 due to fast turns   STG Target Performance Timed Up and Go test score to   Performance level <15s   Rationale for safe homemaking tasks;for safe standing;for safe showering;for safe household ambulation;for safe community ambulation   Due date 05/26/23   LTG Target Performance Timed Up and Go test score to   Performance Level <13.5s   Rationale for safe household ambulation;for safe community ambulation;for safe homemaking tasks;for safe standing   Due date 07/07/23   Goal #2   Goal #2 balance   Previous Functional Level Times a month that patient fell   Performance level 1   Current Functional Level Number of near falls or episodes of unsteadiness per day   Performance level 2-3   STG Target Performance Decrease number of near falls or episodes of unsteadiness per day to   Performance level 1   Rationale for safe household ambulation;for safe community ambulation;for safe standing;for safe homemaking tasks   Due date 05/26/23   LTG Target Performance Decrease number of near falls or episodes of unsteadiness per week to   Performance Level 0    Rationale for safe household ambulation;for safe community ambulation;for safe homemaking tasks;for safe standing   Due date 07/07/23     Therapy Frequency:  1x daily /week  Predicted Duration of Therapy Intervention:  8 weeks    HUMA LANGSTON                 I CERTIFY THE NEED FOR THESE SERVICES FURNISHED UNDER        THIS PLAN OF TREATMENT AND WHILE UNDER MY CARE     (Physician attestation of this document indicates review and certification of the therapy plan).                     Certification Date From:  05/12/23   Certification Date To:  07/07/23    Referring Provider:  Lynda Lopes    Initial Assessment        See Epic Evaluation SOC Date: 05/12/23

## 2023-05-24 ENCOUNTER — THERAPY VISIT (OUTPATIENT)
Dept: PHYSICAL THERAPY | Facility: CLINIC | Age: 88
End: 2023-05-24
Payer: MEDICARE

## 2023-05-24 DIAGNOSIS — R26.89 IMPAIRMENT OF BALANCE: Primary | ICD-10-CM

## 2023-05-24 PROCEDURE — 97112 NEUROMUSCULAR REEDUCATION: CPT | Mod: GP | Performed by: PHYSICAL THERAPIST

## 2023-05-24 PROCEDURE — 97110 THERAPEUTIC EXERCISES: CPT | Mod: GP | Performed by: PHYSICAL THERAPIST

## 2023-05-31 ENCOUNTER — THERAPY VISIT (OUTPATIENT)
Dept: PHYSICAL THERAPY | Facility: CLINIC | Age: 88
End: 2023-05-31
Payer: MEDICARE

## 2023-05-31 DIAGNOSIS — R26.89 IMPAIRMENT OF BALANCE: Primary | ICD-10-CM

## 2023-05-31 PROCEDURE — 97112 NEUROMUSCULAR REEDUCATION: CPT | Mod: GP | Performed by: PHYSICAL THERAPIST

## 2023-05-31 PROCEDURE — 97110 THERAPEUTIC EXERCISES: CPT | Mod: GP | Performed by: PHYSICAL THERAPIST

## 2023-06-02 ENCOUNTER — TRANSFERRED RECORDS (OUTPATIENT)
Dept: HEALTH INFORMATION MANAGEMENT | Facility: CLINIC | Age: 88
End: 2023-06-02
Payer: MEDICARE

## 2023-06-05 ENCOUNTER — INFUSION THERAPY VISIT (OUTPATIENT)
Dept: INFUSION THERAPY | Facility: CLINIC | Age: 88
End: 2023-06-05
Payer: MEDICARE

## 2023-06-05 ENCOUNTER — LAB (OUTPATIENT)
Dept: LAB | Facility: CLINIC | Age: 88
End: 2023-06-05
Payer: MEDICARE

## 2023-06-05 VITALS
WEIGHT: 162.1 LBS | SYSTOLIC BLOOD PRESSURE: 154 MMHG | RESPIRATION RATE: 18 BRPM | BODY MASS INDEX: 29.65 KG/M2 | HEART RATE: 57 BPM | TEMPERATURE: 97.5 F | DIASTOLIC BLOOD PRESSURE: 67 MMHG | OXYGEN SATURATION: 100 %

## 2023-06-05 DIAGNOSIS — E21.0 PRIMARY HYPERPARATHYROIDISM (H): Primary | ICD-10-CM

## 2023-06-05 DIAGNOSIS — M81.0 AGE-RELATED OSTEOPOROSIS WITHOUT CURRENT PATHOLOGICAL FRACTURE: ICD-10-CM

## 2023-06-05 LAB
CALCIUM SERPL-MCNC: 9.6 MG/DL (ref 8.5–10.1)
CREAT SERPL-MCNC: 0.72 MG/DL (ref 0.52–1.04)
GFR SERPL CREATININE-BSD FRML MDRD: 81 ML/MIN/1.73M2
HOLD SPECIMEN: NORMAL
HOLD SPECIMEN: NORMAL

## 2023-06-05 PROCEDURE — 82565 ASSAY OF CREATININE: CPT | Performed by: INTERNAL MEDICINE

## 2023-06-05 PROCEDURE — 36415 COLL VENOUS BLD VENIPUNCTURE: CPT | Performed by: INTERNAL MEDICINE

## 2023-06-05 PROCEDURE — 96365 THER/PROPH/DIAG IV INF INIT: CPT | Performed by: NURSE PRACTITIONER

## 2023-06-05 PROCEDURE — 99207 PR NO CHARGE LOS: CPT

## 2023-06-05 PROCEDURE — 82310 ASSAY OF CALCIUM: CPT | Performed by: INTERNAL MEDICINE

## 2023-06-05 RX ORDER — EPINEPHRINE 1 MG/ML
0.3 INJECTION, SOLUTION, CONCENTRATE INTRAVENOUS EVERY 5 MIN PRN
Status: CANCELLED | OUTPATIENT
Start: 2023-06-05

## 2023-06-05 RX ORDER — HEPARIN SODIUM (PORCINE) LOCK FLUSH IV SOLN 100 UNIT/ML 100 UNIT/ML
5 SOLUTION INTRAVENOUS
Status: CANCELLED | OUTPATIENT
Start: 2023-06-05

## 2023-06-05 RX ORDER — ACETAMINOPHEN 325 MG/1
650 TABLET ORAL ONCE
Status: CANCELLED | OUTPATIENT
Start: 2023-06-05

## 2023-06-05 RX ORDER — MEPERIDINE HYDROCHLORIDE 25 MG/ML
25 INJECTION INTRAMUSCULAR; INTRAVENOUS; SUBCUTANEOUS EVERY 30 MIN PRN
Status: CANCELLED | OUTPATIENT
Start: 2023-06-05

## 2023-06-05 RX ORDER — ALBUTEROL SULFATE 0.83 MG/ML
2.5 SOLUTION RESPIRATORY (INHALATION)
Status: CANCELLED | OUTPATIENT
Start: 2023-06-05

## 2023-06-05 RX ORDER — HEPARIN SODIUM,PORCINE 10 UNIT/ML
5 VIAL (ML) INTRAVENOUS
Status: CANCELLED | OUTPATIENT
Start: 2023-06-05

## 2023-06-05 RX ORDER — DIPHENHYDRAMINE HYDROCHLORIDE 50 MG/ML
50 INJECTION INTRAMUSCULAR; INTRAVENOUS
Status: CANCELLED
Start: 2023-06-05

## 2023-06-05 RX ORDER — METHYLPREDNISOLONE SODIUM SUCCINATE 125 MG/2ML
125 INJECTION, POWDER, LYOPHILIZED, FOR SOLUTION INTRAMUSCULAR; INTRAVENOUS
Status: CANCELLED
Start: 2023-06-05

## 2023-06-05 RX ORDER — ZOLEDRONIC ACID 5 MG/100ML
5 INJECTION, SOLUTION INTRAVENOUS ONCE
Status: CANCELLED
Start: 2023-06-05

## 2023-06-05 RX ORDER — ZOLEDRONIC ACID 5 MG/100ML
5 INJECTION, SOLUTION INTRAVENOUS ONCE
Status: COMPLETED | OUTPATIENT
Start: 2023-06-05 | End: 2023-06-05

## 2023-06-05 RX ORDER — ACETAMINOPHEN 325 MG/1
650 TABLET ORAL ONCE
Status: COMPLETED | OUTPATIENT
Start: 2023-06-05 | End: 2023-06-05

## 2023-06-05 RX ORDER — ALBUTEROL SULFATE 90 UG/1
1-2 AEROSOL, METERED RESPIRATORY (INHALATION)
Status: CANCELLED
Start: 2023-06-05

## 2023-06-05 RX ORDER — EPINEPHRINE 1 MG/ML
0.3 INJECTION, SOLUTION INTRAMUSCULAR; SUBCUTANEOUS EVERY 5 MIN PRN
Status: CANCELLED | OUTPATIENT
Start: 2023-06-05

## 2023-06-05 RX ADMIN — Medication 250 ML: at 14:10

## 2023-06-05 RX ADMIN — ACETAMINOPHEN 650 MG: 325 TABLET ORAL at 13:48

## 2023-06-05 RX ADMIN — ZOLEDRONIC ACID 5 MG: 0.05 INJECTION, SOLUTION INTRAVENOUS at 14:10

## 2023-06-05 ASSESSMENT — PAIN SCALES - GENERAL: PAINLEVEL: NO PAIN (0)

## 2023-06-05 NOTE — PROGRESS NOTES
Infusion Nursing Note:  Brenda Barker presents today for Reclast.    Patient seen by provider today: No   present during visit today: Not Applicable.    Note: Patient feeling fatigued lately and some balance issues. She is seeing PT. Chronic tinnitus in bilateral ears     Patient denies dental issues at this time.  States seeing dentist regularily.  Patient will inform dentist that she got Reclast infusion, prior to any invasive dental work in the future.     Reminded patient to drink 6-8 glasses of water today, and tomorrow, to protect kidneys.     Intravenous Access:  Peripheral IV placed.    Treatment Conditions:  Creatinine   Date Value Ref Range Status   06/05/2023 0.72 0.52 - 1.04 mg/dL Final   12/10/2020 0.70 0.52 - 1.04 mg/dL Final   Calcium 9.6      Post Infusion Assessment:  Patient tolerated infusion without incident.  Blood return noted pre and post infusion.  Site patent and intact, free from redness, edema or discomfort.  No evidence of extravasations.  Access discontinued per protocol.       Discharge Plan:   Patient and/or family verbalized understanding of discharge instructions and all questions answered.  AVS to patient via [employer].  Patient will return in 1 year for next appointment.   Patient discharged in stable condition accompanied by: self and  drove.  Departure Mode: ambulatory with walker.      Kami Parmar RN

## 2023-06-13 ENCOUNTER — THERAPY VISIT (OUTPATIENT)
Dept: PHYSICAL THERAPY | Facility: CLINIC | Age: 88
End: 2023-06-13
Payer: MEDICARE

## 2023-06-13 DIAGNOSIS — R26.89 IMPAIRMENT OF BALANCE: Primary | ICD-10-CM

## 2023-06-13 PROCEDURE — 97112 NEUROMUSCULAR REEDUCATION: CPT | Mod: GP | Performed by: PHYSICAL THERAPIST

## 2023-06-13 PROCEDURE — 97110 THERAPEUTIC EXERCISES: CPT | Mod: GP | Performed by: PHYSICAL THERAPIST

## 2023-06-13 NOTE — PROGRESS NOTES
PLAN  Continue therapy per current plan of care.    Beginning/End Dates of Progress Note Reporting Period:    to 06/13/2023    Referring Provider:  Lynda Lopes       06/13/23 0500   Appointment Info   Signing clinician's name / credentials Jamir Mesa DPT   Total/Authorized Visits 6 per PT POC   Visits Used 4   Medical Diagnosis Balance problems   PT Tx Diagnosis Impairment of balance   Quick Adds Certification   Progress Note/Certification   Start of Care Date 05/12/23   Onset of illness/injury or Date of Surgery 05/03/23   Therapy Frequency 1x daily / week   Predicted Duration 6 weeks   Certification date from 05/12/23   Certification date to 07/07/23   Progress Note Due Date 06/07/23   PT Goal 1   Goal Identifier TUG test score   Goal Description The patient will improve her timed up and go test to less than 13.5 seconds   Rationale   (for safe household ambulation;for safe community ambulation;for safe homemaking tasks;for safe standing)   Goal Progress The patient can currently perform the TUG in 12 seconds, Cognitive TUG in 13 seconds   Target Date 07/07/23   Date Met 06/13/23   PT Goal 2   Goal Identifier Falls   Goal Description The patient will decrease the number of near falls or episodes of unsteadiness per week to 0   Rationale   (for safe household ambulation;for safe community ambulation;for safe homemaking tasks;for safe standing)   Goal Progress The patient notes no episodes of instability since her last therapy session   Target Date 07/07/23   Date Met 06/13/23   Subjective Report   Subjective Report The patient notes feeling a significant improvement in her balance, notes that she has been able to mobilize more independently. States that she feels she will likely be appropriate for discharge following her next physical therapy appointment.   Objective Measures   Objective Measures Objective Measure 1;Objective Measure 2;Objective Measure 3   Objective Measure 1   Objective Measure Semi  tandem stance   Details x30s bilaterally   Objective Measure 2   Details Flexion: Left: 4/5   Pain:  Right: 4/5 Extension:  Left: 4/5  Right: 4/5 Abduction:  Left: 4+/5     Right: 4+/5  Adduction:  Left: 5/5  Pain:Right: 5/5   Pain:  Internal Rotation:  Left: 4+/5    Pain:Right: 4+/5   External Rotation:  Left: 4+/5   Pain:  Right: 4+/5   Pain:  Knee Flexion:  Left: 5/5   Pain:Right: 4/5   Pain:  Knee Extension:  Left: 5/5   Pain:Right: 5/5    planterflexion L 11 R12   Objective Measure 3   Objective Measure TUG Cognitive TUG   Details 12, 13 respectively   Treatment Interventions (PT)   Interventions Therapeutic Procedure/Exercise;Neuromuscular Re-education   Therapeutic Procedure/Exercise   Therapeutic Procedures: strength, endurance, ROM, flexibillity minutes (26203) 18   Therapeutic Procedures Ther Proc 2;Ther Proc 3;Ther Proc 4;Ther Proc 5;Ther Proc 6   Ther Proc 1 Standing hip extension   Ther Proc 1 - Details x10 each   Ther Proc 2 Squat at sink   Ther Proc 2 - Details x15   Ther Proc 3 heel raise   Ther Proc 3 - Details x24   Ther Proc 4 toe raise   Ther Proc 4 - Details x10   Ther Proc 5 marching   Ther Proc 5 - Details x10 each   Ther Proc 6 Nustep warmup   Ther Proc 6 - Details 5 min   PTRx Ther Proc 1 Bridging #1   PTRx Ther Proc 1 - Details x20   PTRx Ther Proc 2 Sit to Stand   PTRx Ther Proc 2 - Details 3x5   PTRx Ther Proc 3 Hip AROM Standing Abduction   PTRx Ther Proc 3 - Details 2x10 each   Skilled Intervention Imroved stability noted on each exercise in standing   Patient Response/Progress The patient notes muscular fatigue with each exercise, feels more confident dueing standing single leg activities   Neuromuscular Re-education   Neuromuscular re-ed of mvmt, balance, coord, kinesthetic sense, posture, proprioception minutes (20650) 9   Neuromuscular Re-education Neuro Re-ed 2;Neuro Re-ed 3;Neuro Re-ed 4;Neuro Re-ed 5;Neuro Re-ed 6;Neuro Re-ed 7   Neuro Re-ed 1 Semi-Tandem Stance   Neuro Re-ed 1 -  Details 2x30s each side   Neuro Re-ed 2 Side stepping   Neuro Re-ed 2 - Details x2 min   Neuro Re-ed 3 Retro walking   Neuro Re-ed 3 - Details x2 min   Neuro Re-ed 4 single leg stance   Neuro Re-ed 4 - Details 2x30s   Neuro Re-ed 5 stepping recovery pattern   Neuro Re-ed 5 - Details 2x10 with UE support   Skilled Intervention SBAx1   Patient Response/Progress no LOB during any balance activities   Plan   Plan for next session Retest TUG, Progress note for MD   Comments   Comments 10 minutes spent on progress note assessment   Total Session Time   Timed Code Treatment Minutes 27   Total Treatment Time (sum of timed and untimed services) 27

## 2023-06-30 ENCOUNTER — THERAPY VISIT (OUTPATIENT)
Dept: PHYSICAL THERAPY | Facility: CLINIC | Age: 88
End: 2023-06-30
Payer: MEDICARE

## 2023-06-30 DIAGNOSIS — R26.89 IMPAIRMENT OF BALANCE: Primary | ICD-10-CM

## 2023-06-30 PROCEDURE — 97110 THERAPEUTIC EXERCISES: CPT | Mod: GP | Performed by: PHYSICAL THERAPIST

## 2023-07-02 PROBLEM — R26.89 IMPAIRMENT OF BALANCE: Status: RESOLVED | Noted: 2023-05-12 | Resolved: 2023-07-02

## 2023-07-02 NOTE — PROGRESS NOTES
DISCHARGE  Reason for Discharge: Patient has met all goals.  Patient chooses to discontinue therapy.    Equipment Issued: Home exercise program      Discharge Plan: Patient to continue home program. Will reach out with any questions of further concerns.    Referring Provider:  Lynda Lopes       06/30/23 0500   Appointment Info   Signing clinician's name / credentials Jamir Mesa DPT   Total/Authorized Visits 6 per PT POC   Visits Used 5   Medical Diagnosis Balance problems   PT Tx Diagnosis Impairment of balance   Quick Adds Certification   Progress Note/Certification   Start of Care Date 05/12/23   Onset of illness/injury or Date of Surgery 05/03/23   Therapy Frequency 1x daily / week   Predicted Duration 6 weeks   Certification date from 05/12/23   Certification date to 07/07/23   Progress Note Due Date 06/07/23   PT Goal 1   Goal Identifier TUG test score   Goal Description The patient will improve her timed up and go test to less than 13.5 seconds   Rationale   (for safe household ambulation;for safe community ambulation;for safe homemaking tasks;for safe standing)   Goal Progress GOAL MET The patient can currently perform the TUG in 11 seconds, Cognitive TUG in 12 seconds   Target Date 07/07/23   Date Met 06/13/23   PT Goal 2   Goal Identifier Falls   Goal Description The patient will decrease the number of near falls or episodes of unsteadiness per week to 0   Rationale   (for safe household ambulation;for safe community ambulation;for safe homemaking tasks;for safe standing)   Goal Progress GOAL MET The patient notes no episodes of instability   Target Date 07/07/23   Date Met 06/13/23   Subjective Report   Subjective Report The patient notes a significant improvement in her balance, notes she has been consistent with her home exercise program, reports that she feels she is appropriate to be discharged from therapy at this time.   Objective Measures   Objective Measures Objective Measure  1;Objective Measure 2;Objective Measure 3;Objective Measure 4   Objective Measure 1   Objective Measure Semi tandem stance   Details x30s bilaterally   Objective Measure 2   Details Flexion: Left: 4/5   Pain:  Right: 4/5 Extension:  Left: 4/5  Right: 4/5 Abduction:  Left: 4+/5     Right: 4+/5  Adduction:  Left: 5/5  Pain:Right: 5/5   Pain:  Internal Rotation:  Left: 4+/5    Pain:Right: 4+/5   External Rotation:  Left: 4+/5   Pain:  Right: 4+/5   Pain:  Knee Flexion:  Left: 5/5   Pain:Right: 4/5   Pain:  Knee Extension:  Left: 5/5   Pain:Right: 5/5    planterflexion L 11 R12   Objective Measure 3   Objective Measure TUG Cognitive TUG   Details 11, 12 respectively   Objective Measure 4   Objective Measure 30s sit<>stand   Details 11   Treatment Interventions (PT)   Interventions Therapeutic Procedure/Exercise;Neuromuscular Re-education   Therapeutic Procedure/Exercise   Therapeutic Procedures: strength, endurance, ROM, flexibillity minutes (99074) 24   Therapeutic Procedures Ther Proc 2;Ther Proc 3;Ther Proc 4;Ther Proc 5;Ther Proc 6   Ther Proc 1 Standing hip extension   Ther Proc 1 - Details x10 each   Ther Proc 2 Squat at sink   Ther Proc 2 - Details x15   Ther Proc 3 heel raise   Ther Proc 3 - Details x24   Ther Proc 4 toe raise   Ther Proc 4 - Details x10   Ther Proc 5 marching   Ther Proc 5 - Details x10 each   Ther Proc 6 Nustep warmup   Ther Proc 6 - Details 5 min   PTRx Ther Proc 1 Bridging #1   PTRx Ther Proc 1 - Details x20   PTRx Ther Proc 2 Sit to Stand   PTRx Ther Proc 2 - Details 2x10   PTRx Ther Proc 3 Hip AROM Standing Abduction   PTRx Ther Proc 3 - Details 2x10 each   Skilled Intervention Review of HEP   Patient Response/Progress Patient notes that she feels she will be able to perform HEP independently at home and verbalizes agreement to be consistent with HEP   Plan   Plan for next session Retest TUG, Progress note for MD   Comments   Comments 16 min spent on DC assessment   Total Session Time    Timed Code Treatment Minutes 24   Total Treatment Time (sum of timed and untimed services) 24

## 2023-07-11 ENCOUNTER — OFFICE VISIT (OUTPATIENT)
Dept: ENDOCRINOLOGY | Facility: CLINIC | Age: 88
End: 2023-07-11
Payer: MEDICARE

## 2023-07-11 VITALS
WEIGHT: 158 LBS | HEART RATE: 59 BPM | SYSTOLIC BLOOD PRESSURE: 134 MMHG | BODY MASS INDEX: 28.9 KG/M2 | OXYGEN SATURATION: 100 % | DIASTOLIC BLOOD PRESSURE: 60 MMHG

## 2023-07-11 DIAGNOSIS — M81.0 AGE-RELATED OSTEOPOROSIS WITHOUT CURRENT PATHOLOGICAL FRACTURE: Primary | ICD-10-CM

## 2023-07-11 DIAGNOSIS — E21.0 PRIMARY HYPERPARATHYROIDISM (H): ICD-10-CM

## 2023-07-11 DIAGNOSIS — E04.1 THYROID NODULE: ICD-10-CM

## 2023-07-11 LAB
ALBUMIN SERPL BCG-MCNC: 4.6 G/DL (ref 3.5–5.2)
BUN SERPL-MCNC: 21.5 MG/DL (ref 8–23)
CALCIUM SERPL-MCNC: 10.1 MG/DL (ref 8.8–10.2)
CREAT SERPL-MCNC: 0.87 MG/DL (ref 0.51–0.95)
GFR SERPL CREATININE-BSD FRML MDRD: 65 ML/MIN/1.73M2
PHOSPHATE SERPL-MCNC: 2.9 MG/DL (ref 2.5–4.5)
PTH-INTACT SERPL-MCNC: 71 PG/ML (ref 15–65)

## 2023-07-11 PROCEDURE — 84100 ASSAY OF PHOSPHORUS: CPT | Performed by: INTERNAL MEDICINE

## 2023-07-11 PROCEDURE — 82310 ASSAY OF CALCIUM: CPT | Performed by: INTERNAL MEDICINE

## 2023-07-11 PROCEDURE — 82340 ASSAY OF CALCIUM IN URINE: CPT | Performed by: INTERNAL MEDICINE

## 2023-07-11 PROCEDURE — 84520 ASSAY OF UREA NITROGEN: CPT | Performed by: INTERNAL MEDICINE

## 2023-07-11 PROCEDURE — 82040 ASSAY OF SERUM ALBUMIN: CPT | Performed by: INTERNAL MEDICINE

## 2023-07-11 PROCEDURE — 82565 ASSAY OF CREATININE: CPT | Performed by: INTERNAL MEDICINE

## 2023-07-11 PROCEDURE — 99214 OFFICE O/P EST MOD 30 MIN: CPT | Performed by: INTERNAL MEDICINE

## 2023-07-11 PROCEDURE — 81050 URINALYSIS VOLUME MEASURE: CPT | Performed by: INTERNAL MEDICINE

## 2023-07-11 PROCEDURE — 82570 ASSAY OF URINE CREATININE: CPT | Performed by: INTERNAL MEDICINE

## 2023-07-11 PROCEDURE — 83970 ASSAY OF PARATHORMONE: CPT | Performed by: INTERNAL MEDICINE

## 2023-07-11 PROCEDURE — 36415 COLL VENOUS BLD VENIPUNCTURE: CPT | Performed by: INTERNAL MEDICINE

## 2023-07-11 NOTE — PATIENT INSTRUCTIONS
Weight bearing Exercises- walking.   Fall prevention  Adequate Calcium and vitamin D intake: for maintenance calcium 1200 mg daily through diet and vitamin D 1000 IU daily.      2 servings of diary per day products below.   Milk                            8 oz            300 mg  Yogurt                          1 cup           400 mg  Hard cheese                     1.5 oz          300 mg  Cottage cheese                  2 cup           300 mg  Orange juice with Calcium       8 oz            300 mg  Low fat dairy sources are recommended    Blood work for today. 24 hour urine collection to check calcium.   a day; if Sunday discard the first urine on Sunday morning and collect all other urine all day Sunday and night; including morning urine of Monday morning. Bring it back to the laboratory Monday morning after completion of urine collection.           [facility]-Department of Endocrinology  Diabetes Educators:   Sybil Ruvalcaba RN and Wendy Riggins RN  Clinic Nurse: KATJA Driver  CMA's: Lyla Zhang   EMT: Mehdi  Scheduling/Clinic phone number : [phone]   Clinic Fax: [phone]  On-Call Endocrine at the [facility] (after hours/weekends): [phone] option 4    Please call the number below to schedule your labs.  Lab    General [phone]   [facility] [phone]   [place] [phone]   [facility]  [phone]   [facility] [phone]   [place] [phone]   [facility]) [phone]   [facility] Walk[facility] Only   [place] [phone]   [place] [phone]   [place] [phone]   [place] [phone]     Please reach out to the following centers to schedule your imaging appointment:  Imaging (DEXA, CT, MRI, XRAY)    [facility] ([facility], [facility], [place]) [phone]   [facility] ([place], [place]) [phone]   [facility] ([facility]) [phone]   [facility] ([facility]) [phone]      Appointment Reminders:  * Please bring meter with for staff to download  * If you are due ONLY for an A1C, it is scheduled with the nurse and will be done in clinic. You do not need to schedule a lab appointment. Fasting is not required for an A1C.  * Refill request should be submitted to your pharmacy. They will contact clinic for approval.

## 2023-07-11 NOTE — LETTER
7/11/2023         RE: Brenda Barker  [address] [address]        Dear Colleague,    Thank you for referring your patient, Brenda Barker, to the [facility]. Please see a copy of my visit note below.    Endocrinology Clinic Visit    Chief Complaint: Follow Up and Endocrine Problem     Information obtained from:Patient      Assessment/Treatment Plan:    Osteoporosis  Primary hyperparathyroidism; now with interval improvement in calcium level    Patient has had history of high calcium in the setting of high parathyroid hormone we suggest his primary hyperparathyroidism.  The 24-hour urine collection result was not supportive of this diagnosis.  We are obtaining 24-hour urine collection again and we will recheck the parathyroid hormone and calcium level.  Interestingly, after Reclast infusions her calcium level has improved.    Osteoporosis standpoint plan  #1  Strengthening exercises like walking  2.  Calcium 1200 mg daily from dietary sources.  Preferably from diet source.  3.  Vitamin D 800 international units daily through multivitamin.   4.  Bone specific therapy indicated Reclast infusion x2 given.  Tolerated treatment well.  Scheduled to follow-up DEXA scan for 8/3/2024.  5.  Fall prevention and strengthening exercises.    Hyperparathyroidism  History of Hypercalcemia    Patient has had history of high calcium in the setting of high parathyroid hormone we suggest his primary hyperparathyroidism.  The 24-hour urine collection result was not supportive of this diagnosis.  We are obtaining 24-hour urine collection again and we will recheck the parathyroid hormone and calcium level.  Interestingly, after Reclast infusions her calcium level has improved.    Thyroid nodule-follow-up thyroid ultrasound ordered.  Patient has had a thyroid biopsy done in 2013 for a left thyroid nodule which was measuring at 2.7 cm at the time.  Now follow-up thyroid  "ultrasound shows that this nodule is stable.  Other smaller nodules as noted on recent ultrasound.  No parathyroid adenoma noted.   Component      Latest Ref Rng 7/11/2023  2:46 PM   Creatinine      0.51 - 0.95 mg/dL 0.87    GFR Estimate      >60 mL/min/1.73m2 65    Albumin      3.5 - 5.2 g/dL 4.6    Calcium      8.8 - 10.2 mg/dL 10.1    Parathyroid Hormone Intact      15 - 65 pg/mL 71 (H)    Phosphorus      2.5 - 4.5 mg/dL 2.9    Urea Nitrogen      8.0 - 23.0 mg/dL 21.5      Return to clinic in 10 months.      Oskar Su MD  Staff Endocrinologist    Division of Endocrinology and Diabetes      Subjective:         HPI: Brenda Barker is a 87 year old female with history of with multiple medical issues discussed below.     Has had hypercalcemia for the last 5+ years intermittently.  She was also noted to have high parathyroid hormone along with that.  No history of kidney stones.  No history of fractures.  No family history of hypercalcemia.  Not currently taking high vitamin D or high calcium supplement.  Takes multivitamin.  Detailed review of system was documented below.  Planning to see dermatology for skin disorders.  4/2022  \"Lumbar spine T-score in region of L4 = -2.5      HIPS:  Mean total hip T-score: -2.6  Left femoral neck T-score = -1.8     Radius 33% T-score = -3.0\"  Vitamin d 20 mcg via multivitamin.   Cottage cheese, yogurt every other day.  Drinks milk.    Answers for HPI/ROS submitted by the patient on 7/8/2023  General Symptoms: Yes  Skin Symptoms: No  HENT Symptoms: No  EYE SYMPTOMS: No  HEART SYMPTOMS: No  LUNG SYMPTOMS: No  INTESTINAL SYMPTOMS: No  URINARY SYMPTOMS: No  GYNECOLOGIC SYMPTOMS: No  BREAST SYMPTOMS: No  SKELETAL SYMPTOMS: No  BLOOD SYMPTOMS: No  NERVOUS SYSTEM SYMPTOMS: No  MENTAL HEALTH SYMPTOMS: No    Now status post Reclast infusion x2 and has tolerated treatment well.    Allergies   Allergen Reactions     Diatrizoate Hives and Shortness Of Breath     Ciprofloxacin      " nausea     Contrast Dye      Hives,anaphylaxix     Oxycodone Other (See Comments)     hallucinations     Sulfasalazine      Other reaction(s): Rash       Current Outpatient Medications   Medication Sig Dispense Refill     Acetaminophen (TYLENOL PO) Take 650 mg by mouth every 6 hours as needed for mild pain or fever       acyclovir (ZOVIRAX) 400 MG tablet TAKE 1 TABLET(400 MG) BY MOUTH DAILY 90 tablet 1     amoxicillin (AMOXIL) 500 MG capsule TAKE 4 CAPSULES(2000 MG) BY MOUTH 30 TO 60 MINUTES BEFORE DENTAL PROCEDURE 4 capsule 0     Ascorbic Acid (VITAMIN C PO)        atorvastatin (LIPITOR) 20 MG tablet Take 1 tablet (20 mg) by mouth daily 90 tablet 3     CENTRUM SILVER OR TABS 1 TABLET DAILY       Cetirizine HCl (ZYRTEC ALLERGY PO) Take 1 tablet by mouth daily       ferrous gluconate (FERGON) 324 (38 Fe) MG tablet TAKE 1 TABLET BY MOUTH EVERY OTHER  tablet 0     FISH OIL 1000 MG OR CAPS 2 CAPSULES DAILY  (? DOSE) OTC --     FOLIC ACID 1 MG OR TABS 4 mg TABS DAILY       hydrochlorothiazide (MICROZIDE) 12.5 MG capsule Take 1 capsule (12.5 mg) by mouth daily 90 capsule 3     hydrocortisone, Perianal, (HYDROCORTISONE) 2.5 % cream Place rectally 2 times daily as needed for hemorrhoids 30 g 2     methotrexate 2.5 MG tablet CHEMO Take 6 tablets (15 mg) by mouth every 7 days       metoprolol succinate ER (TOPROL XL) 50 MG 24 hr tablet Take 1 tablet (50 mg) by mouth daily 90 tablet 3     metroNIDAZOLE (METROCREAM) 0.75 % external cream Apply topically 2 times daily 45 g 3     Multiple Vitamins-Minerals (ZINC PO)        nitroFURantoin macrocrystal (MACRODANTIN) 100 MG capsule Take 1 capsule (100 mg) by mouth daily 90 capsule 3     omeprazole (PRILOSEC) 20 MG DR capsule TAKE 1 CAPSULE(20 MG) BY MOUTH DAILY 90 capsule 0     phenazopyridine (PYRIDIUM) 100 MG tablet Take 1 tablet (100 mg) by mouth 3 times daily as needed for urinary tract discomfort 6 tablet 0     polyvinyl alcohol (LIQUIFILM TEARS) 1.4 % ophthalmic  "solution 1 drop as needed.         Review of Systems     11 point review system (Constitutional, HENT, Eyes, Respiratory, Cardiovascular, Gastrointestinal, Genitourinary, Musculoskeletal,Neurological, Psychiatric/Behavioural, Endocrine) is negative or is as per HPI above        Objective:   /60 (BP Location: Left arm, Patient Position: Sitting, Cuff Size: Adult Regular)   Pulse 59   Wt 71.7 kg (158 lb)   LMP 05/17/1972 (Exact Date)   SpO2 100%   BMI 28.90 kg/m    Constitutional: Pleasant no acute cardiopulmonary distress.   EYES: anicteric, normal extra-ocular movements, no lid lag or retraction.   HEENT: Mouth/Throat: Mucous membrane is moist.     Cardiovascular: RRR, S1, S2 normal.   Pulmonary/Chest: CTAB. No wheezing or rales.   Abdominal: +BS. Non tender to palpation.    Neurological: Alert and oriented.  No tremor and reflexes are symmetrical bilaterally and within the normal limits. Muscle strength 5/5.   Extremities: No edema.  Psychological: appropriate mood and affect   In House Labs:   Hemoglobin A1C   Date Value Ref Range Status   05/06/2022 5.4 0.0 - 5.6 % Final     Comment:     Normal <5.7%   Prediabetes 5.7-6.4%    Diabetes 6.5% or higher     Note: Adopted from ADA consensus guidelines.   04/12/2007 6.3 (H) 4.3 - 6.0 % Final   08/16/2006 5.8 4.3 - 6.0 % Final   03/28/2006 6.1 (H) 4.3 - 6.0 % Final       TSH   Date Value Ref Range Status   12/17/2021 0.97 0.40 - 4.00 mU/L Final   09/25/2019 1.08 0.40 - 4.00 mU/L Final   06/11/2018 1.05 0.30 - 4.50 uIU/mL Final   04/12/2007 1.71 0.4 - 5.0 mU/L Final   05/19/2005 1.22 0.4 - 5.0 mU/L Final       Creatinine   Date Value Ref Range Status   06/05/2023 0.72 0.52 - 1.04 mg/dL Final   12/10/2020 0.70 0.52 - 1.04 mg/dL Final   ]    measuring 2.2 x 2.4 x 2.7 cm  \"SPECIMEN/STAIN PROCESS:  FNA-thyroid,Left       Pap-Cyto x 2, Diff Quick Stain-cyto x 2    ----------------------------------------------------------------    CYTOLOGIC " "INTERPRETATION:     FNA-thyroid,Left:   Negative for Malignancy  Consistent with a benign nodule (includes adenomatoid nodule, colloid  nodule, etc.)\"  This note has been dictated using voice recognition software.  As a result, there may be errors in the documentation that have gone undetected.  Please consider this when interpreting information in this documentation.          Again, thank you for allowing me to participate in the care of your patient.        Sincerely,        Oskar Su MD    "

## 2023-07-13 NOTE — RESULT ENCOUNTER NOTE
Hello -    Here are my comments about your recent results:  Your calcium and parathyroid hormone results are stable. We will contact you once we have the urine test result.     Please let us know if you have any questions or concerns.     Regards,  Oskar Su MD

## 2023-07-19 NOTE — TELEPHONE ENCOUNTER
Department of Anesthesiology  Postprocedure Note    Patient: Pepe Carty  MRN: [id number]  YOB: 1987  Date of evaluation: 7/19/2023      Procedure Summary     Date: 07/19/23 Room / Location: [address]    Anesthesia Start: 1118 Anesthesia Stop: 1352    Procedures:       OPEN LEFT INGUINAL HERNIA REPAIR WITH MESH (Left: Abdomen)      OPEN UMBILICAL HERNIA REPAIR (Abdomen) Diagnosis:       Left inguinal hernia      Umbilical hernia without obstruction and without gangrene      (Left inguinal hernia [B15.33])      (Umbilical hernia without obstruction and without gangrene [K42.9])    Surgeons: Anny Perez MD Responsible Provider: Bipin Morgan MD    Anesthesia Type: general ASA Status: 2          Anesthesia Type: No value filed.     Nika Phase I: Nika Score: 10    Nika Phase II: Nika Score: 10      Anesthesia Post Evaluation    Patient location during evaluation: PACU  Patient participation: complete - patient participated  Level of consciousness: awake and alert  Airway patency: patent  Nausea & Vomiting: no nausea and no vomiting  Complications: no  Cardiovascular status: hemodynamically stable  Respiratory status: acceptable  Hydration status: stable  Multimodal analgesia pain management approach See [employer] message below.     Ruth Gr RN  [facility]

## 2023-07-20 LAB
CALCIUM 24H UR-MRATE: 0.02 G/SPEC (ref 0.1–0.3)
CALCIUM UR-MCNC: 3.4 MG/DL
COLLECT DURATION TIME UR: 24 H
COLLECT DURATION TIME UR: 24 H
CREAT 24H UR-MRATE: 0.4 G/SPEC (ref 0.72–1.51)
CREAT UR-MCNC: 58.9 MG/DL
SPECIMEN VOL UR: 675 ML
SPECIMEN VOL UR: 675 ML

## 2023-07-24 DIAGNOSIS — M81.0 AGE-RELATED OSTEOPOROSIS WITHOUT CURRENT PATHOLOGICAL FRACTURE: Primary | ICD-10-CM

## 2023-07-24 RX ORDER — IBUPROFEN 200 MG
1 CAPSULE ORAL 2 TIMES DAILY
Qty: 180 TABLET | Refills: 3 | Status: SHIPPED | OUTPATIENT
Start: 2023-07-24 | End: 2024-06-11

## 2023-07-24 NOTE — PROGRESS NOTES
Hello -    Here are my comments about your recent results:  The urine test results once again from the low calcium level in the urine.  This could be due to low calcium intake or problem with calcium absorption.  As a first step, I recommend starting calcium supplement.  I have sent a prescription to your pharmacy.  Please let us know if you have any questions or concerns.      calcium citrate (CITRACAL) 950 (200 Ca) MG tablet 180 tablet 3 7/24/2023  --   Sig - Route: Take 1 tablet (950 mg) by mouth 2 times daily - Oral   Class: E-Prescribe   Order: [id number]       Regards,  Oskar Su MD

## 2023-07-24 NOTE — RESULT ENCOUNTER NOTE
Please call and inform the following.  Patient prefers calling.      Hello -    Here are my comments about your recent results:  The urine test results once again from the low calcium level in the urine.  This could be due to low calcium intake or problem with calcium absorption.  As a first step, I recommend starting calcium supplement.  I have sent a prescription to your pharmacy.  Please let us know if you have any questions or concerns.     Regards,  Oskar Su MD

## 2023-07-26 ENCOUNTER — MYC MEDICAL ADVICE (OUTPATIENT)
Dept: ENDOCRINOLOGY | Facility: CLINIC | Age: 88
End: 2023-07-26
Payer: MEDICARE

## 2023-09-12 DIAGNOSIS — I10 HYPERTENSION, GOAL BELOW 140/90: ICD-10-CM

## 2023-09-13 ENCOUNTER — IMMUNIZATION (OUTPATIENT)
Dept: NURSING | Facility: CLINIC | Age: 88
End: 2023-09-13
Payer: MEDICARE

## 2023-09-13 PROCEDURE — 90662 IIV NO PRSV INCREASED AG IM: CPT

## 2023-09-13 PROCEDURE — G0008 ADMIN INFLUENZA VIRUS VAC: HCPCS

## 2023-09-13 RX ORDER — HYDROCHLOROTHIAZIDE 12.5 MG/1
1 CAPSULE ORAL DAILY
Qty: 90 CAPSULE | Refills: 1 | Status: SHIPPED | OUTPATIENT
Start: 2023-09-13 | End: 2024-03-22

## 2023-11-02 ENCOUNTER — ANCILLARY PROCEDURE (OUTPATIENT)
Dept: ULTRASOUND IMAGING | Facility: CLINIC | Age: 88
End: 2023-11-02
Attending: INTERNAL MEDICINE
Payer: MEDICARE

## 2023-11-02 DIAGNOSIS — E04.1 THYROID NODULE: ICD-10-CM

## 2023-11-02 PROCEDURE — 76536 US EXAM OF HEAD AND NECK: CPT

## 2023-11-03 DIAGNOSIS — B00.9 HERPES SIMPLEX VIRUS INFECTION: ICD-10-CM

## 2023-11-03 RX ORDER — ACYCLOVIR 400 MG/1
TABLET ORAL
Qty: 30 TABLET | Refills: 0 | Status: SHIPPED | OUTPATIENT
Start: 2023-11-03 | End: 2023-12-26

## 2023-11-09 ENCOUNTER — MYC MEDICAL ADVICE (OUTPATIENT)
Dept: FAMILY MEDICINE | Facility: CLINIC | Age: 88
End: 2023-11-09
Payer: MEDICARE

## 2023-11-09 ENCOUNTER — MYC REFILL (OUTPATIENT)
Dept: FAMILY MEDICINE | Facility: CLINIC | Age: 88
End: 2023-11-09
Payer: MEDICARE

## 2023-11-09 DIAGNOSIS — N39.0 ACUTE UTI (URINARY TRACT INFECTION): ICD-10-CM

## 2023-11-09 RX ORDER — PHENAZOPYRIDINE HYDROCHLORIDE 100 MG/1
100 TABLET, FILM COATED ORAL 3 TIMES DAILY PRN
Qty: 6 TABLET | Refills: 0 | Status: SHIPPED | OUTPATIENT
Start: 2023-11-09 | End: 2023-12-26

## 2023-12-12 NOTE — TELEPHONE ENCOUNTER
Routing refill request to provider for review/approval because:  Drug not active on patient's medication list      Checo Navarrete RN, BSN          [facility] [place]

## 2023-12-22 ASSESSMENT — ENCOUNTER SYMPTOMS
NAUSEA: 0
DIZZINESS: 0
DIARRHEA: 0
DYSURIA: 0
HEMATURIA: 0
HEMATOCHEZIA: 0
FEVER: 0
HEARTBURN: 0
EYE PAIN: 0
BREAST MASS: 0
PALPITATIONS: 0
NERVOUS/ANXIOUS: 0
SHORTNESS OF BREATH: 0
HEADACHES: 0
MYALGIAS: 1
ARTHRALGIAS: 0
JOINT SWELLING: 0
FREQUENCY: 0
ABDOMINAL PAIN: 0
PARESTHESIAS: 0
WEAKNESS: 0
CONSTIPATION: 0
COUGH: 0
SORE THROAT: 0
CHILLS: 0

## 2023-12-22 ASSESSMENT — ACTIVITIES OF DAILY LIVING (ADL): CURRENT_FUNCTION: HOUSEWORK REQUIRES ASSISTANCE

## 2023-12-26 ENCOUNTER — OFFICE VISIT (OUTPATIENT)
Dept: FAMILY MEDICINE | Facility: CLINIC | Age: 88
End: 2023-12-26
Payer: MEDICARE

## 2023-12-26 VITALS
DIASTOLIC BLOOD PRESSURE: 70 MMHG | OXYGEN SATURATION: 100 % | RESPIRATION RATE: 16 BRPM | WEIGHT: 161.2 LBS | SYSTOLIC BLOOD PRESSURE: 130 MMHG | HEIGHT: 62 IN | HEART RATE: 67 BPM | BODY MASS INDEX: 29.66 KG/M2 | TEMPERATURE: 97.2 F

## 2023-12-26 DIAGNOSIS — E21.0 PRIMARY HYPERPARATHYROIDISM (H): ICD-10-CM

## 2023-12-26 DIAGNOSIS — K21.00 GASTROESOPHAGEAL REFLUX DISEASE WITH ESOPHAGITIS WITHOUT HEMORRHAGE: ICD-10-CM

## 2023-12-26 DIAGNOSIS — Z87.440 H/O RECURRENT URINARY TRACT INFECTION: ICD-10-CM

## 2023-12-26 DIAGNOSIS — I10 HYPERTENSION, GOAL BELOW 140/90: ICD-10-CM

## 2023-12-26 DIAGNOSIS — G25.0 ESSENTIAL TREMOR: ICD-10-CM

## 2023-12-26 DIAGNOSIS — R00.0 RAPID HEART RATE: ICD-10-CM

## 2023-12-26 DIAGNOSIS — E78.5 HYPERLIPIDEMIA LDL GOAL <130: ICD-10-CM

## 2023-12-26 DIAGNOSIS — M06.9 RHEUMATOID ARTHRITIS, INVOLVING UNSPECIFIED SITE, UNSPECIFIED WHETHER RHEUMATOID FACTOR PRESENT (H): ICD-10-CM

## 2023-12-26 DIAGNOSIS — B00.9 HERPES SIMPLEX VIRUS INFECTION: ICD-10-CM

## 2023-12-26 DIAGNOSIS — D33.3 ACOUSTIC NEUROMA (H): ICD-10-CM

## 2023-12-26 DIAGNOSIS — Z00.00 ENCOUNTER FOR MEDICARE ANNUAL WELLNESS EXAM: Primary | ICD-10-CM

## 2023-12-26 LAB
ALBUMIN SERPL BCG-MCNC: 4.4 G/DL (ref 3.5–5.2)
ALP SERPL-CCNC: 53 U/L (ref 40–150)
ALT SERPL W P-5'-P-CCNC: 21 U/L (ref 0–50)
ANION GAP SERPL CALCULATED.3IONS-SCNC: 12 MMOL/L (ref 7–15)
AST SERPL W P-5'-P-CCNC: 29 U/L (ref 0–45)
BILIRUB SERPL-MCNC: 0.5 MG/DL
BUN SERPL-MCNC: 14.1 MG/DL (ref 8–23)
CALCIUM SERPL-MCNC: 10.2 MG/DL (ref 8.8–10.2)
CHLORIDE SERPL-SCNC: 103 MMOL/L (ref 98–107)
CHOLEST SERPL-MCNC: 176 MG/DL
CREAT SERPL-MCNC: 0.7 MG/DL (ref 0.51–0.95)
CREAT UR-MCNC: 151 MG/DL
DEPRECATED HCO3 PLAS-SCNC: 26 MMOL/L (ref 22–29)
EGFRCR SERPLBLD CKD-EPI 2021: 84 ML/MIN/1.73M2
FASTING STATUS PATIENT QL REPORTED: YES
GLUCOSE SERPL-MCNC: 103 MG/DL (ref 70–99)
HDLC SERPL-MCNC: 50 MG/DL
LDLC SERPL CALC-MCNC: 96 MG/DL
MICROALBUMIN UR-MCNC: <12 MG/L
MICROALBUMIN/CREAT UR: NORMAL MG/G{CREAT}
NONHDLC SERPL-MCNC: 126 MG/DL
POTASSIUM SERPL-SCNC: 4.3 MMOL/L (ref 3.4–5.3)
PROT SERPL-MCNC: 6.9 G/DL (ref 6.4–8.3)
SODIUM SERPL-SCNC: 141 MMOL/L (ref 135–145)
TRIGL SERPL-MCNC: 152 MG/DL

## 2023-12-26 PROCEDURE — 36415 COLL VENOUS BLD VENIPUNCTURE: CPT | Performed by: PREVENTIVE MEDICINE

## 2023-12-26 PROCEDURE — 82043 UR ALBUMIN QUANTITATIVE: CPT | Performed by: PREVENTIVE MEDICINE

## 2023-12-26 PROCEDURE — 80053 COMPREHEN METABOLIC PANEL: CPT | Performed by: PREVENTIVE MEDICINE

## 2023-12-26 PROCEDURE — G0439 PPPS, SUBSEQ VISIT: HCPCS | Performed by: PREVENTIVE MEDICINE

## 2023-12-26 PROCEDURE — 99213 OFFICE O/P EST LOW 20 MIN: CPT | Mod: 25 | Performed by: PREVENTIVE MEDICINE

## 2023-12-26 PROCEDURE — 82570 ASSAY OF URINE CREATININE: CPT | Performed by: PREVENTIVE MEDICINE

## 2023-12-26 PROCEDURE — 80061 LIPID PANEL: CPT | Performed by: PREVENTIVE MEDICINE

## 2023-12-26 RX ORDER — METOPROLOL SUCCINATE 50 MG/1
50 TABLET, EXTENDED RELEASE ORAL DAILY
Qty: 90 TABLET | Refills: 3 | Status: SHIPPED | OUTPATIENT
Start: 2023-12-26

## 2023-12-26 RX ORDER — RESPIRATORY SYNCYTIAL VIRUS VACCINE 120MCG/0.5
0.5 KIT INTRAMUSCULAR ONCE
Qty: 1 EACH | Refills: 0 | Status: CANCELLED | OUTPATIENT
Start: 2023-12-26 | End: 2023-12-26

## 2023-12-26 RX ORDER — ACYCLOVIR 400 MG/1
400 TABLET ORAL DAILY
Qty: 90 TABLET | Refills: 3 | Status: SHIPPED | OUTPATIENT
Start: 2023-12-26 | End: 2024-06-04 | Stop reason: SINTOL

## 2023-12-26 RX ORDER — ATORVASTATIN CALCIUM 20 MG/1
20 TABLET, FILM COATED ORAL DAILY
Qty: 90 TABLET | Refills: 3 | Status: SHIPPED | OUTPATIENT
Start: 2023-12-26

## 2023-12-26 RX ORDER — NITROFURANTOIN MACROCRYSTAL 100 MG
100 CAPSULE ORAL DAILY
Qty: 90 CAPSULE | Refills: 3 | Status: SHIPPED | OUTPATIENT
Start: 2023-12-26

## 2023-12-26 ASSESSMENT — ENCOUNTER SYMPTOMS
DYSURIA: 0
COUGH: 0
PARESTHESIAS: 0
FEVER: 0
ABDOMINAL PAIN: 0
HEMATOCHEZIA: 0
ARTHRALGIAS: 0
CONSTIPATION: 0
CHILLS: 0
PALPITATIONS: 0
HEARTBURN: 0
HEADACHES: 0
JOINT SWELLING: 0
FREQUENCY: 0
SORE THROAT: 0
HEMATURIA: 0
NERVOUS/ANXIOUS: 0
BREAST MASS: 0
NAUSEA: 0
DIZZINESS: 0
SHORTNESS OF BREATH: 0
DIARRHEA: 0
MYALGIAS: 1
EYE PAIN: 0
WEAKNESS: 0

## 2023-12-26 ASSESSMENT — ACTIVITIES OF DAILY LIVING (ADL): CURRENT_FUNCTION: HOUSEWORK REQUIRES ASSISTANCE

## 2023-12-26 NOTE — PATIENT INSTRUCTIONS
Patient Education   Personalized Prevention Plan  You are due for the preventive services outlined below.  Your care team is available to assist you in scheduling these services.  If you have already completed any of these items, please share that information with your care team to update in your medical record.  Health Maintenance Due   Topic Date Due     COVID-19 Vaccine (8 - 2023-24 season) 12/18/2023        Patient Education   Personalized Prevention Plan  You are due for the preventive services outlined below.  Your care team is available to assist you in scheduling these services.  If you have already completed any of these items, please share that information with your care team to update in your medical record.  Health Maintenance Due   Topic Date Due     COVID-19 Vaccine (8 - 2023-24 season) 12/18/2023     Learning About Dietary Guidelines  What are the Dietary Guidelines for Americans?     Dietary Guidelines for Americans provide tips for eating well and staying healthy. This helps reduce the risk for long-term (chronic) diseases.  These guidelines recommend that you:  Eat and drink the right amount for you. The U.[place] government's food guide is called MyPlate. It can help you make your own well-balanced eating plan.  Try to balance your eating with your activity. This helps you stay at a healthy weight.  Drink alcohol in moderation, if at all.  Limit foods high in salt, saturated fat, trans fat, and added sugar.  These guidelines are from the U.S. Department of Agriculture and the U.S. Department of Health and Human Services. They are updated every 5 years.  What is MyPlate?  MyPlate is the U.[place] government's food guide. It can help you make your own well-balanced eating plan. A balanced eating plan means that you eat enough, but not too much, and that your food gives you the nutrients you need to stay healthy.  MyPlate focuses on eating plenty of whole grains, fruits, and vegetables, and on limiting fat and  "sugar. It is available online at www.ChooseMyPlate.gov.  How can you get started?  If you're trying to eat healthier, you can slowly change your eating habits over time. You don't have to make big changes all at once. Start by adding one or two healthy foods to your meals each day.  Grains  Choose whole-grain breads and cereals and whole-wheat pasta and whole-grain crackers.  Vegetables  Eat a variety of vegetables every day. They have lots of nutrients and are part of a heart-healthy diet.  Fruits  Eat a variety of fruits every day. Fruits contain lots of nutrients. Choose fresh fruit instead of fruit juice.  Protein foods  Choose fish and lean poultry more often. Eat red meat and fried meats less often. Dried beans, tofu, and nuts are also good sources of protein.  Dairy  Choose low-fat or fat-free products from this food group. If you have problems digesting milk, try eating cheese or yogurt instead.  Fats and oils  Limit fats and oils if you're trying to cut calories. Choose healthy fats when you cook. These include canola oil and olive oil.  Where can you learn more?  Go to https://www.CreateTrips.net/patiented  Enter D676 in the search box to learn more about \"Learning About Dietary Guidelines.\"  Current as of: February 28, 2023               Content Version: 13.8    3863-1871 [employer].   Care instructions adapted under license by your healthcare professional. If you have questions about a medical condition or this instruction, always ask your healthcare professional. [employer] disclaims any warranty or liability for your use of this information.      Activities of Daily Living    Your Health Risk Assessment indicates you have difficulties with activities of daily living such as housework, bathing, preparing meals, taking medication, etc. Please make a follow up appointment for us to address this issue in more detail.  Bladder Training: Care Instructions  Your Care Instructions   "   Bladder training is used to treat urge incontinence and stress incontinence. Urge incontinence means that the need to urinate comes on so fast that you can't get to a toilet in time. Stress incontinence means that you leak urine because of pressure on your bladder. For example, it may happen when you laugh, cough, or lift something heavy.  Bladder training can increase how long you can wait before you have to urinate. It can also help your bladder hold more urine. And it can give you better control over the urge to urinate.  It is important to remember that bladder training takes a few weeks to a few months to make a difference. You may not see results right away, but don't give up.  Follow-up care is a key part of your treatment and safety. Be sure to make and go to all appointments, and call your doctor if you are having problems. It's also a good idea to know your test results and keep a list of the medicines you take.  How can you care for yourself at home?  Work with your doctor to come up with a bladder training program that is right for you. You may use one or more of the following methods.  Delayed urination  In the beginning, try to keep from urinating for 5 minutes after you first feel the need to go.  While you wait, take deep, slow breaths to relax. Kegel exercises can also help you delay the need to go to the bathroom.  After some practice, when you can easily wait 5 minutes to urinate, try to wait 10 minutes before you urinate.  Slowly increase the waiting period until you are able to control when you have to urinate.  Scheduled urination  Empty your bladder when you first wake up in the morning.  Schedule times throughout the day when you will urinate.  Start by going to the bathroom every hour, even if you don't need to go.  Slowly increase the time between trips to the bathroom.  When you have found a schedule that works well for you, keep doing it.  If you wake up during the night and have to  "urinate, do it. Apply your schedule to waking hours only.  Kegel exercises  These tighten and strengthen pelvic muscles, which can help you control the flow of urine. (If doing these exercises causes pain, stop doing them and talk with your doctor.) To do Kegel exercises:  Squeeze your muscles as if you were trying not to pass gas. Or squeeze your muscles as if you were stopping the flow of urine. Your belly, legs, and buttocks shouldn't move.  Hold the squeeze for 3 seconds, then relax for 5 to 10 seconds.  Start with 3 seconds, then add 1 second each week until you are able to squeeze for 10 seconds.  Repeat the exercise 10 times a session. Do 3 to 8 sessions a day.  When should you call for help?  Watch closely for changes in your health, and be sure to contact your doctor if:    Your incontinence is getting worse.     You do not get better as expected.   Where can you learn more?  Go to https://www.[employer].net/patiented  Enter V684 in the search box to learn more about \"Bladder Training: Care Instructions.\"  Current as of: February 28, 2023               Content Version: 13.8    9278-2303 [employer].   Care instructions adapted under license by your healthcare professional. If you have questions about a medical condition or this instruction, always ask your healthcare professional. [employer] disclaims any warranty or liability for your use of this information.         "

## 2023-12-26 NOTE — PROGRESS NOTES
"SUBJECTIVE:   Brenda is a 88 year old, presenting for the following:  Physical        12/26/2023    12:40 PM   Additional Questions   Roomed by zachery   Accompanied by self         12/26/2023    12:40 PM   Patient Reported Additional Medications   Patient reports taking the following new medications no       Are you in the first 12 months of your Medicare coverage?  No    Healthy Habits:     In general, how would you rate your overall health?  Good    Frequency of exercise:  2-3 days/week    Duration of exercise:  15-30 minutes    Do you usually eat at least 4 servings of fruit and vegetables a day, include whole grains    & fiber and avoid regularly eating high fat or \"junk\" foods?  No    Taking medications regularly:  Yes    Medication side effects:  None    Ability to successfully perform activities of daily living:  Housework requires assistance    Home Safety:  No safety concerns identified    Hearing Impairment:  No hearing concerns    In the past 6 months, have you been bothered by leaking of urine? Yes    In general, how would you rate your overall mental or emotional health?  Excellent    Additional concerns today:  No  Lives with .  Independent with groceries    Today's PHQ-2 Score:       12/26/2023    12:32 PM   PHQ-2 ( 1999 [employer])   Q1: Little interest or pleasure in doing things 0   Q2: Feeling down, depressed or hopeless 0   PHQ-2 Score 0   Q1: Little interest or pleasure in doing things Not at all   Q2: Feeling down, depressed or hopeless Not at all   PHQ-2 Score 0     Have you ever done Advance Care Planning? (For example, a Health Directive, POLST, or a discussion with a medical provider or your loved ones about your wishes): Yes, advance care planning is on file.    Fall risk  Fallen 2 or more times in the past year?: No  Any fall with injury in the past year?: No  click delete button to remove this line now  Cognitive Screening   1) Repeat 3 items (Leader, Season, Table)    2) Clock draw: " NORMAL  3) 3 item recall: Recalls 2 objects   Results: NORMAL clock, 1-2 items recalled: COGNITIVE IMPAIRMENT LESS LIKELY    Mini-CogTM Copyright ERYN Gonzalez. Licensed by the author for use in [facility]; reprinted with permission (santosh@[email or web address]). All rights reserved.      Do you have sleep apnea, excessive snoring or daytime drowsiness? : no    Reviewed and updated as needed this visit by clinical staff   Tobacco  Allergies  Meds  Problems  Med Hx  Surg Hx  Fam Hx          Reviewed and updated as needed this visit by Provider   Tobacco  Allergies  Meds  Problems  Med Hx  Surg Hx  Fam Hx         Social History     Tobacco Use    Smoking status: Never    Smokeless tobacco: Never   Substance Use Topics    Alcohol use: Yes     Comment: occasionally             12/22/2023     3:19 PM   Alcohol Use   Prescreen: >3 drinks/day or >7 drinks/week? No          No data to display              Do you have a current opioid prescription? No  Do you use any other controlled substances or medications that are not prescribed by a provider? None        Rheumatologist is through [facility]. On Methotrexate     Hypertension Follow-up     Do you check your blood pressure regularly outside of the clinic? Yes   Are you following a low salt diet? Yes  Are your blood pressures ever more than 140 on the top number (systolic) OR more       than 90 on the bottom number (diastolic), for example 140/90? No  120-130 systolic   No chest pain  124/70 yesterday  No dizziness  No further balance problems, resolved with PT  No falls  No new pedal edema    Seeing Endo also, has had Reclast infusion.  Saw Eye doctor and retina specialist.       Current providers sharing in care for this patient include:   Patient Care Team:  Lynda Lopes MD as PCP - General (Family Medicine)  Liudmila Bowie MD as Referring Physician (Family Medicine)  Michelle Shabazz PA-C as Physician Assistant (Dermatology)  Oskar Su  MD as MD (Endocrinology, Diabetes, and Metabolism)  Oskar Su MD as Assigned Endocrinology Provider  Gracia Best DO as MD (Gastroenterology)  Vannesa Tejada PA-C as Physician Assistant (Family Medicine)  Carlitos Mayorga MD as Assigned Neuroscience Provider  Vannesa Tejada PA-C as Assigned PCP    The following health maintenance items are reviewed in Epic and correct as of today:  Health Maintenance   Topic Date Due    COVID-19 Vaccine (8 - 2023-24 season) 12/18/2023    ANNUAL REVIEW OF HM ORDERS  05/02/2024    MEDICARE ANNUAL WELLNESS VISIT  12/26/2024    FALL RISK ASSESSMENT  12/26/2024    ADVANCE CARE PLANNING  12/26/2028    DTAP/TDAP/TD IMMUNIZATION (2 - Td or Tdap) 12/17/2029    PHQ-2 (once per calendar year)  Completed    INFLUENZA VACCINE  Completed    Pneumococcal Vaccine: 65+ Years  Completed    ZOSTER IMMUNIZATION  Completed    RSV VACCINE (Pregnancy & 60+)  Completed    IPV IMMUNIZATION  Aged Out    HPV IMMUNIZATION  Aged Out    MENINGITIS IMMUNIZATION  Aged Out    RSV MONOCLONAL ANTIBODY  Aged Out     Lab work is in process  Labs reviewed in EPIC  BP Readings from Last 3 Encounters:   12/26/23 130/70   07/11/23 134/60   06/05/23 (!) 154/67    Wt Readings from Last 3 Encounters:   12/26/23 73.1 kg (161 lb 3.2 oz)   07/11/23 71.7 kg (158 lb)   06/05/23 73.5 kg (162 lb 1.6 oz)                  Patient Active Problem List   Diagnosis    External hemorrhoids    Aortic valve disorder    Cervicalgia    Rheumatoid arthritis (H)    Bilateral carpal tunnel syndrome    Asymmetrical sensorineural hearing loss    Status post total left knee replacement    Obesity    High risk medication use    Gastroesophageal reflux disease with esophagitis    Hypertension, goal below 140/90    Hyperlipidemia LDL goal <130    Rapid heart rate    Resting tremor    Status post total replacement of left hip    Anemia in other chronic diseases classified elsewhere    Acoustic neuroma (H)    Unspecified  blepharoconjunctivitis, bilateral    Diplopia    Epiretinal membrane    Esotropia    Presence of external hearing-aid    Abnormal CXR    Age-related osteoporosis without current pathological fracture    Primary hyperparathyroidism (H24)    Spinal stenosis of lumbar region with neurogenic claudication    Immunocompromised (H24)     Past Surgical History:   Procedure Laterality Date    ARTHROPLASTY HIP Left 6/27/2018    Procedure: ARTHROPLASTY HIP;  Left Total Hip Arthroplasty  ;  Surgeon: Keo Priest MD;  Location: [facility] OR    BACK SURGERY  2011    Fusion L3-4 & decompression; [facility]    BIOPSY  2013    Thyroid - normal result    C DEXA, BONE DENSITY, AXIAL SKEL  7/03 7/03 L1-4 1.7, FemNkL-0.8,R-1.2, FArm Px 0.2,Dist-0.3    EYE SURGERY  2010 & 11    Cataract surgery    HC FLEX SIGMOIDOSCOPY W/WO KARYNA SPEC BY BRUSH/WASH  12/2001    HC REMOVAL OF TONSILS,<11 Y/O  1942    ORTHOPEDIC SURGERY  2008    Left knee replacement    SURGICAL HISTORY OF -   1989    basal cell Ca scalp excised    SURGICAL HISTORY OF -   1998    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    ZZC REMOVAL OF OVARY(S)  1980    2nd ovary removed - endometriosis    ZZC TOTAL ABDOM HYSTERECTOMY  1972    uterus, ovary removed - fibroid    ZZHC COLONOSCOPY THRU STOMA W BIOPSY/CAUTERY TUMOR/POLYP/LESION  1993    adenomatous polyp    ZZHC COLONOSCOPY THRU STOMA, DIAGNOSTIC  1998, 10/03    normal, '03 rec repeat in 5 yrs due to +hx polyp and + fam hx       Social History     Tobacco Use    Smoking status: Never    Smokeless tobacco: Never   Substance Use Topics    Alcohol use: Yes     Comment: occasionally     Family History   Problem Relation Age of Onset    Gastrointestinal Disease Father         bleeding ulcer    Cancer Mother         thyroid cancer    Other Cancer Mother         thyroid & throat    Osteoporosis Mother     Thyroid Disease Mother     Diabetes Paternal Grandmother     Cerebrovascular Disease Paternal Grandmother      Colon Cancer Maternal Half-Brother     Osteoporosis Maternal Grandmother          Current Outpatient Medications   Medication Sig Dispense Refill    Acetaminophen (TYLENOL PO) Take 650 mg by mouth every 6 hours as needed for mild pain or fever      acyclovir (ZOVIRAX) 400 MG tablet Take 1 tablet (400 mg) by mouth daily 90 tablet 3    amoxicillin (AMOXIL) 500 MG capsule TAKE 4 CAPSULES(2000 MG) BY MOUTH 30 TO 60 MINUTES BEFORE DENTAL PROCEDURE 4 capsule 0    Ascorbic Acid (VITAMIN C PO)       atorvastatin (LIPITOR) 20 MG tablet Take 1 tablet (20 mg) by mouth daily 90 tablet 3    calcium citrate (CITRACAL) 950 (200 Ca) MG tablet Take 1 tablet (950 mg) by mouth 2 times daily 180 tablet 3    CENTRUM SILVER OR TABS 1 TABLET DAILY      Cetirizine HCl (ZYRTEC ALLERGY PO) Take 1 tablet by mouth daily      ferrous gluconate (FERGON) 324 (38 Fe) MG tablet TAKE 1 TABLET BY MOUTH EVERY OTHER  tablet 0    FISH OIL 1000 MG OR CAPS 2 CAPSULES DAILY  (? DOSE) OTC --    FOLIC ACID 1 MG OR TABS 4 mg TABS DAILY      hydrochlorothiazide (MICROZIDE) 12.5 MG capsule TAKE 1 CAPSULE(12.5 MG) BY MOUTH DAILY 90 capsule 1    hydrocortisone, Perianal, (HYDROCORTISONE) 2.5 % cream Place rectally 2 times daily as needed for hemorrhoids 30 g 2    methotrexate 2.5 MG tablet CHEMO Take 6 tablets (15 mg) by mouth every 7 days      metoprolol succinate ER (TOPROL XL) 50 MG 24 hr tablet Take 1 tablet (50 mg) by mouth daily 90 tablet 3    metroNIDAZOLE (METROCREAM) 0.75 % external cream Apply topically 2 times daily 45 g 3    Multiple Vitamins-Minerals (ZINC PO)       nitroFURantoin macrocrystal (MACRODANTIN) 100 MG capsule Take 1 capsule (100 mg) by mouth daily 90 capsule 3    omeprazole (PRILOSEC) 20 MG DR capsule Take 1 capsule (20 mg) by mouth daily 90 capsule 3    polyvinyl alcohol (LIQUIFILM TEARS) 1.4 % ophthalmic solution 1 drop as needed.       Allergies   Allergen Reactions    Diatrizoate Hives and Shortness Of Breath     "Ciprofloxacin      nausea    Contrast Dye      Hives,anaphylaxix    Oxycodone Other (See Comments)     hallucinations    Sulfasalazine      Other reaction(s): Rash       Mammogram Screening - Patient over age 75, has elected to discontinue screenings.  Pertinent mammograms are reviewed under the imaging tab.    Review of Systems   Constitutional:  Negative for chills and fever.   HENT:  Negative for congestion, ear pain, hearing loss and sore throat.    Eyes:  Negative for pain and visual disturbance.   Respiratory:  Negative for cough and shortness of breath.    Cardiovascular:  Negative for chest pain, palpitations and peripheral edema.   Gastrointestinal:  Negative for abdominal pain, constipation, diarrhea, heartburn, hematochezia and nausea.   Breasts:  Negative for tenderness, breast mass and discharge.   Genitourinary:  Negative for dysuria, frequency, genital sores, hematuria, pelvic pain, urgency, vaginal bleeding and vaginal discharge.   Musculoskeletal:  Positive for myalgias. Negative for arthralgias and joint swelling.   Skin:  Negative for rash.   Neurological:  Negative for dizziness, weakness, headaches and paresthesias.   Psychiatric/Behavioral:  Negative for mood changes. The patient is not nervous/anxious.        OBJECTIVE:   /70   Pulse 67   Temp 97.2  F (36.2  C) (Tympanic)   Resp 16   Ht 1.562 m (5' 1.5\")   Wt 73.1 kg (161 lb 3.2 oz)   LMP 05/17/1972 (Exact Date)   SpO2 100%   BMI 29.97 kg/m   Estimated body mass index is 29.97 kg/m  as calculated from the following:    Height as of this encounter: 1.562 m (5' 1.5\").    Weight as of this encounter: 73.1 kg (161 lb 3.2 oz).  Physical Exam  GENERAL APPEARANCE: healthy, alert and no distress  RESP: lungs clear to auscultation - no rales, rhonchi or wheezes  CV: regular rates and rhythm, normal S1 S2  ABDOMEN: soft, non-tender and no rebound or guarding   MS: extremities normal- no gross deformities noted   SKIN: no suspicious lesions " or rashes  NEURO: Normal strength and tone, mentation intact and speech normal, tremor+   PSYCH: mentation appears normal      Diagnostic Test Results:  Labs reviewed in Epic  No results found for this or any previous visit (from the past 24 hour(s)).    ASSESSMENT / PLAN:   Brenda was seen today for physical.    Diagnoses and all orders for this visit:    Encounter for Medicare annual wellness exam  -preventive guidelines reviewed  -Vaccines are up to date    Rheumatoid arthritis, involving unspecified site, unspecified whether rheumatoid factor present (H)  -     Comprehensive metabolic panel; Future  -     Comprehensive metabolic panel  --on Methotrexate    -per Rheumatology     Acoustic neuroma (H)  -removed in 1998     Hypertension, goal below 140/90  -   at goal, continue current medication   -     Lipid panel reflex to direct LDL Non-fasting  -     Albumin Random Urine Quantitative with Creat Ratio  -     Comprehensive metabolic panel    Primary hyperparathyroidism (H24)  -followed by Endocrine    Essential tremor  -stable  -has seen neurology in the past    Herpes simplex virus infection  -     acyclovir (ZOVIRAX) 400 MG tablet; Take 1 tablet (400 mg) by mouth daily  -refills on medication provided     Gastroesophageal reflux disease with esophagitis without hemorrhage  -     Comprehensive metabolic panel; Future  -     omeprazole (PRILOSEC) 20 MG DR capsule; Take 1 capsule (20 mg) by mouth daily  -     Comprehensive metabolic panel  -discussed long term risk of Proton pump inhibitor use as irreversible bone loss.   -patient will try and use every other day if symptoms are managed      Hyperlipidemia LDL goal <130  -     atorvastatin (LIPITOR) 20 MG tablet; Take 1 tablet (20 mg) by mouth daily    Rapid heart rate  -     metoprolol succinate ER (TOPROL XL) 50 MG 24 hr tablet; Take 1 tablet (50 mg) by mouth daily    H/O recurrent urinary tract infection  -     Comprehensive metabolic panel; Future  -      "nitroFURantoin macrocrystal (MACRODANTIN) 100 MG capsule; Take 1 capsule (100 mg) by mouth daily  -     Comprehensive metabolic panel    Other orders  -     PRIMARY CARE FOLLOW-UP SCHEDULING; Future  -     PRIMARY CARE FOLLOW-UP SCHEDULING; Future    COUNSELING:  Reviewed preventive health counseling, as reflected in patient instructions       Regular exercise       Healthy diet/nutrition       Vision screening       Fall risk prevention      BMI:   Estimated body mass index is 29.97 kg/m  as calculated from the following:    Height as of this encounter: 1.562 m (5' 1.5\").    Weight as of this encounter: 73.1 kg (161 lb 3.2 oz).   Weight management plan: Discussed healthy diet and exercise guidelines      She reports that she has never smoked. She has never used smokeless tobacco.      Appropriate preventive services were discussed with this patient, including applicable screening as appropriate for fall prevention, nutrition, physical activity, Tobacco-use cessation, weight loss and cognition.  Checklist reviewing preventive services available has been given to the patient.    Reviewed patients plan of care and provided an AVS. The Basic Care Plan (routine screening as documented in Health Maintenance) for Brenda meets the Care Plan requirement. This Care Plan has been established and reviewed with the Patient.          Lynda Lopes MD MPH    [facility]    Identified Health Risks:  I have reviewed Opioid Use Disorder and Substance Use Disorder risk factors and made any needed referrals. The patient was counseled and encouraged to consider modifying their diet and eating habits. She was provided with information on recommended healthy diet options.  The patient reports that she has difficulty with activities of daily living. I have asked that the patient make a follow up appointment in 1 year where this issue will be further evaluated and addressed.  Information on urinary incontinence and " treatment options given to patient.

## 2023-12-28 NOTE — RESULT ENCOUNTER NOTE
Brenda, your test results were within normal limits.  Cholesterol is at goal for you.  Electrolytes, glucose, kidney function and liver function tests are within normal limits.   Urine sample is not showing any abnormal protein.    Please do not hesitate to call us at [phone] if you have any questions or concerns.    Thank you,    Lynda Lopes MD MPH

## 2024-01-22 DIAGNOSIS — D50.8 OTHER IRON DEFICIENCY ANEMIA: ICD-10-CM

## 2024-01-23 DIAGNOSIS — R00.0 RAPID HEART RATE: ICD-10-CM

## 2024-01-23 DIAGNOSIS — K21.00 GASTROESOPHAGEAL REFLUX DISEASE WITH ESOPHAGITIS WITHOUT HEMORRHAGE: ICD-10-CM

## 2024-01-23 RX ORDER — METOPROLOL SUCCINATE 50 MG/1
50 TABLET, EXTENDED RELEASE ORAL DAILY
Qty: 90 TABLET | Refills: 3 | OUTPATIENT
Start: 2024-01-23

## 2024-01-23 RX ORDER — FERROUS GLUCONATE 324(38)MG
TABLET ORAL
Qty: 100 TABLET | Refills: 0 | Status: SHIPPED | OUTPATIENT
Start: 2024-01-23 | End: 2024-09-30

## 2024-03-03 NOTE — PROGRESS NOTES
Endocrinology Clinic Visit    Chief Complaint: Follow Up and Endocrine Problem     Information obtained from:Patient      Assessment/Treatment Plan:    Osteoporosis  Primary hyperparathyroidism; now with interval improvement in calcium level    Patient has had history of high calcium in the setting of high parathyroid hormone we suggest his primary hyperparathyroidism.  The 24-hour urine collection result was not supportive of this diagnosis.  We are obtaining 24-hour urine collection again and we will recheck the parathyroid hormone and calcium level.  Interestingly, after Reclast infusions her calcium level has improved.    Osteoporosis standpoint plan  #1  Strengthening exercises like walking  2.  Calcium 1200 mg daily from dietary sources.  Preferably from diet source.  3.  Vitamin D 800 international units daily through multivitamin.   4.  Bone specific therapy indicated Reclast infusion x2 given.  Tolerated treatment well.  Scheduled to follow-up DEXA scan for 8/3/2024.  5.  Fall prevention and strengthening exercises.    Hyperparathyroidism  History of Hypercalcemia    Patient has had history of high calcium in the setting of high parathyroid hormone we suggest his primary hyperparathyroidism.  The 24-hour urine collection result was not supportive of this diagnosis.  We are obtaining 24-hour urine collection again and we will recheck the parathyroid hormone and calcium level.  Interestingly, after Reclast infusions her calcium level has improved.    Thyroid nodule-follow-up thyroid ultrasound ordered.  Patient has had a thyroid biopsy done in 2013 for a left thyroid nodule which was measuring at 2.7 cm at the time.  Now follow-up thyroid ultrasound shows that this nodule is stable.  Other smaller nodules as noted on recent ultrasound.  No parathyroid adenoma noted.   Component      Latest Ref Rng 7/11/2023  2:46 PM   Creatinine      0.51 - 0.95 mg/dL 0.87    GFR Estimate      >60 mL/min/1.73m2 65   "  Albumin      3.5 - 5.2 g/dL 4.6    Calcium      8.8 - 10.2 mg/dL 10.1    Parathyroid Hormone Intact      15 - 65 pg/mL 71 (H)    Phosphorus      2.5 - 4.5 mg/dL 2.9    Urea Nitrogen      8.0 - 23.0 mg/dL 21.5      Return to clinic in 10 months.      Oskar Su MD  Staff Endocrinologist    Division of Endocrinology and Diabetes      Subjective:         HPI: Brenda Barker is a 87 year old female with history of with multiple medical issues discussed below.     Has had hypercalcemia for the last 5+ years intermittently.  She was also noted to have high parathyroid hormone along with that.  No history of kidney stones.  No history of fractures.  No family history of hypercalcemia.  Not currently taking high vitamin D or high calcium supplement.  Takes multivitamin.  Detailed review of system was documented below.  Planning to see dermatology for skin disorders.  4/2022  \"Lumbar spine T-score in region of L4 = -2.5      HIPS:  Mean total hip T-score: -2.6  Left femoral neck T-score = -1.8     Radius 33% T-score = -3.0\"  Vitamin d 20 mcg via multivitamin.   Cottage cheese, yogurt every other day.  Drinks milk.    Answers for HPI/ROS submitted by the patient on 7/8/2023  General Symptoms: Yes  Skin Symptoms: No  HENT Symptoms: No  EYE SYMPTOMS: No  HEART SYMPTOMS: No  LUNG SYMPTOMS: No  INTESTINAL SYMPTOMS: No  URINARY SYMPTOMS: No  GYNECOLOGIC SYMPTOMS: No  BREAST SYMPTOMS: No  SKELETAL SYMPTOMS: No  BLOOD SYMPTOMS: No  NERVOUS SYSTEM SYMPTOMS: No  MENTAL HEALTH SYMPTOMS: No    Now status post Reclast infusion x2 and has tolerated treatment well.    Allergies   Allergen Reactions     Diatrizoate Hives and Shortness Of Breath     Ciprofloxacin      nausea     Contrast Dye      Hives,anaphylaxix     Oxycodone Other (See Comments)     hallucinations     Sulfasalazine      Other reaction(s): Rash       Current Outpatient Medications   Medication Sig Dispense Refill     Acetaminophen (TYLENOL PO) Take 650 mg by " mouth every 6 hours as needed for mild pain or fever       acyclovir (ZOVIRAX) 400 MG tablet TAKE 1 TABLET(400 MG) BY MOUTH DAILY 90 tablet 1     amoxicillin (AMOXIL) 500 MG capsule TAKE 4 CAPSULES(2000 MG) BY MOUTH 30 TO 60 MINUTES BEFORE DENTAL PROCEDURE 4 capsule 0     Ascorbic Acid (VITAMIN C PO)        atorvastatin (LIPITOR) 20 MG tablet Take 1 tablet (20 mg) by mouth daily 90 tablet 3     CENTRUM SILVER OR TABS 1 TABLET DAILY       Cetirizine HCl (ZYRTEC ALLERGY PO) Take 1 tablet by mouth daily       ferrous gluconate (FERGON) 324 (38 Fe) MG tablet TAKE 1 TABLET BY MOUTH EVERY OTHER  tablet 0     FISH OIL 1000 MG OR CAPS 2 CAPSULES DAILY  (? DOSE) OTC --     FOLIC ACID 1 MG OR TABS 4 mg TABS DAILY       hydrochlorothiazide (MICROZIDE) 12.5 MG capsule Take 1 capsule (12.5 mg) by mouth daily 90 capsule 3     hydrocortisone, Perianal, (HYDROCORTISONE) 2.5 % cream Place rectally 2 times daily as needed for hemorrhoids 30 g 2     methotrexate 2.5 MG tablet CHEMO Take 6 tablets (15 mg) by mouth every 7 days       metoprolol succinate ER (TOPROL XL) 50 MG 24 hr tablet Take 1 tablet (50 mg) by mouth daily 90 tablet 3     metroNIDAZOLE (METROCREAM) 0.75 % external cream Apply topically 2 times daily 45 g 3     Multiple Vitamins-Minerals (ZINC PO)        nitroFURantoin macrocrystal (MACRODANTIN) 100 MG capsule Take 1 capsule (100 mg) by mouth daily 90 capsule 3     omeprazole (PRILOSEC) 20 MG DR capsule TAKE 1 CAPSULE(20 MG) BY MOUTH DAILY 90 capsule 0     phenazopyridine (PYRIDIUM) 100 MG tablet Take 1 tablet (100 mg) by mouth 3 times daily as needed for urinary tract discomfort 6 tablet 0     polyvinyl alcohol (LIQUIFILM TEARS) 1.4 % ophthalmic solution 1 drop as needed.         Review of Systems     11 point review system (Constitutional, HENT, Eyes, Respiratory, Cardiovascular, Gastrointestinal, Genitourinary, Musculoskeletal,Neurological, Psychiatric/Behavioural, Endocrine) is negative or is as per HPI  "above        Objective:   /60 (BP Location: Left arm, Patient Position: Sitting, Cuff Size: Adult Regular)   Pulse 59   Wt 71.7 kg (158 lb)   LMP 05/17/1972 (Exact Date)   SpO2 100%   BMI 28.90 kg/m    Constitutional: Pleasant no acute cardiopulmonary distress.   EYES: anicteric, normal extra-ocular movements, no lid lag or retraction.   HEENT: Mouth/Throat: Mucous membrane is moist.     Cardiovascular: RRR, S1, S2 normal.   Pulmonary/Chest: CTAB. No wheezing or rales.   Abdominal: +BS. Non tender to palpation.    Neurological: Alert and oriented.  No tremor and reflexes are symmetrical bilaterally and within the normal limits. Muscle strength 5/5.   Extremities: No edema.  Psychological: appropriate mood and affect   In House Labs:   Hemoglobin A1C   Date Value Ref Range Status   05/06/2022 5.4 0.0 - 5.6 % Final     Comment:     Normal <5.7%   Prediabetes 5.7-6.4%    Diabetes 6.5% or higher     Note: Adopted from ADA consensus guidelines.   04/12/2007 6.3 (H) 4.3 - 6.0 % Final   08/16/2006 5.8 4.3 - 6.0 % Final   03/28/2006 6.1 (H) 4.3 - 6.0 % Final       TSH   Date Value Ref Range Status   12/17/2021 0.97 0.40 - 4.00 mU/L Final   09/25/2019 1.08 0.40 - 4.00 mU/L Final   06/11/2018 1.05 0.30 - 4.50 uIU/mL Final   04/12/2007 1.71 0.4 - 5.0 mU/L Final   05/19/2005 1.22 0.4 - 5.0 mU/L Final       Creatinine   Date Value Ref Range Status   06/05/2023 0.72 0.52 - 1.04 mg/dL Final   12/10/2020 0.70 0.52 - 1.04 mg/dL Final   ]    measuring 2.2 x 2.4 x 2.7 cm  \"SPECIMEN/STAIN PROCESS:  FNA-thyroid,Left       Pap-Cyto x 2, Diff Quick Stain-cyto x 2    ----------------------------------------------------------------    CYTOLOGIC INTERPRETATION:     FNA-thyroid,Left:   Negative for Malignancy  Consistent with a benign nodule (includes adenomatoid nodule, colloid  nodule, etc.)\"  This note has been dictated using voice recognition software.  As a result, there may be errors in the documentation that have gone " undetected.  Please consider this when interpreting information in this documentation.         None

## 2024-03-22 DIAGNOSIS — I10 HYPERTENSION, GOAL BELOW 140/90: ICD-10-CM

## 2024-03-22 RX ORDER — HYDROCHLOROTHIAZIDE 12.5 MG/1
1 CAPSULE ORAL DAILY
Qty: 90 CAPSULE | Refills: 0 | Status: SHIPPED | OUTPATIENT
Start: 2024-03-22 | End: 2024-06-24

## 2024-05-23 ENCOUNTER — OFFICE VISIT (OUTPATIENT)
Dept: URGENT CARE | Facility: URGENT CARE | Age: 89
End: 2024-05-23
Payer: MEDICARE

## 2024-05-23 VITALS
WEIGHT: 170 LBS | RESPIRATION RATE: 24 BRPM | OXYGEN SATURATION: 100 % | BODY MASS INDEX: 31.6 KG/M2 | DIASTOLIC BLOOD PRESSURE: 66 MMHG | HEART RATE: 87 BPM | TEMPERATURE: 97.8 F | SYSTOLIC BLOOD PRESSURE: 133 MMHG

## 2024-05-23 DIAGNOSIS — B37.9 ANTIBIOTIC-INDUCED YEAST INFECTION: ICD-10-CM

## 2024-05-23 DIAGNOSIS — D84.9 IMMUNOCOMPROMISED (H): ICD-10-CM

## 2024-05-23 DIAGNOSIS — J01.90 ACUTE SINUSITIS WITH COEXISTING CONDITION, NEED PROPHYLACTIC TREATMENT: Primary | ICD-10-CM

## 2024-05-23 DIAGNOSIS — T36.95XA ANTIBIOTIC-INDUCED YEAST INFECTION: ICD-10-CM

## 2024-05-23 PROCEDURE — 99213 OFFICE O/P EST LOW 20 MIN: CPT | Performed by: PHYSICIAN ASSISTANT

## 2024-05-23 RX ORDER — BENZONATATE 200 MG/1
200 CAPSULE ORAL 3 TIMES DAILY PRN
Qty: 30 CAPSULE | Refills: 0 | Status: SHIPPED | OUTPATIENT
Start: 2024-05-23 | End: 2024-06-02

## 2024-05-23 RX ORDER — FLUCONAZOLE 150 MG/1
150 TABLET ORAL ONCE
Qty: 1 TABLET | Refills: 0 | Status: SHIPPED | OUTPATIENT
Start: 2024-05-23 | End: 2024-05-23

## 2024-05-23 ASSESSMENT — ENCOUNTER SYMPTOMS
DIARRHEA: 0
NAUSEA: 0
CARDIOVASCULAR NEGATIVE: 1
COUGH: 1
SHORTNESS OF BREATH: 0
SINUS PAIN: 1
WHEEZING: 0
RHINORRHEA: 1
FATIGUE: 0
CHILLS: 0
PALPITATIONS: 0
SORE THROAT: 0
HEADACHES: 1
SINUS PRESSURE: 1
FEVER: 0
VOMITING: 0

## 2024-05-23 ASSESSMENT — PAIN SCALES - GENERAL: PAINLEVEL: NO PAIN (0)

## 2024-05-23 NOTE — PROGRESS NOTES
Antonia Gutierrez is a 88 year old, presenting for the following health issues:  Sinus Problem (X 4 DAYS STARTED WITH A BAD COUGH AND CONGESTION)    HPI   Acute Illness  Acute illness concerns:   Onset/Duration: 4days  Symptoms:  Fever: No  Chills/Sweats: No  Headache (location?): YES  Sinus Pressure: YES  Conjunctivitis:  No  Ear Pain: no  Rhinorrhea: YES  Congestion: YES  Sore Throat: No  Cough: YES-non-productive  Wheeze: No  Decreased Appetite: No  Nausea: No  Vomiting: No  Diarrhea: No  Dysuria/Freq.: No  Dysuria or Hematuria: No  Fatigue/Achiness: No  Sick/Strep Exposure: No  Therapies tried and outcome: rest,fluids,zyrtec,mucinex with minimal relief    Patient Active Problem List   Diagnosis    External hemorrhoids    Aortic valve disorder    Cervicalgia    Rheumatoid arthritis (H)    Bilateral carpal tunnel syndrome    Asymmetrical sensorineural hearing loss    Status post total left knee replacement    Obesity    High risk medication use    Gastroesophageal reflux disease with esophagitis    Hypertension, goal below 140/90    Hyperlipidemia LDL goal <130    Rapid heart rate    Resting tremor    Status post total replacement of left hip    Anemia in other chronic diseases classified elsewhere    Acoustic neuroma (H)    Unspecified blepharoconjunctivitis, bilateral    Diplopia    Epiretinal membrane    Esotropia    Presence of external hearing-aid    Abnormal CXR    Age-related osteoporosis without current pathological fracture    Primary hyperparathyroidism (H24)    Spinal stenosis of lumbar region with neurogenic claudication    Immunocompromised (H24)     Current Outpatient Medications   Medication Sig Dispense Refill    Acetaminophen (TYLENOL PO) Take 650 mg by mouth every 6 hours as needed for mild pain or fever      acyclovir (ZOVIRAX) 400 MG tablet Take 1 tablet (400 mg) by mouth daily 90 tablet 3    amoxicillin (AMOXIL) 500 MG capsule TAKE 4 CAPSULES(2000 MG) BY MOUTH 30 TO 60 MINUTES BEFORE DENTAL  PROCEDURE 4 capsule 0    Ascorbic Acid (VITAMIN C PO)       atorvastatin (LIPITOR) 20 MG tablet Take 1 tablet (20 mg) by mouth daily 90 tablet 3    calcium citrate (CITRACAL) 950 (200 Ca) MG tablet Take 1 tablet (950 mg) by mouth 2 times daily 180 tablet 3    CENTRUM SILVER OR TABS 1 TABLET DAILY      Cetirizine HCl (ZYRTEC ALLERGY PO) Take 1 tablet by mouth daily      ferrous gluconate (FERGON) 324 (38 Fe) MG tablet TAKE 1 TABLET BY MOUTH EVERY OTHER  tablet 0    FISH OIL 1000 MG OR CAPS 2 CAPSULES DAILY  (? DOSE) OTC --    FOLIC ACID 1 MG OR TABS 4 mg TABS DAILY      hydrochlorothiazide (MICROZIDE) 12.5 MG capsule TAKE 1 CAPSULE(12.5 MG) BY MOUTH DAILY 90 capsule 0    hydrocortisone, Perianal, (HYDROCORTISONE) 2.5 % cream Place rectally 2 times daily as needed for hemorrhoids 30 g 2    methotrexate 2.5 MG tablet CHEMO Take 6 tablets (15 mg) by mouth every 7 days      metoprolol succinate ER (TOPROL XL) 50 MG 24 hr tablet Take 1 tablet (50 mg) by mouth daily 90 tablet 3    metroNIDAZOLE (METROCREAM) 0.75 % external cream Apply topically 2 times daily 45 g 3    Multiple Vitamins-Minerals (ZINC PO)       nitroFURantoin macrocrystal (MACRODANTIN) 100 MG capsule Take 1 capsule (100 mg) by mouth daily 90 capsule 3    omeprazole (PRILOSEC) 20 MG DR capsule Take 1 capsule (20 mg) by mouth daily 90 capsule 3    polyvinyl alcohol (LIQUIFILM TEARS) 1.4 % ophthalmic solution 1 drop as needed.       No current facility-administered medications for this visit.        Allergies   Allergen Reactions    Diatrizoate Hives and Shortness Of Breath    Ciprofloxacin      nausea    Contrast Dye      Hives,anaphylaxix    Oxycodone Other (See Comments)     hallucinations    Sulfasalazine      Other reaction(s): Rash       Review of Systems   Constitutional:  Negative for chills, fatigue and fever.   HENT:  Positive for congestion, rhinorrhea, sinus pressure and sinus pain. Negative for ear pain and sore throat.    Respiratory:   Positive for cough. Negative for shortness of breath and wheezing.    Cardiovascular: Negative.  Negative for chest pain, palpitations and leg swelling.   Gastrointestinal:  Negative for diarrhea, nausea and vomiting.   Neurological:  Positive for headaches.   All other systems reviewed and are negative.          Objective    /66 (BP Location: Left arm, Patient Position: Sitting, Cuff Size: Adult Regular)   Pulse 87   Temp 97.8  F (36.6  C) (Oral)   Resp 24   Wt 77.1 kg (170 lb)   LMP 05/17/1972 (Exact Date)   SpO2 100%   BMI 31.60 kg/m    Body mass index is 31.6 kg/m .  Physical Exam  Vitals and nursing note reviewed.   Constitutional:       General: She is not in acute distress.     Appearance: Normal appearance. She is well-developed and normal weight. She is not ill-appearing.   HENT:      Head: Normocephalic and atraumatic.      Comments: TMs are intact without any erythema or bulging bilaterally.  Airway is patent.     Nose: Congestion and rhinorrhea present.      Right Turbinates: Swollen.      Left Turbinates: Swollen.      Right Sinus: Maxillary sinus tenderness and frontal sinus tenderness present.      Left Sinus: Maxillary sinus tenderness and frontal sinus tenderness present.      Mouth/Throat:      Lips: Pink.      Mouth: Mucous membranes are moist.      Pharynx: Oropharynx is clear. Uvula midline. No pharyngeal swelling, oropharyngeal exudate or posterior oropharyngeal erythema.      Tonsils: No tonsillar exudate.   Eyes:      General: No scleral icterus.     Extraocular Movements: Extraocular movements intact.      Conjunctiva/sclera: Conjunctivae normal.      Pupils: Pupils are equal, round, and reactive to light.   Neck:      Thyroid: No thyromegaly.   Cardiovascular:      Rate and Rhythm: Normal rate and regular rhythm.      Pulses: Normal pulses.      Heart sounds: Normal heart sounds, S1 normal and S2 normal. No murmur heard.     No friction rub. No gallop.   Pulmonary:      Effort:  Pulmonary effort is normal. No accessory muscle usage, respiratory distress or retractions.      Breath sounds: Normal breath sounds and air entry. No stridor. No decreased breath sounds, wheezing, rhonchi or rales.   Musculoskeletal:      Cervical back: Normal range of motion and neck supple.   Lymphadenopathy:      Cervical: No cervical adenopathy.   Skin:     General: Skin is warm and dry.      Nails: There is no clubbing.   Neurological:      Mental Status: She is alert and oriented to person, place, and time.   Psychiatric:         Mood and Affect: Mood normal.         Behavior: Behavior normal.         Thought Content: Thought content normal.         Judgment: Judgment normal.              Assessment/Plan:  Acute sinusitis with coexisting condition, need prophylactic treatment:  Will treat with [id number] for sinusitis and take with food/probiotics to minimize GI upset.  Will also give tessalon perles as needed for cough.  Recommend tylenol/ibuprofen prn pain/fever and zyrtec for sinus congestion.   Rest, fluids, chicken soup.  Recheck in clinic if symptoms worsen or if symptoms do not improve.    -     amoxicillin-clavulanate (AUGMENTIN) 875-125 MG tablet; Take 1 tablet by mouth 2 times daily for 10 days  -     benzonatate (TESSALON) 200 MG capsule; Take 1 capsule (200 mg) by mouth 3 times daily as needed for cough    Immunocompromised (H24)    Antibiotic-induced yeast infection:  Will also give diflucan as needed for yeast infection from antibiotic use.  -     fluconazole (DIFLUCAN) 150 MG tablet; Take 1 tablet (150 mg) by mouth once for 1 dose        Nayeli Cheney PA-C

## 2024-05-30 DIAGNOSIS — E21.0 PRIMARY HYPERPARATHYROIDISM (H): ICD-10-CM

## 2024-05-30 DIAGNOSIS — M81.0 AGE-RELATED OSTEOPOROSIS WITHOUT CURRENT PATHOLOGICAL FRACTURE: Primary | ICD-10-CM

## 2024-05-30 RX ORDER — DIPHENHYDRAMINE HYDROCHLORIDE 50 MG/ML
50 INJECTION INTRAMUSCULAR; INTRAVENOUS
Start: 2024-05-30

## 2024-05-30 RX ORDER — EPINEPHRINE 1 MG/ML
0.3 INJECTION, SOLUTION INTRAMUSCULAR; SUBCUTANEOUS EVERY 5 MIN PRN
OUTPATIENT
Start: 2024-05-30

## 2024-05-30 RX ORDER — HEPARIN SODIUM (PORCINE) LOCK FLUSH IV SOLN 100 UNIT/ML 100 UNIT/ML
5 SOLUTION INTRAVENOUS
OUTPATIENT
Start: 2024-05-30

## 2024-05-30 RX ORDER — MEPERIDINE HYDROCHLORIDE 25 MG/ML
25 INJECTION INTRAMUSCULAR; INTRAVENOUS; SUBCUTANEOUS EVERY 30 MIN PRN
OUTPATIENT
Start: 2024-06-03

## 2024-05-30 RX ORDER — ALBUTEROL SULFATE 0.83 MG/ML
2.5 SOLUTION RESPIRATORY (INHALATION)
OUTPATIENT
Start: 2024-06-03

## 2024-05-30 RX ORDER — ALBUTEROL SULFATE 90 UG/1
1-2 AEROSOL, METERED RESPIRATORY (INHALATION)
Start: 2024-06-03

## 2024-05-30 RX ORDER — ALBUTEROL SULFATE 0.83 MG/ML
2.5 SOLUTION RESPIRATORY (INHALATION)
OUTPATIENT
Start: 2024-05-30

## 2024-05-30 RX ORDER — HEPARIN SODIUM,PORCINE 10 UNIT/ML
5 VIAL (ML) INTRAVENOUS
OUTPATIENT
Start: 2024-05-30

## 2024-05-30 RX ORDER — METHYLPREDNISOLONE SODIUM SUCCINATE 125 MG/2ML
125 INJECTION, POWDER, LYOPHILIZED, FOR SOLUTION INTRAMUSCULAR; INTRAVENOUS
Start: 2024-06-03

## 2024-05-30 RX ORDER — ACETAMINOPHEN 325 MG/1
650 TABLET ORAL
OUTPATIENT
Start: 2024-06-03

## 2024-05-30 RX ORDER — ALBUTEROL SULFATE 90 UG/1
1-2 AEROSOL, METERED RESPIRATORY (INHALATION)
Start: 2024-05-30

## 2024-05-30 RX ORDER — HEPARIN SODIUM (PORCINE) LOCK FLUSH IV SOLN 100 UNIT/ML 100 UNIT/ML
5 SOLUTION INTRAVENOUS
OUTPATIENT
Start: 2024-06-03

## 2024-05-30 RX ORDER — MEPERIDINE HYDROCHLORIDE 25 MG/ML
25 INJECTION INTRAMUSCULAR; INTRAVENOUS; SUBCUTANEOUS EVERY 30 MIN PRN
OUTPATIENT
Start: 2024-05-30

## 2024-05-30 RX ORDER — DIPHENHYDRAMINE HYDROCHLORIDE 50 MG/ML
50 INJECTION INTRAMUSCULAR; INTRAVENOUS
Start: 2024-06-03

## 2024-05-30 RX ORDER — METHYLPREDNISOLONE SODIUM SUCCINATE 125 MG/2ML
125 INJECTION, POWDER, LYOPHILIZED, FOR SOLUTION INTRAMUSCULAR; INTRAVENOUS
Start: 2024-05-30

## 2024-05-30 RX ORDER — ZOLEDRONIC ACID 5 MG/100ML
5 INJECTION, SOLUTION INTRAVENOUS ONCE
Start: 2024-06-03

## 2024-05-30 RX ORDER — HEPARIN SODIUM,PORCINE 10 UNIT/ML
5-20 VIAL (ML) INTRAVENOUS DAILY PRN
OUTPATIENT
Start: 2024-06-03

## 2024-05-30 RX ORDER — EPINEPHRINE 1 MG/ML
0.3 INJECTION, SOLUTION INTRAMUSCULAR; SUBCUTANEOUS EVERY 5 MIN PRN
OUTPATIENT
Start: 2024-06-03

## 2024-05-31 ENCOUNTER — NURSE TRIAGE (OUTPATIENT)
Dept: FAMILY MEDICINE | Facility: CLINIC | Age: 89
End: 2024-05-31
Payer: MEDICARE

## 2024-05-31 NOTE — TELEPHONE ENCOUNTER
RN spoke with pt and relayed provider message. Pt verbalized understanding, RN assisted in scheduling follow up appointment with PCP. Reviewed red flag symptoms and when to go to ED, pt agreeable.     Mindy Arango RN

## 2024-05-31 NOTE — TELEPHONE ENCOUNTER
Nurse Triage SBAR    Is this a 2nd Level Triage? NO        Situation: Writer called to triage MyC message above from 5/31. Patient states she had syncopal episode with loss of consciousness early Wednesday AM 5/29. Was in the middle of having BM (diarrhea, patient thinking from abx) when this occurred - has sudden lightheadedness and fell forwards, lost consciousness.     Background: Recently seen in  for sinus infection, prescribed tessalon and Augmentin, as well as fluconazole x1 for abx-induced yeast infection - has been taking these as prescribed. No hx syncopal events, has had falls but never lost consciousness. Also on metoprolol 50 mg for rapid HR. Not on any blood thinners.    Assessment:   - Currently, patient not having any symptoms that she is concerned about. Mild neck pain which is tolerable and minor facial bruises caused by glasses when she fell   - No lightheadedness/dizziness after event  - Was able to get self up (spouse is [place], slept through event). Went to rest after getting up, once she and spouse woke up, spouse checked her BP, patient noted it was lower than usual, around 106/67. BP yesterday more normal for her, around 137/68-69.   - Denies any further diarrhea  - Using walker to move around and is doing well with this, no concerns about ambulation or injury per patient    Protocol Recommended Disposition:   Go to ED Now    Recommendation: Patient states she feels fine, doesn't feel she needs to be seen, especially in ED. Did recommend this, although episode does sound like a possible vasovagal response since she has not had any issues since. Patient states she's OK with message being sent to provider as FYI, is OK scheduling office visit if provider recommends this    Routed to provider    Does the patient meet one of the following criteria for ADS visit consideration? 16+ years old, with an [facility] PCP     TIP  Providers, please consider if this condition is appropriate for management at  "one of our Acute and Diagnostic Services sites.     If patient is a good candidate, please use dotphrase <dot>triageresponse and select Refer to ADS to document.    Melinda Rushing, RN, BSN  [facility]    Reason for Disposition   Any head or face injury    Additional Information   Negative: Still unconscious   Negative: Still feels dizzy or lightheaded   Negative: Difficult to awaken or acting confused (e.g., disoriented, slurred speech)   Negative: Difficulty breathing   Negative: Bluish (or gray) lips or face   Negative: Shock suspected (e.g., cold/pale/clammy skin, too weak to stand, low BP, rapid pulse)   Negative: Bleeding (e.g., vomiting blood, rectal bleeding or tarry stools, severe vaginal bleeding)   Negative: Chest pain   Negative: Extra heartbeats, irregular heart beating, or heart is beating very fast (i.e., 'palpitations')   Negative: Heart beating < 50 beats per minute OR > 140 beats per minute   Negative: Fainted suddenly after medicine, allergic food or bee sting   Negative: Sounds like a life-threatening emergency to the triager   Negative: Has diabetes (diabetes mellitus) and fainting from low blood glucose (70 mg/dl [3.9 mmol/l] or below)   Negative: Seizure suspected (e.g., muscle jerking or shaking followed by confusion)   Negative: Heat exhaustion suspected (i.e., dehydration from heat exposure)   Negative: Fainted > 15 minutes ago and still looks pale (pale skin, pallor)   Negative: Fainted > 15 minutes ago and still feels weak or dizzy   Negative: History of heart problems or congestive heart failure   Negative: Occurred during exercise    Answer Assessment - Initial Assessment Questions  1. ONSET: \"How long were you unconscious?\" (minutes) \"When did it happen?\"       Not sure, happened 5/29 early AM  2. CONTENT: \"What happened during period of unconsciousness?\" (e.g., seizure activity)       Doesn't think anything other than laying there, no seizure activity noted per " "patient  3. MENTAL STATUS: \"Alert and oriented now?\" (oriented x 3 = name, month, location)       Yes  4. TRIGGER: \"What do you think caused the fainting?\" \"What were you doing just before you fainted?\"  (e.g., exercise, sudden standing up, prolonged standing)      Not sure, was sitting on toilet having BM (diarrhea) and suddenly felt lightheaded and then lost consciousness, fell forward  5. RECURRENT SYMPTOM: \"Have you ever passed out before?\" If Yes, ask: \"When was the last time?\" and \"What happened that time?\"       No  6. INJURY: \"Did you sustain any injury during the fall?\"       Yes, minor per patient - small bruises/scrapes on face from glasses, mild neck pain  7. CARDIAC SYMPTOMS: \"Have you had any of the following symptoms: chest pain, difficulty breathing, palpitations?\"      No  8. NEUROLOGIC SYMPTOMS: \"Have you had any of the following symptoms: headache, numbness, vertigo, weakness?\"      No  9. GI SYMPTOMS: \"Have you had any of the following symptoms: abdomen pain, vomiting, diarrhea, blood in stools?\"      Diarrhea from abx patient thinking, but not having this anymore, no blood in stool  10. OTHER SYMPTOMS: \"Do you have any other symptoms?\"        Decrease in appetite but this isn't new, no other concerns  11. PREGNANCY: \"Is there any chance you are pregnant?\" \"When was your last menstrual period?\"        Not asked    Protocols used: [id number]    "

## 2024-05-31 NOTE — TELEPHONE ENCOUNTER
Documentation reviewed.  OK to be seen in clinic and can be added on a same day slot if needed.  Thank you,  Lynda Lopes MD MPH

## 2024-06-04 ENCOUNTER — OFFICE VISIT (OUTPATIENT)
Dept: FAMILY MEDICINE | Facility: CLINIC | Age: 89
End: 2024-06-04
Payer: MEDICARE

## 2024-06-04 VITALS
HEIGHT: 62 IN | DIASTOLIC BLOOD PRESSURE: 69 MMHG | TEMPERATURE: 97.7 F | BODY MASS INDEX: 30.78 KG/M2 | OXYGEN SATURATION: 96 % | HEART RATE: 96 BPM | WEIGHT: 167.25 LBS | RESPIRATION RATE: 20 BRPM | SYSTOLIC BLOOD PRESSURE: 112 MMHG

## 2024-06-04 DIAGNOSIS — M06.9 RHEUMATOID ARTHRITIS, INVOLVING UNSPECIFIED SITE, UNSPECIFIED WHETHER RHEUMATOID FACTOR PRESENT (H): ICD-10-CM

## 2024-06-04 DIAGNOSIS — B00.9 HERPES SIMPLEX VIRUS INFECTION: ICD-10-CM

## 2024-06-04 DIAGNOSIS — E21.0 PRIMARY HYPERPARATHYROIDISM (H): ICD-10-CM

## 2024-06-04 DIAGNOSIS — D33.3 ACOUSTIC NEUROMA (H): ICD-10-CM

## 2024-06-04 DIAGNOSIS — R55 SYNCOPE, UNSPECIFIED SYNCOPE TYPE: Primary | ICD-10-CM

## 2024-06-04 DIAGNOSIS — I10 HYPERTENSION, GOAL BELOW 140/90: ICD-10-CM

## 2024-06-04 DIAGNOSIS — R26.89 BALANCE PROBLEMS: ICD-10-CM

## 2024-06-04 LAB
ALBUMIN UR-MCNC: NEGATIVE MG/DL
APPEARANCE UR: CLEAR
BILIRUB UR QL STRIP: NEGATIVE
COLOR UR AUTO: YELLOW
ERYTHROCYTE [DISTWIDTH] IN BLOOD BY AUTOMATED COUNT: 13.6 % (ref 10–15)
GLUCOSE UR STRIP-MCNC: NEGATIVE MG/DL
HCT VFR BLD AUTO: 37.2 % (ref 35–47)
HGB BLD-MCNC: 12.4 G/DL (ref 11.7–15.7)
HGB UR QL STRIP: NEGATIVE
KETONES UR STRIP-MCNC: NEGATIVE MG/DL
LEUKOCYTE ESTERASE UR QL STRIP: NEGATIVE
MCH RBC QN AUTO: 30 PG (ref 26.5–33)
MCHC RBC AUTO-ENTMCNC: 33.3 G/DL (ref 31.5–36.5)
MCV RBC AUTO: 90 FL (ref 78–100)
NITRATE UR QL: NEGATIVE
PH UR STRIP: 7 [PH] (ref 5–7)
PLATELET # BLD AUTO: 398 10E3/UL (ref 150–450)
RBC # BLD AUTO: 4.14 10E6/UL (ref 3.8–5.2)
SP GR UR STRIP: 1.01 (ref 1–1.03)
UROBILINOGEN UR STRIP-ACNC: 0.2 E.U./DL
WBC # BLD AUTO: 7.7 10E3/UL (ref 4–11)

## 2024-06-04 PROCEDURE — G2211 COMPLEX E/M VISIT ADD ON: HCPCS | Performed by: PREVENTIVE MEDICINE

## 2024-06-04 PROCEDURE — 85027 COMPLETE CBC AUTOMATED: CPT | Performed by: PREVENTIVE MEDICINE

## 2024-06-04 PROCEDURE — 80053 COMPREHEN METABOLIC PANEL: CPT | Performed by: PREVENTIVE MEDICINE

## 2024-06-04 PROCEDURE — 99214 OFFICE O/P EST MOD 30 MIN: CPT | Performed by: PREVENTIVE MEDICINE

## 2024-06-04 PROCEDURE — 81003 URINALYSIS AUTO W/O SCOPE: CPT | Performed by: PREVENTIVE MEDICINE

## 2024-06-04 PROCEDURE — 36415 COLL VENOUS BLD VENIPUNCTURE: CPT | Performed by: PREVENTIVE MEDICINE

## 2024-06-04 PROCEDURE — 93000 ELECTROCARDIOGRAM COMPLETE: CPT | Performed by: PREVENTIVE MEDICINE

## 2024-06-04 RX ORDER — VALACYCLOVIR HYDROCHLORIDE 500 MG/1
500 TABLET, FILM COATED ORAL 2 TIMES DAILY
Qty: 6 TABLET | Refills: 0 | Status: SHIPPED | OUTPATIENT
Start: 2024-06-04 | End: 2024-06-07

## 2024-06-04 RX ORDER — VALACYCLOVIR HYDROCHLORIDE 500 MG/1
500 TABLET, FILM COATED ORAL DAILY
Qty: 90 TABLET | Refills: 1 | Status: SHIPPED | OUTPATIENT
Start: 2024-06-04 | End: 2024-06-11 | Stop reason: SINTOL

## 2024-06-04 NOTE — RESULT ENCOUNTER NOTE
Brenda,     Urine sample is normal.  Complete blood count is not showing anemia or infection.   Other labs are pending.     Please do not hesitate to call us at [phone] if you have any questions or concerns.    Thank you,    Lynda Lopes MD MPH

## 2024-06-04 NOTE — PROGRESS NOTES
Assessment & Plan     Syncope, unspecified syncope type  -1 episode on 5/29/2024  -This occurred while patient was having a bowel movement and sitting on the toilet  -There were no preceding chest pain, palpitations, dizziness symptoms  -Patient describes a wave of darkness proceeding the loss of consciousness  -Symptoms are most consistent with a vasovagal syncope episode  -However, we discussed that for a comprehensive evaluation we would need to do an MRI MRA of the brain, and an ultrasound of the heart as well  -At this time since the patient is feeling well and has not had any recurrent episodes, patient would like to defer the comprehensive workup  -Emergency room precautions reviewed, if syncopal episode again then needs to be seen in the emergency room  -Patient is not on any blood thinners  -EKG is not showing any acute abnormalities or arrhythmias, no change compared to prior EKG  -Await lab results  - REVIEW OF HEALTH MAINTENANCE PROTOCOL ORDERS  - EKG 12-lead complete w/read - Clinics  - CBC with platelets  - Comprehensive metabolic panel (BMP + Alb, Alk Phos, ALT, AST, Total. Bili, TP)  - UA Macroscopic with reflex to Microscopic and Culture - Lab Collect    Hypertension, goal below 140/90  -Blood pressure is at goal  -Based on history, unlikely to be orthostatic changes leading to syncopal episode    Rheumatoid arthritis, involving unspecified site, unspecified whether rheumatoid factor present (H)  -Followed by rheumatology    Primary hyperparathyroidism (H24)  Followed by endocrine-    Acoustic neuroma (H)  -removed in 1998     Balance problems  -Has had physical therapy in the past and found it to be useful  -Not doing home exercises at this time  - Physical Therapy  Referral    Herpes simplex virus infection  -Has had GI side effects with higher doses of acyclovir  -Will use Valtrex 500 mg twice a day for 3 days for this acute episode and thereafter patient can start taking Valtrex 500  "mg daily for suppressive therapy.  It is possible that the 500 mg dose may not suppress only infections, however we will start at this dose as patient has had GI side effects with other antivirals  - valACYclovir (VALTREX) 500 MG tablet  Dispense: 90 tablet; Refill: 1  - valACYclovir (VALTREX) 500 MG tablet  Dispense: 6 tablet; Refill: 0                Subjective   Brenda is a 88 year old, presenting for the following health issues:  Fall (Fall on 5/29 at 3am. Patient reports that she did not seek medical attention after fall.)        6/4/2024     1:40 PM   Additional Questions   Roomed by Rosaline   Accompanied by Self     History of Present Illness       Reason for visit:  Fall due to losing concioousness  Symptom onset:  3-7 days ago  Symptoms include:  Nothing now  Symptom intensity:  Moderate  Symptom progression:  Improving  Had these symptoms before:  No  What makes it worse:  Don't know  What makes it better:  No diareaha    She eats 0-1 servings of fruits and vegetables daily.She consumes 0 sweetened beverage(s) daily.She exercises with enough effort to increase her heart rate 9 or less minutes per day.  She exercises with enough effort to increase her heart rate 3 or less days per week.   She is taking medications regularly.     This was at 3 am in the morning  Some loose stools, was on the toilet, was reading a book.  Could feel this wave of darkness. Tried to book on counter.  When she woke up she was on the floor.  Slight neck pain  Slight bending of glasses  Small scrape on head and nose  Laid there for a bit  Then was able to pull herself up and then walked back to bed  No chest pain  Mehrdad shortness of breath  No other symptoms  No dizziness  No nausea or emesis  This happened 5/29/24.  No repeat episode  No past history    Per Nurse triage:    \"Patient states she had syncopal episode with loss of consciousness early Wednesday AM 5/29. Was in the middle of having BM (diarrhea, patient thinking from abx) when " "this occurred - has sudden lightheadedness and fell forwards, lost consciousness.      Background: Recently seen in  for sinus infection, prescribed tessalon and Augmentin, as well as fluconazole x1 for abx-induced yeast infection - has been taking these as prescribed. No hx syncopal events, has had falls but never lost consciousness. Also on metoprolol 50 mg for rapid HR. Not on any blood thinners.     Assessment:   - Currently, patient not having any symptoms that she is concerned about. Mild neck pain which is tolerable and minor facial bruises caused by glasses when she fell   - No lightheadedness/dizziness after event  - Was able to get self up (spouse is [place], slept through event). Went to rest after getting up, once she and spouse woke up, spouse checked her BP, patient noted it was lower than usual, around 106/67. BP yesterday more normal for her, around 137/68-69.   - Denies any further diarrhea  - Using walker to move around and is doing well with this, no concerns about ambulation or injury per patient     Protocol Recommended Disposition:   Go to ED Now     Recommendation: Patient states she feels fine, doesn't feel she needs to be seen, especially in ED. Did recommend this, although episode does sound like a possible vasovagal response since she has not had any issues since. Patient states she's OK with message being sent to provider as GEM, is OK scheduling office visit if provider recommends this\"    Patient would like referral to PT for her balance, has worked with them before and found this to be useful Not doing home exercises for now.  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  She is only able to take 400 mg daily but the recommended doses at least 400 mg twice a day, tends to have GI side effects with the higher dose.  Having lesions at least twice a month.  We discussed changing over to " "valacyclovir.        Review of Systems  Constitutional, HEENT, cardiovascular, pulmonary, gi and gu systems are negative, except as otherwise noted.      Objective    /69 (BP Location: Left arm, Patient Position: Sitting, Cuff Size: Adult Regular)   Pulse 96   Temp 97.7  F (36.5  C) (Oral)   Resp 20   Ht 1.562 m (5' 1.5\")   Wt 75.9 kg (167 lb 4 oz)   LMP 05/17/1972 (Exact Date)   SpO2 96%   BMI 31.09 kg/m    Body mass index is 31.09 kg/m .  Physical Exam   GENERAL APPEARANCE: healthy, alert and no distress, head tremor+  EYES: Eyes grossly normal to inspection and conjunctivae and sclerae normal  RESP: lungs clear to auscultation - no rales, rhonchi or wheezes  CV: regular rates and rhythm, normal S1 S2  ABDOMEN: non distended   NEURO: Normal strength and tone, mentation intact and speech normal  PSYCH: mentation appears normal  Skin: Right buttock close to the gluteal cleft, there is a cluster of blisters on an erythematous base    No results found for this or any previous visit (from the past 24 hour(s)).        Signed Electronically by: Lynda Lopes MD MPH    "

## 2024-06-05 LAB
ALBUMIN SERPL BCG-MCNC: 4.2 G/DL (ref 3.5–5.2)
ALP SERPL-CCNC: 79 U/L (ref 40–150)
ALT SERPL W P-5'-P-CCNC: 39 U/L (ref 0–50)
ANION GAP SERPL CALCULATED.3IONS-SCNC: 17 MMOL/L (ref 7–15)
AST SERPL W P-5'-P-CCNC: 32 U/L (ref 0–45)
BILIRUB SERPL-MCNC: 0.3 MG/DL
BUN SERPL-MCNC: 17.2 MG/DL (ref 8–23)
CALCIUM SERPL-MCNC: 9.9 MG/DL (ref 8.8–10.2)
CHLORIDE SERPL-SCNC: 96 MMOL/L (ref 98–107)
CREAT SERPL-MCNC: 0.7 MG/DL (ref 0.51–0.95)
DEPRECATED HCO3 PLAS-SCNC: 24 MMOL/L (ref 22–29)
EGFRCR SERPLBLD CKD-EPI 2021: 84 ML/MIN/1.73M2
GLUCOSE SERPL-MCNC: 85 MG/DL (ref 70–99)
POTASSIUM SERPL-SCNC: 3.6 MMOL/L (ref 3.4–5.3)
PROT SERPL-MCNC: 7.2 G/DL (ref 6.4–8.3)
SODIUM SERPL-SCNC: 137 MMOL/L (ref 135–145)

## 2024-06-05 NOTE — RESULT ENCOUNTER NOTE
Brenda,     Electrolytes, glucose, kidney function, and liver function tests are within normal limits.    Please do not hesitate to call us at [phone] if you have any questions or concerns.    Thank you,    Lynda Lopes MD MPH

## 2024-06-10 ENCOUNTER — OFFICE VISIT (OUTPATIENT)
Dept: URGENT CARE | Facility: URGENT CARE | Age: 89
End: 2024-06-10
Payer: MEDICARE

## 2024-06-10 ENCOUNTER — ANCILLARY PROCEDURE (OUTPATIENT)
Dept: GENERAL RADIOLOGY | Facility: CLINIC | Age: 89
End: 2024-06-10
Payer: MEDICARE

## 2024-06-10 ENCOUNTER — TELEPHONE (OUTPATIENT)
Dept: ENDOCRINOLOGY | Facility: CLINIC | Age: 89
End: 2024-06-10

## 2024-06-10 VITALS
DIASTOLIC BLOOD PRESSURE: 64 MMHG | OXYGEN SATURATION: 99 % | WEIGHT: 166 LBS | SYSTOLIC BLOOD PRESSURE: 125 MMHG | HEART RATE: 87 BPM | TEMPERATURE: 98.1 F | RESPIRATION RATE: 14 BRPM | BODY MASS INDEX: 30.86 KG/M2

## 2024-06-10 DIAGNOSIS — R05.2 SUBACUTE COUGH: Primary | ICD-10-CM

## 2024-06-10 DIAGNOSIS — R05.2 SUBACUTE COUGH: ICD-10-CM

## 2024-06-10 PROCEDURE — 71046 X-RAY EXAM CHEST 2 VIEWS: CPT | Mod: TC | Performed by: RADIOLOGY

## 2024-06-10 PROCEDURE — 99213 OFFICE O/P EST LOW 20 MIN: CPT

## 2024-06-10 ASSESSMENT — ENCOUNTER SYMPTOMS: COUGH: 1

## 2024-06-10 NOTE — PROGRESS NOTES
Patient presents with:  Cough: It will be 3 weeks today and won't go away      Clinical Decision Makin-year-old female, well, nontoxic-appearing, presenting with dry cough x 3 weeks.  Otherwise, she has been reassuringly feeling well-no fevers or chills, no shortness of breath, no chest discomfort, no fatigue, no confusion/AMS.  She feels sinusitis symptoms for which she was treated for on , with Augmentin 10-day course, have improved, but dry nagging cough is persisting.      Exam, lung sounds are clear throughout, she is not in any respiratory distress.  She is able to ambulate and speak in full sentences without becoming short of breath.  VSS on RA.    XRAY:  Chest 2 views:  Personally reviewed by me - no airspace, soft tissue, cardiac or bony abnormalities.     Suspect postviral bronchitis.  She did not notice much improvement with Tessalon, offered refill but she declined this.  Recommended okay to continue over-the-counter cough medicine as needed, to follow-up urgently if fevers/chills, shortness of breath, confusion/AMS, fatigue, or chest discomfort develop.  Via shared decision making with patient, deferred further workup today, as VSS on RA, and she is otherwise feeling well besides nagging cough.  Also with reassuring lab workup on 2024.    At the end of the encounter, I discussed results, diagnosis, medications. Discussed red flags for immediate return to clinic/ER, as well as indications for follow up if no improvement. Patient understood and agreed to plan. Patient was stable for discharge.    ICD-10-CM    1. Subacute cough  R05.2 XR Chest 2 Views          Patient Instructions   Follow up with Dr. Lopes in the next 1-2 weeks regarding Valtrex side effects - I would stop taking this until you follow up with her and ask for her input. Monitor cough closely over the next week - if at any point you are feeling shortness of breath, feverish, or chest discomfort - follow up immediately. In the  "meantime, OK to try over the counter cough medication such as Delsym, as needed.      What is bronchitis?  Bronchitis is an infection that causes a cough. It happens when the tubes that carry air into the lungs, called the \"bronchi,\" get infected.    Usually, bronchitis happens after a person gets a cold or the flu. The viruses that cause the cold or flu infect the bronchi and irritate them. Antibiotics do not help bronchitis.    Bronchitis can also happen when a person gets an infection called \"whooping cough,\" but this is much less common. Whooping cough is caused by bacteria that can infect the bronchi. Most people get vaccines to prevent whooping cough, but the vaccine doesn't always work. Your doctor will be able to tell if you have whooping cough by doing an exam and listening to your cough.      What are the symptoms of bronchitis?  The most common symptoms of bronchitis are:  ?A cough that can last up to a few weeks  ?Coughing up mucus that is clear, yellow, or green - Green mucus does not always mean that you have a bacterial infection.  You might also have normal cold or flu symptoms, like a stuffy nose, sore throat, or headache. People with bronchitis do not usually get a fever.    Is there a test for bronchitis?  Most people with bronchitis do not need a test. But if your doctor or nurse is not sure what is causing your cough, they might do tests. For example, they might order a chest X-ray. Or if they think that you might have COVID-19, they will test you for the virus that causes the infection.    How is bronchitis treated?  Bronchitis almost always goes away on its own. But the cough can take up to 3 weeks to get better, and sometimes even longer.    Doctors do not usually treat bronchitis with antibiotic medicines. That's because bronchitis is usually caused by a virus, and antibiotics kill bacteria, not viruses. Also, antibiotics can actually cause other problems.  To feel better, you can treat your " cold and flu symptoms. You can:  ?Get lots of rest, and drink plenty of liquids.  ?Drink hot tea.  ?Suck on cough drops or hard candy.  ?Take over-the-counter cough and cold medicines.  ?Breath in warm, moist air, such as in the shower, over a kettle, or from a humidifier.  ?Take a pain-relieving medicine if you have cold or flu symptoms like headache, muscle aches, or joint pain.  Avoid smoking or being around others who smoke. This can make your cough worse.    How can I keep from getting bronchitis again?  You can reduce your chance of getting bronchitis again by keeping the germs that cause bronchitis out of your body. One of the best ways to do this is to wash your hands often with soap and water. If there is no sink nearby, you can use a hand gel with alcohol in it to clean your hands.    How can I keep from spreading my germs?  In addition to washing your hands often, cover your mouth with your elbow when you sneeze or cough. Using your elbow keeps you from getting germs on your hands. If you use a tissue, throw the tissue away and wash your hands.  When should I call the doctor?  Call for advice if you have:  ?Fever higher than 100.4 F (38 C)  ?Chest pain when you cough, trouble breathing, or coughing up blood  ?A barking cough that makes it hard to talk  ?Cough and weight loss that you cannot explain  ?Symptoms that are not getting better after 3 weeks     HPI:  Brenda Barker is a 88 year old female who presents today with concern for persistent dry cough 3+ weeks. No SOB. No fevers/chills. No chest pain. Has been taking tessalon for cough, has not noticed much relief. Taking Zyrtec daily. Also took OTC cough medicine - Delsym, without much relief.     Seen in UC on 5/23 - rx Augmentin x 10 days for sinus infection.  Feels sinusitis symptoms have improved.    On 5/29 started having diarrhea - had a syncopal event - seen in office on 6/4 -reassuring workup. Pt notes hx HSV and on 5/29 office visit she was rx  Valtrex because was having GI side effects with acyclovir - but diarrhea was uncontrollable with the Valtrex - not taking Valtrex anymore because of this. Current outbreak she did have at the time was gone.     History obtained from the patient.    Problem List:  2023-05: Impairment of balance  2023-05: Immunocompromised (H24)  2022-07: Spinal stenosis of lumbar region with neurogenic claudication  2022-05: Age-related osteoporosis without current pathological   fracture  2022-05: Primary hyperparathyroidism (H24)  2021-03: Presence of external hearing-aid  2021-03: Abnormal CXR  2019-10: Unspecified blepharoconjunctivitis, bilateral  2019-09: Anemia in other chronic diseases classified elsewhere  2019-09: Acoustic neuroma (H)  2018-07: Status post total replacement of left hip  2018-06: Status post hip surgery  2018-06: Hypercalcemia  2018-06: 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  2018-03: ACP (advance care planning)  2017-12: Hypertension, goal below 140/90  2017-12: Hyperlipidemia LDL goal <130  2017-12: Rapid heart rate  2017-12: Resting tremor  2017-12: Gastroesophageal reflux disease with esophagitis  2017-10: Obesity  2017-10: High risk medication use  2017-03: Diplopia  2017-03: Epiretinal membrane  2017-03: Esotropia  2013-05: Asymmetrical sensorineural hearing loss  2013-05: Tinnitus  2008-10: Status post total left knee replacement  2005-06: Abnormal glucose  2005-02: HYPERTENSION  2005-02: DEPRESSION  2004-11: Bilateral carpal tunnel syndrome  2004-06: JOINT PAIN-BILAT HANDS, PORTIA MIDDLE FINGER L  2004-06: Brachial neuritis or radiculitis  2004-06: Rheumatoid arthritis (H)  2004-06: EDEMA, BILATERAL HANDS  2004-06: Cervicalgia  2004-05: 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-6 BILAT UNCINATE SPURS 8/2001    DEPRESSION 2/13/2005    Depressive disorder     1982 suicide of son    STARLAD (degenerative joint disease)     Endometriosis     Essential and other specified forms of tremor     HEMORRHOIDS     HERPES SIMPLEX  I AND II     HX ADENOMATOUS COLON POLYP 1993    HYPERLIPIDEMIA     HYPERTENSION 2/13/2005    L ACOUSTIC NEUROMA     post op 7 th nerve palsy and CSF leak    Neoplasm of uncertain behavior of skin     OBESITY -BMI 32     OSTEOPENIA 7/03    L1-4 1.7,FemNck L-0.8,R-1.2,F'arm dist-0.3    Pain in joint, pelvic region and thigh     Rheumatoid arthritis(714.0) 6/28/2004    Spinal stenosis, unspecified region other than cervical     Unspecified hearing loss     URIN TRACT INFECTION, RECURRENT        Social History     Tobacco Use    Smoking status: Never    Smokeless tobacco: Never   Substance Use Topics    Alcohol use: Yes     Comment: occasionally       Review of Systems   Respiratory:  Positive for cough.    All other systems reviewed and are negative.      Vitals:    06/10/24 1125   BP: 125/64   BP Location: Left arm   Patient Position: Sitting   Cuff Size: Adult Regular   Pulse: 87   Resp: 14   Temp: 98.1  F (36.7  C)   TempSrc: Tympanic   SpO2: 99%   Weight: 75.3 kg (166 lb)       Physical Exam  Constitutional:       General: She is not in acute distress.     Appearance: Normal appearance. She is not  ill-appearing, toxic-appearing or diaphoretic.   HENT:      Head: Normocephalic and atraumatic.      Right Ear: Tympanic membrane, ear canal and external ear normal. There is no impacted cerumen.      Left Ear: Tympanic membrane, ear canal and external ear normal. There is no impacted cerumen.      Nose: Nose normal.      Mouth/Throat:      Mouth: Mucous membranes are moist.   Eyes:      General: No scleral icterus.        Right eye: No discharge.         Left eye: No discharge.      Conjunctiva/sclera: Conjunctivae normal.   Cardiovascular:      Rate and Rhythm: Normal rate and regular rhythm.      Heart sounds: Normal heart sounds. No murmur heard.     No friction rub. No gallop.   Pulmonary:      Effort: Pulmonary effort is normal. No tachypnea, bradypnea, accessory muscle usage, prolonged expiration, respiratory distress or retractions.      Breath sounds: Normal breath sounds and air entry. No stridor, decreased air movement or transmitted upper airway sounds. No decreased breath sounds, wheezing, rhonchi or rales.   Chest:      Chest wall: No tenderness.   Musculoskeletal:      Cervical back: Neck supple. No rigidity or tenderness.   Lymphadenopathy:      Cervical: No cervical adenopathy.   Skin:     General: Skin is warm.      Capillary Refill: Capillary refill takes less than 2 seconds.      Coloration: Skin is not jaundiced or pale.      Findings: No bruising, erythema, lesion or rash.   Neurological:      General: No focal deficit present.      Mental Status: She is alert and oriented to person, place, and time.   Psychiatric:         Mood and Affect: Mood normal.         Behavior: Behavior normal.         Thought Content: Thought content normal.         Judgment: Judgment normal.         Results:  Results for orders placed or performed in visit on 06/10/24   XR Chest 2 Views     Status: None    Narrative    CHEST TWO VIEWS 6/10/2024 11:45 AM     HISTORY: Cough 3+ weeks.; Subacute cough    COMPARISON:  March 23, 2021       Impression    IMPRESSION: There are no acute infiltrates. The cardiac silhouette is  not enlarged. Pulmonary vasculature is unremarkable.    ESTELA WILSON MD         SYSTEM ID:  [id number]

## 2024-06-10 NOTE — TELEPHONE ENCOUNTER
06/10/24 called spoke to pt.    Friendly reminder regarding an event that will in pact appt.      Good afternoon, I see you have an appointment scheduled with Dr. Su tomorrow at [place]. We wanted to let you know that there is an event happening at [facility] across the street from our clinic tomorrow which will impact roads around the area. Please note that [facility] will be closed to all traffic so you will have to access the clinic from [facility] to [address] [place] [address]. There will be people in the parking lot to help direct traffic. Please allow extra time to get to the clinic for your appointment.    Yusef Arreola -General Care Navigator

## 2024-06-10 NOTE — PATIENT INSTRUCTIONS
"Follow up with Dr. Lopes in the next 1-2 weeks regarding Valtrex side effects - I would stop taking this until you follow up with her and ask for her input. Monitor cough closely over the next week - if at any point you are feeling shortness of breath, feverish, or chest discomfort - follow up immediately. In the meantime, OK to try over the counter cough medication such as Delsym, as needed.      What is bronchitis?  Bronchitis is an infection that causes a cough. It happens when the tubes that carry air into the lungs, called the \"bronchi,\" get infected.    Usually, bronchitis happens after a person gets a cold or the flu. The viruses that cause the cold or flu infect the bronchi and irritate them. Antibiotics do not help bronchitis.    Bronchitis can also happen when a person gets an infection called \"whooping cough,\" but this is much less common. Whooping cough is caused by bacteria that can infect the bronchi. Most people get vaccines to prevent whooping cough, but the vaccine doesn't always work. Your doctor will be able to tell if you have whooping cough by doing an exam and listening to your cough.      What are the symptoms of bronchitis?  The most common symptoms of bronchitis are:  ?A cough that can last up to a few weeks  ?Coughing up mucus that is clear, yellow, or green - Green mucus does not always mean that you have a bacterial infection.  You might also have normal cold or flu symptoms, like a stuffy nose, sore throat, or headache. People with bronchitis do not usually get a fever.    Is there a test for bronchitis?  Most people with bronchitis do not need a test. But if your doctor or nurse is not sure what is causing your cough, they might do tests. For example, they might order a chest X-ray. Or if they think that you might have COVID-19, they will test you for the virus that causes the infection.    How is bronchitis treated?  Bronchitis almost always goes away on its own. But the cough can take " up to 3 weeks to get better, and sometimes even longer.    Doctors do not usually treat bronchitis with antibiotic medicines. That's because bronchitis is usually caused by a virus, and antibiotics kill bacteria, not viruses. Also, antibiotics can actually cause other problems.  To feel better, you can treat your cold and flu symptoms. You can:  ?Get lots of rest, and drink plenty of liquids.  ?Drink hot tea.  ?Suck on cough drops or hard candy.  ?Take over-the-counter cough and cold medicines.  ?Breath in warm, moist air, such as in the shower, over a kettle, or from a humidifier.  ?Take a pain-relieving medicine if you have cold or flu symptoms like headache, muscle aches, or joint pain.  Avoid smoking or being around others who smoke. This can make your cough worse.    How can I keep from getting bronchitis again?  You can reduce your chance of getting bronchitis again by keeping the germs that cause bronchitis out of your body. One of the best ways to do this is to wash your hands often with soap and water. If there is no sink nearby, you can use a hand gel with alcohol in it to clean your hands.    How can I keep from spreading my germs?  In addition to washing your hands often, cover your mouth with your elbow when you sneeze or cough. Using your elbow keeps you from getting germs on your hands. If you use a tissue, throw the tissue away and wash your hands.  When should I call the doctor?  Call for advice if you have:  ?Fever higher than 100.4 F (38 C)  ?Chest pain when you cough, trouble breathing, or coughing up blood  ?A barking cough that makes it hard to talk  ?Cough and weight loss that you cannot explain  ?Symptoms that are not getting better after 3 weeks

## 2024-06-11 ENCOUNTER — MYC MEDICAL ADVICE (OUTPATIENT)
Dept: FAMILY MEDICINE | Facility: CLINIC | Age: 89
End: 2024-06-11

## 2024-06-11 ENCOUNTER — VIRTUAL VISIT (OUTPATIENT)
Dept: ENDOCRINOLOGY | Facility: CLINIC | Age: 89
End: 2024-06-11
Payer: MEDICARE

## 2024-06-11 ENCOUNTER — TELEPHONE (OUTPATIENT)
Dept: ENDOCRINOLOGY | Facility: CLINIC | Age: 89
End: 2024-06-11

## 2024-06-11 ENCOUNTER — MYC MEDICAL ADVICE (OUTPATIENT)
Dept: ENDOCRINOLOGY | Facility: CLINIC | Age: 89
End: 2024-06-11

## 2024-06-11 DIAGNOSIS — B00.9 HERPES SIMPLEX VIRUS INFECTION: ICD-10-CM

## 2024-06-11 DIAGNOSIS — E83.59 HYPOCALCIURIA: ICD-10-CM

## 2024-06-11 DIAGNOSIS — M81.0 AGE-RELATED OSTEOPOROSIS WITHOUT CURRENT PATHOLOGICAL FRACTURE: Primary | ICD-10-CM

## 2024-06-11 PROCEDURE — 99214 OFFICE O/P EST MOD 30 MIN: CPT | Mod: 95 | Performed by: INTERNAL MEDICINE

## 2024-06-11 PROCEDURE — G2211 COMPLEX E/M VISIT ADD ON: HCPCS | Mod: 95 | Performed by: INTERNAL MEDICINE

## 2024-06-11 RX ORDER — ACYCLOVIR 400 MG/1
400 TABLET ORAL DAILY
Qty: 90 TABLET | Refills: 3 | Status: SHIPPED | OUTPATIENT
Start: 2024-06-11

## 2024-06-11 NOTE — TELEPHONE ENCOUNTER
Rx for acyclovir pended for Dr. Lopes's review. Patient requesting to switch back due to side effects related to valacyclovir.    Juana Billings RN    [facility]

## 2024-06-11 NOTE — LETTER
6/11/2024      Brenda Barker  [address] [address] [address] [facility] [address]      Dear Colleague,    Thank you for referring your patient, Brenda Barker, to the [facility]. Please see a copy of my visit note below.    Endocrinology Clinic Visit    Chief Complaint: RECHECK (I year follow up parathyroid)     Information obtained from:Patient      Assessment/Treatment Plan:    Osteoporosis    Currently on Reclast infusion and status post 2 infusions.  No fractures since.  No reaction to Reclast infusion.       Osteoporosis standpoint plan  #1  Fall prevention and strengthening exercises like walking  2.  Calcium 1200 mg daily from all sources.    3.  Vitamin D 800 international units daily through multivitamin.  Check follow-up vitamin D level.  4. Schedule follow-up DEXA scan  5.  Reclast infusion yearly      Hypocalciuria    Patient has had history of high calcium in the setting of high parathyroid hormone.  24-hour urine collection consistent with hypocalciuria which is not consistent with primary hyperparathyroidism.  Will check a follow-up 24-hour urine collection after increasing calcium intake as documented above.    Thyroid nodule-follow-up thyroid ultrasound ordered.  Patient has had a thyroid biopsy done in 2013 for a left thyroid nodule which was measuring at 2.7 cm at the time.  Now follow-up thyroid ultrasound shows that this nodule is stable.     Oskar Su MD  Staff Endocrinologist    Division of Endocrinology and Diabetes      Subjective:         HPI: Brenda Barker is a 88 year old female with history of with multiple medical issues discussed below.   Has bronchitis currently following with providers.  No fever.  Has had hypercalcemia for the last 5+ years intermittently.  She was also noted to have high parathyroid hormone along with that.  No history of kidney stones.  No history of fractures.  No family history of hypercalcemia.  Not currently taking high  "vitamin D or high calcium supplement.  Takes multivitamin.  Detailed review of system was documented below.  Planning to see dermatology for skin disorders.  4/2022  \"Lumbar spine T-score in region of L4 = -2.5      HIPS:  Mean total hip T-score: -2.6  Left femoral neck T-score = -1.8     Radius 33% T-score = -3.0\"  Vitamin d 20 mcg via multivitamin.   Cottage cheese, yogurt every other day.  Drinks milk.    General Symptoms: Yes  Skin Symptoms: No  HENT Symptoms: No  EYE SYMPTOMS: No  HEART SYMPTOMS: No  LUNG SYMPTOMS: No  INTESTINAL SYMPTOMS: No  URINARY SYMPTOMS: No  GYNECOLOGIC SYMPTOMS: No  BREAST SYMPTOMS: No  SKELETAL SYMPTOMS: No  BLOOD SYMPTOMS: No  NERVOUS SYSTEM SYMPTOMS: No  MENTAL HEALTH SYMPTOMS: No    Now status post Reclast infusion x2 and has tolerated treatment well.    Allergies   Allergen Reactions     Diatrizoate Hives and Shortness Of Breath     Ciprofloxacin      nausea     Contrast Dye      Hives,anaphylaxix     Oxycodone Other (See Comments)     hallucinations     Sulfasalazine      Other reaction(s): Rash       Current Outpatient Medications   Medication Sig Dispense Refill     Acetaminophen (TYLENOL PO) Take 650 mg by mouth every 6 hours as needed for mild pain or fever       amoxicillin (AMOXIL) 500 MG capsule TAKE 4 CAPSULES(2000 MG) BY MOUTH 30 TO 60 MINUTES BEFORE DENTAL PROCEDURE (Patient not taking: Reported on 6/4/2024) 4 capsule 0     Ascorbic Acid (VITAMIN C PO)        atorvastatin (LIPITOR) 20 MG tablet Take 1 tablet (20 mg) by mouth daily 90 tablet 3     calcium citrate (CITRACAL) 950 (200 Ca) MG tablet Take 1 tablet (950 mg) by mouth 2 times daily 180 tablet 3     CENTRUM SILVER OR TABS 1 TABLET DAILY       Cetirizine HCl (ZYRTEC ALLERGY PO) Take 1 tablet by mouth daily       ferrous gluconate (FERGON) 324 (38 Fe) MG tablet TAKE 1 TABLET BY MOUTH EVERY OTHER  tablet 0     FISH OIL 1000 MG OR CAPS 2 CAPSULES DAILY  (? DOSE) OTC --     FOLIC ACID 1 MG OR TABS 4 mg TABS " DAILY       hydrochlorothiazide (MICROZIDE) 12.5 MG capsule TAKE 1 CAPSULE(12.5 MG) BY MOUTH DAILY 90 capsule 0     hydrocortisone, Perianal, (HYDROCORTISONE) 2.5 % cream Place rectally 2 times daily as needed for hemorrhoids (Patient not taking: Reported on 6/4/2024) 30 g 2     methotrexate 2.5 MG tablet CHEMO Take 6 tablets (15 mg) by mouth every 7 days       metoprolol succinate ER (TOPROL XL) 50 MG 24 hr tablet Take 1 tablet (50 mg) by mouth daily 90 tablet 3     metroNIDAZOLE (METROCREAM) 0.75 % external cream Apply topically 2 times daily 45 g 3     Multiple Vitamins-Minerals (ZINC PO)        nitroFURantoin macrocrystal (MACRODANTIN) 100 MG capsule Take 1 capsule (100 mg) by mouth daily 90 capsule 3     omeprazole (PRILOSEC) 20 MG DR capsule Take 1 capsule (20 mg) by mouth daily 90 capsule 3     polyvinyl alcohol (LIQUIFILM TEARS) 1.4 % ophthalmic solution 1 drop as needed.       valACYclovir (VALTREX) 500 MG tablet Take 1 tablet (500 mg) by mouth daily 90 tablet 1     valACYclovir (VALTREX) 500 MG tablet Take 1 tablet (500 mg) by mouth 2 times daily for 3 days For acute episode right now 6 tablet 0       Review of Systems     8 point review system (Constitutional, HENT, Eyes, Respiratory, Cardiovascular, Gastrointestinal, Genitourinary, Musculoskeletal,Neurological, Psychiatric/Behavioural, Endocrine) is negative or is as per HPI above        Objective:   LMP 05/17/1972 (Exact Date)   Constitutional: Pleasant no acute cardiopulmonary distress.   Psychological: appropriate mood and affect   In House Labs:   Hemoglobin A1C   Date Value Ref Range Status   05/06/2022 5.4 0.0 - 5.6 % Final     Comment:     Normal <5.7%   Prediabetes 5.7-6.4%    Diabetes 6.5% or higher     Note: Adopted from ADA consensus guidelines.   04/12/2007 6.3 (H) 4.3 - 6.0 % Final   08/16/2006 5.8 4.3 - 6.0 % Final   03/28/2006 6.1 (H) 4.3 - 6.0 % Final       TSH   Date Value Ref Range Status   12/17/2021 0.97 0.40 - 4.00 mU/L Final  "  09/25/2019 1.08 0.40 - 4.00 mU/L Final   06/11/2018 1.05 0.30 - 4.50 uIU/mL Final   04/12/2007 1.71 0.4 - 5.0 mU/L Final   05/19/2005 1.22 0.4 - 5.0 mU/L Final       Creatinine   Date Value Ref Range Status   06/04/2024 0.70 0.51 - 0.95 mg/dL Final   12/10/2020 0.70 0.52 - 1.04 mg/dL Final   ]    measuring 2.2 x 2.4 x 2.7 cm  \"SPECIMEN/STAIN PROCESS:  FNA-thyroid,Left       Pap-Cyto x 2, Diff Quick Stain-cyto x 2    ----------------------------------------------------------------    CYTOLOGIC INTERPRETATION:     FNA-thyroid,Left:   Negative for Malignancy  Consistent with a benign nodule (includes adenomatoid nodule, colloid  nodule, etc.)\"  This note has been dictated using voice recognition software.  As a result, there may be errors in the documentation that have gone undetected.  Please consider this when interpreting information in this documentation.        Video-Visit Details    Type of service:  Video Visit  Joined the call at 6/11/2024, 12:54:27 pm.  Left the call at 6/11/2024, 1:06:24 pm.  You were on the call for 11 minutes 57 seconds .    Distant Location (provider location):  ON-site.     Platform used for Video Visit: Antoni  The longitudinal plan of care for the diagnosis(es)/condition(s) as documented were addressed during this visit. Due to the added complexity in care, I will continue to support Brenda in the subsequent management and with ongoing continuity of care.      Again, thank you for allowing me to participate in the care of your patient.        Sincerely,        Oskar Su MD  "

## 2024-06-11 NOTE — NURSING NOTE
Is the patient currently in the state of [place]? YES    Visit mode:VIDEO    If the visit is dropped, the patient can be reconnected by: VIDEO VISIT: Text to cell phone:   Telephone Information:   Mobile [phone]       Will anyone else be joining the visit? NO  (If patient encounters technical issues they should call [phone]    How would you like to obtain your AVS? MyChart    Are changes needed to the allergy or medication list? Yes    Pt is no longer taking the valtrex due to side effects.  Pt experienced diarrhea.      Are refills needed on medications prescribed by this physician? NO    Reason for visit: RECHECK (I year follow up parathyroid)    Bindu AVILEZF

## 2024-06-11 NOTE — PROGRESS NOTES
"Endocrinology Clinic Visit    Chief Complaint: RECHECK (I year follow up parathyroid)     Information obtained from:Patient      Assessment/Treatment Plan:    Osteoporosis    Currently on Reclast infusion and status post 2 infusions.  No fractures since.  No reaction to Reclast infusion.       Osteoporosis standpoint plan  #1  Fall prevention and strengthening exercises like walking  2.  Calcium 1200 mg daily from all sources.    3.  Vitamin D 800 international units daily through multivitamin.  Check follow-up vitamin D level.  4. Schedule follow-up DEXA scan  5.  Reclast infusion yearly      Hypocalciuria    Patient has had history of high calcium in the setting of high parathyroid hormone.  24-hour urine collection consistent with hypocalciuria which is not consistent with primary hyperparathyroidism.  Will check a follow-up 24-hour urine collection after increasing calcium intake as documented above.    Thyroid nodule-follow-up thyroid ultrasound ordered.  Patient has had a thyroid biopsy done in 2013 for a left thyroid nodule which was measuring at 2.7 cm at the time.  Now follow-up thyroid ultrasound shows that this nodule is stable.     Oskar Su MD  Staff Endocrinologist    Division of Endocrinology and Diabetes      Subjective:         HPI: Brenda Barker is a 88 year old female with history of with multiple medical issues discussed below.   Has bronchitis currently following with providers.  No fever.  Has had hypercalcemia for the last 5+ years intermittently.  She was also noted to have high parathyroid hormone along with that.  No history of kidney stones.  No history of fractures.  No family history of hypercalcemia.  Not currently taking high vitamin D or high calcium supplement.  Takes multivitamin.  Detailed review of system was documented below.  Planning to see dermatology for skin disorders.  4/2022  \"Lumbar spine T-score in region of L4 = -2.5      HIPS:  Mean total hip T-score: " "-2.6  Left femoral neck T-score = -1.8     Radius 33% T-score = -3.0\"  Vitamin d 20 mcg via multivitamin.   Cottage cheese, yogurt every other day.  Drinks milk.    General Symptoms: Yes  Skin Symptoms: No  HENT Symptoms: No  EYE SYMPTOMS: No  HEART SYMPTOMS: No  LUNG SYMPTOMS: No  INTESTINAL SYMPTOMS: No  URINARY SYMPTOMS: No  GYNECOLOGIC SYMPTOMS: No  BREAST SYMPTOMS: No  SKELETAL SYMPTOMS: No  BLOOD SYMPTOMS: No  NERVOUS SYSTEM SYMPTOMS: No  MENTAL HEALTH SYMPTOMS: No    Now status post Reclast infusion x2 and has tolerated treatment well.    Allergies   Allergen Reactions    Diatrizoate Hives and Shortness Of Breath    Ciprofloxacin      nausea    Contrast Dye      Hives,anaphylaxix    Oxycodone Other (See Comments)     hallucinations    Sulfasalazine      Other reaction(s): Rash       Current Outpatient Medications   Medication Sig Dispense Refill    Acetaminophen (TYLENOL PO) Take 650 mg by mouth every 6 hours as needed for mild pain or fever      amoxicillin (AMOXIL) 500 MG capsule TAKE 4 CAPSULES(2000 MG) BY MOUTH 30 TO 60 MINUTES BEFORE DENTAL PROCEDURE (Patient not taking: Reported on 6/4/2024) 4 capsule 0    Ascorbic Acid (VITAMIN C PO)       atorvastatin (LIPITOR) 20 MG tablet Take 1 tablet (20 mg) by mouth daily 90 tablet 3    calcium citrate (CITRACAL) 950 (200 Ca) MG tablet Take 1 tablet (950 mg) by mouth 2 times daily 180 tablet 3    CENTRUM SILVER OR TABS 1 TABLET DAILY      Cetirizine HCl (ZYRTEC ALLERGY PO) Take 1 tablet by mouth daily      ferrous gluconate (FERGON) 324 (38 Fe) MG tablet TAKE 1 TABLET BY MOUTH EVERY OTHER  tablet 0    FISH OIL 1000 MG OR CAPS 2 CAPSULES DAILY  (? DOSE) OTC --    FOLIC ACID 1 MG OR TABS 4 mg TABS DAILY      hydrochlorothiazide (MICROZIDE) 12.5 MG capsule TAKE 1 CAPSULE(12.5 MG) BY MOUTH DAILY 90 capsule 0    hydrocortisone, Perianal, (HYDROCORTISONE) 2.5 % cream Place rectally 2 times daily as needed for hemorrhoids (Patient not taking: Reported on " 6/4/2024) 30 g 2    methotrexate 2.5 MG tablet CHEMO Take 6 tablets (15 mg) by mouth every 7 days      metoprolol succinate ER (TOPROL XL) 50 MG 24 hr tablet Take 1 tablet (50 mg) by mouth daily 90 tablet 3    metroNIDAZOLE (METROCREAM) 0.75 % external cream Apply topically 2 times daily 45 g 3    Multiple Vitamins-Minerals (ZINC PO)       nitroFURantoin macrocrystal (MACRODANTIN) 100 MG capsule Take 1 capsule (100 mg) by mouth daily 90 capsule 3    omeprazole (PRILOSEC) 20 MG DR capsule Take 1 capsule (20 mg) by mouth daily 90 capsule 3    polyvinyl alcohol (LIQUIFILM TEARS) 1.4 % ophthalmic solution 1 drop as needed.      valACYclovir (VALTREX) 500 MG tablet Take 1 tablet (500 mg) by mouth daily 90 tablet 1    valACYclovir (VALTREX) 500 MG tablet Take 1 tablet (500 mg) by mouth 2 times daily for 3 days For acute episode right now 6 tablet 0       Review of Systems     8 point review system (Constitutional, HENT, Eyes, Respiratory, Cardiovascular, Gastrointestinal, Genitourinary, Musculoskeletal,Neurological, Psychiatric/Behavioural, Endocrine) is negative or is as per HPI above        Objective:   LMP 05/17/1972 (Exact Date)   Constitutional: Pleasant no acute cardiopulmonary distress.   Psychological: appropriate mood and affect   In House Labs:   Hemoglobin A1C   Date Value Ref Range Status   05/06/2022 5.4 0.0 - 5.6 % Final     Comment:     Normal <5.7%   Prediabetes 5.7-6.4%    Diabetes 6.5% or higher     Note: Adopted from ADA consensus guidelines.   04/12/2007 6.3 (H) 4.3 - 6.0 % Final   08/16/2006 5.8 4.3 - 6.0 % Final   03/28/2006 6.1 (H) 4.3 - 6.0 % Final       TSH   Date Value Ref Range Status   12/17/2021 0.97 0.40 - 4.00 mU/L Final   09/25/2019 1.08 0.40 - 4.00 mU/L Final   06/11/2018 1.05 0.30 - 4.50 uIU/mL Final   04/12/2007 1.71 0.4 - 5.0 mU/L Final   05/19/2005 1.22 0.4 - 5.0 mU/L Final       Creatinine   Date Value Ref Range Status   06/04/2024 0.70 0.51 - 0.95 mg/dL Final   12/10/2020 0.70 0.52 -  "1.04 mg/dL Final   ]    measuring 2.2 x 2.4 x 2.7 cm  \"SPECIMEN/STAIN PROCESS:  FNA-thyroid,Left       Pap-Cyto x 2, Diff Quick Stain-cyto x 2    ----------------------------------------------------------------    CYTOLOGIC INTERPRETATION:     FNA-thyroid,Left:   Negative for Malignancy  Consistent with a benign nodule (includes adenomatoid nodule, colloid  nodule, etc.)\"  This note has been dictated using voice recognition software.  As a result, there may be errors in the documentation that have gone undetected.  Please consider this when interpreting information in this documentation.        Video-Visit Details    Type of service:  Video Visit  Joined the call at 6/11/2024, 12:54:27 pm.  Left the call at 6/11/2024, 1:06:24 pm.  You were on the call for 11 minutes 57 seconds .    Distant Location (provider location):  ON-site.     Platform used for Video Visit: Antoni  The longitudinal plan of care for the diagnosis(es)/condition(s) as documented were addressed during this visit. Due to the added complexity in care, I will continue to support Brenda in the subsequent management and with ongoing continuity of care.    "

## 2024-06-22 DIAGNOSIS — I10 HYPERTENSION, GOAL BELOW 140/90: ICD-10-CM

## 2024-06-24 RX ORDER — HYDROCHLOROTHIAZIDE 12.5 MG/1
1 CAPSULE ORAL DAILY
Qty: 90 CAPSULE | Refills: 0 | Status: SHIPPED | OUTPATIENT
Start: 2024-06-24 | End: 2024-09-19

## 2024-07-05 ENCOUNTER — LAB (OUTPATIENT)
Dept: LAB | Facility: CLINIC | Age: 89
End: 2024-07-05
Payer: MEDICARE

## 2024-07-05 DIAGNOSIS — E83.59 HYPOCALCIURIA: ICD-10-CM

## 2024-07-05 DIAGNOSIS — M81.0 AGE-RELATED OSTEOPOROSIS WITHOUT CURRENT PATHOLOGICAL FRACTURE: ICD-10-CM

## 2024-07-05 LAB
ALBUMIN SERPL BCG-MCNC: 4.5 G/DL (ref 3.5–5.2)
BUN SERPL-MCNC: 19.4 MG/DL (ref 8–23)
CALCIUM SERPL-MCNC: 10.2 MG/DL (ref 8.8–10.2)
CREAT SERPL-MCNC: 0.83 MG/DL (ref 0.51–0.95)
EGFRCR SERPLBLD CKD-EPI 2021: 68 ML/MIN/1.73M2
PHOSPHATE SERPL-MCNC: 2.8 MG/DL (ref 2.5–4.5)
VIT D+METAB SERPL-MCNC: 53 NG/ML (ref 20–50)

## 2024-07-05 PROCEDURE — 82306 VITAMIN D 25 HYDROXY: CPT

## 2024-07-05 PROCEDURE — 84520 ASSAY OF UREA NITROGEN: CPT

## 2024-07-05 PROCEDURE — 82310 ASSAY OF CALCIUM: CPT

## 2024-07-05 PROCEDURE — 82040 ASSAY OF SERUM ALBUMIN: CPT

## 2024-07-05 PROCEDURE — 82565 ASSAY OF CREATININE: CPT

## 2024-07-05 PROCEDURE — 84100 ASSAY OF PHOSPHORUS: CPT

## 2024-07-05 PROCEDURE — 36415 COLL VENOUS BLD VENIPUNCTURE: CPT

## 2024-07-07 PROCEDURE — 82570 ASSAY OF URINE CREATININE: CPT

## 2024-07-07 PROCEDURE — 81050 URINALYSIS VOLUME MEASURE: CPT

## 2024-07-07 PROCEDURE — 82340 ASSAY OF CALCIUM IN URINE: CPT

## 2024-07-08 DIAGNOSIS — M81.0 AGE-RELATED OSTEOPOROSIS WITHOUT CURRENT PATHOLOGICAL FRACTURE: Primary | ICD-10-CM

## 2024-07-08 DIAGNOSIS — E83.59 HYPOCALCIURIA: ICD-10-CM

## 2024-07-08 LAB
CALCIUM 24H UR-MRATE: 0.05 G/SPEC (ref 0.1–0.3)
CALCIUM UR-MCNC: 4.7 MG/DL
COLLECT DURATION TIME UR: 24 H
COLLECT DURATION TIME UR: 24 H
CREAT 24H UR-MRATE: 0.6 G/SPEC (ref 0.72–1.51)
CREAT UR-MCNC: 54.2 MG/DL
SPECIMEN VOL UR: 1110 ML
SPECIMEN VOL UR: 1110 ML

## 2024-07-11 NOTE — RESULT ENCOUNTER NOTE
Hello -    #1 the urine test result again shows low calcium in the urine.  Please start the newly prescribed calcium carbonate 1000 mg twice a day with meals.  #2 the vitamin D level is slightly above the range.  You can reduce your vitamin D intake by taking it 6 days a week and skipping 1 day a week.    #3 calcium, phosphorus, results are within the expected range.  Please let us know if you have any questions or concerns.     Regards,  Oskar Su MD

## 2024-07-13 NOTE — PROGRESS NOTES
Dear Brenda    Your test results are attached. I am happy to let you know that they are stable.    Your hemoglobin continues to improve and the iron stores are back to normal. You may cut back on the iron to every other day or even 2-3 times a week should be enough.     Please contact me by [email or web address] if you have any questions about your labs or management.    Liudmila Bowie MD OB Labor Progress Note    S: Pt reports feeling more painful contractions.    O: Visit Vitals  /87   Pulse 66   Temp 98.5 °F (36.9 °C) (Oral)   Ht 5' 2\" (1.575 m)   Wt 95.6 kg (210 lb 12.8 oz)   LMP 2023 (Within Days)   BMI 38.56 kg/m²     FHT: 125 bpm, moderate variability, +accels, no decels  [place]: q 2-3 mins  SVE: 1.5/70/-3    Procedure:  Cook's catheter prepared and inserted with stylet into cervix. Uterine balloon inflated to 60 cc and vaginal balloon inflated to 40 cc. Stylet removed. Pt tolerated procedure well.    A/P: 20 yo  at 37w5d in labor.  -FWB: NICHD Category I tracing  -Labor:    -GBS positive--will start PCN   -s/p Cytotec x 3 doses; now Cook's catheter in place   -Pain: Nubain prn, Epidural prn  -Gest HTN   -BPs stable   -Pt asxs    Nataliia Knox D.O.

## 2024-07-15 ASSESSMENT — ACTIVITIES OF DAILY LIVING (ADL)
STANDING_FOR_1_HOUR: QUITE A BIT OF DIFFICULTY
PLEASE_INDICATE_YOR_PRIMARY_REASON_FOR_REFERRAL_TO_THERAPY:: FOOT AND/OR ANKLE
GETTING_INTO_OR_OUT_OF_A_CAR: A LITTLE BIT OF DIFFICULTY
WALKING_BETWEEN_ROOMS: A LITTLE BIT OF DIFFICULTY
RUNNING_ON_UNEVEN_GROUND: EXTREME DIFFICULTY OR UNABLE TO PERFORM ACTIVITY
WALKING_A_MILE: EXTREME DIFFICULTY OR UNABLE TO PERFORM ACTIVITY
PERFORMING_LIGHT_ACTIVITIES_AROUND_YOUR_HOME: A LITTLE BIT OF DIFFICULTY
RUNNING_ON_EVEN_GROUND: EXTREME DIFFICULTY OR UNABLE TO PERFORM ACTIVITY
MAKING_SHARP_TURNS_WHILE_RUNNING_FAST: EXTREME DIFFICULTY OR UNABLE TO PERFORM ACTIVITY
GOING_UP_OR_DOWN_10_STAIRS: QUITE A BIT OF DIFFICULTY
PUTTING_ON_YOUR_SHOES_OR_SOCKS: NO DIFFICULTY
PERFORMING_HEAVY_ACTIVITIES_AROUND_YOUR_HOME: MODERATE DIFFICULTY
SQUATTING: A LITTLE BIT OF DIFFICULTY
GETTING_INTO_AND_OUT_OF_A_BATH: NO DIFFICULTY
SITTING_FOR_1_HOUR: NO DIFFICULTY
SHOPPING: QUITE A BIT OF DIFFICULTY
YOUR_USUAL_HOBBIES,_RECREATIONAL_OR_SPORTING_ACTIVITIES: MODERATE DIFFICULTY
LIFTING_AN_OBJECT,_LIKE_A_BAG_OF_GROCERIES_FROM_THE_FLOOR: MODERATE DIFFICULTY
LEFS_SCORE(%): 0
WALKING_2_BLOCKS: QUITE A BIT OF DIFFICULTY
ANY_OF_YOUR_USUAL_WORK,_HOUSEWORK_OR_SCHOOL_ACTIVITIES: MODERATE DIFFICULTY
LEFS_RAW_SCORE: 0
ROLLING_OVER_IN_BED: A LITTLE BIT OF DIFFICULTY

## 2024-07-19 ENCOUNTER — THERAPY VISIT (OUTPATIENT)
Dept: PHYSICAL THERAPY | Facility: CLINIC | Age: 89
End: 2024-07-19
Payer: MEDICARE

## 2024-07-19 DIAGNOSIS — R29.898 BILATERAL LEG WEAKNESS: ICD-10-CM

## 2024-07-19 DIAGNOSIS — R26.89 IMPAIRMENT OF BALANCE: Primary | ICD-10-CM

## 2024-07-19 PROCEDURE — 97161 PT EVAL LOW COMPLEX 20 MIN: CPT | Mod: GP | Performed by: PHYSICAL THERAPIST

## 2024-07-19 PROCEDURE — 97110 THERAPEUTIC EXERCISES: CPT | Mod: GP | Performed by: PHYSICAL THERAPIST

## 2024-07-19 NOTE — PROGRESS NOTES
"PHYSICAL THERAPY EVALUATION  Type of Visit: Evaluation     Abuse screening: the patient does not require abuse support services  Fall Risk Screen:  Fall screen completed by: PT  Have you fallen 2 or more times in the past year?: Yes  Have you fallen and had an injury in the past year?: No  Is patient a fall risk?: Department fall risk interventions implemented  I did inform the patient of the balance group through [facility] and informed her of the multiple locations where these balance groups are available. At the moment the patient note she would like to focus on strength and balance and would like to have her PT done at this clinic due to the proximity of her home. The patient verbalizes agreement and understanding related to my prompting to be evaluated by the balance group should she notice her balance does not improve with our interventions.     Subjective       Presenting condition or subjective complaint: Strength and balance. The patient presents to the clinic today after a break from PT, she reports that during her last bout of PT she felt she was doing well but has since stopped doing her exercises. She notes that she recently had a sinus infection which then developed to bronchitis- due to this she reports feeling very weak like she has lost strength. During this time she reports and incident where she was sitting in the restroom and felt \"a black cloud pass over my eyes\" and after this she lost consciousness and fell while sitting. She notes this is an incident which has not occurred before or since her fall. She reports that she has not experienced any falls since then but continues to feel weak. The patient declines a referral to a therapist in the [facility] balance group at this time noting she hopes to do PT in the Health system.    Date of onset: 06/04/24    Relevant medical history: Hearing problems; Osteoarthritis; Osteoporosis; Rheumatoid arthritis; Seizures; Thyroid problems   Dates & types " of surgery:  left TKA (unknown date, 5+years ago), Left RENETTA (2018)    Prior therapy history for the same diagnosis, illness or injury: Yes 2023    Living Environment  Social support: With a significant other or spouse   Type of [facility]: [facility]   Stairs to enter the home: No       Ramp: No   Stairs inside the home: No       Help at home: None  Equipment owned: Straight Cane; Walker with wheels     Employment: No    Hobbies/Interests:      Patient goals for therapy: Walk without regular assistance       Objective   EVALUATION    POSTURE: Standing Posture: Lordosis decreased, Thoracic kyphosis increased, anterior chain tightness observed, the patient is bent forward into flexion from the hips, lumbar spine, thoracic spine, and has forward head posture  GAIT:   Assistive Device(s): Cane (single end)  Gait Deviations: Base of support decreased  Nicky decreased  BALANCE/PROPRIOCEPTION: Single Leg Stance Eyes Open (seconds): L=4 seconds, R= 3 seconds  30 sit to stand: 8  5x sit to stand: 13s  TUG: 15 seconds  Cognitive TUs      STRENGTH:   Pain: - none + mild ++ moderate +++ severe  Strength Scale: 0-5/5 Left Right   Hip Flexion 4 4   Hip Extension 4+ 4+   Hip Abduction 4 4   Hip Adduction 5 5   Hip Internal Rotation 4 4   Hip External Rotation 4 4   Knee Flexion 5 5   Knee Extension 4+ 4+       Assessment & Plan   CLINICAL IMPRESSIONS  Medical Diagnosis: Balance problems    Treatment Diagnosis: impaired balance, bilateral lower extremity weakness   Impression/Assessment: Patient is a 88 year old female with impaired balance and bilateral lower extremity weakness complaints.  The following significant findings have been identified: Decreased strength, Impaired balance, Impaired gait, Decreased activity tolerance, and Impaired posture. These impairments interfere with their ability to perform recreational activities, household chores, household mobility, and community mobility as compared to previous level of  function.     Clinical Decision Making (Complexity):  Clinical Presentation: Stable/Uncomplicated  Clinical Presentation Rationale: based on medical and personal factors listed in PT evaluation  Clinical Decision Making (Complexity): Low complexity    PLAN OF CARE  Treatment Interventions:  Interventions: Gait Training, Neuromuscular Re-education, Therapeutic Activity, Therapeutic Exercise    Long Term Goals     PT Goal 1  Goal Identifier: Ambulation  Goal Description: the patient will be able to walk with modified independence  utilizing a single edge cane for 20 minutes on a level surface noting no instances of instability  Rationale: to maximize safety and independence with performance of ADLs and functional tasks;to maximize safety and independence within the community  Goal Progress: the patient currently notes she walks ~10 minutes at a time before she starts feeling tired and weak  Target Date: 09/13/24      Frequency of Treatment: 1x daily for 4 weeks tapering to 1x every other week for 4 weeks  Duration of Treatment: 8 weeks total    Recommended Referrals to Other Professionals:   Education Assessment:        Risks and benefits of evaluation/treatment have been explained.   Patient/Family/caregiver agrees with Plan of Care.     Evaluation Time:     PT Eval, Low Complexity Minutes (29471): 25       Signing Clinician: HUMA ENCARNACION [facility]                                                                                   OUTPATIENT PHYSICAL THERAPY      PLAN OF TREATMENT FOR OUTPATIENT REHABILITATION   Patient's Last Name, First Name, Brenda Anderson YOB: 1935   Provider's Name   [facility]   Medical Record No.  [id number]     Onset Date: 06/04/24  Start of Care Date: 07/19/24     Medical Diagnosis:  Balance problems      PT Treatment Diagnosis:  impaired balance, bilateral lower extremity weakness Plan of  Treatment  Frequency/Duration: 1x daily for 4 weeks tapering to 1x every other week for 4 weeks/ 8 weeks total    Certification date from 07/19/24 to 09/13/24         See note for plan of treatment details and functional goals     HUAM LANGSTON                         I CERTIFY THE NEED FOR THESE SERVICES FURNISHED UNDER        THIS PLAN OF TREATMENT AND WHILE UNDER MY CARE     (Physician attestation of this document indicates review and certification of the therapy plan).              Referring Provider:  Dr. Lynda Lopes MD    Initial Assessment  See Epic Evaluation- Start of Care Date: 07/19/24

## 2024-07-29 ENCOUNTER — THERAPY VISIT (OUTPATIENT)
Dept: PHYSICAL THERAPY | Facility: CLINIC | Age: 89
End: 2024-07-29
Payer: MEDICARE

## 2024-07-29 DIAGNOSIS — R26.89 IMPAIRMENT OF BALANCE: Primary | ICD-10-CM

## 2024-07-29 DIAGNOSIS — R29.898 BILATERAL LEG WEAKNESS: ICD-10-CM

## 2024-07-29 PROCEDURE — 97110 THERAPEUTIC EXERCISES: CPT | Mod: GP | Performed by: PHYSICAL THERAPIST

## 2024-07-29 PROCEDURE — 97112 NEUROMUSCULAR REEDUCATION: CPT | Mod: GP | Performed by: PHYSICAL THERAPIST

## 2024-07-31 ENCOUNTER — ANCILLARY PROCEDURE (OUTPATIENT)
Dept: BONE DENSITY | Facility: CLINIC | Age: 89
End: 2024-07-31
Attending: INTERNAL MEDICINE
Payer: MEDICARE

## 2024-07-31 DIAGNOSIS — M81.0 AGE-RELATED OSTEOPOROSIS WITHOUT CURRENT PATHOLOGICAL FRACTURE: ICD-10-CM

## 2024-07-31 PROCEDURE — 77080 DXA BONE DENSITY AXIAL: CPT | Performed by: RADIOLOGY

## 2024-08-04 ENCOUNTER — MYC MEDICAL ADVICE (OUTPATIENT)
Dept: ENDOCRINOLOGY | Facility: CLINIC | Age: 89
End: 2024-08-04
Payer: MEDICARE

## 2024-08-05 NOTE — TELEPHONE ENCOUNTER
Thank you for the update Brenda,     Regarding your symptoms of anxiety, rapid heart rate and chest pressure; you need to be evaluated in the emergency room to rule out underlying heart disease before attributing to anxiety [just to be on the safe side].    Regarding osteoporosis medication, I will be happy to talk to you about alternatives.  I will ask our clinic team to schedule routine follow-up appointment for you.      Clinic team   Clinic team, please call patient and advised to seek care immediately if she is still having 'chest pressure'.    Follow-up regarding osteoporosis-Please schedule routine next available to discuss next steps based on [place] message.   Oskar Su MD

## 2024-08-05 NOTE — TELEPHONE ENCOUNTER
Oskar Su MD  [facility] Endocrinology Adult [place]16 minutes ago (3:57 PM)       Clinic team   Clinic team, please call patient and advised to seek care immediately if she is still having 'chest pressure'.    Follow-up regarding osteoporosis-Please schedule routine next available to discuss next steps based on MyChart message.  Oskar Su MD                 Writer called. Patient advised of recommendations from Dr. Su. Patient verbalizes understanding. Patient states she no longer has the symptoms and they subsided right after. Patient notes that if they would have continued she would have been seen. Patient also agrees to seek care if these symptoms return.     Writer will have Clinic Coordinator team reach out to patient to schedule patient for next available with Dr. Su.      Neisha La RN  Endocrine Care Coordinator  [facility]

## 2024-08-06 NOTE — TELEPHONE ENCOUNTER
8/6 Called and spoke to patient, appointment is currently scheduled.      [facility]   Orthopedics, Podiatry, Sports Medicine, Ent ,Eye , Audiology, Adult Endocrine & Diabetes, Nutrition & Medication Therapy Management Specialties   [facility]

## 2024-09-19 DIAGNOSIS — I10 HYPERTENSION, GOAL BELOW 140/90: ICD-10-CM

## 2024-09-19 RX ORDER — HYDROCHLOROTHIAZIDE 12.5 MG/1
1 CAPSULE ORAL DAILY
Qty: 90 CAPSULE | Refills: 0 | Status: SHIPPED | OUTPATIENT
Start: 2024-09-19

## 2024-09-27 DIAGNOSIS — E78.5 HYPERLIPIDEMIA LDL GOAL <130: ICD-10-CM

## 2024-09-27 RX ORDER — ATORVASTATIN CALCIUM 20 MG/1
20 TABLET, FILM COATED ORAL DAILY
Qty: 90 TABLET | Refills: 3 | OUTPATIENT
Start: 2024-09-27

## 2024-09-30 DIAGNOSIS — D50.8 OTHER IRON DEFICIENCY ANEMIA: ICD-10-CM

## 2024-09-30 RX ORDER — FERROUS GLUCONATE 324(38)MG
TABLET ORAL
Qty: 45 TABLET | Refills: 1 | Status: SHIPPED | OUTPATIENT
Start: 2024-09-30

## 2024-10-03 DIAGNOSIS — R00.0 RAPID HEART RATE: ICD-10-CM

## 2024-10-03 DIAGNOSIS — K21.00 GASTROESOPHAGEAL REFLUX DISEASE WITH ESOPHAGITIS WITHOUT HEMORRHAGE: ICD-10-CM

## 2024-10-03 RX ORDER — METOPROLOL SUCCINATE 50 MG/1
50 TABLET, EXTENDED RELEASE ORAL DAILY
Qty: 90 TABLET | Refills: 3 | OUTPATIENT
Start: 2024-10-03

## 2024-10-29 ENCOUNTER — MYC MEDICAL ADVICE (OUTPATIENT)
Dept: FAMILY MEDICINE | Facility: CLINIC | Age: 89
End: 2024-10-29
Payer: MEDICARE

## 2024-10-31 ENCOUNTER — TRANSFERRED RECORDS (OUTPATIENT)
Dept: HEALTH INFORMATION MANAGEMENT | Facility: CLINIC | Age: 89
End: 2024-10-31
Payer: MEDICARE

## 2024-10-31 ENCOUNTER — MEDICAL CORRESPONDENCE (OUTPATIENT)
Dept: HEALTH INFORMATION MANAGEMENT | Facility: CLINIC | Age: 89
End: 2024-10-31

## 2024-11-06 ENCOUNTER — PATIENT OUTREACH (OUTPATIENT)
Dept: CARE COORDINATION | Facility: CLINIC | Age: 89
End: 2024-11-06
Payer: MEDICARE

## 2024-11-06 NOTE — PROGRESS NOTES
[facility] Medicare ACO Reach Population - Chart Review      Background: Chart reviewed proactively to support health maintenance initiatives within [facility]'s Medicare ACO Reach Population.     Patient was discharged to ARU on 10/31/24.    Plan:  Primary Care Care Coordination referral placed.     Kim Hahn RN  [facility]

## 2024-11-07 ENCOUNTER — PATIENT OUTREACH (OUTPATIENT)
Dept: CARE COORDINATION | Facility: CLINIC | Age: 89
End: 2024-11-07
Payer: MEDICARE
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
## 2024-11-07 NOTE — LETTER
TCU Hand In    Patient name: Brenda Underwood  PCP: Lynda Lopes  PCP Care Coordinator's information (phone number/email): Opal Wagner RN, BSN, PHN Care Coordinator  [place], [place] and [facility]   Phone: [phone]     Primary family contact: Underwood,David   Other MDs involved: NA    Patient Care Team:  Lynda Lopes MD as PCP - General (Family Medicine)  Michelle Shabazz PA-C as Physician Assistant (Dermatology)  Oskar Su MD as MD (Endocrinology, Diabetes, and Metabolism)  Oskar Su MD as Assigned Endocrinology Provider  Gracia Best DO as MD (Gastroenterology)  Vannesa Tejada PA-C as Physician Assistant (Family Medicine)  Lynda Lopes MD as Assigned PCP  Opal Wagner RN as Clinic Care Coordinator (Primary Care - CC)    Resources in place previously (home care services, equipment needs, etc.): na    Advanced Directive: Y    Social History     Socioeconomic History    Marital status:      Spouse name: DAVID UNDERWOOD    Number of children: 3    Years of education: Not on file    Highest education level: Not on file   Occupational History    Occupation: retired     Employer: RETIRED   Tobacco Use    Smoking status: Never    Smokeless tobacco: Never   Substance and Sexual Activity    Alcohol use: Yes     Comment: occasionally    Drug use: No    Sexual activity: Not Currently     Partners: Male     Birth control/protection: Post-menopausal, Male Surgical, Female Surgical   Other Topics Concern     Service No    Blood Transfusions Yes     Comment: 1972 @ [facility] & 1980 @ [facility]    Caffeine Concern No    Occupational Exposure No    Hobby Hazards No    Sleep Concern No    Stress Concern No    Weight Concern Yes    Special Diet Yes     Comment: TRIES FOR A LOW FAT    Back Care No     Comment: Hx OF BACK CONCERNS    Exercise Yes     Comment: APPROX 5 TIMES A WEEK  -WALKING & CURVES    Bike Helmet No     Comment: DOES NOT BIKE    Seat  Belt Yes    Self-Exams Yes    Parent/sibling w/ CABG, MI or angioplasty before 65F 55M? No   Social History Narrative    Not on file     Social Drivers of Health     Financial Resource Strain: Low Risk  (12/22/2023)    Financial Resource Strain     Within the past 12 months, have you or your family members you live with been unable to get utilities (heat, electricity) when it was really needed?: No   Food Insecurity: No Food Insecurity (10/31/2024)    Received from [facility]     Hunger Vital Sign     Worried About Running Out of Food in the Last Year: Never true     Ran Out of Food in the Last Year: Never true   Transportation Needs: No Transportation Needs (10/31/2024)    Received from [facility]     PRAPARE - Transportation     Lack of Transportation (Medical): No     Lack of Transportation (Non-Medical): No   Physical Activity: Not on file   Stress: Not on file   Social Connections: Not on file   Interpersonal Safety: Not At Risk (10/31/2024)    Received from [facility]     Humiliation, Afraid, Rape, and Kick questionnaire     Fear of Current or Ex-Partner: No     Emotionally Abused: No     Physically Abused: No     Sexually Abused: No   Housing Stability: Low Risk  (10/31/2024)    Received from [facility]     Housing Stability Vital Sign     Unable to Pay for Housing in the Last Year: No     Number of Times Moved in the Last Year: 0     Homeless in the Last Year: No     Please contact me with discharge date, disposition and possible services (home health care, county, family support). My role would follow up with the patient/family with discharge back into the community.     Thank you, Opal Wagner RN, BSN, PHN Care Coordinator  [place], [place], and [place]   Phone: [phone]

## 2024-11-14 ENCOUNTER — TELEPHONE (OUTPATIENT)
Dept: FAMILY MEDICINE | Facility: CLINIC | Age: 89
End: 2024-11-14
Payer: MEDICARE

## 2024-11-14 NOTE — TELEPHONE ENCOUNTER
FYI - Status Update    [facility]    Update: Letting provider know that patient will be starting skilled nursing, OT and PT tomorrow.    Does caller want a call/response back: No    Emmanuelle KHAN [facility]

## 2024-11-15 ENCOUNTER — TELEPHONE (OUTPATIENT)
Dept: FAMILY MEDICINE | Facility: CLINIC | Age: 89
End: 2024-11-15
Payer: MEDICARE

## 2024-11-15 NOTE — TELEPHONE ENCOUNTER
RN spoke with Shaw and relayed provider message. Shaw will let pt know to stop aspirin.     Mindy Arango RN

## 2024-11-15 NOTE — TELEPHONE ENCOUNTER
I do not see any indications for using Aspirin. Best to stop taking Aspirin.  Will discuss the rest at the hospital follow up visit.    Thank you,  Lynda Lopes MD MPH

## 2024-11-15 NOTE — TELEPHONE ENCOUNTER
Shaw a home care nurse is calling.    Patient is setting up her own meds.    Patient is taking ASA 81 mg daily.  Not active on med list.  Should pt be taking it?  Someone told patient to take it many yrs ago.      Has hospital follow up visit scheduled.    At the hospital advised to stop hydrochlorothiazide.  At the next visit to determine timing of restarting HCTZ with PCP.    Leana Schmidt RN, BSN  [facility]

## 2024-11-20 ENCOUNTER — MEDICAL CORRESPONDENCE (OUTPATIENT)
Dept: HEALTH INFORMATION MANAGEMENT | Facility: CLINIC | Age: 89
End: 2024-11-20
Payer: MEDICARE

## 2024-11-25 ENCOUNTER — TELEPHONE (OUTPATIENT)
Dept: FAMILY MEDICINE | Facility: CLINIC | Age: 89
End: 2024-11-25
Payer: MEDICARE

## 2024-11-25 NOTE — TELEPHONE ENCOUNTER
Routing refill request to provider for review/approval because:  Labs not current:  LDL    Halina Jacome RN, [facility] Triage             Wound Care Center Physician Orders and Discharge Instructions  [facility]  [address], [address]  [place] [id number]  Telephone: [phone]      Fax: [phone]        Your home care company:   [facility] .     Your wound-care supplies will be provided by:  [facility] .     NAME:  Haroldo Brady   YOB: 1956  PRIMARY DIAGNOSIS FOR WOUND CARE CENTER:  Venous .     Wound cleansing:   Do not scrub or use excessive force.  Wash hands with soap and water before and after dressing changes.  Prior to applying a clean dressing, cleanse wound with normal saline, wound cleanser, or mild soap and water. Ask your physician or nurse before getting the wound(s) wet in the shower.                Wound care for home:     Right lower leg wounds:    Wash with soap and water with dressing changes    Gauze in between toes then cling (may change as needed)    Xeroform over wound    Interdry    Optiloc (large)    Coflex calamine toes to knees    Nylon    Keep dressing until next [facility] next       Your physician has ordered Compression for treatment of venous ulcers, venous insufficiency,and/or Lymphedema.   * Do not remove until your next scheduled visit in [facility]- do not get wet.    * If your wrap falls down, becomes painful or you have decreased sensation, and/or excessive drainage- please call the [facility] for RN visit to get your compression wrap changed   * You need to exercice as tolerated- walking is good   * Elevate your legs preferrably above level of your heart when sitting as much as possible   * The Goal of this therapy is to reduce Edema and get into long term compression garments to control venous insufficiency, lymphedema and reduce occurrence of venous ulcers      Please note, all wounds (unless stated otherwise here) were mechanically debrided at the time of cleansing here in the wound-care center today, so a small amount of pain, drainage or bleeding

## 2024-11-25 NOTE — TELEPHONE ENCOUNTER
Provider: BHANU Miller wanting to confirm with PCP if pt should be wearing compression stockings before teaching pt how to don/doff. Please advise.      Home Care is calling regarding an established patient with [facility].       Requesting orders from: Lynda Lopes  Provider is following patient: Yes  Is this a 60-day recertification request?  No    Provider Question:  Pt was seen last week for evaluation. Pt had purchased compression stockings, reports she had previously worn these in hospital. Per BHANU Miller no orders or indication in hospital discharge paperwork that pt should be wearing these. Wanting to confirm with PCP if pt should be wearing compression stockings before teaching pt how to don/doff. States pt has trace swelling in R ankle and 2+ edema in L ankle, walking around home fairly well.     Pt has hospital follow up with PCP on 11/27.       Information was gathered and will be sent to provider for review.  RN will contact Home Care with information after provider review.  Confirmed ok to leave a detailed message with call back.  Contact information confirmed and updated as needed.    Mindy Arango RN

## 2024-11-26 ENCOUNTER — TELEPHONE (OUTPATIENT)
Dept: FAMILY MEDICINE | Facility: CLINIC | Age: 89
End: 2024-11-26
Payer: MEDICARE

## 2024-11-26 NOTE — TELEPHONE ENCOUNTER
Would be OK to use compression socks for lower extremity edema.  Can discuss further at hospital follow up.    Thank you,  Lynda Lopes MD MPH

## 2024-11-26 NOTE — TELEPHONE ENCOUNTER
Forms/Letter Request    Type of form/letter: Home Health Certification and Plan of care Cert Period: 11/15/24-01/13/25 Order # [id number]      Do we have the form/letter: Yes: Faxed in    Who is the form from? [facility] (if other please explain)    Where did/will the form come from? form was faxed in    When is form/letter needed by: ASAP    How would you like the form/letter returned: Fax : [phone]    Patient Notified form requests are processed in 5-7 business days:Yes    Could we send this information to you in [employer] or would you prefer to receive a phone call?:   Patient would prefer a phone call   Okay to leave a detailed message?: Yes at Cell number on file:    Telephone Information:   Mobile [phone]

## 2024-11-27 ENCOUNTER — OFFICE VISIT (OUTPATIENT)
Dept: FAMILY MEDICINE | Facility: CLINIC | Age: 89
End: 2024-11-27
Payer: MEDICARE

## 2024-11-27 VITALS
BODY MASS INDEX: 29.15 KG/M2 | WEIGHT: 156.8 LBS | SYSTOLIC BLOOD PRESSURE: 120 MMHG | HEART RATE: 99 BPM | TEMPERATURE: 97 F | RESPIRATION RATE: 18 BRPM | DIASTOLIC BLOOD PRESSURE: 68 MMHG | OXYGEN SATURATION: 97 %

## 2024-11-27 DIAGNOSIS — D84.9 IMMUNOCOMPROMISED (H): ICD-10-CM

## 2024-11-27 DIAGNOSIS — E78.5 HYPERLIPIDEMIA LDL GOAL <130: ICD-10-CM
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
## 2024-11-27 DIAGNOSIS — T07.XXXA MULTIPLE FRACTURES: ICD-10-CM

## 2024-11-27 LAB
ALBUMIN SERPL BCG-MCNC: 4 G/DL (ref 3.5–5.2)
ALP SERPL-CCNC: 107 U/L (ref 40–150)
ALT SERPL W P-5'-P-CCNC: 17 U/L (ref 0–50)
ANION GAP SERPL CALCULATED.3IONS-SCNC: 9 MMOL/L (ref 7–15)
AST SERPL W P-5'-P-CCNC: 25 U/L (ref 0–45)
BILIRUB SERPL-MCNC: 0.4 MG/DL
BUN SERPL-MCNC: 10.7 MG/DL (ref 8–23)
CALCIUM SERPL-MCNC: 10 MG/DL (ref 8.8–10.4)
CHLORIDE SERPL-SCNC: 105 MMOL/L (ref 98–107)
CHOLEST SERPL-MCNC: 146 MG/DL
CREAT SERPL-MCNC: 0.73 MG/DL (ref 0.51–0.95)
EGFRCR SERPLBLD CKD-EPI 2021: 79 ML/MIN/1.73M2
ERYTHROCYTE [DISTWIDTH] IN BLOOD BY AUTOMATED COUNT: 16.3 % (ref 10–15)
FASTING STATUS PATIENT QL REPORTED: NO
FASTING STATUS PATIENT QL REPORTED: NO
GLUCOSE SERPL-MCNC: 116 MG/DL (ref 70–99)
HCO3 SERPL-SCNC: 24 MMOL/L (ref 22–29)
HCT VFR BLD AUTO: 38.7 % (ref 35–47)
HDLC SERPL-MCNC: 34 MG/DL
HGB BLD-MCNC: 12.2 G/DL (ref 11.7–15.7)
LDLC SERPL CALC-MCNC: 86 MG/DL
MCH RBC QN AUTO: 29.8 PG (ref 26.5–33)
MCHC RBC AUTO-ENTMCNC: 31.5 G/DL (ref 31.5–36.5)
MCV RBC AUTO: 94 FL (ref 78–100)
NONHDLC SERPL-MCNC: 112 MG/DL
PLATELET # BLD AUTO: 334 10E3/UL (ref 150–450)
POTASSIUM SERPL-SCNC: 4.6 MMOL/L (ref 3.4–5.3)
PROT SERPL-MCNC: 6.8 G/DL (ref 6.4–8.3)
RBC # BLD AUTO: 4.1 10E6/UL (ref 3.8–5.2)
SODIUM SERPL-SCNC: 138 MMOL/L (ref 135–145)
TRIGL SERPL-MCNC: 131 MG/DL
WBC # BLD AUTO: 6.8 10E3/UL (ref 4–11)

## 2024-11-27 ASSESSMENT — PAIN SCALES - GENERAL: PAINLEVEL_OUTOF10: NO PAIN (0)
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
## 2024-11-27 NOTE — TELEPHONE ENCOUNTER
Order faxed to [facility] [phone] on 11/27/24 at 11:06am. Copy placed in abstract and original in tc copy bin.

## 2024-11-27 NOTE — RESULT ENCOUNTER NOTE
Brenda, your test results were within normal limits.  Cholesterol is at goal.  Electrolytes, non fasting glucose, kidney function and liver function tests are normal.  Blood counts have normalized, you are no longer anemic.    Please do not hesitate to call us at [phone] if you have any questions or concerns.    Thank you,    Lynda Lopes MD MPH

## 2024-12-05 ENCOUNTER — PATIENT OUTREACH (OUTPATIENT)
Dept: CARE COORDINATION | Facility: CLINIC | Age: 89
End: 2024-12-05
Payer: MEDICARE

## 2024-12-05 ENCOUNTER — MEDICAL CORRESPONDENCE (OUTPATIENT)
Dept: HEALTH INFORMATION MANAGEMENT | Facility: CLINIC | Age: 89
End: 2024-12-05
Payer: MEDICARE

## 2024-12-05 NOTE — LETTER
[facility] CARE COORDINATION  [address]  [place] [facility] [address]   December 5, 2024    Brenda Barker  [address]  [place] [address]      Dear Brenda,    I am a clinic care coordinator who works with Lynda Lopes MD with the [facility]. Below is a description of clinic care coordination and how I can further assist you.       The clinic care coordination team is made up of a registered nurse, , financial resource worker and community health worker who understand the health care system. The goal of clinic care coordination is to help you manage your health and improve access to the health care system. Our team works alongside your provider to assist you in determining your health and social needs. We can help you obtain health care and community resources, providing you with necessary information and education. We can work with you through any barriers and develop a care plan that helps coordinate and strengthen the communication between you and your care team.  Our services are voluntary and are offered without charge to you personally.    Please feel free to contact me with any questions or concerns regarding care coordination and what we can offer.      We are focused on providing you with the highest-quality healthcare experience possible.    Sincerely,     Opal Wagner RN, BSN, PHN Care Coordinator  [facility]   Phone: [phone]

## 2024-12-05 NOTE — PROGRESS NOTES
Clinic Care Coordination Contact  [facility]/Voicemail    Clinical Data: Care Coordinator Outreach    Outreach Documentation Number of Outreach Attempt   12/5/2024   1:21 PM 1       Left message on patient's voicemail with call back information and requested return call.      Plan: Care Coordinator will send care coordination introduction letter with care coordinator contact information and explanation of care coordination services via check24t. Care Coordinator will try to reach patient again in 1-2 business days.    Opal Wagner RN, BSN, PHN Care Coordinator  [facility] and [facility]   Phone: [phone]

## 2024-12-10 ENCOUNTER — PATIENT OUTREACH (OUTPATIENT)
Dept: CARE COORDINATION | Facility: CLINIC | Age: 89
End: 2024-12-10
Payer: MEDICARE

## 2024-12-10 ENCOUNTER — OFFICE VISIT (OUTPATIENT)
Dept: PEDIATRICS | Facility: CLINIC | Age: 89
End: 2024-12-10
Payer: MEDICARE

## 2024-12-10 ENCOUNTER — ANCILLARY PROCEDURE (OUTPATIENT)
Dept: CT IMAGING | Facility: CLINIC | Age: 89
End: 2024-12-10
Attending: NURSE PRACTITIONER
Payer: MEDICARE

## 2024-12-10 VITALS
HEART RATE: 70 BPM | SYSTOLIC BLOOD PRESSURE: 118 MMHG | OXYGEN SATURATION: 98 % | RESPIRATION RATE: 17 BRPM | TEMPERATURE: 97 F | DIASTOLIC BLOOD PRESSURE: 66 MMHG

## 2024-12-10 DIAGNOSIS — K43.9 VENTRAL HERNIA WITHOUT OBSTRUCTION OR GANGRENE: ICD-10-CM

## 2024-12-10 DIAGNOSIS — R19.01 RIGHT UPPER QUADRANT ABDOMINAL SWELLING, MASS AND LUMP: Primary | ICD-10-CM

## 2024-12-10 DIAGNOSIS — R19.01 RIGHT UPPER QUADRANT ABDOMINAL SWELLING, MASS AND LUMP: ICD-10-CM

## 2024-12-10 PROCEDURE — 99417 PROLNG OP E/M EACH 15 MIN: CPT | Performed by: NURSE PRACTITIONER

## 2024-12-10 PROCEDURE — G1010 CDSM STANSON: HCPCS | Performed by: RADIOLOGY

## 2024-12-10 PROCEDURE — 74176 CT ABD & PELVIS W/O CONTRAST: CPT | Mod: MG | Performed by: RADIOLOGY

## 2024-12-10 PROCEDURE — 99215 OFFICE O/P EST HI 40 MIN: CPT | Performed by: NURSE PRACTITIONER

## 2024-12-10 NOTE — PROGRESS NOTES
"Clinic Care Coordination Contact    Patient called CC RN yesterday morning asking CC RN about \"your work in care coordination\" via voicemail.     CC RN returned call. She again asked what is care coordination. Our program was discussed. Patient denies having a health related goal as she recently discharged from home health physical therapy and OT and she is back to her baseline. No further OP physical therapy was recommended.     Patient stated that she had received the okay to remove the elastic band from her midriff and she noted a \"bulge or hematoma.\" She then put the band back on and \"the bulge went down.\"     We discussed how to reach clinic triage with questions or concerns on her health. That Ippieshart is better for simple yes or no questions that do not require a conversation. Patient declined needing triage advice now as her plan is to call the clinic to make a visit.     We discussed her medications she requested. multivitamin (CERTAVITE)  mg-mcg oral tablet and Gabapentin 100 mg TID. Theses were prescribed at her [facility] discharge. CC RN offered to send a telephone message to her PCP about these meds. Patient reported that she has replied to the open Ippieshart message and she is comfortable awaiting a follow up reply.     CC RN warm transferred patients call to the priority scheduling line to make a visit to discuss her current medical concern.     Plan:   Patient declined CC.   No further outreach will be made.   Opal Wagner RN, BSN, PHN Care Coordinator  [place], [place], and [facility]   Phone: [phone]   "
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
## 2024-12-18 ENCOUNTER — MEDICAL CORRESPONDENCE (OUTPATIENT)
Dept: HEALTH INFORMATION MANAGEMENT | Facility: CLINIC | Age: 89
End: 2024-12-18
Payer: MEDICARE

## 2025-01-14 ENCOUNTER — TRANSFERRED RECORDS (OUTPATIENT)
Dept: HEALTH INFORMATION MANAGEMENT | Facility: CLINIC | Age: 89
End: 2025-01-14
Payer: MEDICARE

## 2025-01-15 ENCOUNTER — OFFICE VISIT (OUTPATIENT)
Dept: SURGERY | Facility: CLINIC | Age: 89
End: 2025-01-15
Payer: MEDICARE

## 2025-01-15 VITALS
DIASTOLIC BLOOD PRESSURE: 79 MMHG | HEART RATE: 71 BPM | WEIGHT: 162.6 LBS | OXYGEN SATURATION: 99 % | SYSTOLIC BLOOD PRESSURE: 137 MMHG | BODY MASS INDEX: 30.72 KG/M2

## 2025-01-15 DIAGNOSIS — S39.81XA TRAUMATIC ABDOMINAL HERNIA: Primary | ICD-10-CM

## 2025-01-15 DIAGNOSIS — K43.9 LATERAL VENTRAL HERNIA: ICD-10-CM
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
## 2025-01-25 PROBLEM — R26.89 IMPAIRMENT OF BALANCE: Status: RESOLVED | Noted: 2024-07-19 | Resolved: 2025-01-25

## 2025-01-25 PROBLEM — R29.898 BILATERAL LEG WEAKNESS: Status: RESOLVED | Noted: 2024-07-19 | Resolved: 2025-01-25

## 2025-02-12 ENCOUNTER — OFFICE VISIT (OUTPATIENT)
Dept: SURGERY | Facility: CLINIC | Age: 89
End: 2025-02-12
Attending: PREVENTIVE MEDICINE
Payer: MEDICARE

## 2025-02-12 ENCOUNTER — TELEPHONE (OUTPATIENT)
Dept: SURGERY | Facility: CLINIC | Age: 89
End: 2025-02-12

## 2025-02-12 VITALS
OXYGEN SATURATION: 99 % | RESPIRATION RATE: 16 BRPM | DIASTOLIC BLOOD PRESSURE: 84 MMHG | BODY MASS INDEX: 29.45 KG/M2 | SYSTOLIC BLOOD PRESSURE: 132 MMHG | HEIGHT: 61 IN | WEIGHT: 156 LBS | HEART RATE: 64 BPM
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
## 2025-02-12 DIAGNOSIS — S39.81XA TRAUMATIC ABDOMINAL HERNIA: ICD-10-CM

## 2025-02-12 RX ORDER — HEPARIN SODIUM 10000 [USP'U]/ML
5000 INJECTION, SOLUTION INTRAVENOUS; SUBCUTANEOUS
OUTPATIENT
Start: 2025-02-12

## 2025-02-12 RX ORDER — GABAPENTIN 100 MG/1
100 CAPSULE ORAL 3 TIMES DAILY
Qty: 90 CAPSULE | Refills: 1 | Status: SHIPPED | OUTPATIENT
Start: 2025-02-12
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
## 2025-02-12 NOTE — Clinical Note
These are tough to fix as that split between the rectus muscle and the obliques can be hard to get back together.  She appears frail but she is mentally sharp.  I hope we do not dull that any with surgery.

## 2025-02-12 NOTE — TELEPHONE ENCOUNTER
Type of surgery: robotic repair right sided flank hernia  Location of surgery: [facility]  Date and time of surgery: 4/8/25 8:00am  Surgeon: Dr Echeverria  Pre-Op Appt Date: pt to schedule  Post-Op Appt Date: pt to schedule   Packet sent out: Yes  Pre-cert/Authorization completed:  Not Applicable  Date: 2/12/25

## 2025-02-12 NOTE — Clinical Note
She has been having more constipation and diarrhea.  She while went up in my clinic and I am going to proceed with repair.  Trust me we did have a long discussion about medical risk.  She is just going to wind up putting most of her colon into this and eventually will probably incarcerated all off.  These are tough to fix.
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
## 2025-03-18 ENCOUNTER — OFFICE VISIT (OUTPATIENT)
Dept: FAMILY MEDICINE | Facility: CLINIC | Age: 89
End: 2025-03-18
Payer: COMMERCIAL

## 2025-03-18 VITALS
OXYGEN SATURATION: 97 % | DIASTOLIC BLOOD PRESSURE: 70 MMHG | TEMPERATURE: 97.3 F | BODY MASS INDEX: 29.94 KG/M2 | HEIGHT: 61 IN | WEIGHT: 158.6 LBS | HEART RATE: 79 BPM | SYSTOLIC BLOOD PRESSURE: 125 MMHG | RESPIRATION RATE: 14 BRPM

## 2025-03-18 DIAGNOSIS — S39.81XA TRAUMATIC ABDOMINAL HERNIA: ICD-10-CM

## 2025-03-18 DIAGNOSIS — D50.8 OTHER IRON DEFICIENCY ANEMIA: ICD-10-CM

## 2025-03-18 DIAGNOSIS — I10 HYPERTENSION, GOAL BELOW 140/90: ICD-10-CM

## 2025-03-18 DIAGNOSIS — D84.9 IMMUNOCOMPROMISED: ICD-10-CM

## 2025-03-18 DIAGNOSIS — M06.9 RHEUMATOID ARTHRITIS, INVOLVING UNSPECIFIED SITE, UNSPECIFIED WHETHER RHEUMATOID FACTOR PRESENT (H): ICD-10-CM

## 2025-03-18 DIAGNOSIS — Z01.818 PRE-OPERATIVE EXAMINATION: Primary | ICD-10-CM

## 2025-03-18 DIAGNOSIS — E78.5 HYPERLIPIDEMIA LDL GOAL <130: ICD-10-CM

## 2025-03-18 DIAGNOSIS — D33.3 ACOUSTIC NEUROMA (H): ICD-10-CM

## 2025-03-18 LAB
ERYTHROCYTE [DISTWIDTH] IN BLOOD BY AUTOMATED COUNT: 15.7 % (ref 10–15)
HCT VFR BLD AUTO: 40.5 % (ref 35–47)
HGB BLD-MCNC: 13 G/DL (ref 11.7–15.7)
MCH RBC QN AUTO: 29.3 PG (ref 26.5–33)
MCHC RBC AUTO-ENTMCNC: 32.1 G/DL (ref 31.5–36.5)
MCV RBC AUTO: 91 FL (ref 78–100)
PLATELET # BLD AUTO: 235 10E3/UL (ref 150–450)
RBC # BLD AUTO: 4.43 10E6/UL (ref 3.8–5.2)
WBC # BLD AUTO: 5.1 10E3/UL (ref 4–11)

## 2025-03-18 PROCEDURE — 93000 ELECTROCARDIOGRAM COMPLETE: CPT | Performed by: PREVENTIVE MEDICINE

## 2025-03-18 PROCEDURE — 85027 COMPLETE CBC AUTOMATED: CPT | Performed by: PREVENTIVE MEDICINE

## 2025-03-18 PROCEDURE — 99214 OFFICE O/P EST MOD 30 MIN: CPT | Performed by: PREVENTIVE MEDICINE

## 2025-03-18 PROCEDURE — 36415 COLL VENOUS BLD VENIPUNCTURE: CPT | Performed by: PREVENTIVE MEDICINE

## 2025-03-18 NOTE — RESULT ENCOUNTER NOTE
Brenda,     Blood count is not showing anemia.     Please do not hesitate to call us at [phone] if you have any questions or concerns.    Thank you,    Lynda Lopes MD MPH

## 2025-03-18 NOTE — PATIENT INSTRUCTIONS
Antiplatelet or Anticoagulation Medication Instructions   - We reviewed the medication list and the patient is not on an antiplatelet or anticoagulation medications.    Additional Medication Instructions  Take all scheduled medications on the day of surgery EXCEPT for modifications listed below:   - Herbal medications and vitamins: DO NOT TAKE 14 days prior to surgery.   - Beta Blockers (atenolol, metoprolol, propranolol) : Continue taking on the day of surgery.   - Statins (atorvastatin, simvastatin, pravastatin) : Continue taking on the day of surgery.
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
## 2025-03-19 LAB
ANION GAP SERPL CALCULATED.3IONS-SCNC: 9 MMOL/L (ref 7–15)
BUN SERPL-MCNC: 16.5 MG/DL (ref 8–23)
CALCIUM SERPL-MCNC: 10.4 MG/DL (ref 8.8–10.4)
CHLORIDE SERPL-SCNC: 102 MMOL/L (ref 98–107)
CREAT SERPL-MCNC: 0.69 MG/DL (ref 0.51–0.95)
EGFRCR SERPLBLD CKD-EPI 2021: 84 ML/MIN/1.73M2
GLUCOSE SERPL-MCNC: 98 MG/DL (ref 70–99)
HCO3 SERPL-SCNC: 26 MMOL/L (ref 22–29)
POTASSIUM SERPL-SCNC: 4.4 MMOL/L (ref 3.4–5.3)
SODIUM SERPL-SCNC: 137 MMOL/L (ref 135–145)

## 2025-03-19 NOTE — RESULT ENCOUNTER NOTE
Brenda,    Electrolytes, glucose and kidney function are normal.     Please do not hesitate to call us at [phone] if you have any questions or concerns.    Thank you,    Lynda Lopes MD MPH

## 2025-04-02 RX ORDER — MELATONIN 5 MG
1 TABLET,CHEWABLE ORAL AT BEDTIME
COMMUNITY

## 2025-04-02 NOTE — PROGRESS NOTES
PTA medications updated by Medication Scribe prior to surgery via phone call with patient (last doses completed by Nurse)     Medication history sources: Patient, Surescripts, H&P, and Patient's home med list  In the past week, patient estimated taking medication this percent of the time: Greater than 90%      Significant changes made to the medication list:  None      Additional medication history information:   Patient was advised to bring: Liquifilm tears OP.     Medication reconciliation completed by provider prior to medication history? No    Time spent in this activity: 50 minutes    The information provided in this note is only as accurate as the sources available at the time of update(s)  Prior to Admission medications    Medication Sig Last Dose Taking? Auth Provider Long Term End Date   Acetaminophen (TYLENOL PO) Take 1,300 mg by mouth 2 times daily. (2 x 650 mg = 1,300 mg) Evening Yes Reported, Patient     acyclovir (ZOVIRAX) 400 MG tablet Take 1 tablet (400 mg) by mouth daily Morning Yes Lynda Lopes MD Yes    Melatonin 5 MG CHEW Take 1 chew tab by mouth at bedtime. (Gummies) Bedtime Yes Reported, Patient     Ascorbic Acid (VITAMIN C PO)  3/31/2025 Morning Yes Reported, Patient     atorvastatin (LIPITOR) 20 MG tablet Take 1 tablet (20 mg) by mouth daily. Evening Yes Lynda Lopes MD Yes    calcium carbonate antacid (TUMS ULTRA 1000) 1000 MG CHEW Take 1 tablet (1,000 mg) by mouth 2 times daily (before meals) Evening Yes Oskar Su MD     Cetirizine HCl (ZYRTEC ALLERGY PO) Take 1 tablet by mouth daily Evening Yes Reported, Patient     ferrous gluconate (FERGON) 324 (38 Fe) MG tablet TAKE 1 TABLET BY MOUTH EVERY OTHER DAY 3/31/2025 Morning Yes Lynda Lopes MD     FISH OIL 1000 MG OR CAPS Take 1 g by mouth 2 times daily. 3/31/2025 Evening Yes Ainsley Almanzar MD     FOLIC ACID 1 MG OR TABS Take 1 mg by mouth 2 times daily. 3/31/2025 Evening Yes Michael Blanco MD      methotrexate 2.5 MG tablet CHEMO Take 17.5 mg by mouth every 7 days. (7 x 2.5 mg = 17.5 mg:Saturdays) Morning Yes Michael Pride MD Yes    metoprolol succinate ER (TOPROL XL) 50 MG 24 hr tablet Take 1 tablet (50 mg) by mouth daily. Morning Yes Lynda Lopes MD Yes    Multiple Vitamins-Minerals (ZINC PO) Take 2 chew tab by mouth every morning. Gummies 3/31/2025 Morning Yes Reported, Patient     nitroFURantoin macrocrystal (MACRODANTIN) 100 MG capsule Take 1 capsule (100 mg) by mouth daily. Morning Yes Lynda Lopes MD     omeprazole (PRILOSEC) 20 MG DR capsule Take 1 capsule (20 mg) by mouth daily. Morning Yes Lynda Lopes MD     polyvinyl alcohol (LIQUIFILM TEARS) 1.4 % ophthalmic solution 1 drop as needed. Unknown Yes Reported, Patient Yes          Medication history completed by: Tammy Campbell LPN

## 2025-04-08 ENCOUNTER — ANESTHESIA (OUTPATIENT)
Dept: SURGERY | Facility: CLINIC | Age: 89
DRG: 908 | End: 2025-04-08
Payer: MEDICARE

## 2025-04-08 ENCOUNTER — HOSPITAL ENCOUNTER (INPATIENT)
Facility: CLINIC | Age: 89
LOS: 1 days | Discharge: HOME | DRG: 908 | End: 2025-04-09
Attending: SURGERY | Admitting: SURGERY
Payer: MEDICARE

## 2025-04-08 ENCOUNTER — ANESTHESIA EVENT (OUTPATIENT)
Dept: SURGERY | Facility: CLINIC | Age: 89
DRG: 908 | End: 2025-04-08
Payer: MEDICARE

## 2025-04-08 DIAGNOSIS — S39.81XA TRAUMATIC ABDOMINAL HERNIA: ICD-10-CM

## 2025-04-08 DIAGNOSIS — G89.18 ACUTE POST-OPERATIVE PAIN: Primary | ICD-10-CM

## 2025-04-08 LAB
CREAT SERPL-MCNC: 0.69 MG/DL (ref 0.51–0.95)
EGFRCR SERPLBLD CKD-EPI 2021: 84 ML/MIN/1.73M2
GLUCOSE SERPL-MCNC: 112 MG/DL (ref 70–99)
POTASSIUM SERPL-SCNC: 3.6 MMOL/L (ref 3.4–5.3)

## 2025-04-08 PROCEDURE — 999N000141 HC STATISTIC PRE-PROCEDURE NURSING ASSESSMENT: Performed by: SURGERY

## 2025-04-08 PROCEDURE — 370N000017 HC ANESTHESIA TECHNICAL FEE, PER MIN: Performed by: SURGERY

## 2025-04-08 PROCEDURE — 49659 UNLSTD LAPS PX HRNAP HRNRPHY: CPT | Performed by: SURGERY

## 2025-04-08 PROCEDURE — 8E0W4CZ ROBOTIC ASSISTED PROCEDURE OF TRUNK REGION, PERCUTANEOUS ENDOSCOPIC APPROACH: ICD-10-PCS | Performed by: SURGERY

## 2025-04-08 PROCEDURE — 0WUF4JZ SUPPLEMENT ABDOMINAL WALL WITH SYNTHETIC SUBSTITUTE, PERCUTANEOUS ENDOSCOPIC APPROACH: ICD-10-PCS | Performed by: SURGERY

## 2025-04-08 PROCEDURE — 250N000013 HC RX MED GY IP 250 OP 250 PS 637: Performed by: PHYSICIAN ASSISTANT

## 2025-04-08 PROCEDURE — 250N000011 HC RX IP 250 OP 636: Performed by: SURGERY

## 2025-04-08 PROCEDURE — 49659 UNLSTD LAPS PX HRNAP HRNRPHY: CPT | Mod: AS | Performed by: PHYSICIAN ASSISTANT

## 2025-04-08 PROCEDURE — S2900 ROBOTIC SURGICAL SYSTEM: HCPCS | Performed by: SURGERY

## 2025-04-08 PROCEDURE — 258N000003 HC RX IP 258 OP 636: Performed by: ANESTHESIOLOGY

## 2025-04-08 PROCEDURE — 360N000080 HC SURGERY LEVEL 7, PER MIN: Performed by: SURGERY

## 2025-04-08 PROCEDURE — 84132 ASSAY OF SERUM POTASSIUM: CPT | Performed by: ANESTHESIOLOGY

## 2025-04-08 PROCEDURE — 49593 RPR AA HRN 1ST 3-10 RDC: CPT | Mod: AS | Performed by: PHYSICIAN ASSISTANT

## 2025-04-08 PROCEDURE — 250N000013 HC RX MED GY IP 250 OP 250 PS 637: Performed by: ANESTHESIOLOGY

## 2025-04-08 PROCEDURE — 49593 RPR AA HRN 1ST 3-10 RDC: CPT | Performed by: SURGERY

## 2025-04-08 PROCEDURE — 82565 ASSAY OF CREATININE: CPT | Performed by: SURGERY

## 2025-04-08 PROCEDURE — 0KNK4ZZ RELEASE RIGHT ABDOMEN MUSCLE, PERCUTANEOUS ENDOSCOPIC APPROACH: ICD-10-PCS | Performed by: SURGERY

## 2025-04-08 PROCEDURE — 82947 ASSAY GLUCOSE BLOOD QUANT: CPT | Performed by: ANESTHESIOLOGY

## 2025-04-08 PROCEDURE — 250N000011 HC RX IP 250 OP 636: Mod: JZ | Performed by: ANESTHESIOLOGY

## 2025-04-08 PROCEDURE — C1781 MESH (IMPLANTABLE): HCPCS | Performed by: SURGERY

## 2025-04-08 PROCEDURE — 250N000011 HC RX IP 250 OP 636: Performed by: PHYSICIAN ASSISTANT

## 2025-04-08 PROCEDURE — 710N000009 HC RECOVERY PHASE 1, LEVEL 1, PER MIN: Performed by: SURGERY

## 2025-04-08 PROCEDURE — 250N000009 HC RX 250: Performed by: ANESTHESIOLOGY

## 2025-04-08 PROCEDURE — 250N000009 HC RX 250: Performed by: SURGERY

## 2025-04-08 PROCEDURE — 258N000003 HC RX IP 258 OP 636: Performed by: PHYSICIAN ASSISTANT

## 2025-04-08 PROCEDURE — 250N000011 HC RX IP 250 OP 636: Performed by: ANESTHESIOLOGY

## 2025-04-08 PROCEDURE — 258N000001 HC RX 258: Performed by: SURGERY

## 2025-04-08 PROCEDURE — 250N000025 HC SEVOFLURANE, PER MIN: Performed by: SURGERY

## 2025-04-08 PROCEDURE — 272N000001 HC OR GENERAL SUPPLY STERILE: Performed by: SURGERY

## 2025-04-08 PROCEDURE — 120N000001 HC R&B MED SURG/OB

## 2025-04-08 DEVICE — SELF-GRIPPING POLYPROPYLENE MESH POLYPROPYLENE WITH POLYLACTIC ACID GRIPS
Type: IMPLANTABLE DEVICE | Site: ABDOMEN | Status: FUNCTIONAL
Brand: PROGRIP

## 2025-04-08 RX ORDER — POLYETHYLENE GLYCOL 3350 17 G/17G
17 POWDER, FOR SOLUTION ORAL DAILY
Status: DISCONTINUED | OUTPATIENT
Start: 2025-04-09 | End: 2025-04-09 | Stop reason: HOSPADM

## 2025-04-08 RX ORDER — PROPOFOL 10 MG/ML
INJECTION, EMULSION INTRAVENOUS PRN
Status: DISCONTINUED | OUTPATIENT
Start: 2025-04-08 | End: 2025-04-08

## 2025-04-08 RX ORDER — ONDANSETRON 2 MG/ML
4 INJECTION INTRAMUSCULAR; INTRAVENOUS EVERY 30 MIN PRN
Status: DISCONTINUED | OUTPATIENT
Start: 2025-04-08 | End: 2025-04-08 | Stop reason: HOSPADM

## 2025-04-08 RX ORDER — FAMOTIDINE 20 MG/1
20 TABLET, FILM COATED ORAL DAILY
Status: DISCONTINUED | OUTPATIENT
Start: 2025-04-08 | End: 2025-04-09 | Stop reason: HOSPADM

## 2025-04-08 RX ORDER — ACETAMINOPHEN 500 MG
1000 TABLET ORAL EVERY 4 HOURS PRN
Status: DISCONTINUED | OUTPATIENT
Start: 2025-04-08 | End: 2025-04-08 | Stop reason: HOSPADM

## 2025-04-08 RX ORDER — LIDOCAINE HYDROCHLORIDE 20 MG/ML
INJECTION, SOLUTION INFILTRATION; PERINEURAL PRN
Status: DISCONTINUED | OUTPATIENT
Start: 2025-04-08 | End: 2025-04-08

## 2025-04-08 RX ORDER — SODIUM CHLORIDE 9 MG/ML
INJECTION, SOLUTION INTRAVENOUS CONTINUOUS
Status: DISCONTINUED | OUTPATIENT
Start: 2025-04-08 | End: 2025-04-09 | Stop reason: HOSPADM

## 2025-04-08 RX ORDER — NALOXONE HYDROCHLORIDE 0.4 MG/ML
0.2 INJECTION, SOLUTION INTRAMUSCULAR; INTRAVENOUS; SUBCUTANEOUS
Status: DISCONTINUED | OUTPATIENT
Start: 2025-04-08 | End: 2025-04-09 | Stop reason: HOSPADM

## 2025-04-08 RX ORDER — PANTOPRAZOLE SODIUM 40 MG/1
40 TABLET, DELAYED RELEASE ORAL DAILY
Status: DISCONTINUED | OUTPATIENT
Start: 2025-04-08 | End: 2025-04-09 | Stop reason: HOSPADM

## 2025-04-08 RX ORDER — DEXAMETHASONE SODIUM PHOSPHATE 4 MG/ML
INJECTION, SOLUTION INTRA-ARTICULAR; INTRALESIONAL; INTRAMUSCULAR; INTRAVENOUS; SOFT TISSUE PRN
Status: DISCONTINUED | OUTPATIENT
Start: 2025-04-08 | End: 2025-04-08

## 2025-04-08 RX ORDER — GLYCOPYRROLATE 0.2 MG/ML
INJECTION, SOLUTION INTRAMUSCULAR; INTRAVENOUS PRN
Status: DISCONTINUED | OUTPATIENT
Start: 2025-04-08 | End: 2025-04-08

## 2025-04-08 RX ORDER — BUPIVACAINE HYDROCHLORIDE AND EPINEPHRINE 2.5; 5 MG/ML; UG/ML
INJECTION, SOLUTION INFILTRATION; PERINEURAL PRN
Status: DISCONTINUED | OUTPATIENT
Start: 2025-04-08 | End: 2025-04-08 | Stop reason: HOSPADM

## 2025-04-08 RX ORDER — BISACODYL 10 MG
10 SUPPOSITORY, RECTAL RECTAL DAILY PRN
Status: DISCONTINUED | OUTPATIENT
Start: 2025-04-11 | End: 2025-04-09 | Stop reason: HOSPADM

## 2025-04-08 RX ORDER — FENTANYL CITRATE 0.05 MG/ML
25 INJECTION, SOLUTION INTRAMUSCULAR; INTRAVENOUS EVERY 5 MIN PRN
Status: DISCONTINUED | OUTPATIENT
Start: 2025-04-08 | End: 2025-04-08 | Stop reason: HOSPADM

## 2025-04-08 RX ORDER — ATORVASTATIN CALCIUM 20 MG/1
20 TABLET, FILM COATED ORAL DAILY
Status: DISCONTINUED | OUTPATIENT
Start: 2025-04-08 | End: 2025-04-09 | Stop reason: HOSPADM

## 2025-04-08 RX ORDER — CEFAZOLIN SODIUM/WATER 2 G/20 ML
2 SYRINGE (ML) INTRAVENOUS
Status: COMPLETED | OUTPATIENT
Start: 2025-04-08 | End: 2025-04-08

## 2025-04-08 RX ORDER — FENTANYL CITRATE 0.05 MG/ML
50 INJECTION, SOLUTION INTRAMUSCULAR; INTRAVENOUS EVERY 5 MIN PRN
Status: DISCONTINUED | OUTPATIENT
Start: 2025-04-08 | End: 2025-04-08 | Stop reason: HOSPADM

## 2025-04-08 RX ORDER — CEFAZOLIN SODIUM/WATER 2 G/20 ML
2 SYRINGE (ML) INTRAVENOUS SEE ADMIN INSTRUCTIONS
Status: DISCONTINUED | OUTPATIENT
Start: 2025-04-08 | End: 2025-04-08 | Stop reason: HOSPADM

## 2025-04-08 RX ORDER — HYDROXYZINE HYDROCHLORIDE 10 MG/1
10 TABLET, FILM COATED ORAL EVERY 6 HOURS PRN
Status: DISCONTINUED | OUTPATIENT
Start: 2025-04-08 | End: 2025-04-09 | Stop reason: HOSPADM

## 2025-04-08 RX ORDER — HYDROCODONE BITARTRATE AND ACETAMINOPHEN 5; 325 MG/1; MG/1
1-2 TABLET ORAL EVERY 6 HOURS PRN
Status: DISCONTINUED | OUTPATIENT
Start: 2025-04-08 | End: 2025-04-09 | Stop reason: HOSPADM

## 2025-04-08 RX ORDER — PROCHLORPERAZINE MALEATE 5 MG/1
5 TABLET ORAL EVERY 6 HOURS PRN
Status: DISCONTINUED | OUTPATIENT
Start: 2025-04-08 | End: 2025-04-09 | Stop reason: HOSPADM

## 2025-04-08 RX ORDER — LIDOCAINE 40 MG/G
CREAM TOPICAL
Status: DISCONTINUED | OUTPATIENT
Start: 2025-04-08 | End: 2025-04-09 | Stop reason: HOSPADM

## 2025-04-08 RX ORDER — HYDROMORPHONE HCL IN WATER/PF 6 MG/30 ML
0.4 PATIENT CONTROLLED ANALGESIA SYRINGE INTRAVENOUS
Status: DISCONTINUED | OUTPATIENT
Start: 2025-04-08 | End: 2025-04-09 | Stop reason: HOSPADM

## 2025-04-08 RX ORDER — SODIUM CHLORIDE, SODIUM LACTATE, POTASSIUM CHLORIDE, CALCIUM CHLORIDE 600; 310; 30; 20 MG/100ML; MG/100ML; MG/100ML; MG/100ML
INJECTION, SOLUTION INTRAVENOUS CONTINUOUS
Status: DISCONTINUED | OUTPATIENT
Start: 2025-04-08 | End: 2025-04-08 | Stop reason: HOSPADM

## 2025-04-08 RX ORDER — ONDANSETRON 2 MG/ML
INJECTION INTRAMUSCULAR; INTRAVENOUS PRN
Status: DISCONTINUED | OUTPATIENT
Start: 2025-04-08 | End: 2025-04-08

## 2025-04-08 RX ORDER — METOPROLOL SUCCINATE 50 MG/1
50 TABLET, EXTENDED RELEASE ORAL DAILY
Status: DISCONTINUED | OUTPATIENT
Start: 2025-04-09 | End: 2025-04-09 | Stop reason: HOSPADM

## 2025-04-08 RX ORDER — MAGNESIUM HYDROXIDE 1200 MG/15ML
LIQUID ORAL PRN
Status: DISCONTINUED | OUTPATIENT
Start: 2025-04-08 | End: 2025-04-08 | Stop reason: HOSPADM

## 2025-04-08 RX ORDER — AMOXICILLIN 250 MG
1 CAPSULE ORAL 2 TIMES DAILY
Status: DISCONTINUED | OUTPATIENT
Start: 2025-04-08 | End: 2025-04-09 | Stop reason: HOSPADM

## 2025-04-08 RX ORDER — NALOXONE HYDROCHLORIDE 0.4 MG/ML
0.1 INJECTION, SOLUTION INTRAMUSCULAR; INTRAVENOUS; SUBCUTANEOUS
Status: DISCONTINUED | OUTPATIENT
Start: 2025-04-08 | End: 2025-04-08 | Stop reason: HOSPADM

## 2025-04-08 RX ORDER — ONDANSETRON 2 MG/ML
4 INJECTION INTRAMUSCULAR; INTRAVENOUS EVERY 6 HOURS PRN
Status: DISCONTINUED | OUTPATIENT
Start: 2025-04-08 | End: 2025-04-09 | Stop reason: HOSPADM

## 2025-04-08 RX ORDER — ONDANSETRON 4 MG/1
4 TABLET, ORALLY DISINTEGRATING ORAL EVERY 30 MIN PRN
Status: DISCONTINUED | OUTPATIENT
Start: 2025-04-08 | End: 2025-04-08 | Stop reason: HOSPADM

## 2025-04-08 RX ORDER — NALOXONE HYDROCHLORIDE 0.4 MG/ML
0.4 INJECTION, SOLUTION INTRAMUSCULAR; INTRAVENOUS; SUBCUTANEOUS
Status: DISCONTINUED | OUTPATIENT
Start: 2025-04-08 | End: 2025-04-09 | Stop reason: HOSPADM

## 2025-04-08 RX ORDER — HYDROMORPHONE HCL IN WATER/PF 6 MG/30 ML
0.2 PATIENT CONTROLLED ANALGESIA SYRINGE INTRAVENOUS EVERY 5 MIN PRN
Status: DISCONTINUED | OUTPATIENT
Start: 2025-04-08 | End: 2025-04-08 | Stop reason: HOSPADM

## 2025-04-08 RX ORDER — PROPOFOL 10 MG/ML
INJECTION, EMULSION INTRAVENOUS CONTINUOUS PRN
Status: DISCONTINUED | OUTPATIENT
Start: 2025-04-08 | End: 2025-04-08

## 2025-04-08 RX ORDER — HYDROMORPHONE HCL IN WATER/PF 6 MG/30 ML
0.4 PATIENT CONTROLLED ANALGESIA SYRINGE INTRAVENOUS EVERY 5 MIN PRN
Status: DISCONTINUED | OUTPATIENT
Start: 2025-04-08 | End: 2025-04-08 | Stop reason: HOSPADM

## 2025-04-08 RX ORDER — CALCIUM CARBONATE 500 MG/1
1000 TABLET, CHEWABLE ORAL
Status: DISCONTINUED | OUTPATIENT
Start: 2025-04-08 | End: 2025-04-09 | Stop reason: HOSPADM

## 2025-04-08 RX ORDER — ONDANSETRON 4 MG/1
4 TABLET, ORALLY DISINTEGRATING ORAL EVERY 6 HOURS PRN
Status: DISCONTINUED | OUTPATIENT
Start: 2025-04-08 | End: 2025-04-09 | Stop reason: HOSPADM

## 2025-04-08 RX ORDER — LIDOCAINE 40 MG/G
CREAM TOPICAL
Status: DISCONTINUED | OUTPATIENT
Start: 2025-04-08 | End: 2025-04-08 | Stop reason: HOSPADM

## 2025-04-08 RX ORDER — HEPARIN SODIUM 5000 [USP'U]/.5ML
5000 INJECTION, SOLUTION INTRAVENOUS; SUBCUTANEOUS
Status: COMPLETED | OUTPATIENT
Start: 2025-04-08 | End: 2025-04-08

## 2025-04-08 RX ORDER — FOLIC ACID 1 MG/1
1 TABLET ORAL 2 TIMES DAILY
Status: DISCONTINUED | OUTPATIENT
Start: 2025-04-08 | End: 2025-04-09 | Stop reason: HOSPADM

## 2025-04-08 RX ORDER — ACETAMINOPHEN 325 MG/1
975 TABLET ORAL EVERY 8 HOURS
Status: DISCONTINUED | OUTPATIENT
Start: 2025-04-08 | End: 2025-04-09 | Stop reason: HOSPADM

## 2025-04-08 RX ORDER — DEXAMETHASONE SODIUM PHOSPHATE 4 MG/ML
4 INJECTION, SOLUTION INTRA-ARTICULAR; INTRALESIONAL; INTRAMUSCULAR; INTRAVENOUS; SOFT TISSUE
Status: DISCONTINUED | OUTPATIENT
Start: 2025-04-08 | End: 2025-04-08 | Stop reason: HOSPADM

## 2025-04-08 RX ORDER — FENTANYL CITRATE 50 UG/ML
INJECTION, SOLUTION INTRAMUSCULAR; INTRAVENOUS PRN
Status: DISCONTINUED | OUTPATIENT
Start: 2025-04-08 | End: 2025-04-08

## 2025-04-08 RX ORDER — HYDROMORPHONE HCL IN WATER/PF 6 MG/30 ML
0.2 PATIENT CONTROLLED ANALGESIA SYRINGE INTRAVENOUS
Status: DISCONTINUED | OUTPATIENT
Start: 2025-04-08 | End: 2025-04-09 | Stop reason: HOSPADM

## 2025-04-08 RX ORDER — METHOCARBAMOL 500 MG/1
250 TABLET ORAL EVERY 6 HOURS PRN
Status: DISCONTINUED | OUTPATIENT
Start: 2025-04-08 | End: 2025-04-09 | Stop reason: HOSPADM

## 2025-04-08 RX ADMIN — HYDROMORPHONE HYDROCHLORIDE 0.4 MG: 0.2 INJECTION, SOLUTION INTRAMUSCULAR; INTRAVENOUS; SUBCUTANEOUS at 12:16

## 2025-04-08 RX ADMIN — CALCIUM CARBONATE (ANTACID) CHEW TAB 500 MG 1000 MG: 500 CHEW TAB at 15:50

## 2025-04-08 RX ADMIN — FOLIC ACID 1 MG: 1 TABLET ORAL at 21:37

## 2025-04-08 RX ADMIN — Medication 2 G: at 08:01

## 2025-04-08 RX ADMIN — ROCURONIUM BROMIDE 20 MG: 50 INJECTION, SOLUTION INTRAVENOUS at 08:37

## 2025-04-08 RX ADMIN — FENTANYL CITRATE 50 MCG: 50 INJECTION INTRAMUSCULAR; INTRAVENOUS at 07:59

## 2025-04-08 RX ADMIN — FENTANYL CITRATE 50 MCG: 50 INJECTION INTRAMUSCULAR; INTRAVENOUS at 08:31

## 2025-04-08 RX ADMIN — PHENYLEPHRINE HYDROCHLORIDE 50 MCG: 10 INJECTION INTRAVENOUS at 10:23

## 2025-04-08 RX ADMIN — LIDOCAINE HYDROCHLORIDE 80 MG: 20 INJECTION, SOLUTION INFILTRATION; PERINEURAL at 07:59

## 2025-04-08 RX ADMIN — PROPOFOL 150 MG: 10 INJECTION, EMULSION INTRAVENOUS at 07:59

## 2025-04-08 RX ADMIN — FENTANYL CITRATE 25 MCG: 50 INJECTION, SOLUTION INTRAMUSCULAR; INTRAVENOUS at 11:41

## 2025-04-08 RX ADMIN — HYDROMORPHONE HYDROCHLORIDE 0.2 MG: 0.2 INJECTION, SOLUTION INTRAMUSCULAR; INTRAVENOUS; SUBCUTANEOUS at 11:57

## 2025-04-08 RX ADMIN — PHENYLEPHRINE HYDROCHLORIDE 50 MCG: 10 INJECTION INTRAVENOUS at 10:11

## 2025-04-08 RX ADMIN — ACETAMINOPHEN 975 MG: 325 TABLET ORAL at 21:36

## 2025-04-08 RX ADMIN — DEXAMETHASONE SODIUM PHOSPHATE 4 MG: 4 INJECTION, SOLUTION INTRA-ARTICULAR; INTRALESIONAL; INTRAMUSCULAR; INTRAVENOUS; SOFT TISSUE at 08:03

## 2025-04-08 RX ADMIN — ACETAMINOPHEN 975 MG: 325 TABLET ORAL at 13:58

## 2025-04-08 RX ADMIN — PHENYLEPHRINE HYDROCHLORIDE 0.1 MCG/KG/MIN: 10 INJECTION INTRAVENOUS at 10:18

## 2025-04-08 RX ADMIN — PHENYLEPHRINE HYDROCHLORIDE 50 MCG: 10 INJECTION INTRAVENOUS at 10:01

## 2025-04-08 RX ADMIN — ONDANSETRON 4 MG: 2 INJECTION INTRAMUSCULAR; INTRAVENOUS at 11:04

## 2025-04-08 RX ADMIN — HYDROMORPHONE HYDROCHLORIDE 0.2 MG: 0.2 INJECTION, SOLUTION INTRAMUSCULAR; INTRAVENOUS; SUBCUTANEOUS at 13:59

## 2025-04-08 RX ADMIN — HEPARIN SODIUM 5000 UNITS: 5000 INJECTION, SOLUTION INTRAVENOUS; SUBCUTANEOUS at 07:15

## 2025-04-08 RX ADMIN — PHENYLEPHRINE HYDROCHLORIDE 50 MCG: 10 INJECTION INTRAVENOUS at 10:19

## 2025-04-08 RX ADMIN — PHENYLEPHRINE HYDROCHLORIDE 50 MCG: 10 INJECTION INTRAVENOUS at 10:51

## 2025-04-08 RX ADMIN — SODIUM CHLORIDE, SODIUM LACTATE, POTASSIUM CHLORIDE, AND CALCIUM CHLORIDE: .6; .31; .03; .02 INJECTION, SOLUTION INTRAVENOUS at 07:17

## 2025-04-08 RX ADMIN — PANTOPRAZOLE SODIUM 40 MG: 40 TABLET, DELAYED RELEASE ORAL at 13:58

## 2025-04-08 RX ADMIN — HYDROMORPHONE HYDROCHLORIDE 0.25 MG: 1 INJECTION, SOLUTION INTRAMUSCULAR; INTRAVENOUS; SUBCUTANEOUS at 09:24

## 2025-04-08 RX ADMIN — Medication 150 MG: at 11:15

## 2025-04-08 RX ADMIN — GLYCOPYRROLATE 0.1 MG: 0.2 INJECTION, SOLUTION INTRAMUSCULAR; INTRAVENOUS at 10:26

## 2025-04-08 RX ADMIN — ROCURONIUM BROMIDE 50 MG: 50 INJECTION, SOLUTION INTRAVENOUS at 07:59

## 2025-04-08 RX ADMIN — ROCURONIUM BROMIDE 20 MG: 50 INJECTION, SOLUTION INTRAVENOUS at 09:07

## 2025-04-08 RX ADMIN — ACETAMINOPHEN 1000 MG: 500 TABLET, FILM COATED ORAL at 07:14

## 2025-04-08 RX ADMIN — ROCURONIUM BROMIDE 10 MG: 50 INJECTION, SOLUTION INTRAVENOUS at 09:50

## 2025-04-08 RX ADMIN — SODIUM CHLORIDE: 9 INJECTION, SOLUTION INTRAVENOUS at 13:32

## 2025-04-08 RX ADMIN — HYDROMORPHONE HYDROCHLORIDE 0.4 MG: 0.2 INJECTION, SOLUTION INTRAMUSCULAR; INTRAVENOUS; SUBCUTANEOUS at 12:04

## 2025-04-08 RX ADMIN — PROPOFOL 30 MCG/KG/MIN: 10 INJECTION, EMULSION INTRAVENOUS at 08:03

## 2025-04-08 RX ADMIN — ATORVASTATIN CALCIUM 20 MG: 20 TABLET, FILM COATED ORAL at 21:37

## 2025-04-08 RX ADMIN — SODIUM CHLORIDE, SODIUM LACTATE, POTASSIUM CHLORIDE, AND CALCIUM CHLORIDE: .6; .31; .03; .02 INJECTION, SOLUTION INTRAVENOUS at 11:15

## 2025-04-08 RX ADMIN — ROCURONIUM BROMIDE 10 MG: 50 INJECTION, SOLUTION INTRAVENOUS at 10:36

## 2025-04-08 RX ADMIN — FAMOTIDINE 20 MG: 20 TABLET, FILM COATED ORAL at 15:48

## 2025-04-08 RX ADMIN — SENNOSIDES AND DOCUSATE SODIUM 1 TABLET: 50; 8.6 TABLET ORAL at 21:37

## 2025-04-08 RX ADMIN — ONDANSETRON 4 MG: 2 INJECTION, SOLUTION INTRAMUSCULAR; INTRAVENOUS at 17:42

## 2025-04-08 RX ADMIN — FENTANYL CITRATE 25 MCG: 50 INJECTION, SOLUTION INTRAMUSCULAR; INTRAVENOUS at 11:50

## 2025-04-08 RX ADMIN — ROCURONIUM BROMIDE 10 MG: 50 INJECTION, SOLUTION INTRAVENOUS at 10:19

## 2025-04-08 RX ADMIN — HYDROMORPHONE HYDROCHLORIDE 0.25 MG: 1 INJECTION, SOLUTION INTRAMUSCULAR; INTRAVENOUS; SUBCUTANEOUS at 10:35

## 2025-04-08 ASSESSMENT — ACTIVITIES OF DAILY LIVING (ADL)
ADLS_ACUITY_SCORE: 42
ADLS_ACUITY_SCORE: 22
ADLS_ACUITY_SCORE: 22
ADLS_ACUITY_SCORE: 42
ADLS_ACUITY_SCORE: 22
ADLS_ACUITY_SCORE: 42
ADLS_ACUITY_SCORE: 22
ADLS_ACUITY_SCORE: 42
ADLS_ACUITY_SCORE: 42
ADLS_ACUITY_SCORE: 38
ADLS_ACUITY_SCORE: 42
ADLS_ACUITY_SCORE: 22
ADLS_ACUITY_SCORE: 42
ADLS_ACUITY_SCORE: 42
ADLS_ACUITY_SCORE: 22
ADLS_ACUITY_SCORE: 42

## 2025-04-08 ASSESSMENT — LIFESTYLE VARIABLES: TOBACCO_USE: 0
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
## 2025-04-08 NOTE — PLAN OF CARE
Goal Outcome Evaluation:      Plan of Care Reviewed With: patient  Date & Time: 4/8/2025 & 6250-9825  Surgery/POD#:  robotic reconstruction of a failed semilunar line with component separation and transverse abdominis release on the right POD# 0  Behavior & Aggression: calm and cooperative  Fall Risk: yes, generalized weakness from surgery  Orientation:A/O x4  ABNL VS/O2:VSS, BP slightly elevated.  Pt is on 2L of O2 to maintain O2 sats greater than 90%.  Pt 's O2 sats on RA were 87%.  ABNL Labs: See computer for details.  Pain Management:Pt c/o abdominal pain, Tylenol, Robaxin and dilaudid administered per MD orders, relief noted.  Bowel/Bladder: Pt is voiding in the bedside commode, no BM this shift.  Drains: none  Wounds/incisions: Pt has lap sites to her abdomen taht are C/D/I with steri strips and band aids.   Diet:Pt is on a clear liquid diet. Small emesis noted after dinner, Zofran administered per MD orders and relief noted.  Number of times OUT OF BED this shift : pt was up to the commode and the the bathroom this shift.  Tests/Procedures: none  Anticipated  DC Date: unknown  Significant Information: Pt is [place] and wears bilateral hearing aids.

## 2025-04-08 NOTE — ANESTHESIA PROCEDURE NOTES
Airway       Patient location during procedure: OR       Procedure Start/Stop Times: 4/8/2025 8:02 AM  Staff -        Anesthesiologist:  Jacobo Hoskins MD       CRNA: Hodgkins, Christine Marie Volp, APRN CRNA       Performed By: CRNA  Consent for Airway        Urgency: elective  Indications and Patient Condition       Indications for airway management: adam-procedural       Induction type:intravenous       Mask difficulty assessment: 2 - vent by mask + OA or adjuvant +/- NMBA    Final Airway Details       Final airway type: endotracheal airway       Successful airway: ETT - single  Endotracheal Airway Details        ETT size (mm): 7.0       Cuffed: yes       Successful intubation technique: video laryngoscopy       VL Blade Size: Glidescope 3       Grade View of Cords: 1       Adjucts: stylet       Position: Right       Measured from: lips       Secured at (cm): 21       Bite block used: None    Post intubation assessment        Placement verified by: capnometry, equal breath sounds and chest rise        Number of attempts at approach: 1       Number of other approaches attempted: 0       Secured with: tape       Ease of procedure: easy       Dentition: Intact and Unchanged    Medication(s) Administered   Medication Administration Time: 4/8/2025 8:02 AM

## 2025-04-08 NOTE — ANESTHESIA CARE TRANSFER NOTE
Patient: Brenda Barker    Procedure: Procedure(s):  Robotic repair of right sided flank hernia       Diagnosis: Traumatic abdominal hernia [S39.81XA]  Diagnosis Additional Information: No value filed.    Anesthesia Type:   General     Note:    Oropharynx: oropharynx clear of all foreign objects and spontaneously breathing  Level of Consciousness: drowsy  Oxygen Supplementation: face mask  Level of Supplemental Oxygen (L/min / FiO2): 6  Independent Airway: airway patency satisfactory and stable  Dentition: dentition unchanged  Vital Signs Stable: post-procedure vital signs reviewed and stable  Report to RN Given: handoff report given  Patient transferred to: PACU  Comments: Patient comfortable  Handoff Report: Identifed the Patient, Identified the Reponsible Provider, Reviewed the pertinent medical history, Discussed the surgical course, Reviewed Intra-OP anesthesia mangement and issues during anesthesia, Set expectations for post-procedure period and Allowed opportunity for questions and acknowledgement of understanding      Vitals:  Vitals Value Taken Time   /69 04/08/25 1127   Temp 37.1  C (98.78  F) 04/08/25 1130   Pulse 89 04/08/25 1130   Resp 29 04/08/25 1130   SpO2 100 % 04/08/25 1130   Vitals shown include unfiled device data.    Electronically Signed By: ELIZABETH Nix CRNA  April 8, 2025  11:31 AM

## 2025-04-08 NOTE — ANESTHESIA POSTPROCEDURE EVALUATION
Patient: Brenda Barker    Procedure: Procedure(s):  Robotic repair of right sided flank hernia       Anesthesia Type:  General    Note:  Disposition: Inpatient   Postop Pain Control: Uneventful            Sign Out: Well controlled pain   PONV: No   Neuro/Psych: Uneventful            Sign Out: Acceptable/Baseline neuro status   Airway/Respiratory: Uneventful            Sign Out: Acceptable/Baseline resp. status   CV/Hemodynamics: Uneventful            Sign Out: Acceptable CV status   Other NRE: NONE   DID A NON-ROUTINE EVENT OCCUR? No           Last vitals:  Vitals Value Taken Time   /69 04/08/25 1245   Temp 36.8  C (98.24  F) 04/08/25 1253   Pulse 82 04/08/25 1253   Resp 8 04/08/25 1253   SpO2 98 % 04/08/25 1253   Vitals shown include unfiled device data.    Electronically Signed By: Jacobo Hoskins MD  April 8, 2025  3:17 PM

## 2025-04-09 ENCOUNTER — APPOINTMENT (OUTPATIENT)
Dept: PHYSICAL THERAPY | Facility: CLINIC | Age: 89
DRG: 908 | End: 2025-04-09
Attending: SURGERY
Payer: MEDICARE

## 2025-04-09 VITALS
TEMPERATURE: 98.6 F | OXYGEN SATURATION: 96 % | DIASTOLIC BLOOD PRESSURE: 57 MMHG | RESPIRATION RATE: 16 BRPM | BODY MASS INDEX: 30.02 KG/M2 | SYSTOLIC BLOOD PRESSURE: 120 MMHG | WEIGHT: 159 LBS | HEART RATE: 58 BPM | HEIGHT: 61 IN

## 2025-04-09 LAB
GLUCOSE BLDC GLUCOMTR-MCNC: 116 MG/DL (ref 70–99)
HGB BLD-MCNC: 11.9 G/DL (ref 11.7–15.7)

## 2025-04-09 PROCEDURE — 97530 THERAPEUTIC ACTIVITIES: CPT | Mod: GP

## 2025-04-09 PROCEDURE — 85018 HEMOGLOBIN: CPT | Performed by: PHYSICIAN ASSISTANT

## 2025-04-09 PROCEDURE — 97161 PT EVAL LOW COMPLEX 20 MIN: CPT | Mod: GP

## 2025-04-09 PROCEDURE — 36415 COLL VENOUS BLD VENIPUNCTURE: CPT | Performed by: PHYSICIAN ASSISTANT

## 2025-04-09 PROCEDURE — 97116 GAIT TRAINING THERAPY: CPT | Mod: GP

## 2025-04-09 PROCEDURE — 250N000013 HC RX MED GY IP 250 OP 250 PS 637: Performed by: PHYSICIAN ASSISTANT

## 2025-04-09 RX ORDER — METHOCARBAMOL 500 MG/1
250-500 TABLET, FILM COATED ORAL EVERY 6 HOURS PRN
Qty: 10 TABLET | Refills: 0 | Status: SHIPPED | OUTPATIENT
Start: 2025-04-09 | End: 2025-04-14

## 2025-04-09 RX ORDER — AMOXICILLIN 250 MG
1 CAPSULE ORAL DAILY PRN
Qty: 10 TABLET | Refills: 0 | Status: SHIPPED | OUTPATIENT
Start: 2025-04-09

## 2025-04-09 RX ORDER — TRAMADOL HYDROCHLORIDE 50 MG/1
50 TABLET ORAL EVERY 6 HOURS PRN
Qty: 10 TABLET | Refills: 0 | Status: SHIPPED | OUTPATIENT
Start: 2025-04-09 | End: 2025-04-12

## 2025-04-09 RX ADMIN — FOLIC ACID 1 MG: 1 TABLET ORAL at 08:04

## 2025-04-09 RX ADMIN — CALCIUM CARBONATE (ANTACID) CHEW TAB 500 MG 1000 MG: 500 CHEW TAB at 06:40

## 2025-04-09 RX ADMIN — PANTOPRAZOLE SODIUM 40 MG: 40 TABLET, DELAYED RELEASE ORAL at 08:03

## 2025-04-09 RX ADMIN — SENNOSIDES AND DOCUSATE SODIUM 1 TABLET: 50; 8.6 TABLET ORAL at 08:06

## 2025-04-09 RX ADMIN — ACETAMINOPHEN 975 MG: 325 TABLET ORAL at 06:40

## 2025-04-09 RX ADMIN — FAMOTIDINE 20 MG: 20 TABLET, FILM COATED ORAL at 08:04

## 2025-04-09 RX ADMIN — ACETAMINOPHEN 975 MG: 325 TABLET ORAL at 13:29

## 2025-04-09 ASSESSMENT — ACTIVITIES OF DAILY LIVING (ADL)
ADLS_ACUITY_SCORE: 44
ADLS_ACUITY_SCORE: 41
ADLS_ACUITY_SCORE: 44
ADLS_ACUITY_SCORE: 41

## 2025-04-09 NOTE — UTILIZATION REVIEW
Admission Status; Secondary Review Determination       Under the authority of the Utilization Management Committee, the utilization review process indicated a secondary review on the above patient. The review outcome is based on review of the medical records, discussions with staff, and applying clinical experience noted on the date of the review.     (x) Inpatient Status Appropriate - This patient's medical care is consistent with medical management for inpatient care and reasonable inpatient medical practice.     RATIONALE FOR DETERMINATION     88 yo female with recent h/o traumatic flank hernia resulting in failure of the right semilunar line.  Admitted for surgical robotic reconstruction of the failed semilunar line with component separation and transverse abdominis release on the right.     At the time of admission with the information available to the attending physician more than 2 nights Hospital complex care was anticipated, based on patient risk of adverse outcome if treated as outpatient and complex care required. Inpatient admission is appropriate based on the Medicare guidelines.     This document was produced using voice recognition software       The information on this document is developed by the utilization review team in order for the business office to ensure compliance. This only denotes the appropriateness of proper admission status and does not reflect the quality of care rendered.   The definitions of Inpatient Status and Observation Status used in making the determination above are those provided in the CMS Coverage Manual, Chapter 1 and Chapter 6, section 70.4.         Sincerely,     Elda Doshi, DO  Utilization Review  Physician Advisor  [facility].

## 2025-04-09 NOTE — CONSULTS
Care Management Initial Consult    General Information  Assessment completed with: Patient, Spouse or significant other,    Type of CM/SW Visit: Initial Assessment    Primary Care Provider verified and updated as needed: Yes   Readmission within the last 30 days: no previous admission in last 30 days      Reason for Consult: discharge planning  Advance Care Planning: Advance Care Planning Reviewed: present on chart          Communication Assessment  Patient's communication style: spoken language (English or Bilingual)    Hearing Difficulty or Deaf: yes   Wear Glasses or Blind: yes    Cognitive  Cognitive/Neuro/Behavioral: WDL                      Living Environment:   People in home: spouse     Current living Arrangements: house      Able to return to prior arrangements: yes       Family/Social Support:  Care provided by: self, spouse/significant other  Provides care for: no one  Marital Status:   Support system:    (David)       Description of Support System: Supportive, Involved    Support Assessment: Adequate family and caregiver support    Current Resources:   Patient receiving home care services: No        Community Resources:    Equipment currently used at home: grab bar, tub/shower, grab bar, toilet, walker, rolling  Supplies currently used at home:      Employment/Financial:  Employment Status: retired        Financial Concerns:     Referral to Financial Worker: No       Does the patient's insurance plan have a 3 day qualifying hospital stay waiver?  No    Lifestyle & Psychosocial Needs:  Social Drivers of Health     Food Insecurity: Low Risk  (12/31/2024)    Food Insecurity     Within the past 12 months, did you worry that your food would run out before you got money to buy more?: No     Within the past 12 months, did the food you bought just not last and you didn t have money to get more?: No   Depression: Not at risk (1/3/2025)    PHQ-2     PHQ-2 Score: 0   Housing Stability: Low Risk   (12/31/2024)    Housing Stability     Do you have housing? : Yes     Are you worried about losing your housing?: No   Tobacco Use: Low Risk  (4/8/2025)    Patient History     Smoking Tobacco Use: Never     Smokeless Tobacco Use: Never     Passive Exposure: Never   Financial Resource Strain: Low Risk  (12/31/2024)    Financial Resource Strain     Within the past 12 months, have you or your family members you live with been unable to get utilities (heat, electricity) when it was really needed?: No   Alcohol Use: Not on file   Transportation Needs: Low Risk  (12/31/2024)    Transportation Needs     Within the past 12 months, has lack of transportation kept you from medical appointments, getting your medicines, non-medical meetings or appointments, work, or from getting things that you need?: No   Physical Activity: Unknown (12/31/2024)    Exercise Vital Sign     Days of Exercise per Week: 0 days     Minutes of Exercise per Session: Not on file   Interpersonal Safety: Low Risk  (4/8/2025)    Interpersonal Safety     Do you feel physically and emotionally safe where you currently live?: Yes     Within the past 12 months, have you been hit, slapped, kicked or otherwise physically hurt by someone?: No     Within the past 12 months, have you been humiliated or emotionally abused in other ways by your partner or ex-partner?: No   Stress: No Stress Concern Present (12/31/2024)    [employer] [place] [employer] - Occupational Stress Questionnaire     Feeling of Stress : Only a little   Social Connections: Unknown (12/31/2024)    Social Connection and Isolation Panel [NHANES]     Frequency of Communication with Friends and Family: Not on file     Frequency of Social Gatherings with Friends and Family: Once a week     Attends Jain Services: Not on file     Active Member of Clubs or Organizations: Not on file     Attends Club or Organization Meetings: Not on file     Marital Status: Not on file   Health Literacy:  Not on file       Functional Status:  Prior to admission patient needed assistance:   Dependent ADLs:: Ambulation-walker          Mental Health Status:          Chemical Dependency Status:                Values/Beliefs:  Spiritual, Cultural Beliefs, Mormon Practices, Values that affect care:                 Discussed  Partnership in Safe Discharge Planning  document with patient/family: No    Additional Information:  Consult for discharge planning.     89 years old woman who underwent an elective repair of a RUQ abd wall hernia repair performed robotically.  Patient was admitted to the hospital after the procedure.She is doing well post-op and will be discharging home later today.     Writer reviewed chart and recommendations at discharge. Writer met with patient at bedside and introduced self and role. Writer confirmed patient's primary doctor and home address as accurate. Patient is recommended for outpatient PT and would like to complete this referral. Provider did put this into discharge orders     Next Steps: No further care management intervention anticipated at this time.  Please re-consult if further needs arise.  Care management signing off.       DONNY Rosa

## 2025-04-09 NOTE — PLAN OF CARE
Date & Time: 4/9/2025 8058-5081   Surgery/POD#: POD 1: robotic reconstruction of a failed semilunar line with component separation and transverse abdominis release on the right  Behavior & Aggression: Green  Fall Risk: Yes  Orientation:A&O X4  ABNL VS/O2:VSS 2L NC  ABNL Labs: see results  Pain Management:Scheduled Tylenol  Bowel/Bladder: +BS, -gas, -BM, up to bathroom to void  Drains: PIV SL,   Wounds/incisions4 abd port sites WDL  Diet: Clear, tolerating  Number of times OUT OF BED this shift: 2  Tests/Procedures: n/a  Anticipated  DC Date: Pending  Significant Information: Pt is [place] and wear bilateral hearing aides

## 2025-04-09 NOTE — PROVIDER NOTIFICATION
MD Notification    Notified Person: MD    Notified Person Name: Bob Caban    Notification Date/Time: 04/09/25 8:44 AM    Notification Interaction: Amcom    Purpose of Notification: Do we want parameter's on the metoprolol? I held for now, HR has been in the upper 50's-lower 60's today     Orders Received:    Comments:

## 2025-04-09 NOTE — DISCHARGE SUMMARY
"[facility] Discharge Summary    Brenda Barker MRN# [id number]   Age: 89 year old YOB: 1935     Date of Admission:  4/8/2025  Date of Discharge:  {DISCHARGE DATE:119492}  Admitting Provider:  Denton Echeverria MD  Discharge Provider:  Jero Simmons PA-C  Discharging Service: General Surgery     Primary Provider: Lynda Lopes  Primary Care Physician Phone Number: [phone]          Admission Diagnoses:   Principle Diagnosis:  {                   :2177474}  Secondary Diagnoses:  ***          Discharge Diagnosis:     Traumatic abdominal hernia [S39.81XA]          Procedures:     Procedure(s): ***            Discharge Medications:   {                  :1290652}        Allergies:         Allergies   Allergen Reactions    Diatrizoate Hives and Shortness Of Breath    Ciprofloxacin      nausea    Contrast Dye      Hives,anaphylaxix    Oxycodone Other (See Comments)     hallucinations    Sulfasalazine      Other reaction(s): Rash             Brief History of Illness:   Brenda Barker ***    After discussing the risks, benefits, and possible complications, informed consent was obtained and the patient underwent the above procedure.  There were no complications.  Please see the Operative Report for full details.           Hospital Course:   Brenda Barker's hospital course was unremarkable.  She recovered as anticipated and experienced no post-operative complications. ***    On the date of discharge, the patient was discharged to home in stable condition and afebrile.  She verbalized understanding of all discharge instructions and felt comfortable with the discharge plan.  She was asked to call with any further questions or concerns.         Condition on Discharge:        Discharge condition: Stable   Discharge vitals: Blood pressure 120/57, pulse 58, temperature 98.6  F (37  C), temperature source Oral, resp. rate 16, height 1.549 m (5' 1\"), weight 72.1 kg (159 lb), last menstrual period 05/17/1972, SpO2 " "96%, not currently breastfeeding.           Discharge Disposition:     { :[id number]::\"Discharged to home\"}          Discharge Instructions and Follow-Up:      Brenda Mendosaman was asked to follow up with surgical team in 1-2 weeks.       Discharge Instructions:   Follow-up Appointments     Follow Up      Follow up with Juwan in a week or two.  Call [phone] with any   questions.          After Care Instructions       Activity      Your activity upon discharge: activity as tolerated        Diet      Follow this diet upon discharge: Regular Diet              Referrals     Future Labs/Procedures    Physical Therapy  Referral     Process Instructions:    If ordering DME, please utilize the DME ordering process. If ordering   services for Hand Therapy, please utiize order: Occupational Therapy    Referral [[phone]] and select \"Hand Therapy\" under Specialty   Services.    Comments:    Please be aware that coverage of these services is subject to the terms   and limitations of your health insurance plan.  Call member services at   your health plan with any benefit or coverage questions.  [facility] will call you to coordinate your care as prescribed by   your provider. If you don't hear from a representative within 2 business   days, please call [phone].          Jero Simmons PA-C   Office: [phone]            "

## 2025-04-09 NOTE — PROGRESS NOTES
"Surgery    No complaints  Minimal pain  Patient denies bowel movement (2x documented)  Tolerating diet  Awaiting PT consult.     Gen:  Awake, Alert, NAD, pleasant, conversational.  /57 (BP Location: Right arm)   Pulse 58   Temp 98.6  F (37  C) (Oral)   Resp 16   Ht 1.549 m (5' 1\")   Wt 72.1 kg (159 lb)   SpO2 96%   BMI 30.04 kg/m    Resp - clear to auscultation bilaterally, no wheezing, rhonchi, crackles.  Cardiac - Regular rate & rhythm without murmur  Abdomen - soft, minimal tenderness to palpation localized to incisions. Incisions without erythema or drainage. Steri strips in place.  Extremities - no lower extremity edema or tenderness with palpation    Hgb 11.9    A/P 88yo woman presented for elective repair of a right upper quadrant abdominal wall hernia repair. This was performed robotically on 4/8/25.    - diet as tolerated  - bowel regimen  - acitvity: ambulate, PT consult pending  - encourage incentive spirometer use  - dvt ppx: pcd's, mobilize.  - gi ppx: PPI  - dispo: anticipate discharge later today if PT endorses discharge to home.   - instructed  - follow up in about 2 weeks with Dr. Echeverria.    Jero Simmons PA-C  Office: [phone]  Pager: [phone]    "
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
## 2025-04-09 NOTE — PLAN OF CARE
Patient discharging home with family. AVS gone over with patient in room and prescriptions given to patient. PIV removed. All belongings left with patient. All Questions answered.
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
## 2025-04-10 NOTE — PLAN OF CARE
Physical Therapy Discharge Summary    Reason for therapy discharge:    Discharged to home with outpatient therapy.    Progress towards therapy goal(s). See goals on Care Plan in [facility] electronic health record for goal details.  Goals partially met.  Barriers to achieving goals:   discharge from facility.    Therapy recommendation(s):    Continued therapy is recommended.  Rationale/Recommendations:  Patient is slightly below baseline level of independence, but mobilizing SBA with a FWW. She has supportive spousewho already assists with bathing and transportation. Recommend outpatient PT to progress balance and strength.  PT Brief overview of current status: SBA with FWW; Goals of therapy will be to address safe mobility and make recs for d/c to next level of care. Pt and RN will continue to follow all falls risk precautions as documented by RN staff while hospitalized.  PT Total Distance Amb During Session (feet): 120  PT Equipment Needed at Discharge: walker, rolling (has at home)    Recommendation above provided by last treating therapist.

## 2025-04-12 NOTE — UTILIZATION REVIEW
Admission Status; Secondary Review Determination              As part of the [facility] Utilization review plan, a self-audit is done on Medicare inpatient admission with less than 2 midnights stay. The 2014 IPPS Final Rule allows outpatient billing in the event that a hospital determines that an inpatient admission was not medically necessary under utilization review process.                (x) Outpatient status would be Appropriate- Short Stay- Post discharge review.         RATIONALE FOR DETERMINATION    89 year old female who underwent elective robotic reconstruction of a failed semilunar line with component separation and transverse abdominis release on the right on 4/8/2025. Course was uncomplicated and discharrged on 4/9. [place] [address] codes as OP.     This account and medical record number for a Medicare beneficiary was an inpatient short stay (<2 midnights) and didn't meet medical necessity for inpatient admission. We recommend billing outpatient services based on the severity of illness, intensity of service provided and the inpatient length of stay.  Please contact me within one week of receiving this letter only if you disagree with this determination. If you concur, no further action is needed.                   The information on this document is developed by the utilization review team in order for the business office to ensure compliance.  This only denotes the appropriateness of proper admission status and does not reflect the quality of care rendered.            The definitions of Inpatient Status and Observation Status used in making the determination above are those provided in the CMS Coverage Manual, Chapter 1 and Chapter 6, section 70.4.         Sincerely,

## 2025-04-14 ENCOUNTER — MYC MEDICAL ADVICE (OUTPATIENT)
Dept: SURGERY | Facility: CLINIC | Age: 89
End: 2025-04-14

## 2025-04-14 ENCOUNTER — E-VISIT (OUTPATIENT)
Dept: FAMILY MEDICINE | Facility: CLINIC | Age: 89
End: 2025-04-14
Payer: MEDICARE

## 2025-04-14 DIAGNOSIS — G89.18 ACUTE POST-OPERATIVE PAIN: ICD-10-CM

## 2025-04-14 DIAGNOSIS — R30.0 DYSURIA: Primary | ICD-10-CM

## 2025-04-14 RX ORDER — METHOCARBAMOL 500 MG/1
250-500 TABLET, FILM COATED ORAL EVERY 6 HOURS PRN
Qty: 10 TABLET | Refills: 0 | Status: SHIPPED | OUTPATIENT
Start: 2025-04-14

## 2025-04-15 ENCOUNTER — LAB (OUTPATIENT)
Dept: LAB | Facility: CLINIC | Age: 89
End: 2025-04-15
Payer: MEDICARE

## 2025-04-15 DIAGNOSIS — R30.0 DYSURIA: ICD-10-CM

## 2025-04-15 LAB
ALBUMIN UR-MCNC: NEGATIVE MG/DL
APPEARANCE UR: CLEAR
BACTERIA #/AREA URNS HPF: ABNORMAL /HPF
BILIRUB UR QL STRIP: NEGATIVE
COLOR UR AUTO: YELLOW
GLUCOSE UR STRIP-MCNC: NEGATIVE MG/DL
HGB UR QL STRIP: ABNORMAL
KETONES UR STRIP-MCNC: NEGATIVE MG/DL
LEUKOCYTE ESTERASE UR QL STRIP: ABNORMAL
NITRATE UR QL: NEGATIVE
PH UR STRIP: 7 [PH] (ref 5–7)
RBC #/AREA URNS AUTO: ABNORMAL /HPF
SP GR UR STRIP: 1.01 (ref 1–1.03)
SQUAMOUS #/AREA URNS AUTO: ABNORMAL /LPF
UROBILINOGEN UR STRIP-ACNC: 0.2 E.U./DL
WBC #/AREA URNS AUTO: ABNORMAL /HPF

## 2025-04-15 PROCEDURE — 81001 URINALYSIS AUTO W/SCOPE: CPT

## 2025-04-15 NOTE — RESULT ENCOUNTER NOTE
Brenda,    Urine sample is not showing any infection or blood.  Urine culture is hence not indicated.  If your symptoms are persistent, I would request an in person evaluation.    Please do not hesitate to call us at [phone] if you have any questions or concerns.    Thank you,    Lynda Lopes MD MPH

## 2025-04-15 NOTE — PATIENT INSTRUCTIONS
Dear Brenda Barker,     After reviewing your responses, I would like you to come in for a urine test to make sure we treat you correctly. This urine test is to evaluate you for a possible urinary tract infection, and should be scheduled for today or tomorrow. Schedule a Lab Only appointment here.     Lab appointments are not available at most locations on the weekends. If no Lab Only appointment is available, you should be seen in any of our convenient Walk-in or Urgent Care Centers, which can be found on our website here.     You will receive instructions with your results in [facility] once they are available.     If your symptoms worsen, you develop pain in your back or stomach, develop fevers, or are not improving in 5 days, please contact your primary care provider for an appointment or visit a Walk-in or Urgent Care Center to be seen.     Thanks again for choosing us as your health care partner,     Lynda Lopes MD

## 2025-04-22 ENCOUNTER — MYC MEDICAL ADVICE (OUTPATIENT)
Dept: FAMILY MEDICINE | Facility: CLINIC | Age: 89
End: 2025-04-22
Payer: MEDICARE

## 2025-04-22 DIAGNOSIS — N39.0 ACUTE UTI (URINARY TRACT INFECTION): ICD-10-CM

## 2025-04-23 RX ORDER — PHENAZOPYRIDINE HYDROCHLORIDE 100 MG/1
100 TABLET, FILM COATED ORAL 3 TIMES DAILY PRN
Qty: 6 TABLET | Refills: 0 | Status: SHIPPED | OUTPATIENT
Start: 2025-04-23

## 2025-04-23 NOTE — TELEPHONE ENCOUNTER
Patient requesting refill of Pyridium.  Med last prescribed in 2023 and was discontinued since then.      Medication and pharmacy pended.      Kristina Kjellberg, MSN, RN

## 2025-04-24 ENCOUNTER — OFFICE VISIT (OUTPATIENT)
Dept: SURGERY | Facility: CLINIC | Age: 89
End: 2025-04-24
Payer: MEDICARE

## 2025-04-24 DIAGNOSIS — K45.8 FLANK HERNIA: Primary | ICD-10-CM

## 2025-04-24 NOTE — LETTER
April 24, 2025          Lynda Lopes MD  [id number] [place] [address]      RE:   Brenda Barker 1935      Dear Colleague,    Thank you for referring your patient, Brenda Barker, to [facility] Please see a copy of my visit note below.    Bredna returns after her repair of her traumatic flank hernia.  She did great.  She stayed overnight as planned and was discharged the next day.     She is still having a little bit of pain at the site of the hernia which comes on if she has been sitting for some time.  She is wearing the binder and really isn't a fan of it.     On examination all wounds are well healed. She still had the band-aids and steris on.  I took the band-aids off and told her to take the steris off, easiest in the shower.     No evidence of a seroma, although I may feel a little fluid at the site of the hernia.  It isn't noticeable.    Again, thank you for allowing me to participate in the care of your patient.      Sincerely,      Denton Echeverria MD

## 2025-04-24 NOTE — PROGRESS NOTES
[facility], Progress note         Denton Echeverria MD    Brenda Barker MRN# [id number]   YOB: 1935 Age: 89 year old       Brenda returns after her repair of her traumatic flank hernia.  She did great.  She stayed overnight as planned and was discharged the next day.    She is still having a little bit of pain at the site of the hernia which comes on if she has been sitting for some time.  She is wearing the binder and really isn't a fan of it.    On examination all wounds are well healed. She still had the band-aids and steris on.  I took the band-aids off and told her to take the steris off, easiest in the shower.    No evidence of a seroma, although I may feel a little fluid at the site of the hernia.  It isn't noticeable.    I'll have her return in 6-7 weeks for a re-check.    Denton Echeverria M.D., F.A.C.S.  [facility]  [facility]  [place]  [phone]

## 2025-05-27 ENCOUNTER — OFFICE VISIT (OUTPATIENT)
Dept: FAMILY MEDICINE | Facility: CLINIC | Age: 89
End: 2025-05-27
Attending: PREVENTIVE MEDICINE
Payer: COMMERCIAL

## 2025-05-27 VITALS
SYSTOLIC BLOOD PRESSURE: 125 MMHG | HEART RATE: 68 BPM | DIASTOLIC BLOOD PRESSURE: 80 MMHG | BODY MASS INDEX: 29.45 KG/M2 | OXYGEN SATURATION: 100 % | RESPIRATION RATE: 16 BRPM | WEIGHT: 156 LBS | TEMPERATURE: 97.4 F | HEIGHT: 61 IN

## 2025-05-27 DIAGNOSIS — Z87.440 H/O RECURRENT URINARY TRACT INFECTION: ICD-10-CM

## 2025-05-27 DIAGNOSIS — Z23 ENCOUNTER FOR IMMUNIZATION: ICD-10-CM

## 2025-05-27 DIAGNOSIS — I10 HYPERTENSION, GOAL BELOW 140/90: ICD-10-CM

## 2025-05-27 DIAGNOSIS — E78.5 HYPERLIPIDEMIA LDL GOAL <130: ICD-10-CM

## 2025-05-27 DIAGNOSIS — K45.8 FLANK HERNIA: Primary | ICD-10-CM

## 2025-05-27 DIAGNOSIS — M06.9 RHEUMATOID ARTHRITIS, INVOLVING UNSPECIFIED SITE, UNSPECIFIED WHETHER RHEUMATOID FACTOR PRESENT (H): ICD-10-CM

## 2025-05-27 PROCEDURE — 90480 ADMN SARSCOV2 VAC 1/ONLY CMP: CPT | Performed by: PREVENTIVE MEDICINE

## 2025-05-27 PROCEDURE — 91320 SARSCV2 VAC 30MCG TRS-SUC IM: CPT | Performed by: PREVENTIVE MEDICINE

## 2025-05-27 PROCEDURE — G2211 COMPLEX E/M VISIT ADD ON: HCPCS | Performed by: PREVENTIVE MEDICINE

## 2025-05-27 PROCEDURE — 99214 OFFICE O/P EST MOD 30 MIN: CPT | Performed by: PREVENTIVE MEDICINE

## 2025-05-27 NOTE — NURSING NOTE
Prior to immunization administration, verified patients identity using patient s name and date of birth. Please see Immunization Activity for additional information.     Screening Questionnaire for Adult Immunization    Are you sick today?   No   Do you have allergies to medications, food, a vaccine component or latex?   No   Have you ever had a serious reaction after receiving a vaccination?   No   Do you have a long-term health problem with heart, lung, kidney, or metabolic disease (e.g., diabetes), asthma, a blood disorder, no spleen, complement component deficiency, a cochlear implant, or a spinal fluid leak?  Are you on long-term aspirin therapy?   No   Do you have cancer, leukemia, HIV/AIDS, or any other immune system problem?   No   Do you have a parent, brother, or sister with an immune system problem?   No   In the past 3 months, have you taken medications that affect  your immune system, such as prednisone, other steroids, or anticancer drugs; drugs for the treatment of rheumatoid arthritis, Crohn s disease, or psoriasis; or have you had radiation treatments?   No   Have you had a seizure, or a brain or other nervous system problem?   No   During the past year, have you received a transfusion of blood or blood    products, or been given immune (gamma) globulin or antiviral drug?   No   For women: Are you pregnant or is there a chance you could become       pregnant during the next month?   No   Have you received any vaccinations in the past 4 weeks?   No     Immunization questionnaire answers were all negative.      Patient instructed to remain in clinic for 15 minutes afterwards, and to report any adverse reactions.     Screening performed by Ayah Cohen MA on 5/27/2025 at 1:44 PM.      .
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
## 2025-07-08 ENCOUNTER — OFFICE VISIT (OUTPATIENT)
Dept: ENDOCRINOLOGY | Facility: CLINIC | Age: 89
End: 2025-07-08
Payer: MEDICARE

## 2025-07-08 VITALS
OXYGEN SATURATION: 100 % | RESPIRATION RATE: 16 BRPM | HEART RATE: 61 BPM | BODY MASS INDEX: 29.48 KG/M2 | WEIGHT: 156 LBS | DIASTOLIC BLOOD PRESSURE: 71 MMHG | SYSTOLIC BLOOD PRESSURE: 162 MMHG

## 2025-07-08 DIAGNOSIS — E83.59 HYPOCALCIURIA: ICD-10-CM

## 2025-07-08 DIAGNOSIS — M81.0 AGE-RELATED OSTEOPOROSIS WITHOUT CURRENT PATHOLOGICAL FRACTURE: Primary | ICD-10-CM

## 2025-07-08 DIAGNOSIS — E04.1 THYROID NODULE: ICD-10-CM

## 2025-07-08 DIAGNOSIS — E21.0 PRIMARY HYPERPARATHYROIDISM: ICD-10-CM

## 2025-07-08 LAB
ALBUMIN SERPL BCG-MCNC: 4.5 G/DL (ref 3.5–5.2)
BUN SERPL-MCNC: 15.3 MG/DL (ref 8–23)
CALCIUM SERPL-MCNC: 10.2 MG/DL (ref 8.8–10.4)
CREAT SERPL-MCNC: 0.6 MG/DL (ref 0.51–0.95)
EGFRCR SERPLBLD CKD-EPI 2021: 86 ML/MIN/1.73M2
PHOSPHATE SERPL-MCNC: 3.2 MG/DL (ref 2.5–4.5)
TSH SERPL DL<=0.005 MIU/L-ACNC: 1.4 UIU/ML (ref 0.3–4.2)

## 2025-07-08 PROCEDURE — 3078F DIAST BP <80 MM HG: CPT | Performed by: INTERNAL MEDICINE

## 2025-07-08 PROCEDURE — 82040 ASSAY OF SERUM ALBUMIN: CPT | Performed by: INTERNAL MEDICINE

## 2025-07-08 PROCEDURE — 84100 ASSAY OF PHOSPHORUS: CPT | Performed by: INTERNAL MEDICINE

## 2025-07-08 PROCEDURE — 82310 ASSAY OF CALCIUM: CPT | Performed by: INTERNAL MEDICINE

## 2025-07-08 PROCEDURE — 84520 ASSAY OF UREA NITROGEN: CPT | Performed by: INTERNAL MEDICINE

## 2025-07-08 PROCEDURE — 84443 ASSAY THYROID STIM HORMONE: CPT | Performed by: INTERNAL MEDICINE

## 2025-07-08 PROCEDURE — G2211 COMPLEX E/M VISIT ADD ON: HCPCS | Performed by: INTERNAL MEDICINE

## 2025-07-08 PROCEDURE — 36415 COLL VENOUS BLD VENIPUNCTURE: CPT | Performed by: INTERNAL MEDICINE

## 2025-07-08 PROCEDURE — 82306 VITAMIN D 25 HYDROXY: CPT | Performed by: INTERNAL MEDICINE

## 2025-07-08 PROCEDURE — 82565 ASSAY OF CREATININE: CPT | Performed by: INTERNAL MEDICINE

## 2025-07-08 PROCEDURE — 99214 OFFICE O/P EST MOD 30 MIN: CPT | Performed by: INTERNAL MEDICINE

## 2025-07-08 PROCEDURE — 3077F SYST BP >= 140 MM HG: CPT | Performed by: INTERNAL MEDICINE

## 2025-07-08 RX ORDER — EPINEPHRINE 1 MG/ML
0.3 INJECTION, SOLUTION INTRAMUSCULAR; SUBCUTANEOUS EVERY 5 MIN PRN
OUTPATIENT
Start: 2025-07-08

## 2025-07-08 RX ORDER — ALBUTEROL SULFATE 0.83 MG/ML
2.5 SOLUTION RESPIRATORY (INHALATION)
OUTPATIENT
Start: 2025-07-08

## 2025-07-08 RX ORDER — DIPHENHYDRAMINE HYDROCHLORIDE 50 MG/ML
25 INJECTION, SOLUTION INTRAMUSCULAR; INTRAVENOUS
Start: 2025-07-08

## 2025-07-08 RX ORDER — ACETAMINOPHEN 325 MG/1
650 TABLET ORAL
OUTPATIENT
Start: 2025-07-08

## 2025-07-08 RX ORDER — MEPERIDINE HYDROCHLORIDE 25 MG/ML
25 INJECTION INTRAMUSCULAR; INTRAVENOUS; SUBCUTANEOUS
OUTPATIENT
Start: 2025-07-08

## 2025-07-08 RX ORDER — ZOLEDRONIC ACID 0.05 MG/ML
5 INJECTION, SOLUTION INTRAVENOUS ONCE
Start: 2025-07-08

## 2025-07-08 RX ORDER — HEPARIN SODIUM,PORCINE 10 UNIT/ML
5-20 VIAL (ML) INTRAVENOUS DAILY PRN
OUTPATIENT
Start: 2025-07-08

## 2025-07-08 RX ORDER — DIPHENHYDRAMINE HYDROCHLORIDE 50 MG/ML
50 INJECTION, SOLUTION INTRAMUSCULAR; INTRAVENOUS
Start: 2025-07-08

## 2025-07-08 RX ORDER — HEPARIN SODIUM (PORCINE) LOCK FLUSH IV SOLN 100 UNIT/ML 100 UNIT/ML
5 SOLUTION INTRAVENOUS
OUTPATIENT
Start: 2025-07-08

## 2025-07-08 RX ORDER — METHYLPREDNISOLONE SODIUM SUCCINATE 40 MG/ML
40 INJECTION INTRAMUSCULAR; INTRAVENOUS
Start: 2025-07-08

## 2025-07-08 RX ORDER — ALBUTEROL SULFATE 90 UG/1
1-2 INHALANT RESPIRATORY (INHALATION)
Start: 2025-07-08

## 2025-07-08 NOTE — NURSING NOTE
Brenda Barker's goals for this visit include:   Chief Complaint   Patient presents with    Follow Up    Osteoporosis       She requests these members of her care team be copied on today's visit information: yes    PCP: Lynda Lopes    Referring Provider:  Referred Self, MD  No address on file    BP (!) 162/71   Pulse 61   Resp 16   Wt 70.8 kg (156 lb)   LMP 05/17/1972 (Exact Date)   SpO2 100%   BMI 29.48 kg/m      Do you need any medication refills at today's visit? None    Mehdi Kang, EMT
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
## 2025-07-08 NOTE — PATIENT INSTRUCTIONS
Weight bearing Exercises- walking.   Fall prevention  Adequate Calcium and vitamin D intake: for maintenance calcium 1200 mg daily through diet and supplement and vitamin D 1000 IU daily.    Schedule RECLAST infusion     2 servings of diary per day products below.   Milk                            8 oz            300 mg  Yogurt                          1 cup           300 mg  Hard cheese                     1.5 oz          300 mg  Cottage cheese                  2 cup           300 mg  Orange juice with Calcium       8 oz            300 mg  Low fat dairy sources are recommended        [facility] Address:   [place] Address:     [address] [place] [address]    Phone: [phone]  Fax: [phone]   [address] [place] [address]    Phone: [phone]  Fax: [phone]     [facility] Cost Estimate Phone Number: [phone]    General Lab and Imaging Scheduling Phone Number: [phone]      If you are interested in exploring research opportunities in the Division of Diabetes, Endocrinology and Metabolism and within the [facility] you are welcome to view the following QR codes by scanning them using your phone's camera to access a link to more information.  Participating in a research study is voluntary. Your decision whether or not to participate will not affect your current or future relations with the [facility] or [facility]. Current available studies are:     Type 1 Diabetes  Adults     Pediatrics     Type 2 Diabetes  Weight Loss - People Treated with Diet or Metformin Only     Pediatrics and Young Adults
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
## 2025-07-09 LAB — VIT D+METAB SERPL-MCNC: 42 NG/ML (ref 20–50)

## 2025-07-17 LAB
CALCIUM 24H UR-MRATE: 0.09 G/SPEC (ref 0.1–0.3)
CALCIUM UR-MCNC: 6.7 MG/DL
COLLECT DURATION TIME UR: 24 H
SPECIMEN VOL UR: 1375 ML

## 2025-07-18 DIAGNOSIS — M81.0 AGE-RELATED OSTEOPOROSIS WITHOUT CURRENT PATHOLOGICAL FRACTURE: ICD-10-CM

## 2025-07-18 DIAGNOSIS — E83.59 HYPOCALCIURIA: ICD-10-CM

## 2025-07-21 RX ORDER — FAMOTIDINE 20 MG
TABLET ORAL
Qty: 180 TABLET | Refills: 0 | Status: SHIPPED | OUTPATIENT
Start: 2025-07-21

## 2025-07-21 NOTE — TELEPHONE ENCOUNTER
Last Written Prescription:   Disp Refills Start End JADA   calcium carbonate antacid (TUMS ULTRA 1000) 1000 MG CHEW 180 tablet 3 7/8/2024 -- No   Sig - Route: Take 1 tablet (1,000 mg) by mouth 2 times daily (before meals) - Oral     ----------------------  Last Visit Date: 07/08/2025 Office Visit ANALILIA Christy   Future Visit Date: 7/14/26  ----------------------      Refill decision:   [x] Medication refilled per  Medication Refill in Ambulatory Care  policy.      Request from pharmacy:  Requested Prescriptions   Pending Prescriptions Disp Refills    ANTACID MAXIMUM 1000 MG CHEW [Pharmacy Med Name: ANTACID ULTRA CHEWABLE TABS BERRY] 180 tablet 3     Sig: CHEW AND SWALLOW 1 TABLET(1000 MG) BY MOUTH TWICE DAILY BEFORE MEALS       There is no refill protocol information for this order        Radha SANTIAGO RN  [facility] Central Nursing/Red Flag Triage & Med Refill Team

## 2025-08-07 ENCOUNTER — ANCILLARY PROCEDURE (OUTPATIENT)
Dept: ULTRASOUND IMAGING | Facility: CLINIC | Age: 89
End: 2025-08-07
Attending: INTERNAL MEDICINE
Payer: MEDICARE

## 2025-08-07 DIAGNOSIS — E04.1 THYROID NODULE: ICD-10-CM

## 2025-08-07 PROCEDURE — 76536 US EXAM OF HEAD AND NECK: CPT

## 2025-08-19 ENCOUNTER — PATIENT OUTREACH (OUTPATIENT)
Dept: CARE COORDINATION | Facility: CLINIC | Age: 89
End: 2025-08-19
Payer: MEDICARE

## (undated) DEVICE — ANTIFOG SOLUTION SEE SHARP 150M TROCAR SWABS 30978 (COI)

## (undated) DEVICE — LINEN TOWEL PACK X5 5464

## (undated) DEVICE — PREP CHLORAPREP 26ML TINTED HI-LITE ORANGE 930815

## (undated) DEVICE — SUCTION MANIFOLD NEPTUNE 2 SYS 4 PORT 0702-020-000

## (undated) DEVICE — PACK TOTAL HIP W/POUCH RIVERSIDE LATEX FREE

## (undated) DEVICE — DEVICE RETRIEVER HEWSON 71111579

## (undated) DEVICE — DAVINCI XI DRAPE ARM 470015

## (undated) DEVICE — SOL NACL 0.9% IRRIG 3000ML BAG 2B7477

## (undated) DEVICE — HOOD FLYTE W/PEELAWAY 408-800-100

## (undated) DEVICE — DRSG KERLIX 4 1/2"X4YDS ROLL 6730

## (undated) DEVICE — POSITIONER ABDUCTION PILLOW FOAM MED FP-ABDUCTM

## (undated) DEVICE — SU VICRYL+ 0 27 UR6 VLT VCP603H

## (undated) DEVICE — GLOVE PROTEXIS BLUE W/NEU-THERA 8.0  2D73EB80

## (undated) DEVICE — SUCTION IRR STRYKERFLOW II W/TIP 250-070-520

## (undated) DEVICE — STRAP STIRRUP W/SLIP 30187-030

## (undated) DEVICE — SU DERMABOND ADVANCED .7ML DNX12

## (undated) DEVICE — SOL WATER IRRIG 1000ML BOTTLE 2F7114

## (undated) DEVICE — ESU GROUND PAD UNIVERSAL W/O CORD

## (undated) DEVICE — BAG DECANTER STERILE WHITE DYNJDEC09

## (undated) DEVICE — ESU GROUND PAD ADULT W/CORD E7507

## (undated) DEVICE — DRAPE STERI TOWEL LG 1010

## (undated) DEVICE — GLOVE PROTEXIS W/NEU-THERA 8.0  2D73TE80

## (undated) DEVICE — DAVINCI XI OBTURATOR BLADELESS 8MM 470359

## (undated) DEVICE — BONE CLEANING TIP INTERPULSE  0210-010-000

## (undated) DEVICE — SUCTION CANISTER MEDIVAC LINER 3000ML W/LID 65651-530

## (undated) DEVICE — BLADE KNIFE SURG 20 371120

## (undated) DEVICE — SPONGE LAP 18X18" X8435

## (undated) DEVICE — CLEANER INST PRE-KLENZ SOAK SHIELD TUBE 6 ML MEDIUM 2D66J4

## (undated) DEVICE — DRSG AQUACEL AG 3.5X9.75" HYDROFIBER 412011

## (undated) DEVICE — GLOVE BIOGEL PI MICRO SZ 8.0 48580

## (undated) DEVICE — SOL NACL 0.9% IRRIG 1000ML BOTTLE 2F7124

## (undated) DEVICE — SPONGE PACK VAGINAL 2"X9

## (undated) DEVICE — SU MONOCRYL 3-0 PS-1 27" Y936H

## (undated) DEVICE — GOWN IMPERVIOUS SPECIALTY XLG/XLONG 32474

## (undated) DEVICE — SU ETHIBOND 5 V-37 4X30" MB66G

## (undated) DEVICE — SUCTION IRR SYSTEM W/O TIP INTERPULSE HANDPIECE 0210-100-000

## (undated) DEVICE — SYR 03ML LL W/O NDL 309657

## (undated) DEVICE — DEVICE SUTURE PASSER 14GA WECK EFX EFXSP2

## (undated) DEVICE — DRAPE IOBAN INCISE 36X23" 6651EZ

## (undated) DEVICE — LINEN ORTHO PACK 5446

## (undated) DEVICE — DRAPE SHEET REV FOLD 3/4 9349

## (undated) DEVICE — SU VICRYL 4-0 PS-2 18" UND J496H

## (undated) DEVICE — LIGHT HANDLE X2

## (undated) DEVICE — SU STRATAFIX PDS PLUS 1 CT-1 18" SXPP1A404

## (undated) DEVICE — GLOVE BIOGEL PI MICRO INDICATOR UNDERGLOVE SZ 8.0 48980

## (undated) DEVICE — BLADE SAW RECIP STRK 70X6X0.025MM 0277-096-250S5

## (undated) DEVICE — LINEN MAYO STAND COVER OVERSIZE PACK 5458

## (undated) DEVICE — SU VICRYL 0 CT 36" J358H

## (undated) DEVICE — SU STRATAFIX SYMMETRIC PDS PLUS #0 36CM CT-1 SXPP1A425

## (undated) DEVICE — GOWN XLG DISP 9545

## (undated) DEVICE — SUCTION MANIFOLD DORNOCH ULTRA CART UL-CL500

## (undated) DEVICE — DRILL BIT FLEXIBLE REFLECTION 35MM 71362935

## (undated) DEVICE — DAVINCI XI SEAL UNIVERSAL 5-12MM 470500

## (undated) DEVICE — CEMENT PRESSURIZER FEMORAL CANAL MED 0206-546-000

## (undated) DEVICE — BONE CEMENT MIXEVAC HI VAC W/CARTRIDGE 0306-563-000

## (undated) DEVICE — BNDG ABDOMINAL BINDER 9X45-62" 79-89071

## (undated) DEVICE — BONE CLEANING TIP INTERPULSE FEMORAL CANAL 0210-008-000

## (undated) DEVICE — PACK LAP CHOLE SLC15LCFSD

## (undated) DEVICE — GLOVE PROTEXIS POWDER FREE 7.5 ORTHOPEDIC 2D73ET75

## (undated) DEVICE — DAVINCI XI DRAPE COLUMN 470341

## (undated) DEVICE — DRSG STERI STRIP 1/2X4" R1547

## (undated) DEVICE — DAVINCI HOT SHEARS TIP COVER  400180

## (undated) DEVICE — STRAP KNEE/BODY 31143004

## (undated) DEVICE — SU VICRYL 2-0 CT-2 27" UND J269H

## (undated) DEVICE — SU VICRYL 2-0 CT-1 27" UND J259H

## (undated) RX ORDER — GLYCOPYRROLATE 0.2 MG/ML
INJECTION, SOLUTION INTRAMUSCULAR; INTRAVENOUS
Status: DISPENSED
Start: 2018-06-27

## (undated) RX ORDER — ACETAMINOPHEN 500 MG
TABLET ORAL
Status: DISPENSED
Start: 2025-04-08

## (undated) RX ORDER — PHENYLEPHRINE HCL IN 0.9% NACL 1 MG/10 ML
SYRINGE (ML) INTRAVENOUS
Status: DISPENSED
Start: 2018-06-27

## (undated) RX ORDER — PROPOFOL 10 MG/ML
INJECTION, EMULSION INTRAVENOUS
Status: DISPENSED
Start: 2025-04-08

## (undated) RX ORDER — FENTANYL CITRATE 50 UG/ML
INJECTION, SOLUTION INTRAMUSCULAR; INTRAVENOUS
Status: DISPENSED
Start: 2018-06-27

## (undated) RX ORDER — ONDANSETRON 2 MG/ML
INJECTION INTRAMUSCULAR; INTRAVENOUS
Status: DISPENSED
Start: 2025-04-08

## (undated) RX ORDER — ONDANSETRON 2 MG/ML
INJECTION INTRAMUSCULAR; INTRAVENOUS
Status: DISPENSED
Start: 2018-06-27

## (undated) RX ORDER — GABAPENTIN 300 MG/1
CAPSULE ORAL
Status: DISPENSED
Start: 2018-06-27

## (undated) RX ORDER — CEFAZOLIN SODIUM 2 G/100ML
INJECTION, SOLUTION INTRAVENOUS
Status: DISPENSED
Start: 2018-06-27

## (undated) RX ORDER — BUPIVACAINE HYDROCHLORIDE AND EPINEPHRINE 2.5; 5 MG/ML; UG/ML
INJECTION, SOLUTION EPIDURAL; INFILTRATION; INTRACAUDAL; PERINEURAL
Status: DISPENSED
Start: 2025-04-08

## (undated) RX ORDER — GABAPENTIN 100 MG/1
CAPSULE ORAL
Status: DISPENSED
Start: 2018-06-27

## (undated) RX ORDER — HYDROMORPHONE HCL IN WATER/PF 6 MG/30 ML
PATIENT CONTROLLED ANALGESIA SYRINGE INTRAVENOUS
Status: DISPENSED
Start: 2025-04-08

## (undated) RX ORDER — LIDOCAINE HYDROCHLORIDE 20 MG/ML
INJECTION, SOLUTION EPIDURAL; INFILTRATION; INTRACAUDAL; PERINEURAL
Status: DISPENSED
Start: 2018-06-27

## (undated) RX ORDER — HEPARIN SODIUM 5000 [USP'U]/.5ML
INJECTION, SOLUTION INTRAVENOUS; SUBCUTANEOUS
Status: DISPENSED
Start: 2025-04-08

## (undated) RX ORDER — HYDROMORPHONE HYDROCHLORIDE 1 MG/ML
INJECTION, SOLUTION INTRAMUSCULAR; INTRAVENOUS; SUBCUTANEOUS
Status: DISPENSED
Start: 2025-04-08

## (undated) RX ORDER — EPINEPHRINE 1 MG/ML
INJECTION, SOLUTION INTRAMUSCULAR; SUBCUTANEOUS
Status: DISPENSED
Start: 2018-06-27

## (undated) RX ORDER — FENTANYL CITRATE 50 UG/ML
INJECTION, SOLUTION INTRAMUSCULAR; INTRAVENOUS
Status: DISPENSED
Start: 2025-04-08

## (undated) RX ORDER — DEXAMETHASONE SODIUM PHOSPHATE 4 MG/ML
INJECTION, SOLUTION INTRA-ARTICULAR; INTRALESIONAL; INTRAMUSCULAR; INTRAVENOUS; SOFT TISSUE
Status: DISPENSED
Start: 2025-04-08

## (undated) RX ORDER — HYDROMORPHONE HYDROCHLORIDE 1 MG/ML
INJECTION, SOLUTION INTRAMUSCULAR; INTRAVENOUS; SUBCUTANEOUS
Status: DISPENSED
Start: 2018-06-27

## (undated) RX ORDER — GLYCOPYRROLATE 0.2 MG/ML
INJECTION, SOLUTION INTRAMUSCULAR; INTRAVENOUS
Status: DISPENSED
Start: 2025-04-08

## (undated) RX ORDER — ACETAMINOPHEN 325 MG/1
TABLET ORAL
Status: DISPENSED
Start: 2018-06-27

## (undated) RX ORDER — CEFAZOLIN SODIUM 1 G/3ML
INJECTION, POWDER, FOR SOLUTION INTRAMUSCULAR; INTRAVENOUS
Status: DISPENSED
Start: 2018-06-27

## (undated) RX ORDER — FENTANYL CITRATE 0.05 MG/ML
INJECTION, SOLUTION INTRAMUSCULAR; INTRAVENOUS
Status: DISPENSED
Start: 2025-04-08

## (undated) RX ORDER — PROPOFOL 10 MG/ML
INJECTION, EMULSION INTRAVENOUS
Status: DISPENSED
Start: 2018-06-27